# Patient Record
Sex: MALE | Race: WHITE | NOT HISPANIC OR LATINO | Employment: OTHER | URBAN - METROPOLITAN AREA
[De-identification: names, ages, dates, MRNs, and addresses within clinical notes are randomized per-mention and may not be internally consistent; named-entity substitution may affect disease eponyms.]

---

## 2018-08-12 ENCOUNTER — APPOINTMENT (OUTPATIENT)
Dept: GENERAL RADIOLOGY | Facility: CLINIC | Age: 58
End: 2018-08-12
Attending: NURSE PRACTITIONER
Payer: COMMERCIAL

## 2018-08-12 ENCOUNTER — NURSE TRIAGE (OUTPATIENT)
Dept: NURSING | Facility: CLINIC | Age: 58
End: 2018-08-12

## 2018-08-12 ENCOUNTER — HOSPITAL ENCOUNTER (OUTPATIENT)
Facility: CLINIC | Age: 58
Setting detail: OBSERVATION
Discharge: HOME | End: 2018-08-13
Attending: NURSE PRACTITIONER | Admitting: INTERNAL MEDICINE
Payer: COMMERCIAL

## 2018-08-12 DIAGNOSIS — E87.6 DIURETIC-INDUCED HYPOKALEMIA: ICD-10-CM

## 2018-08-12 DIAGNOSIS — R60.0 BILATERAL LEG EDEMA: ICD-10-CM

## 2018-08-12 DIAGNOSIS — I10 HYPERTENSION, UNSPECIFIED TYPE: ICD-10-CM

## 2018-08-12 DIAGNOSIS — E11.9 NEW ONSET TYPE 2 DIABETES MELLITUS (H): ICD-10-CM

## 2018-08-12 DIAGNOSIS — F43.22 ADJUSTMENT DISORDER WITH ANXIOUS MOOD: ICD-10-CM

## 2018-08-12 DIAGNOSIS — J90 PLEURAL EFFUSION: ICD-10-CM

## 2018-08-12 DIAGNOSIS — I50.9 PLEURAL EFFUSION DUE TO CONGESTIVE HEART FAILURE (H): ICD-10-CM

## 2018-08-12 DIAGNOSIS — I50.9 ACUTE CONGESTIVE HEART FAILURE, UNSPECIFIED CONGESTIVE HEART FAILURE TYPE: Primary | ICD-10-CM

## 2018-08-12 DIAGNOSIS — J90 EXUDATIVE PLEURISY: ICD-10-CM

## 2018-08-12 DIAGNOSIS — Z87.891 PERSONAL HISTORY OF TOBACCO USE, PRESENTING HAZARDS TO HEALTH: ICD-10-CM

## 2018-08-12 DIAGNOSIS — M79.89 SWELLING OF LIMB: ICD-10-CM

## 2018-08-12 DIAGNOSIS — E11.9 DIABETES MELLITUS WITHOUT COMPLICATION (H): ICD-10-CM

## 2018-08-12 DIAGNOSIS — T50.2X5A DIURETIC-INDUCED HYPOKALEMIA: ICD-10-CM

## 2018-08-12 PROBLEM — F10.10 ALCOHOL ABUSE: Status: ACTIVE | Noted: 2018-08-12

## 2018-08-12 PROBLEM — R17 ELEVATED BILIRUBIN: Status: ACTIVE | Noted: 2018-08-12

## 2018-08-12 PROBLEM — E87.1 HYPONATREMIA: Status: ACTIVE | Noted: 2018-08-12

## 2018-08-12 PROBLEM — R00.0 SINUS TACHYCARDIA: Status: ACTIVE | Noted: 2018-08-12

## 2018-08-12 PROBLEM — R03.0 ELEVATED BLOOD PRESSURE READING WITHOUT DIAGNOSIS OF HYPERTENSION: Status: ACTIVE | Noted: 2018-08-12

## 2018-08-12 LAB
ALBUMIN SERPL-MCNC: 3.5 G/DL (ref 3.4–5)
ALP SERPL-CCNC: 98 U/L (ref 40–150)
ALT SERPL W P-5'-P-CCNC: 55 U/L (ref 0–70)
ANION GAP SERPL CALCULATED.3IONS-SCNC: 12 MMOL/L (ref 3–14)
APTT PPP: 30 SEC (ref 22–37)
AST SERPL W P-5'-P-CCNC: 44 U/L (ref 0–45)
BASOPHILS # BLD AUTO: 0.1 10E9/L (ref 0–0.2)
BASOPHILS NFR BLD AUTO: 0.4 %
BILIRUB SERPL-MCNC: 1.6 MG/DL (ref 0.2–1.3)
BUN SERPL-MCNC: 17 MG/DL (ref 7–30)
CALCIUM SERPL-MCNC: 8.6 MG/DL (ref 8.5–10.1)
CHLORIDE SERPL-SCNC: 96 MMOL/L (ref 94–109)
CO2 SERPL-SCNC: 23 MMOL/L (ref 20–32)
CREAT SERPL-MCNC: 1.01 MG/DL (ref 0.66–1.25)
CRP SERPL-MCNC: 14.1 MG/L (ref 0–8)
DIFFERENTIAL METHOD BLD: ABNORMAL
EOSINOPHIL NFR BLD AUTO: 0.8 %
ERYTHROCYTE [DISTWIDTH] IN BLOOD BY AUTOMATED COUNT: 12.1 % (ref 10–15)
GFR SERPL CREATININE-BSD FRML MDRD: 75 ML/MIN/1.7M2
GLUCOSE BLDC GLUCOMTR-MCNC: 381 MG/DL (ref 70–99)
GLUCOSE SERPL-MCNC: 343 MG/DL (ref 70–99)
HBA1C MFR BLD: 11.6 % (ref 0–5.6)
HCT VFR BLD AUTO: 47.4 % (ref 40–53)
HGB BLD-MCNC: 16.2 G/DL (ref 13.3–17.7)
IMM GRANULOCYTES # BLD: 0.1 10E9/L (ref 0–0.4)
IMM GRANULOCYTES NFR BLD: 0.4 %
INR PPP: 1.12 (ref 0.86–1.14)
LYMPHOCYTES # BLD AUTO: 1.4 10E9/L (ref 0.8–5.3)
LYMPHOCYTES NFR BLD AUTO: 12.1 %
MCH RBC QN AUTO: 31.8 PG (ref 26.5–33)
MCHC RBC AUTO-ENTMCNC: 34.2 G/DL (ref 31.5–36.5)
MCV RBC AUTO: 93 FL (ref 78–100)
MONOCYTES # BLD AUTO: 1.4 10E9/L (ref 0–1.3)
MONOCYTES NFR BLD AUTO: 11.9 %
NEUTROPHILS # BLD AUTO: 8.8 10E9/L (ref 1.6–8.3)
NEUTROPHILS NFR BLD AUTO: 74.4 %
NRBC # BLD AUTO: 0 10*3/UL
NRBC BLD AUTO-RTO: 0 /100
NT-PROBNP SERPL-MCNC: 9087 PG/ML (ref 0–900)
PLATELET # BLD AUTO: 215 10E9/L (ref 150–450)
POTASSIUM SERPL-SCNC: 4.3 MMOL/L (ref 3.4–5.3)
PROT SERPL-MCNC: 6.6 G/DL (ref 6.8–8.8)
RBC # BLD AUTO: 5.1 10E12/L (ref 4.4–5.9)
SODIUM SERPL-SCNC: 131 MMOL/L (ref 133–144)
T4 FREE SERPL-MCNC: 1.41 NG/DL (ref 0.76–1.46)
TROPONIN I SERPL-MCNC: 0.02 UG/L (ref 0–0.04)
TROPONIN I SERPL-MCNC: 0.02 UG/L (ref 0–0.04)
TSH SERPL DL<=0.005 MIU/L-ACNC: 4.88 MU/L (ref 0.4–4)
WBC # BLD AUTO: 11.8 10E9/L (ref 4–11)

## 2018-08-12 PROCEDURE — 84443 ASSAY THYROID STIM HORMONE: CPT | Performed by: NURSE PRACTITIONER

## 2018-08-12 PROCEDURE — 00000146 ZZHCL STATISTIC GLUCOSE BY METER IP

## 2018-08-12 PROCEDURE — 96374 THER/PROPH/DIAG INJ IV PUSH: CPT | Performed by: NURSE PRACTITIONER

## 2018-08-12 PROCEDURE — G0378 HOSPITAL OBSERVATION PER HR: HCPCS

## 2018-08-12 PROCEDURE — 85610 PROTHROMBIN TIME: CPT | Performed by: NURSE PRACTITIONER

## 2018-08-12 PROCEDURE — 80053 COMPREHEN METABOLIC PANEL: CPT | Performed by: NURSE PRACTITIONER

## 2018-08-12 PROCEDURE — 99285 EMERGENCY DEPT VISIT HI MDM: CPT | Performed by: NURSE PRACTITIONER

## 2018-08-12 PROCEDURE — 83036 HEMOGLOBIN GLYCOSYLATED A1C: CPT | Performed by: NURSE PRACTITIONER

## 2018-08-12 PROCEDURE — 99219 ZZC INITIAL OBSERVATION CARE,LEVL II: CPT | Performed by: INTERNAL MEDICINE

## 2018-08-12 PROCEDURE — 96375 TX/PRO/DX INJ NEW DRUG ADDON: CPT | Performed by: NURSE PRACTITIONER

## 2018-08-12 PROCEDURE — 99285 EMERGENCY DEPT VISIT HI MDM: CPT | Mod: 25 | Performed by: NURSE PRACTITIONER

## 2018-08-12 PROCEDURE — 25000128 H RX IP 250 OP 636: Performed by: NURSE PRACTITIONER

## 2018-08-12 PROCEDURE — 84439 ASSAY OF FREE THYROXINE: CPT | Performed by: NURSE PRACTITIONER

## 2018-08-12 PROCEDURE — 93005 ELECTROCARDIOGRAM TRACING: CPT | Performed by: NURSE PRACTITIONER

## 2018-08-12 PROCEDURE — 36415 COLL VENOUS BLD VENIPUNCTURE: CPT | Performed by: NURSE PRACTITIONER

## 2018-08-12 PROCEDURE — 84484 ASSAY OF TROPONIN QUANT: CPT | Mod: 91 | Performed by: NURSE PRACTITIONER

## 2018-08-12 PROCEDURE — 71046 X-RAY EXAM CHEST 2 VIEWS: CPT | Mod: TC

## 2018-08-12 PROCEDURE — 85730 THROMBOPLASTIN TIME PARTIAL: CPT | Performed by: NURSE PRACTITIONER

## 2018-08-12 PROCEDURE — 86140 C-REACTIVE PROTEIN: CPT | Performed by: NURSE PRACTITIONER

## 2018-08-12 PROCEDURE — 83880 ASSAY OF NATRIURETIC PEPTIDE: CPT | Performed by: NURSE PRACTITIONER

## 2018-08-12 PROCEDURE — 93010 ELECTROCARDIOGRAM REPORT: CPT | Mod: Z6 | Performed by: NURSE PRACTITIONER

## 2018-08-12 PROCEDURE — 25000132 ZZH RX MED GY IP 250 OP 250 PS 637: Performed by: INTERNAL MEDICINE

## 2018-08-12 PROCEDURE — 85025 COMPLETE CBC W/AUTO DIFF WBC: CPT | Performed by: NURSE PRACTITIONER

## 2018-08-12 RX ORDER — LANOLIN ALCOHOL/MO/W.PET/CERES
100 CREAM (GRAM) TOPICAL DAILY
Status: DISCONTINUED | OUTPATIENT
Start: 2018-08-12 | End: 2018-08-13 | Stop reason: HOSPADM

## 2018-08-12 RX ORDER — ACETAMINOPHEN 650 MG/1
650 SUPPOSITORY RECTAL EVERY 4 HOURS PRN
Status: DISCONTINUED | OUTPATIENT
Start: 2018-08-12 | End: 2018-08-13 | Stop reason: HOSPADM

## 2018-08-12 RX ORDER — LISINOPRIL 2.5 MG/1
5 TABLET ORAL DAILY
Status: DISCONTINUED | OUTPATIENT
Start: 2018-08-12 | End: 2018-08-13 | Stop reason: HOSPADM

## 2018-08-12 RX ORDER — FUROSEMIDE 40 MG
40 TABLET ORAL
Status: DISCONTINUED | OUTPATIENT
Start: 2018-08-13 | End: 2018-08-13 | Stop reason: HOSPADM

## 2018-08-12 RX ORDER — METOPROLOL TARTRATE 50 MG
50 TABLET ORAL 2 TIMES DAILY
Status: DISCONTINUED | OUTPATIENT
Start: 2018-08-12 | End: 2018-08-13 | Stop reason: HOSPADM

## 2018-08-12 RX ORDER — DEXTROSE MONOHYDRATE 25 G/50ML
25-50 INJECTION, SOLUTION INTRAVENOUS
Status: DISCONTINUED | OUTPATIENT
Start: 2018-08-12 | End: 2018-08-13 | Stop reason: HOSPADM

## 2018-08-12 RX ORDER — NALOXONE HYDROCHLORIDE 0.4 MG/ML
.1-.4 INJECTION, SOLUTION INTRAMUSCULAR; INTRAVENOUS; SUBCUTANEOUS
Status: DISCONTINUED | OUTPATIENT
Start: 2018-08-12 | End: 2018-08-13 | Stop reason: HOSPADM

## 2018-08-12 RX ORDER — FOLIC ACID 1 MG/1
1 TABLET ORAL DAILY
Status: DISCONTINUED | OUTPATIENT
Start: 2018-08-12 | End: 2018-08-13 | Stop reason: HOSPADM

## 2018-08-12 RX ORDER — NICOTINE POLACRILEX 4 MG
15-30 LOZENGE BUCCAL
Status: DISCONTINUED | OUTPATIENT
Start: 2018-08-12 | End: 2018-08-13 | Stop reason: HOSPADM

## 2018-08-12 RX ORDER — ONDANSETRON 2 MG/ML
4 INJECTION INTRAMUSCULAR; INTRAVENOUS EVERY 6 HOURS PRN
Status: DISCONTINUED | OUTPATIENT
Start: 2018-08-12 | End: 2018-08-13 | Stop reason: HOSPADM

## 2018-08-12 RX ORDER — FUROSEMIDE 10 MG/ML
20 INJECTION INTRAMUSCULAR; INTRAVENOUS ONCE
Status: COMPLETED | OUTPATIENT
Start: 2018-08-12 | End: 2018-08-12

## 2018-08-12 RX ORDER — ONDANSETRON 4 MG/1
4 TABLET, ORALLY DISINTEGRATING ORAL EVERY 6 HOURS PRN
Status: DISCONTINUED | OUTPATIENT
Start: 2018-08-12 | End: 2018-08-13 | Stop reason: HOSPADM

## 2018-08-12 RX ORDER — ACETAMINOPHEN 325 MG/1
650 TABLET ORAL EVERY 4 HOURS PRN
Status: DISCONTINUED | OUTPATIENT
Start: 2018-08-12 | End: 2018-08-13 | Stop reason: HOSPADM

## 2018-08-12 RX ORDER — LORAZEPAM 2 MG/ML
1 INJECTION INTRAMUSCULAR ONCE
Status: COMPLETED | OUTPATIENT
Start: 2018-08-12 | End: 2018-08-12

## 2018-08-12 RX ADMIN — Medication 100 MG: at 23:59

## 2018-08-12 RX ADMIN — METFORMIN HYDROCHLORIDE 500 MG: 500 TABLET ORAL at 23:59

## 2018-08-12 RX ADMIN — LORAZEPAM 1 MG: 2 INJECTION INTRAMUSCULAR; INTRAVENOUS at 20:12

## 2018-08-12 RX ADMIN — FOLIC ACID 1 MG: 1 TABLET ORAL at 23:59

## 2018-08-12 RX ADMIN — FUROSEMIDE 20 MG: 10 INJECTION, SOLUTION INTRAVENOUS at 19:27

## 2018-08-12 RX ADMIN — METOPROLOL TARTRATE 50 MG: 50 TABLET, FILM COATED ORAL at 23:59

## 2018-08-12 ASSESSMENT — ENCOUNTER SYMPTOMS: SHORTNESS OF BREATH: 1

## 2018-08-12 NOTE — IP AVS SNAPSHOT
MRN:[id number]                      After Visit Summary   8/12/2018    Sushil Diana    MRN: [id number]           Thank you!     Thank you for choosing [facility] for your care. Our goal is always to provide you with excellent care. Hearing back from our patients is one way we can continue to improve our services. Please take a few minutes to complete the written survey that you may receive in the mail after you visit with us. Thank you!        Patient Information     Date Of Birth          1960        About your hospital stay     You were admitted on:  August 12, 2018 You last received care in the:  [address]    You were discharged on:  August 13, 2018        Reason for your hospital stay       Hospitalized for swelling in your legs and after testing was done, you have a new diagnosis of heart failure, high blood pressure, and diabetes.                  Who to Call     For medical emergencies, please call 911.  For non-urgent questions about your medical care, please call your primary care provider or clinic, [phone]          Attending Provider     Provider Specialty    Kendra Hill APRN CNP Nurse Practitioner - Family    Ben Ventura MD Internal Medicine       Primary Care Provider Office Phone # Fax #    [facility] [phone]       When to contact your care team       Call your primary doctor if you have any of the following: starting to shake or have tremors,  increased shortness of breath or increased swelling.  Symptoms/Problems to look for at home and call the physician about:                  After Care Instructions     Activity       Your activity upon discharge: activity as tolerated            Diet       Follow this diet upon discharge: Orders Placed This Encounter      Room Service      Combination Diet [id number] Calories: Moderate Consistent CHO (4-6 CHO units/meal); 2 gm NA Diet            Monitor and  record       blood glucose 4 times a day, before meals and at bedtime  blood pressure daily  weight every day                  Follow-up Appointments     Follow-up and recommended labs and tests        Follow up with primary care provider, [facility], within 2 weeks, for hospital follow- up and regarding new diagnosis. The following labs/tests are recommended: BMP.                  Your next 10 appointments already scheduled     Aug 22, 2018  8:20 AM CDT   [facility]    [address] [facility] [address] [phone]           Bring a current list of meds and any records pertaining to this visit. For Physicals, please bring immunization records and any forms needing to be filled out. Please arrive 10 minutes early to complete paperwork.              Additional Services     DIABETES EDUCATOR REFERRAL       DIABETES SELF MANAGEMENT TRAINING (DSMT)      Your provider has referred you to Diabetes Education: [facility]: Diabetes Education - [facility] [phone]   [email or web address]     If an urgent visit is needed or A1C is above 12, Care Team to call the Diabetes  Education Team at [phone] or send an In Basket message to the Diabetes Education Pool (P DIAB ED-PATIENT CARE).    A  will call you to make your appointment. If it has been more than 3 business days since your referral was placed, please call the above phone number to schedule.    Type of training and number of hours: New Diagnosis: Initial group DSMT - 10 hours.      Diabetes Type: Type 2 - On Oral Medication   Medicare covers: 10 hours of initial DSMT in 12 month period from the time of first visit, plus 2 hours of follow-up DSMT annually, and additional hours as requested for insulin training.         Diabetes Co-Morbidities: hypertension and mental/affective disorder                A1C Goal:  <7.0       A1C is: Lab Results       Component                Value               Date                       A1C                      11.6                08/12/2018              MEDICAL NUTRITION THERAPY (MNT) for Diabetes    Medical Nutrition Therapy with a Registered Dietitian can be provided in coordination with Diabetes Self-Management Training to assist in achieving optimal diabetes management.    MNT Type and Hours: New diagnosis: Initial MNT - 3 hours                       Medicare will cover: 3 hours initial MNT in 12 month period after first visit, plus 2 hours of follow-up MNT annually        Diabetes Education Topics: Comprehensive Knowledge Assessment and Instruction    Special Educational Needs Requiring Individual DSMT: None      Please be aware that coverage of these services is subject to the terms and limitations of your health insurance plan.  Call member services at your health plan to determine Diabetes Self-Management Training (Codes  and ) and Medical Nutrition Therapy (Codes 53021 and 21894) benefits and ask which blood glucose monitor brands are covered by your plan.  Please bring the following with you to your appointment:    (1)  List of current medications   (2)  List of Blood Glucose Monitor brands that are covered by your insurance plan  (3)  Blood Glucose Monitor and log book  (4)   Food records for the 3 days prior to your visit    The Certified Diabetes Educator may make diabetes medication adjustments per the CDE Protocol and Collaborative Practice Agreement.                  Pending Results     No orders found for last 3 day(s).            Statement of Approval     Ordered          08/13/18 1337  I have reviewed and agree with all the recommendations and orders detailed in this document.  EFFECTIVE NOW     Approved and electronically signed by:  Ben Mazariegos MD             Admission Information     Date & Time Provider Department Dept. Phone     "2018 Ben Ventura MD [address] [facility] [phone]      Your Vitals Were     Blood Pressure Pulse Temperature Respirations Height Weight    116/83 97 95.9  F (35.5  C) (Oral) 20 1.727 m (5' 8\") 87.1 kg (192 lb 0.3 oz)    Pulse Oximetry BMI (Body Mass Index)                97% 29.2 kg/m2          [facility] Information     [facility] lets you send messages to your doctor, view your test results, renew your prescriptions, schedule appointments and more. To sign up, go to [email or web address] . Click on \"Log in\" on the left side of the screen, which will take you to the Welcome page. Then click on \"Sign up Now\" on the right side of the page.     You will be asked to enter the access code listed below, as well as some personal information. Please follow the directions to create your username and password.     Your access code is: [email or web address]  Expires: 2018 11:53 AM     Your access code will  in 90 days. If you need help or a new code, please call your [facility] clinic or [phone].        Care EveryWhere ID     This is your Care EveryWhere ID. This could be used by other organizations to access your [facility] medical records  [id number]        Equal Access to Services     LALY DIAZ AH: Hadii ravinder houser hadasho Sovictor manuel, waaxda luqadaha, qaybta kaalmada eulalio, mariposa khan. So [facility] [phone].    ATENCIÓN: Si habla español, tiene a devi disposición servicios gratuitos de asistencia lingüística. aMlgorzata al [phone].    We comply with applicable federal civil rights laws and [place] laws. We do not discriminate on the basis of race, color, national origin, age, disability, sex, sexual orientation, or gender identity.               Review of your medicines      START taking        Dose / Directions    aspirin 81 MG chewable tablet        Dose:  81 mg   Take 1 tablet (81 mg) by mouth daily   Quantity:  108 tablet   Refills:  3       " atorvastatin 20 MG tablet   Commonly known as:  LIPITOR   Used for:  Diabetes mellitus without complication (H)        Dose:  20 mg   Take 1 tablet (20 mg) by mouth daily   Quantity:  30 tablet   Refills:  0       blood glucose lancets standard   Commonly known as:  no brand specified   Used for:  Diabetes mellitus without complication (H)        Use to test blood sugar 1-3 times daily or as directed.   Quantity:  100 each   Refills:  11       blood glucose monitoring meter device kit   Commonly known as:  no brand specified   Used for:  Diabetes mellitus without complication (H)        Use to test blood sugar 1-3 times daily or as directed.   Quantity:  1 kit   Refills:  0       blood glucose monitoring test strip   Commonly known as:  no brand specified   Used for:  Diabetes mellitus without complication (H)        Use to test blood sugars 1-3 times daily or as directed   Quantity:  100 strip   Refills:  0       citalopram 20 MG tablet   Commonly known as:  celeXA   Used for:  Adjustment disorder with anxious mood        Take 1/2 tablet (10 mg) for 1-2 weeks, then increase to 1 tablet orally daily   Quantity:  30 tablet   Refills:  0       furosemide 40 MG tablet   Commonly known as:  LASIX   Used for:  Swelling of limb        Dose:  40 mg   Take 1 tablet (40 mg) by mouth 2 times daily   Quantity:  60 tablet   Refills:  0       glipiZIDE 5 MG tablet   Commonly known as:  GLUCOTROL   Used for:  Diabetes mellitus without complication (H)        Dose:  5 mg   Take 1 tablet (5 mg) by mouth every morning (before breakfast)   Quantity:  30 tablet   Refills:  0       lisinopril 10 MG tablet   Commonly known as:  PRINIVIL/ZESTRIL   Used for:  Hypertension, unspecified type        Dose:  10 mg   Start taking on:  8/14/2018   Take 1 tablet (10 mg) by mouth daily   Quantity:  30 tablet   Refills:  0       metFORMIN 500 MG 24 hr tablet   Commonly known as:  GLUCOPHAGE-XR        Dose:  1000 mg   Take 2 tablets (1,000 mg) by  mouth daily (with dinner)   Quantity:  60 tablet   Refills:  0       metoprolol tartrate 50 MG tablet   Commonly known as:  LOPRESSOR   Used for:  Hypertension, unspecified type        Dose:  50 mg   Take 1 tablet (50 mg) by mouth 2 times daily   Quantity:  60 tablet   Refills:  0       potassium chloride SA 20 MEQ CR tablet   Commonly known as:  KLOR-CON   Used for:  Bilateral leg edema, Diuretic-induced hypokalemia        Dose:  20 mEq   Take 1 tablet (20 mEq) by mouth daily   Quantity:  30 tablet   Refills:  0         CONTINUE these medicines which have NOT CHANGED        Dose / Directions    omeprazole 20 MG CR capsule   Commonly known as:  priLOSEC   Used for:  Esophageal reflux        1 CAPSULE DAILY   Quantity:  30   Refills:  0         STOP taking     allopurinol 300 MG tablet   Commonly known as:  ZYLOPRIM           ibuprofen 600 MG tablet   Commonly known as:  ADVIL/MOTRIN           SILVADENE 1 % cream   Generic drug:  silver sulfADIAZINE           VICODIN 5-500 MG per tablet   Generic drug:  HYDROcodone-acetaminophen                Where to get your medicines      These medications were sent to [facility] [place] [id number] [facility]  [address] [place] [address]     Phone:  [phone]     aspirin 81 MG chewable tablet         These medications were sent to [facility] [place] [id number] [facility]  [address] [facility] [address]     Phone:  [phone]     atorvastatin 20 MG tablet    blood glucose lancets standard    blood glucose monitoring meter device kit    blood glucose monitoring test strip    citalopram 20 MG tablet    furosemide 40 MG tablet    glipiZIDE 5 MG tablet    lisinopril 10 MG tablet    metFORMIN 500 MG 24 hr tablet    metoprolol tartrate 50 MG tablet    potassium chloride SA 20 MEQ CR tablet                Protect others around you: Learn how to safely use, store and throw away your medicines at  www.disposemymeds.org.             Medication List: This is a list of all your medications and when to take them. Check marks below indicate your daily home schedule. Keep this list as a reference.      Medications           Morning Afternoon Evening Bedtime As Needed    aspirin 81 MG chewable tablet   Take 1 tablet (81 mg) by mouth daily                                   atorvastatin 20 MG tablet   Commonly known as:  LIPITOR   Take 1 tablet (20 mg) by mouth daily                                   blood glucose lancets standard   Commonly known as:  no brand specified   Use to test blood sugar 1-3 times daily or as directed.                                blood glucose monitoring meter device kit   Commonly known as:  no brand specified   Use to test blood sugar 1-3 times daily or as directed.                                blood glucose monitoring test strip   Commonly known as:  no brand specified   Use to test blood sugars 1-3 times daily or as directed                                citalopram 20 MG tablet   Commonly known as:  celeXA   Take 1/2 tablet (10 mg) for 1-2 weeks, then increase to 1 tablet orally daily                                   furosemide 40 MG tablet   Commonly known as:  LASIX   Take 1 tablet (40 mg) by mouth 2 times daily   Last time this was given:  40 mg on 8/13/2018 10:15 AM                                      glipiZIDE 5 MG tablet   Commonly known as:  GLUCOTROL   Take 1 tablet (5 mg) by mouth every morning (before breakfast)   Last time this was given:  5 mg on 8/13/2018  3:07 AM                                   lisinopril 10 MG tablet   Commonly known as:  PRINIVIL/ZESTRIL   Take 1 tablet (10 mg) by mouth daily   Start taking on:  8/14/2018   Last time this was given:  5 mg on 8/13/2018 10:14 AM                                   metFORMIN 500 MG 24 hr tablet   Commonly known as:  GLUCOPHAGE-XR   Take 2 tablets (1,000 mg) by mouth daily (with dinner)                                    metoprolol tartrate 50 MG tablet   Commonly known as:  LOPRESSOR   Take 1 tablet (50 mg) by mouth 2 times daily   Last time this was given:  50 mg on 8/13/2018 10:15 AM                                      omeprazole 20 MG CR capsule   Commonly known as:  priLOSEC   1 CAPSULE DAILY   Last time this was given:  20 mg on 8/13/2018 10:15 AM                                   potassium chloride SA 20 MEQ CR tablet   Commonly known as:  KLOR-CON   Take 1 tablet (20 mEq) by mouth daily                                             More Information        Metoprolol tablets  Brand Name: Lopressor  What is this medicine?  METOPROLOL (me TOE proe lole) is a beta-blocker. Beta-blockers reduce the workload on the heart and help it to beat more regularly. This medicine is used to treat high blood pressure and to prevent chest pain. It is also used to after a heart attack and to prevent an additional heart attack from occurring.  How should I use this medicine?  Take this medicine by mouth with a drink of water. Follow the directions on the prescription label. Take this medicine immediately after meals. Take your doses at regular intervals. Do not take more medicine than directed. Do not stop taking this medicine suddenly. This could lead to serious heart-related effects.  Talk to your pediatrician regarding the use of this medicine in children. Special care may be needed.  What side effects may I notice from receiving this medicine?  Side effects that you should report to your doctor or health care professional as soon as possible:    allergic reactions like skin rash, itching or hives    cold or numb hands or feet    depression    difficulty breathing    faint    fever with sore throat    irregular heartbeat, chest pain    rapid weight gain    swollen legs or ankles  Side effects that usually do not require medical attention (report to your doctor or health care professional if they continue or are bothersome):    anxiety or  nervousness    change in sex drive or performance    dry skin    headache    nightmares or trouble sleeping    short term memory loss    stomach upset or diarrhea    unusually tired  What may interact with this medicine?  This medicine may interact with the following medications:    certain medicines for blood pressure, heart disease, irregular heart beat    certain medicines for depression like monoamine oxidase (MAO) inhibitors, fluoxetine, or paroxetine    clonidine    dobutamine    epinephrine    isoproterenol    reserpine  What if I miss a dose?  If you miss a dose, take it as soon as you can. If it is almost time for your next dose, take only that dose. Do not take double or extra doses.  Where should I keep my medicine?  Keep out of the reach of children.  Store at room temperature between 15 and 30 degrees C (59 and 86 degrees F). Throw away any unused medicine after the expiration date.  What should I tell my health care provider before I take this medicine?  They need to know if you have any of these conditions:    diabetes    heart or vessel disease like slow heart rate, worsening heart failure, heart block, sick sinus syndrome or Raynaud's disease    kidney disease    liver disease    lung or breathing disease, like asthma or emphysema    pheochromocytoma    thyroid disease    an unusual or allergic reaction to metoprolol, other beta-blockers, medicines, foods, dyes, or preservatives    pregnant or trying to get pregnant    breast-feeding  What should I watch for while using this medicine?  Visit your doctor or health care professional for regular check ups. Contact your doctor right away if your symptoms worsen. Check your blood pressure and pulse rate regularly. Ask your health care professional what your blood pressure and pulse rate should be, and when you should contact them.  You may get drowsy or dizzy. Do not drive, use machinery, or do anything that needs mental alertness until you know how this  medicine affects you. Do not sit or stand up quickly, especially if you are an older patient. This reduces the risk of dizzy or fainting spells. Contact your doctor if these symptoms continue. Alcohol may interfere with the effect of this medicine. Avoid alcoholic drinks.  NOTE:This sheet is a summary. It may not cover all possible information. If you have questions about this medicine, talk to your doctor, pharmacist, or health care provider. Copyright  2018 [employer]                Patient Education    Furosemide Oral solution    Furosemide Oral tablet    Furosemide Solution for injection  Furosemide Oral tablet  What is this medicine?  FUROSEMIDE (fyoor [place] se mide) is a diuretic. It helps you make more urine and to lose salt and excess water from your body. This medicine is used to treat high blood pressure, and edema or swelling from heart, kidney, or liver disease.  This medicine may be used for other purposes; ask your health care provider or pharmacist if you have questions.  What should I tell my health care provider before I take this medicine?  They need to know if you have any of these conditions:    abnormal blood electrolytes    diarrhea or vomiting    gout    heart disease    kidney disease, small amounts of urine, or difficulty passing urine    liver disease    an unusual or allergic reaction to furosemide, sulfa drugs, other medicines, foods, dyes, or preservatives    pregnant or trying to get pregnant    breast-feeding  How should I use this medicine?  Take this medicine by mouth with a glass of water. Follow the directions on the prescription label. You may take this medicine with or without food. If it upsets your stomach, take it with food or milk. Do not take your medicine more often than directed. Remember that you will need to pass more urine after taking this medicine. Do not take your medicine at a time of day that will cause you problems. Do not take at bedtime.  Talk to your pediatrician  regarding the use of this medicine in children. While this drug may be prescribed for selected conditions, precautions do apply.  Overdosage: If you think you have taken too much of this medicine contact a poison control center or emergency room at once.  NOTE: This medicine is only for you. Do not share this medicine with others.  What if I miss a dose?  If you miss a dose, take it as soon as you can. If it is almost time for your next dose, take only that dose. Do not take double or extra doses.  What may interact with this medicine?    aspirin and aspirin-like medicines    certain antibiotics    chloral hydrate    cisplatin    cyclosporine    digoxin    diuretics    laxatives    lithium    medicines for blood pressure    medicines that relax muscles for surgery    methotrexate    NSAIDs, medicines for pain and inflammation like ibuprofen, naproxen, or indomethacin    phenytoin    steroid medicines like prednisone or cortisone    sucralfate  This list may not describe all possible interactions. Give your health care provider a list of all the medicines, herbs, non-prescription drugs, or dietary supplements you use. Also tell them if you smoke, drink alcohol, or use illegal drugs. Some items may interact with your medicine.  What should I watch for while using this medicine?  Visit your doctor or health care professional for regular checks on your progress. Check your blood pressure regularly. Ask your doctor or health care professional what your blood pressure should be, and when you should contact him or her. If you are a diabetic, check your blood sugar as directed.  You may need to be on a special diet while taking this medicine. Check with your doctor. Also, ask how many glasses of fluid you need to drink a day. You must not get dehydrated.  You may get drowsy or dizzy. Do not drive, use machinery, or do anything that needs mental alertness until you know how this drug affects you. Do not stand or sit up  quickly, especially if you are an older patient. This reduces the risk of dizzy or fainting spells. Alcohol can make you more drowsy and dizzy. Avoid alcoholic drinks.  This medicine can make you more sensitive to the sun. Keep out of the sun. If you cannot avoid being in the sun, wear protective clothing and use sunscreen. Do not use sun lamps or tanning beds/booths.  What side effects may I notice from receiving this medicine?  Side effects that you should report to your doctor or health care professional as soon as possible:    blood in urine or stools    dry mouth    fever or chills    hearing loss or ringing in the ears    irregular heartbeat    muscle pain or weakness, cramps    skin rash    stomach upset, pain, or nausea    tingling or numbness in the hands or feet    unusually weak or tired    vomiting or diarrhea    yellowing of the eyes or skin  Side effects that usually do not require medical attention (report to your doctor or health care professional if they continue or are bothersome):    headache    loss of appetite    unusual bleeding or bruising  This list may not describe all possible side effects. Call your doctor for medical advice about side effects. You may report side effects to FDA at 9-927-FDA-8905.  Where should I keep my medicine?  Keep out of the reach of children.  Store at room temperature between 15 and 30 degrees C (59 and 86 degrees F). Protect from light. Throw away any unused medicine after the expiration date.  NOTE:This sheet is a summary. It may not cover all possible information. If you have questions about this medicine, talk to your doctor, pharmacist, or health care provider. Copyright  2016 [employer]                Lisinopril tablets  Brand Names: Prinivil, Zestril  What is this medicine?  LISINOPRIL (lyse IN oh pril) is an ACE inhibitor. This medicine is used to treat high blood pressure and heart failure. It is also used to protect the heart immediately after a heart  attack.  How should I use this medicine?  Take this medicine by mouth with a glass of water. Follow the directions on your prescription label. You may take this medicine with or without food. If it upsets your stomach, take it with food. Take your medicine at regular intervals. Do not take it more often than directed. Do not stop taking except on your doctor's advice.  Talk to your pediatrician regarding the use of this medicine in children. Special care may be needed. While this drug may be prescribed for children as young as 6 years of age for selected conditions, precautions do apply.  What side effects may I notice from receiving this medicine?  Side effects that you should report to your doctor or health care professional as soon as possible:    allergic reactions like skin rash, itching or hives, swelling of the hands, feet, face, lips, throat, or tongue    breathing problems    signs and symptoms of kidney injury like trouble passing urine or change in the amount of urine    signs and symptoms of increased potassium like muscle weakness; chest pain; or fast, irregular heartbeat    signs and symptoms of liver injury like dark yellow or brown urine; general ill feeling or flu-like symptoms; light-colored stools; loss of appetite; nausea; right upper belly pain; unusually weak or tired; yellowing of the eyes or skin    signs and symptoms of low blood pressure like dizziness; feeling faint or lightheaded, falls; unusually weak or tired    stomach pain with or without nausea and vomiting  Side effects that usually do not require medical attention (report to your doctor or health care professional if they continue or are bothersome):    changes in taste    cough    dizziness    fever    headache    sensitivity to light  What may interact with this medicine?  Do not take this medicine with any of the following medications:    hymenoptera venom    sacubitril; valsartan  This medicines may also interact with the  following medications:    aliskiren    angiotensin receptor blockers, like losartan or valsartan    certain medicines for diabetes    diuretics    everolimus    gold compounds    lithium    NSAIDs, medicines for pain and inflammation, like ibuprofen or naproxen    potassium salts or supplements    salt substitutes    sirolimus    temsirolimus  What if I miss a dose?  If you miss a dose, take it as soon as you can. If it is almost time for your next dose, take only that dose. Do not take double or extra doses.  Where should I keep my medicine?  Keep out of the reach of children.  Store at room temperature between 15 and 30 degrees C (59 and 86 degrees F). Protect from moisture. Keep container tightly closed. Throw away any unused medicine after the expiration date.  What should I tell my health care provider before I take this medicine?  They need to know if you have any of these conditions:    diabetes    heart or blood vessel disease    kidney disease    low blood pressure    previous swelling of the tongue, face, or lips with difficulty breathing, difficulty swallowing, hoarseness, or tightening of the throat    an unusual or allergic reaction to lisinopril, other ACE inhibitors, insect venom, foods, dyes, or preservatives    pregnant or trying to get pregnant    breast-feeding  What should I watch for while using this medicine?  Visit your doctor or health care professional for regular check ups. Check your blood pressure as directed. Ask your doctor what your blood pressure should be, and when you should contact him or her.  Do not treat yourself for coughs, colds, or pain while you are using this medicine without asking your doctor or health care professional for advice. Some ingredients may increase your blood pressure.  Women should inform their doctor if they wish to become pregnant or think they might be pregnant. There is a potential for serious side effects to an unborn child. Talk to your health care  professional or pharmacist for more information.  Check with your doctor or health care professional if you get an attack of severe diarrhea, nausea and vomiting, or if you sweat a lot. The loss of too much body fluid can make it dangerous for you to take this medicine.  You may get drowsy or dizzy. Do not drive, use machinery, or do anything that needs mental alertness until you know how this drug affects you. Do not stand or sit up quickly, especially if you are an older patient. This reduces the risk of dizzy or fainting spells. Alcohol can make you more drowsy and dizzy. Avoid alcoholic drinks.  Avoid salt substitutes unless you are told otherwise by your doctor or health care professional.  NOTE:This sheet is a summary. It may not cover all possible information. If you have questions about this medicine, talk to your doctor, pharmacist, or health care provider. Copyright  2018 [employer]

## 2018-08-12 NOTE — TELEPHONE ENCOUNTER
C/o bilateral LE swelling x 2 weeks. Includes feet, ankles, calves up to knees. Legs feel tight. A tiny bit of weeping. No pain or redness.  No SOB, no chest pain. Working outside in heat, on feet a lot or sitting. Swelling improves overnight. Elevating legs in recliner, has not seemed to help. Advised see provider within 24 hrs per guideline. Discussed guideline home care advice. Pt voiced understanding and agreement.  Tatyana Lora RN/FNA      Reason for Disposition    [1] MODERATE leg swelling (e.g., swelling extends up to knees) AND [2] new onset or worsening    Additional Information    Negative: Severe difficulty breathing (e.g., struggling for each breath, speaks in single words)    Negative: Looks like a broken bone or dislocated joint (e.g., crooked or deformed)    Negative: Sounds like a life-threatening emergency to the triager    Negative: Chest pain    Negative: Followed a leg injury    Negative: [1] Small area of swelling AND [2] followed an insect bite to the area    Negative: Swelling of one ankle joint    Negative: Swelling of knee is main symptom    Negative: Pregnant    Negative: Postpartum (< 1 month since delivery)    Negative: Difficulty breathing at rest    Negative: Entire foot is cool or blue in comparison to other side    Negative: [1] Can't walk or can barely walk AND [2] new onset    Negative: [1] Difficulty breathing with exertion (e.g., walking) AND [2] new onset or worsening    Negative: [1] Red area or streak AND [2] fever    Negative: [1] Swelling is painful to touch AND [2] fever    Negative: [1] Cast on leg or ankle AND [2] now increased pain    Negative: Patient sounds very sick or weak to the triager    Negative: SEVERE leg swelling (e.g., swelling extends above knee, entire leg is swollen, weeping fluid)    Negative: [1] Red area or streak [2] large (> 2 in. or 5 cm)    Negative: [1] Thigh or calf pain AND [2] only 1 side AND [3] present > 1 hour    Negative: [1] Thigh, calf, or  ankle swelling AND [2] only 1 side    Negative: [1] Thigh, calf, or ankle swelling AND [2] bilateral AND [3] 1 side is more swollen    Protocols used: LEG SWELLING AND EDEMA-ADULT-AH

## 2018-08-12 NOTE — LETTER
Transition Communication Hand-off for Care Transitions to Next Level of Care Provider    Name: Sushil Diana  : 1960  MRN #: [id number]  Primary Care Provider: [facility]  Primary Care MD Name: Dr. Matson  Primary Clinic: [address] [facility] [address]  Primary Care Clinic Name: [facility]  Reason for Hospitalization:  Pleural effusion [J90]  Bilateral leg edema [R60.0]  New onset type 2 diabetes mellitus (H) [E11.9]  Acute congestive heart failure, unspecified congestive heart failure type (H) [I50.9]  Admit Date/Time: 2018  5:55 PM  Discharge Date: 18  Payor Source: Payor: BLUE PLUS / Plan: BLUE PLUS MA / Product Type: HMO /     Readmission Assessment Measure (BRADY) Risk Score/category: N/A - OBS         Reason for Communication Hand-off Referral: Admission diagnoses: CHF    Discharge Plan: Home     Concern for non-adherence with plan of care:   Y/N : No    Discharge Needs Assessment:  Needs       Most Recent Value    Transportation Available family or friend will provide    # of Referrals Placed by [facility] Care Coordination        Follow-up plan:  Future Appointments  Date Time Provider Department Center   2018 8:20 AM Mark Matson DO [facility] MEDIC         Key Recommendations:  Patient with new diagnosis of CHF and new diabetes.  Does not have any discharge needs but needs to follow up close in clinic.    PATRICIA Walsh  [facility] [phone]/ [facility] [phone]    AVS/Discharge Summary is the source of truth; this is a helpful guide for improved communication of patient story

## 2018-08-12 NOTE — ED TRIAGE NOTES
Pt comes in with complaints of bilateral leg swelling that has started over the last few weeks. Pt denies SOB. Pt denies any other symptoms. Pt is very anxious at triage.

## 2018-08-12 NOTE — IP AVS SNAPSHOT
[address] [facility]    [address] [place] [address]    Phone:  [phone]                                       After Visit Summary   8/12/2018    Sushil Diana    MRN: [id number]           After Visit Summary Signature Page     I have received my discharge instructions, and my questions have been answered. I have discussed any challenges I see with this plan with the nurse or doctor.    ..........................................................................................................................................  Patient/Patient Representative Signature      ..........................................................................................................................................  Patient Representative Print Name and Relationship to Patient    ..................................................               ................................................  Date                                            Time    ..........................................................................................................................................  Reviewed by Signature/Title    ...................................................              ..............................................  Date                                                            Time
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
## 2018-08-13 ENCOUNTER — APPOINTMENT (OUTPATIENT)
Dept: CARDIOLOGY | Facility: CLINIC | Age: 58
End: 2018-08-13
Attending: INTERNAL MEDICINE
Payer: COMMERCIAL

## 2018-08-13 VITALS
TEMPERATURE: 95.9 F | RESPIRATION RATE: 20 BRPM | HEIGHT: 68 IN | DIASTOLIC BLOOD PRESSURE: 83 MMHG | WEIGHT: 192.02 LBS | BODY MASS INDEX: 29.1 KG/M2 | OXYGEN SATURATION: 97 % | SYSTOLIC BLOOD PRESSURE: 116 MMHG | HEART RATE: 97 BPM

## 2018-08-13 PROBLEM — I50.43 ACUTE ON CHRONIC COMBINED SYSTOLIC AND DIASTOLIC CONGESTIVE HEART FAILURE (H): Status: ACTIVE | Noted: 2018-08-12

## 2018-08-13 LAB
ALBUMIN SERPL-MCNC: 3.1 G/DL (ref 3.4–5)
ALP SERPL-CCNC: 85 U/L (ref 40–150)
ALT SERPL W P-5'-P-CCNC: 49 U/L (ref 0–70)
ANION GAP SERPL CALCULATED.3IONS-SCNC: 7 MMOL/L (ref 3–14)
AST SERPL W P-5'-P-CCNC: 41 U/L (ref 0–45)
BILIRUB SERPL-MCNC: 1.6 MG/DL (ref 0.2–1.3)
BUN SERPL-MCNC: 19 MG/DL (ref 7–30)
CALCIUM SERPL-MCNC: 8.5 MG/DL (ref 8.5–10.1)
CHLORIDE SERPL-SCNC: 98 MMOL/L (ref 94–109)
CO2 SERPL-SCNC: 28 MMOL/L (ref 20–32)
CREAT SERPL-MCNC: 1.13 MG/DL (ref 0.66–1.25)
GFR SERPL CREATININE-BSD FRML MDRD: 66 ML/MIN/1.7M2
GLUCOSE BLDC GLUCOMTR-MCNC: 155 MG/DL (ref 70–99)
GLUCOSE BLDC GLUCOMTR-MCNC: 238 MG/DL (ref 70–99)
GLUCOSE BLDC GLUCOMTR-MCNC: 386 MG/DL (ref 70–99)
GLUCOSE SERPL-MCNC: 276 MG/DL (ref 70–99)
POTASSIUM SERPL-SCNC: 4.2 MMOL/L (ref 3.4–5.3)
PROT SERPL-MCNC: 5.9 G/DL (ref 6.8–8.8)
SODIUM SERPL-SCNC: 133 MMOL/L (ref 133–144)

## 2018-08-13 PROCEDURE — 80053 COMPREHEN METABOLIC PANEL: CPT | Performed by: INTERNAL MEDICINE

## 2018-08-13 PROCEDURE — 99217 ZZC OBSERVATION CARE DISCHARGE: CPT | Performed by: FAMILY MEDICINE

## 2018-08-13 PROCEDURE — 25000132 ZZH RX MED GY IP 250 OP 250 PS 637: Performed by: INTERNAL MEDICINE

## 2018-08-13 PROCEDURE — 93306 TTE W/DOPPLER COMPLETE: CPT

## 2018-08-13 PROCEDURE — 25500064 ZZH RX 255 OP 636: Performed by: INTERNAL MEDICINE

## 2018-08-13 PROCEDURE — 93306 TTE W/DOPPLER COMPLETE: CPT | Mod: 26 | Performed by: INTERNAL MEDICINE

## 2018-08-13 PROCEDURE — 99207 ZZC MOONLIGHTING INDICATOR: CPT | Performed by: FAMILY MEDICINE

## 2018-08-13 PROCEDURE — 36415 COLL VENOUS BLD VENIPUNCTURE: CPT | Performed by: INTERNAL MEDICINE

## 2018-08-13 PROCEDURE — 84443 ASSAY THYROID STIM HORMONE: CPT | Performed by: INTERNAL MEDICINE

## 2018-08-13 PROCEDURE — G0378 HOSPITAL OBSERVATION PER HR: HCPCS

## 2018-08-13 PROCEDURE — 00000146 ZZHCL STATISTIC GLUCOSE BY METER IP

## 2018-08-13 RX ORDER — LISINOPRIL 10 MG/1
10 TABLET ORAL DAILY
Qty: 30 TABLET | Refills: 0 | Status: SHIPPED | OUTPATIENT
Start: 2018-08-14 | End: 2018-08-13

## 2018-08-13 RX ORDER — METOPROLOL TARTRATE 50 MG
50 TABLET ORAL 2 TIMES DAILY
Qty: 60 TABLET | Refills: 0 | Status: SHIPPED | OUTPATIENT
Start: 2018-08-13 | End: 2018-08-31

## 2018-08-13 RX ORDER — ASPIRIN 81 MG/1
81 TABLET, CHEWABLE ORAL DAILY
Qty: 108 TABLET | Refills: 3 | Status: SHIPPED | OUTPATIENT
Start: 2018-08-13 | End: 2019-09-03

## 2018-08-13 RX ORDER — LISINOPRIL 5 MG/1
10 TABLET ORAL DAILY
Qty: 60 TABLET | Refills: 0 | Status: SHIPPED | OUTPATIENT
Start: 2018-08-14 | End: 2018-08-13

## 2018-08-13 RX ORDER — ATORVASTATIN CALCIUM 20 MG/1
20 TABLET, FILM COATED ORAL DAILY
Qty: 30 TABLET | Refills: 0 | Status: SHIPPED | OUTPATIENT
Start: 2018-08-13 | End: 2018-08-31

## 2018-08-13 RX ORDER — METFORMIN HCL 500 MG
1000 TABLET, EXTENDED RELEASE 24 HR ORAL
Qty: 60 TABLET | Refills: 0 | Status: SHIPPED | OUTPATIENT
Start: 2018-08-13 | End: 2018-08-13

## 2018-08-13 RX ORDER — GLIPIZIDE 5 MG/1
5 TABLET ORAL
Status: DISCONTINUED | OUTPATIENT
Start: 2018-08-13 | End: 2018-08-13 | Stop reason: HOSPADM

## 2018-08-13 RX ORDER — CITALOPRAM HYDROBROMIDE 20 MG/1
TABLET ORAL
Qty: 30 TABLET | Refills: 0 | Status: SHIPPED | OUTPATIENT
Start: 2018-08-13 | End: 2018-08-31

## 2018-08-13 RX ORDER — METFORMIN HCL 500 MG
1000 TABLET, EXTENDED RELEASE 24 HR ORAL
Qty: 60 TABLET | Refills: 0 | Status: SHIPPED | OUTPATIENT
Start: 2018-08-13 | End: 2018-08-31

## 2018-08-13 RX ORDER — CITALOPRAM HYDROBROMIDE 20 MG/1
TABLET ORAL
Qty: 30 TABLET | Refills: 0 | Status: SHIPPED | OUTPATIENT
Start: 2018-08-13 | End: 2018-08-13

## 2018-08-13 RX ORDER — ATORVASTATIN CALCIUM 20 MG/1
20 TABLET, FILM COATED ORAL DAILY
Qty: 30 TABLET | Refills: 0 | Status: SHIPPED | OUTPATIENT
Start: 2018-08-13 | End: 2018-08-13

## 2018-08-13 RX ORDER — POTASSIUM CHLORIDE 1500 MG/1
20 TABLET, EXTENDED RELEASE ORAL DAILY
Qty: 30 TABLET | Refills: 0 | Status: SHIPPED | OUTPATIENT
Start: 2018-08-13 | End: 2018-08-31

## 2018-08-13 RX ORDER — GLIPIZIDE 5 MG/1
5 TABLET ORAL
Qty: 30 TABLET | Refills: 0 | Status: SHIPPED | OUTPATIENT
Start: 2018-08-13 | End: 2018-08-13

## 2018-08-13 RX ORDER — FUROSEMIDE 40 MG
40 TABLET ORAL
Qty: 60 TABLET | Refills: 0 | Status: SHIPPED | OUTPATIENT
Start: 2018-08-13 | End: 2018-08-27

## 2018-08-13 RX ORDER — POTASSIUM CHLORIDE 1500 MG/1
20 TABLET, EXTENDED RELEASE ORAL DAILY
Qty: 30 TABLET | Refills: 0 | Status: SHIPPED | OUTPATIENT
Start: 2018-08-13 | End: 2018-08-13

## 2018-08-13 RX ORDER — ZOLPIDEM TARTRATE 5 MG/1
5 TABLET ORAL
Status: DISCONTINUED | OUTPATIENT
Start: 2018-08-13 | End: 2018-08-13 | Stop reason: HOSPADM

## 2018-08-13 RX ORDER — GLIPIZIDE 5 MG/1
5 TABLET ORAL
Qty: 30 TABLET | Refills: 0 | Status: SHIPPED | OUTPATIENT
Start: 2018-08-13 | End: 2018-08-31

## 2018-08-13 RX ORDER — METOPROLOL TARTRATE 50 MG
50 TABLET ORAL 2 TIMES DAILY
Qty: 60 TABLET | Refills: 0 | Status: SHIPPED | OUTPATIENT
Start: 2018-08-13 | End: 2018-08-13

## 2018-08-13 RX ORDER — FUROSEMIDE 40 MG
40 TABLET ORAL
Qty: 60 TABLET | Refills: 0 | Status: SHIPPED | OUTPATIENT
Start: 2018-08-13 | End: 2018-08-13

## 2018-08-13 RX ORDER — LISINOPRIL 10 MG/1
10 TABLET ORAL DAILY
Qty: 30 TABLET | Refills: 0 | Status: SHIPPED | OUTPATIENT
Start: 2018-08-14 | End: 2018-08-31

## 2018-08-13 RX ADMIN — LISINOPRIL 5 MG: 2.5 TABLET ORAL at 00:00

## 2018-08-13 RX ADMIN — Medication 1 MG: at 00:00

## 2018-08-13 RX ADMIN — Medication 100 MG: at 10:15

## 2018-08-13 RX ADMIN — FUROSEMIDE 40 MG: 40 TABLET ORAL at 10:15

## 2018-08-13 RX ADMIN — ZOLPIDEM TARTRATE 5 MG: 5 TABLET, FILM COATED ORAL at 02:16

## 2018-08-13 RX ADMIN — METOPROLOL TARTRATE 50 MG: 50 TABLET, FILM COATED ORAL at 10:15

## 2018-08-13 RX ADMIN — METFORMIN HYDROCHLORIDE 500 MG: 500 TABLET ORAL at 10:15

## 2018-08-13 RX ADMIN — FOLIC ACID 1 MG: 1 TABLET ORAL at 10:15

## 2018-08-13 RX ADMIN — OMEPRAZOLE 20 MG: 20 CAPSULE, DELAYED RELEASE ORAL at 10:15

## 2018-08-13 RX ADMIN — HUMAN ALBUMIN MICROSPHERES AND PERFLUTREN 3 ML: 10; .22 INJECTION, SOLUTION INTRAVENOUS at 09:40

## 2018-08-13 RX ADMIN — GLIPIZIDE 5 MG: 5 TABLET ORAL at 03:07

## 2018-08-13 RX ADMIN — LISINOPRIL 5 MG: 2.5 TABLET ORAL at 10:14

## 2018-08-13 NOTE — PROGRESS NOTES
S-(situation): patient education    B-(background): CHF and Diabetes    A-(assessment): patient newly diagnosed with CH and Diabetes, CHF booklet and diabetic booklet given along with new medication handouts on metformin, glipizide, metoprolol, lisinopril, and furosemide. Lungs continues to be clear but patient complained if inability to sleep and stated that when he lays down he feels increased anxiety.     R-(recommendations): patient given Ambien at 0230 am along with glipizide for elevated blood sugar. Upon recheck patient is asleep. Will continue to monitor patient and will notify MD of any changes.

## 2018-08-13 NOTE — PROGRESS NOTES
SPIRITUAL HEALTH SERVICES  SPIRITUAL ASSESSMENT Progress Note  [facility]      During Rounding,  introduced himself to Sushil Adalgisa and informed him of his availability.    Joo Al M.Div., [facility]  Staff   Office tel: [phone]

## 2018-08-13 NOTE — CONSULTS
CARE TRANSITION SOCIAL WORK INITIAL ASSESSMENT:  Reason For Consult: other (see comments) (New CHF New Diabetic)   Met with: Patient and Family.    DATA  Principal Problem:    Acute on chronic combined systolic and diastolic congestive heart failure (H)  Active Problems:    Gout    Esophageal reflux    New onset type 2 diabetes mellitus (H)    Alcohol abuse    Hyponatremia    Sinus tachycardia    Elevated bilirubin    Elevated blood pressure reading without diagnosis of hypertension       Primary Care Clinic Name: [facility]  Primary Care MD Name: Dr. Matson  Contact information and PCP information verified: Yes      ASSESSMENT  Cognitive Status: awake, alert and oriented.             Lives With: spouse        Description of Support System: Supportive, Involved   Who is your support system?: Wife       Insurance Concerns: No Insurance issues identified        This writer met with pt and spouse introduced self and role. Discussed discharge planning and medicare guidelines in regards to home care and SNF benefits.  Met with patient and his wife about  discharge plans.  Patient has two new diagnosis.  He states the staff have been teaching him well here.  Discussed the importance of making it to his follow up appointments.  Patient and wife express that are confident in discharging home and following up with PCP.  No other discharge needs at this time.    PLAN    Home    Discharge Planner   Discharge Plans in progress: Home  Barriers to discharge plan: None  Follow up plan: PCP    PATRICIA Walsh  [facility] [phone]/ [facility] [phone]         Entered by: Ashley Tran 08/13/2018 2:08 PM

## 2018-08-13 NOTE — DISCHARGE SUMMARY
[facility] Observation Discharge Summary    Sushil Diana MRN# [id number]   Age: 58 year old YOB: 1960     Date of Observation:  8/12/2018  Date of Discharge:  8/13/2018   Initial Physician:  Ben Ventura MD  Discharge Physician:  Sumi Doe CNP/ Ben Mazariegos MD     Home clinic: [facility] - Referral made for New Patient appointment  Primary care provider: [facility]          Admission Diagnoses:     New onset type 2 diabetes mellitus (H) [E11.9]  Acute congestive heart failure, unspecified congestive heart failure type (H) [I50.9]  Pleural effusion [J90]  Bilateral leg edema [R60.0]         Discharge Diagnoses:   Principal diagnosis: Acute on chronic combined systolic and diastolic congestive heart failure (H)    Secondary diagnoses:    Acute on chronic combined systolic and diastolic congestive heart failure (H)    New onset type 2 diabetes mellitus (H)    Esophageal reflux    Alcohol abuse    Hyponatremia    Sinus tachycardia    Elevated bilirubin    Elevated blood pressure reading without diagnosis of hypertension    Gout    * No resolved hospital problems. *            Procedures:   No procedures performed during this hospital stay           Discharge Medications:     Outpatient Prescriptions Marked as Taking for the 8/12/18 encounter (Hospital Encounter)   Medication Sig     aspirin 81 MG chewable tablet Take 1 tablet (81 mg) by mouth daily     atorvastatin (LIPITOR) 20 MG tablet Take 1 tablet (20 mg) by mouth daily     blood glucose (NO BRAND SPECIFIED) lancets standard Use to test blood sugar 1-3 times daily or as directed.     blood glucose monitoring (NO BRAND SPECIFIED) meter device kit Use to test blood sugar 1-3 times daily or as directed.     blood glucose monitoring (NO BRAND SPECIFIED) test strip Use to test blood sugars 1-3 times daily or as directed     citalopram (CELEXA) 20 MG tablet Take 1/2 tablet (10 mg) for 1-2 weeks,  then increase to 1 tablet orally daily     furosemide (LASIX) 40 MG tablet Take 1 tablet (40 mg) by mouth 2 times daily     glipiZIDE (GLUCOTROL) 5 MG tablet Take 1 tablet (5 mg) by mouth every morning (before breakfast)     IBUPROFEN 600 MG OR TABS 1 tab po every 6 hr. with food     [START ON 8/14/2018] lisinopril (PRINIVIL/ZESTRIL) 5 MG tablet Take 2 tablets (10 mg) by mouth daily     metFORMIN (GLUCOPHAGE-XR) 500 MG 24 hr tablet Take 2 tablets (1,000 mg) by mouth daily (with dinner)     metoprolol tartrate (LOPRESSOR) 50 MG tablet Take 1 tablet (50 mg) by mouth 2 times daily     potassium chloride SA (KLOR-CON) 20 MEQ CR tablet Take 1 tablet (20 mEq) by mouth daily       Current Discharge Medication List      START taking these medications    Details   aspirin 81 MG chewable tablet Take 1 tablet (81 mg) by mouth daily  Qty: 108 tablet, Refills: 3    Associated Diagnoses: Acute congestive heart failure, unspecified congestive heart failure type (H)      atorvastatin (LIPITOR) 20 MG tablet Take 1 tablet (20 mg) by mouth daily  Qty: 30 tablet, Refills: 0    Associated Diagnoses: Acute congestive heart failure, unspecified congestive heart failure type (H); Diabetes mellitus without complication (H)      blood glucose (NO BRAND SPECIFIED) lancets standard Use to test blood sugar 1-3 times daily or as directed.  Qty: 100 each, Refills: 11    Associated Diagnoses: New onset type 2 diabetes mellitus (H); Diabetes mellitus without complication (H)      blood glucose monitoring (NO BRAND SPECIFIED) meter device kit Use to test blood sugar 1-3 times daily or as directed.  Qty: 1 kit, Refills: 0    Associated Diagnoses: New onset type 2 diabetes mellitus (H); Diabetes mellitus without complication (H)      blood glucose monitoring (NO BRAND SPECIFIED) test strip Use to test blood sugars 1-3 times daily or as directed  Qty: 100 strip, Refills: 0    Associated Diagnoses: New onset type 2 diabetes mellitus (H); Diabetes  mellitus without complication (H)      citalopram (CELEXA) 20 MG tablet Take 1/2 tablet (10 mg) for 1-2 weeks, then increase to 1 tablet orally daily  Qty: 30 tablet, Refills: 0    Associated Diagnoses: Adjustment disorder with anxious mood      furosemide (LASIX) 40 MG tablet Take 1 tablet (40 mg) by mouth 2 times daily  Qty: 60 tablet, Refills: 0    Associated Diagnoses: Acute congestive heart failure, unspecified congestive heart failure type (H); Swelling of limb      glipiZIDE (GLUCOTROL) 5 MG tablet Take 1 tablet (5 mg) by mouth every morning (before breakfast)  Qty: 30 tablet, Refills: 0    Associated Diagnoses: Diabetes mellitus without complication (H)      lisinopril (PRINIVIL/ZESTRIL) 5 MG tablet Take 2 tablets (10 mg) by mouth daily  Qty: 60 tablet, Refills: 0    Associated Diagnoses: Acute congestive heart failure, unspecified congestive heart failure type (H); Hypertension, unspecified type      metFORMIN (GLUCOPHAGE-XR) 500 MG 24 hr tablet Take 2 tablets (1,000 mg) by mouth daily (with dinner)  Qty: 60 tablet, Refills: 0    Associated Diagnoses: New onset type 2 diabetes mellitus (H)      metoprolol tartrate (LOPRESSOR) 50 MG tablet Take 1 tablet (50 mg) by mouth 2 times daily  Qty: 60 tablet, Refills: 0    Associated Diagnoses: Acute congestive heart failure, unspecified congestive heart failure type (H); Hypertension, unspecified type      potassium chloride SA (KLOR-CON) 20 MEQ CR tablet Take 1 tablet (20 mEq) by mouth daily  Qty: 30 tablet, Refills: 0    Associated Diagnoses: Acute congestive heart failure, unspecified congestive heart failure type (H); Bilateral leg edema; Diuretic-induced hypokalemia         CONTINUE these medications which have NOT CHANGED    Details   IBUPROFEN 600 MG OR TABS 1 tab po every 6 hr. with food  Qty: 60, Refills: 1      OMEPRAZOLE 20 MG OR CPDR 1 CAPSULE DAILY  Qty: 30, Refills: 0    Associated Diagnoses: Esophageal reflux         STOP taking these medications        allopurinol (ZYLOPRIM) 300 MG tablet Comments:   Reason for Stopping:         SILVADENE 1 % EX CREA Comments:   Reason for Stopping:         VICODIN 5-500 MG OR TABS Comments:   Reason for Stopping:                     Consultations:   No consultations were requested during this hospital stay          Brief History of Illness:   58 year old male patient with HTN, GERD and daily alcohol use presented with 2 weeks history of worsening shortness of air, labored breathing, malaise and weeping lower extremity edema.  Due to concern for new onset heart failure and diabetes, hospitalization for observation was advised. See ER note and history and physical for details.          Hospital Course:   Patient was observed in the hospital.  Patient workup revealed new onset heart failure and Type 2 diabetes with noted hypertension. Patient has a noted history of gout, acid reflux, and hypertension on his previous encounters although he has not been seen by a provider since 2006.  Patient was started on Lopressor 50 mg BID, and Lisinopril 5 mg daily. Lisinopril increased to 10 mg daily on discharge. Lasix transitioned to oral 40 mg twice a day and added Potassium supplement 20 mEq daily.  Echocardiogram completed on 8/13/18 with noted EF estimated at 20%, severe global hypokinesia of the left ventricle.  Patient was given prescription for Lipitor 20 mg daily and recommend ASA 81 mg daily.     Patient started Metformin 500 mg BID and Glipizide 5 mg daily. No diarrhea although looser stools noted and changed the Metformin the long acting XR form 1000 mg once a day. Patient Hemoglobin was A1c 11.6.  Blood Sugars decreased in the hospital stay, from  [phone]. Patient received education prior to discharge and recommendations to follow up with diabetic education. Order for glucose monitor and education complete.     Hyponatremia likely due to CHF, resolved on discharge. Sinus tachycardia also likely related to his CHF, HR  down from 123 to 100.  He does have some anxiety as well that may be contributing to his tachycardia. Started Citalopram titrate 10 to 20 mg daily. In work up noted TSH 4.88, T4 1.8.  This may be elevated due to his other chronic medical problems.  Follow up recommended.  Gout noted in his history, no recent problems. He has used Allopurinol with relief. Patient states he has been taking over the counter Omeprazole for acid reflux. Plan to continue at 20 mg daily.       Patient had quit smoking 2 months ago. Encouraged continued cessation. Patient admits to 6-8 beers per night. No complications or legal concerns from alcohol use. Patient agrees to stop drinking alcohol. Discussed calling primary care or returning to the hospital if he develops tremors or shaking after he quits. Hyperbilirubinemia noted on admission labs, remainder of his LFT's look normal. Follow up with primary care, he may require further evaluation of his liver looking for developing cirrhosis          Data   Most Recent 3 CBC's:  Recent Labs   Lab Test  08/12/18   1830   WBC  11.8*   HGB  16.2   MCV  93   PLT  215      Most Recent 3 BMP's:  Recent Labs   Lab Test  08/13/18   0544  08/12/18   1830   NA  133  131*   POTASSIUM  4.2  4.3   CHLORIDE  98  96   CO2  28  23   BUN  19  17   CR  1.13  1.01   ANIONGAP  7  12   ROBERT  8.5  8.6   GLC  276*  343*     Most Recent 2 LFT's:  Recent Labs   Lab Test  08/13/18   0544  08/12/18   1830   AST  41  44   ALT  49  55   ALKPHOS  85  98   BILITOTAL  1.6*  1.6*     Most Recent INR's and Anticoagulation Dosing History:  Anticoagulation Dose History     Recent Dosing and Labs Latest Ref Rng & Units 8/12/2018    INR 0.86 - 1.14 1.12        Most Recent 3 Troponin's:  Recent Labs   Lab Test  08/12/18   2019  08/12/18   1830   TROPI  0.023  0.022     Most Recent TSH, T4 and A1c Labs:  Recent Labs   Lab Test  08/12/18   1830   TSH  4.88*   T4  1.41   A1C  11.6*     Results for orders placed or performed during the  hospital encounter of 08/12/18   XR Chest 2 Views    Narrative    CHEST TWO VIEW 8/12/2018 6:48 PM     HISTORY: Leg swelling, bibasilar crackles.      COMPARISON: None available.    FINDINGS: Spine hyperostosis versus ankylosis.      Impression    IMPRESSION:  Moderate cardiac enlargement. Pulmonary vascular  congestion. Mild-moderate right and small left pleural effusions.    PIERO MONDRAGON MD     EKG 8/12/2018    Sinus  Tachycardia   Low voltage in limb leads.    -Right sided conduction defect and right axis -possible right ventricular hypertrophy or posterior fascicular block.    -Poor R-wave progression -may be secondary to pulmonary disease   consider old anterior infarct.    -  Diffuse nonspecific T-abnormality.     ECHO COMPLETE WITH [facility]  8/13/2018    Interpretation Summary     1. The left ventricle is normal in size. The visual ejection fraction is  estimated at 20%. There is severe global hypokinesia of the left ventricle.  2. The right ventricle is normal size. Moderately decreased right ventricular  systolic function  3. There is mild to moderate (1-2+) mitral regurgitation.  4. Normal IVC size, minimal respiratory collapse.     No previous echo for comparison.          Most Recent 3 BNP's:  Recent Labs   Lab Test  08/12/18   1830   NTBNPI  9087*   Most Recent 6 glucoses:  Recent Labs   Lab Test  08/13/18   0544  08/12/18   1830   GLC  276*  343*     Most Recent ESR & CRP:  Recent Labs   Lab Test  08/12/18   1830   CRP  14.1*               Discharge Instructions and Follow-Up:   Discharge diet: Cardiac  Diabetic (1800 ADA)   Discharge activity: Activity as tolerated   Discharge follow-up: Follow up with primary care provider in 2 weeks      Pending test results:   Unresulted Labs Ordered in the Past 30 Days of this Admission     No orders found for last 61 day(s).               Discharge Disposition:   Discharged to home      Attestation:  I have reviewed today's vital signs, notes, medications, and test  results.    Sumi Doe, CNP

## 2018-08-13 NOTE — PROGRESS NOTES
S: Patient discharged to home with wife    B: Observation goals met     A: Denies pain. Lungs clear, Mild edema in lower extremities. Teaching done on CHF, teaching done on new diabetes. Patient demonstrated using his new glucometer. All questions answered.     R: Discharge instructions reviewed with patient and patient's wife. Listed belongings gathered and returned to patient.    Patient Education resolved: Yes  New medications-Pt. Has been educated about reason of use and side effects Yes  Home and hospital acquired medications returned to patient NA  Medication Bin checked and emptied on discharge Yes

## 2018-08-13 NOTE — H&P
[facility]    History and Physical  Hospitalist       Date of Admission:  8/12/2018  Date of Service (when I saw the patient): 08/12/18    Assessment & Plan       Active Problems:    CHF (congestive heart failure) (H) - suspected    Assessment: presents with findings concerning for CHF.  He has significant peripheral edema with pleural effusions, elevated BNP along with orthopnea and PND.  He does not have any known history of CHF and it is unclear if this is diastolic or systolic.  He does have cardiomegaly on CXR.  He does not have EKG changes or symptoms concerning for ischemia and his troponin is normal.  He does drink alcohol fairly heavy so this could be an alcohol induced CMP.  His BP is also elevated and this could be hypertension induced CMP.   He is tachycardic and anxious so need to check thyroid function. No known valvular disease and no murmur on exam.  He is not showing any signs of acute respiratory failure and do not think he is going to need a prolonged stay in the hospital but he will need to be monitored overnight as his new medications are started and he received education.    Plan: register to observation, monitor on tele, monitor pulse ox, start metoprolol, lasix and lisinopril, echocardiogram tomorrow, monitor I and O's and daily weights, recheck renal function and electrolytes tomorrow am      New onset type 2 diabetes mellitus (H)    Assessment: no symptoms of hyperglycemia but clearly with new onset DM    Plan: ADA diet, monitor BS, start metformin and will need some education prior to discharge and f/u with diabetic ed      Hyponatremia    Assessment: likely due to CHF    Plan: follow      Sinus tachycardia    Assessment: likely related to his CHF.  He does have some anxiety as well.     Plan: monitor on tele, treat CHF and check TSH      Elevated blood pressure reading without diagnosis of hypertension    Assessment: not previously diagnosed with  hypertension but may very well have HTN    Plan: meds as above.      Gout    Assessment: noted past history but no recent problems    Plan: continue allopurinol      Esophageal reflux    Assessment: asymptomatic currently    Plan: continue PPI      Alcohol abuse    Assessment: admits to 6 - 8 per day.  Remote history of DUI otherwise no complications from alcohol abuse    Plan: start thiamine and folic acid      Elevated bilirubin    Assessment: remainder of his LFT's look normal.  This could be Gilbert's but consider also more advanced liver disease then previously suspected    Plan: recheck tomorrow morning.  His his echocardiogram is completed and is read as normal he is then going to need further evaluation of his liver looking for developing cirrhosis      # Pain Assessment:   Sushil carrillo pain level was assessed and he currently denies pain.        DVT Prophylaxis: anticipate less than 24 hour stay  Code Status: Full Code    Disposition: Expected discharge in 1 days once no signs of acute respiratory failure have developed.    Ben Ventura MD    Primary Care Physician   [facility]    Chief Complaint   Swollen legs    History is obtained from the patient    History of Present Illness   Sushil Diana is a 58 year old male who presents with swelling of his legs.  He has had increasing swelling in his legs over the past two weeks.  He has been standing for long periods of time on a ladder during the hot weather and thought that might be causing his swelling.  They eventually got to the point of weeping so he decided to come to the ED.  He denies SOB but admits to PND and orthopnea.  He denies polyuria or weight loss.  He denies any CP or cough. He is now registered to observation with plans as outlined above.    Past Medical History    I have reviewed this patient's medical history and updated it with pertinent information if needed.   Past Medical History:   Diagnosis Date     GERD  (gastroesophageal reflux disease)      Gout        Past Surgical History   I have reviewed this patient's surgical history and updated it with pertinent information if needed.  Past Surgical History:   Procedure Laterality Date     NONE OF THE ABOVE CORE MEASURE DIAGNOSES         Prior to Admission Medications   Prior to Admission Medications   Prescriptions Last Dose Informant Patient Reported? Taking?   IBUPROFEN 600 MG OR TABS Past Week at Unknown time  No Yes   Si tab po every 6 hr. with food   OMEPRAZOLE 20 MG OR CPDR More than a month at Unknown time  No No   Si CAPSULE DAILY   SILVADENE 1 % EX CREA More than a month at Unknown time  No No   Sig: locally bid   VICODIN 5-500 MG OR TABS Unknown at Unknown time  No No   Si TABLET EVERY 4 TO 6 HOURS AS NEEDED   allopurinol (ZYLOPRIM) 300 MG tablet More than a month at Unknown time  No No   Sig: Take 1 tablet by mouth daily.      Facility-Administered Medications: None     Allergies   Allergies   Allergen Reactions     No Known Drug Allergies        Social History   I have reviewed this patient's social history and updated it with pertinent information if needed. Sushil Diana  reports that he has quit smoking. His smoking use included Cigarettes. He has a 28.00 pack-year smoking history. He has never used smokeless tobacco. He reports that he drinks alcohol. He reports that he does not use illicit drugs.    Family History   I have reviewed this patient's family history and updated it with pertinent information if needed.   Family History   Problem Relation Age of Onset     Diabetes Other        Review of Systems   The 10 point Review of Systems is negative other than noted in the HPI or here.     Physical Exam   Temp: 97.8  F (36.6  C) Temp src: Oral BP: (!) 155/110 Pulse: 119 Heart Rate: 123 Resp: 16 SpO2: 99 % O2 Device: None (Room air)    Vital Signs with Ranges  Temp:  [97.8  F (36.6  C)] 97.8  F (36.6  C)  Pulse:  [119] 119  Heart Rate:  [123]  123  Resp:  [16] 16  BP: (155-174)/(110-122) 155/110  SpO2:  [99 %] 99 %  196 lbs 3.2 oz    Constitutional:   awake, alert, cooperative, no apparent distress, and appears stated age     Eyes:   Lids and lashes normal, pupils equal, round and reactive to light, extra ocular muscles intact, sclera clear, conjunctiva normal     ENT:   Normocephalic, without obvious abnormality, atraumatic, sinuses nontender on palpation, external ears without lesions, oral pharynx with moist mucous membranes, tonsils without erythema or exudates, gums normal and good dentition.     Neck:   Supple, symmetrical, trachea midline, no adenopathy, thyroid symmetric, not enlarged and no tenderness, skin normal     Hematologic / Lymphatic:   no cervical lymphadenopathy and no supraclavicular lymphadenopathy     Back:   Symmetric, no curvature, spinous processes are non-tender on palpation, paraspinous muscles are non-tender on palpation, no costal vertebral tenderness     Lungs:   No increased work of breathing, good air exchange, clear to auscultation bilaterally, no crackles or wheezing     Cardiovascular:   Tachycardic but regular without murmur, rub or gallop     Abdomen:   normal bowel sounds, soft, non-distended, non-tender, no masses palpated, no hepatosplenomegally     Neurologic:   Awake, alert, oriented to name, place and time.  Cranial nerves II-XII are grossly intact.  Motor is 5 out of 5 bilaterally.    Sensory is intact.       Skin:   Edema from his toes to  His hips bilaterally.  He has some scratches on the back of both calves but no erythema.  He has some serous drainage from the areas he has scratched       Data   Data reviewed today:  I personally reviewed the EKG tracing showing Sinus tachycardia without acute ischemic changes.    Recent Labs  Lab 08/12/18  2019 08/12/18  1830   WBC  --  11.8*   HGB  --  16.2   MCV  --  93   PLT  --  215   INR  --  1.12   NA  --  131*   POTASSIUM  --  4.3   CHLORIDE  --  96   CO2  --  23    BUN  --  17   CR  --  1.01   ANIONGAP  --  12   ROBERT  --  8.6   GLC  --  343*   ALBUMIN  --  3.5   PROTTOTAL  --  6.6*   BILITOTAL  --  1.6*   ALKPHOS  --  98   ALT  --  55   AST  --  44   TROPI 0.023 0.022       Recent Results (from the past 24 hour(s))   XR Chest 2 Views    Narrative    CHEST TWO VIEW 8/12/2018 6:48 PM     HISTORY: Leg swelling, bibasilar crackles.      COMPARISON: None available.    FINDINGS: Spine hyperostosis versus ankylosis.      Impression    IMPRESSION:  Moderate cardiac enlargement. Pulmonary vascular  congestion. Mild-moderate right and small left pleural effusions.    PIERO MONDRAGON MD

## 2018-08-13 NOTE — PROGRESS NOTES
S-(situation): Patient registered to Observation. Patient arrived to room 270 via wheelchair from ER    B-(background): complaints of increased swelling to bilateral lower extremities    A-(assessment): patient alert and oriented but seem anxious. Lungs clear but he fells increased shortness of breath when he lays down. He also developed a cough when laying down which goes away when he is up right. Room air oxygenation is 97%. Blood pressure slightly up but started patient on metoprolol and lisinopril. Lower extremities noted to be pink and swollen,  3+ pitting edema, CMS intact and pulses palpable and strong. Sinus rhythm to sinus tachycardia noted. No ectopy    R-(recommendations): Orders and observation goals reviewed with patient and significant other    Nursing Observation criteria listed below was met:    Skin issues/needs documented:Yes  Isolation needs addressed, if appropriate: Yes  Fall Prevention: Education given and documented: Yes  Education Assessment documented:Yes  Education Documented (Pre-existing chronic infection such as, MRSA/VRE need education on admission): Yes  Medication Reconciliation Complete: Yes  New medication patient education completed and documented (Possible Side Effects of Common Medications handout): Yes  Home medications if not able to send immediately home with family stored here: NA  Reminder note placed in discharge instructions: NA  Patient has discharge needs (If yes, please explain): Yes, follow up out patient care of diabetes and heart failure.

## 2018-08-14 ENCOUNTER — PATIENT OUTREACH (OUTPATIENT)
Dept: CARE COORDINATION | Facility: CLINIC | Age: 58
End: 2018-08-14

## 2018-08-14 NOTE — PROGRESS NOTES
Clinic Care Coordination Contact  [facility]/Voicemail    Referral Source: IP Handoff  Clinical Data: Care Coordinator Outreach  Outreach attempted x 1.  Left message on voicemail with call back information and requested return call.  Plan:  Care Coordinator will try to reach patient again in 1-2 business days.      Tomasa EATON, RN, PHN  Care Coordination    [facility]  [address] [place] [address]  Office: [phone]  Fax [phone]   [facility]  [address] [place] [address]  Office: [phone] Fax [phone]  [email or web address]   [email or web address]   Connect with [facility] on social media.

## 2018-08-15 NOTE — PROGRESS NOTES
Clinic Care Coordination Contact  Care Team Conversations    Pt is scheduled to see Dr. Matson on 8/22, he has a new diagnosis of FH and diabetes.  Pt has gained 4 pounds in two days.  Currently taking 40mg lasix twice daily.  Also would he be able to work him in sooner.     Thanks    Tomasa EATON, RN, PHN  Care Coordination    [facility]  [address] [place] [address]  Office: [phone]  Fax [phone]   [facility]  [address] [place] [address]  Office: [phone] Fax [phone]  [email or web address]   [email or web address]   Connect with [facility] on social media.

## 2018-08-15 NOTE — PROGRESS NOTES
Per Dr. Matson patient can be seen on Monday. If he gets worse in the meantime he should go to the ER. Lucero CULLEN

## 2018-08-15 NOTE — PROGRESS NOTES
Clinic Care Coordination Contact  Care Team Conversations    Contacted pt, appt was moved up to Monday.  advised to continue to watch for worsening symptoms.  Keep feet elevated as much as possible and watch salt intake.  Pt states he will and will attend appt on Monday.     Plan:  Pt will weigh himself daily and will call with any questions  Pt will watch sodium intake  Pt will scheduled with diabetic education  RN CCC will outreach in 2-5 business days.     Tomasa EATON, RN, PHN  Care Coordination    [address] [place] [address]  Office: [phone]  Fax [phone]   [facility]  [address] [place] [address]  Office: [phone] Fax [phone]  [email or web address]   [email or web address]   Connect with [facility] on social media.

## 2018-08-15 NOTE — PROGRESS NOTES
"Clinic Care Coordination Contact    Clinic Care Coordination Contact  OUTREACH    Referral Information:  Referral Source: IP Handoff    Primary Diagnosis: CHF    Chief Complaint   Patient presents with     Clinic Care Coordination - Post Hospital     RN assessment      Universal Utilization:   Clinic Utilization  Difficulty keeping appointments:: No  Utilization    Last refreshed: 8/14/2018  3:16 PM:  No Show Count (past year) 0       Last refreshed: 8/14/2018  3:16 PM:  ED visits 0       Last refreshed: 8/14/2018  3:16 PM:  Hospital admissions 1          Current as of: 8/14/2018  3:16 PM           Clinical Concerns:  Current Medical Concerns:  Called and spoke with pt, introduced self and role. Pt states he feels like he is doing ok.  States he has not been to the doctor in years and now he has diabetes and heart failure. He does admit to eating the wrong things, smoking, and drinking.  He states he quite smoking about two months ago and his cough is gone.  He quit drinking about a week ago and really does not feel the need to drink again.  He states he only drank because he likes the taste of beer.  Pt states he is motivated to eat better and take better care of himself.  Pt states he has been weighing himself daily and was 185 when he got home. Today his weight is 189 and has \"about the same\" amount of swelling in his lower extremities.  Pt is taking 40mg lasix twice a day.  Pt does have a scheduled appt with Dr. Matson on 8/22.  Pt would like a sooner appt if able.  Also notified pt diabetic education was trying to get a hold of him.    Current Behavioral Concerns: no current concerns    Education Provided to patient: low sodium diet, daily weights, set up with CDE      Health Maintenance Reviewed: Not assessed  Clinical Pathway: CHF    Medication Management:  Self Management. States he has been nauseated with all the new medications she has been on.      Functional Status:  Dependent ADLs:: Independent  Bed " or wheelchair confined:: No  Mobility Status: Independent    Living Situation:  Current living arrangement:: I live in a private home with family    Diet/Exercise/Sleep:  Diet:: No added salt    Transportation:  Transportation concerns (GOAL):: No  Transportation means:: Regular car     Psychosocial:  Informal Support system:: Significant other     Financial/Insurance concerns (GOAL):: No     Resources and Interventions:  Current Resources: none    Supplies used at home:: None    Referrals Placed: None     Goals: TBD    Patient/Caregiver understanding: Pt states this is all new to him but is motivated to make changes.        Future Appointments              In 1 week Mark Matson DO [facility] MEDIC          Plan:   RN will send message to provider to review weight gain and sooner appt.     Tomasa EATON, RN, PHN  Care Coordination    [address] [address]  Office: [phone]  Fax [phone]   [facility]  [address] [place] [address]  Office: [phone] Fax [phone]  [email or web address]   [email or web address]   Connect with [facility] on social media.

## 2018-08-18 NOTE — PROGRESS NOTES
"Sushil Diana  Gender: male  : 1960  [address] [facility] [address] [phone] (home)     Medical Record: [id number]  Pharmacy:    [facility], [place] - [address] [place] [address] [facility] [place] [facility] [address] [facility]   Primary Care Provider: [facility]    Parent's names are: Data Unavailable (mother) and Data Unavailable (father).      [facility]  2018     Discharge Phone Call:  Key Words/Key Times      Introduction - AIDET (Acknowledge, Introduce, Duration, Explanation)      Empathy-   We are calling to see how you are since your recent stay in the hospital?     Call back COMMENTS:       Clinical Questions -  (f/u appts, medication side effects/purpose, ability to care for self at home) \"For your safety, it is important to us that you understand the purpose and side effects of your medications, can you tell me what your new medications are?\"     Call back COMMENTS:       Staff Recognition -  We like to recognize staff and physicians who have done an excellent job.  Do you remember any people from your care team that you would like recognize?     Call back COMMENTS:       Very Good Care -  We want to provide very good care to all patients.  How was your care?     Call back COMMENTS:       Opportunities for Improvement -  Our goal is to be the best.  Do you have any suggestions for things that we could improve upon?     Call back COMMENTS:       Thank You       For CHF Patients Only:  Teach Back Questions Answered Correctly:    1. What is the name of your  water pill ?   2. What weight gain should you report to your doctor?   3. What foods should you avoid?   4. What symptoms would you report to your doctor?   CHF COMMENTS:       Safe Accounts (purposfully retained items) Examples: MARILEE's, Hemovac, penrose,Wound packing, Ureteral stents, Alfred, PIV/Picc Line    We care about the coordination of your care  1. Do you still " have  in place?   2. If the item is in place, Can you review the plan for removal with me?     First attempt 8/18/18. No answer. No message left. Called both numbers listed.

## 2018-08-20 ENCOUNTER — OFFICE VISIT (OUTPATIENT)
Dept: FAMILY MEDICINE | Facility: OTHER | Age: 58
End: 2018-08-20
Payer: COMMERCIAL

## 2018-08-20 VITALS
OXYGEN SATURATION: 96 % | BODY MASS INDEX: 27.51 KG/M2 | HEART RATE: 80 BPM | SYSTOLIC BLOOD PRESSURE: 122 MMHG | WEIGHT: 180.9 LBS | RESPIRATION RATE: 24 BRPM | DIASTOLIC BLOOD PRESSURE: 88 MMHG | TEMPERATURE: 96 F

## 2018-08-20 DIAGNOSIS — E11.9 NEW ONSET TYPE 2 DIABETES MELLITUS (H): ICD-10-CM

## 2018-08-20 DIAGNOSIS — L02.01 CUTANEOUS ABSCESS OF FACE: ICD-10-CM

## 2018-08-20 DIAGNOSIS — Z12.11 SPECIAL SCREENING FOR MALIGNANT NEOPLASMS, COLON: Primary | ICD-10-CM

## 2018-08-20 DIAGNOSIS — I50.43 ACUTE ON CHRONIC COMBINED SYSTOLIC AND DIASTOLIC CONGESTIVE HEART FAILURE (H): ICD-10-CM

## 2018-08-20 PROCEDURE — 99214 OFFICE O/P EST MOD 30 MIN: CPT | Performed by: INTERNAL MEDICINE

## 2018-08-20 RX ORDER — CEPHALEXIN 500 MG/1
500 CAPSULE ORAL 4 TIMES DAILY
Qty: 21 CAPSULE | Refills: 0 | Status: SHIPPED | OUTPATIENT
Start: 2018-08-20 | End: 2018-08-27

## 2018-08-20 ASSESSMENT — PAIN SCALES - GENERAL: PAINLEVEL: NO PAIN (0)

## 2018-08-20 NOTE — MR AVS SNAPSHOT
After Visit Summary   8/20/2018    Sushil Diana    MRN: [id number]           Patient Information     Date Of Birth          1960        Visit Information        Provider Department      8/20/2018 10:40 AM Mark Matson DO [facility]        Today's Diagnoses     Special screening for malignant neoplasms, colon    -  1    Acute on chronic combined systolic and diastolic congestive heart failure (H)        Cutaneous abscess of face        New onset type 2 diabetes mellitus (H)           Follow-ups after your visit        Additional Services     GASTROENTEROLOGY ADULT REF PROCEDURE ONLY [facility] [phone]       Last Lab Result: Creatinine (mg/dL)       Date                     Value                 08/13/2018               1.13             ----------  Body mass index is 27.51 kg/(m^2).     Needed:  No  Language:  English    Patient will be contacted to schedule procedure.     Please be aware that coverage of these services is subject to the terms and limitations of your health insurance plan.  Call member services at your health plan with any benefit or coverage questions.  Any procedures must be performed at a [facility] facility OR coordinated by your clinic's referral office.    Please bring the following with you to your appointment:    (1) Any X-Rays, CTs or MRIs which have been performed.  Contact the facility where they were done to arrange for  prior to your scheduled appointment.    (2) List of current medications   (3) This referral request   (4) Any documents/labs given to you for this referral                  Your next 10 appointments already scheduled     Aug 23, 2018 11:00 AM CDT   Diabetes Education with [facility] DIABETIC ED RESOURCE   [facility] ([facility])    [address] [place] [id number] [phone]              Who to contact     If you have questions or need follow up  "information about today's clinic visit or your schedule please contact [facility] directly at [phone].  Normal or non-critical lab and imaging results will be communicated to you by [email or web address], letter or phone within 4 business days after the clinic has received the results. If you do not hear from us within 7 days, please contact the clinic through [email or web address] or phone. If you have a critical or abnormal lab result, we will notify you by phone as soon as possible.  Submit refill requests through [employer] or call your pharmacy and they will forward the refill request to us. Please allow 3 business days for your refill to be completed.          Additional Information About Your Visit        MyChart Information     [employer] lets you send messages to your doctor, view your test results, renew your prescriptions, schedule appointments and more. To sign up, go to [email or web address] . Click on \"Log in\" on the left side of the screen, which will take you to the Welcome page. Then click on \"Sign up Now\" on the right side of the page.     You will be asked to enter the access code listed below, as well as some personal information. Please follow the directions to create your username and password.     Your access code is: [email or web address]  Expires: 2018 11:53 AM     Your access code will  in 90 days. If you need help or a new code, please call your [place] clinic or [phone].        Care EveryWhere ID     This is your Care EveryWhere ID. This could be used by other organizations to access your [place] medical records  [id number]        Your Vitals Were     Pulse Temperature Respirations Pulse Oximetry BMI (Body Mass Index)       80 96  F (35.6  C) (Temporal) 24 96% 27.51 kg/m2        Blood Pressure from Last 3 Encounters:   18 122/88   18 116/83   06 180/100    Weight from Last 3 Encounters:   18 180 lb 14.4 oz (82.1 kg)   18 192 lb 0.3 oz (87.1 kg)   06 182 " lb (82.6 kg)              We Performed the Following     GASTROENTEROLOGY ADULT REF PROCEDURE ONLY [facility] [phone]     HEART FAILURE ACTION PLAN          Today's Medication Changes          These changes are accurate as of 8/20/18  9:57 PM.  If you have any questions, ask your nurse or doctor.               Start taking these medicines.        Dose/Directions    cephALEXin 500 MG capsule   Commonly known as:  KEFLEX   Used for:  Cutaneous abscess of face   Started by:  Mark Matson DO        Dose:  500 mg   Take 1 capsule (500 mg) by mouth 4 times daily   Quantity:  21 capsule   Refills:  0            Where to get your medicines      These medications were sent to [facility] [place], [place] - [address]   [address] [facility] [address]     Phone:  [phone]     cephALEXin 500 MG capsule                Primary Care Provider Office Phone # Fax #    Mark Matson [phone]       [address] [facility] [address]        Goals        Lifestyle    I will weigh myself daily and resport weights       Equal Access to Services     LALY DIAZ : Hadii aad ku hadasho Soomaali, waaxda luqadaha, qaybta kaalmada adeegyada, waxay idiin hayamritn honey gant . So [facility] [phone].    ATENCIÓN: Si habla español, tiene a devi disposición servicios gratuitos de asistencia lingüística. Llame al [phone].    We comply with applicable federal civil rights laws and [place] laws. We do not discriminate on the basis of race, color, national origin, age, disability, sex, sexual orientation, or gender identity.            Thank you!     Thank you for choosing [facility]  for your care. Our goal is always to provide you with excellent care. Hearing back from our patients is one way we can continue to improve our services. Please take a few minutes to complete the written survey that you may receive in the mail after your visit with us.  Thank you!             Your Updated Medication List - Protect others around you: Learn how to safely use, store and throw away your medicines at www.disposemymeds.org.          This list is accurate as of 8/20/18  9:57 PM.  Always use your most recent med list.                   Brand Name Dispense Instructions for use Diagnosis    aspirin 81 MG chewable tablet     108 tablet    Take 1 tablet (81 mg) by mouth daily    Acute congestive heart failure, unspecified congestive heart failure type (H)       atorvastatin 20 MG tablet    LIPITOR    30 tablet    Take 1 tablet (20 mg) by mouth daily    Acute congestive heart failure, unspecified congestive heart failure type (H), Diabetes mellitus without complication (H)       blood glucose lancets standard    no brand specified    100 each    Use to test blood sugar 1-3 times daily or as directed.    New onset type 2 diabetes mellitus (H), Diabetes mellitus without complication (H)       blood glucose monitoring meter device kit    no brand specified    1 kit    Use to test blood sugar 1-3 times daily or as directed.    New onset type 2 diabetes mellitus (H), Diabetes mellitus without complication (H)       blood glucose monitoring test strip    no brand specified    100 strip    Use to test blood sugars 1-3 times daily or as directed    New onset type 2 diabetes mellitus (H), Diabetes mellitus without complication (H)       cephALEXin 500 MG capsule    KEFLEX    21 capsule    Take 1 capsule (500 mg) by mouth 4 times daily    Cutaneous abscess of face       citalopram 20 MG tablet    celeXA    30 tablet    Take 1/2 tablet (10 mg) for 1-2 weeks, then increase to 1 tablet orally daily    Adjustment disorder with anxious mood       furosemide 40 MG tablet    LASIX    60 tablet    Take 1 tablet (40 mg) by mouth 2 times daily    Acute congestive heart failure, unspecified congestive heart failure type (H), Swelling of limb       glipiZIDE 5 MG tablet    GLUCOTROL    30 tablet     Take 1 tablet (5 mg) by mouth every morning (before breakfast)    Diabetes mellitus without complication (H)       lisinopril 10 MG tablet    PRINIVIL/ZESTRIL    30 tablet    Take 1 tablet (10 mg) by mouth daily    Acute congestive heart failure, unspecified congestive heart failure type (H), Hypertension, unspecified type       metFORMIN 500 MG 24 hr tablet    GLUCOPHAGE-XR    60 tablet    Take 2 tablets (1,000 mg) by mouth daily (with dinner)    New onset type 2 diabetes mellitus (H)       metoprolol tartrate 50 MG tablet    LOPRESSOR    60 tablet    Take 1 tablet (50 mg) by mouth 2 times daily    Acute congestive heart failure, unspecified congestive heart failure type (H), Hypertension, unspecified type       omeprazole 20 MG CR capsule    priLOSEC    30    1 CAPSULE DAILY    Esophageal reflux       potassium chloride SA 20 MEQ CR tablet    KLOR-CON    30 tablet    Take 1 tablet (20 mEq) by mouth daily    Acute congestive heart failure, unspecified congestive heart failure type (H), Bilateral leg edema, Diuretic-induced hypokalemia

## 2018-08-20 NOTE — LETTER
My Heart Failure Action Plan   Name: Sushil Diana    YOB: 1960   Date: 8/20/2018    My doctor: Mark Matson     [address] [address] [phone]  My Diagnosis:    My Ejection Fraction:     My Exercise Goal: 30 minutes daily  .     My Weight Goal:   Wt Readings from Last 2 Encounters:   08/20/18 180 lb 14.4 oz (82.1 kg)   08/13/18 192 lb 0.3 oz (87.1 kg)     Weigh yourself daily using the same scale. If you gain more than 2 pounds in 24 hours or 5 pounds in a week     My Diet Goal:     Emergency Room Visits:    Our goal is to improve your quality of life and help you avoid a visit to the emergency room or hospital.  If we work together, we can achieve this goal. But, if you feel you need to call 911 or go to the emergency room, please do so.  If you go to the emergency room, please bring your list of medicines and your daily weight chart with you.       GREEN ZONE     Doing well today    Weight gained is no more than 2 pounds a day or 5 pounds a week.    No swelling in feet, ankles, legs or stomach.    No more swelling than usual.    No more trouble breathing than usual.    No change in my sleep.    No other problems. Actions:    I am doing fine.  I will take my medicine, follow my diet, see my doctor, exercise, and watch for symptoms.           YELLOW ZONE         Having a bad day or flare up    Weight gain of more than 2 pounds in one day or 5 pounds in one week.    New swelling in ankle, leg, knee or thigh.    Bloating in belly, pants feel tighter.    Swelling in hands or face.    Coughing or trouble breathing while walking or talking.    Harder to breathe last night.    Have trouble sleeping, wake up short of breath.    Much more tired than usual.    Not eating.    Pain in my chest or bad leg cramps.    Feel weak or dizzy. Actions:    I need to take action and call my doctor or nurse today.                 RED ZONE         Need  medical care now    Weight gain of 5 pounds overnight.    Chest pain or pressure that does not go away.    Feel less alert.    Wheezing or have trouble breathing when at rest.    Cannot sleep lying down.    Cannot take my water pill.    Pass out or faint. Actions:    I need to call my doctor or nurse now!    Call 911 if I have chest pain or cannot breathe.

## 2018-08-20 NOTE — PROGRESS NOTES
SUBJECTIVE:   Sushil Diana is a 58 year old male who presents to clinic today for the following health issues:  Health Maintenance reviewed at today's visit patient asked to schedule/complete:   Colon Cancer:  Patient agrees to schedule      ED/UC Followup:    Facility:  [facility]  Date of visit: 8/12/18  Reason for visit: CHF  Current Status:Improved                       Chief Complaint         The patient is a pleasant 58-year-old gentleman who was recently in the hospital with shortness of breath and edema.  He was diuresed and echocardiogram was performed which demonstrated ejection fraction of about 20%.  While in the hospital, he was also diagnosed as having type 2 diabetes.  He has many questions as does his significant other.He does have a history of alcohol consumption and the possibility of alcoholic cardiomyopathy does exist.  No previous history of cardiac ischemia/myocardial infarction are noted. With respect to the diabetes, he has no previous history polyuria or polydipsia.  Has had no acute vision changes are symptoms of uncontrolled hyperglycemia.  He does have a skin lesion on his face.  It is above his left eye and measured about the size of a dime.  It is very typical superficial abscess.  No involvement of the upper eyelid is noted at this time.                       PAST, FAMILY,SOCIAL HISTORY:     Medical  History:   has a past medical history of GERD (gastroesophageal reflux disease) and Gout.     Surgical History:   has a past surgical history that includes None of the above core measure diagnoses (AMI, CHF, Stroke) apply at this time..     Social History:   reports that he has quit smoking. His smoking use included Cigarettes. He has a 28.00 pack-year smoking history. He has never used smokeless tobacco. He reports that he drinks alcohol. He reports that he does not use illicit drugs.     Family History:  family history includes Diabetes in an other family  member.            MEDICATIONS  Current Outpatient Prescriptions   Medication Sig Dispense Refill     aspirin 81 MG chewable tablet Take 1 tablet (81 mg) by mouth daily 108 tablet 3     atorvastatin (LIPITOR) 20 MG tablet Take 1 tablet (20 mg) by mouth daily 30 tablet 0     blood glucose (NO BRAND SPECIFIED) lancets standard Use to test blood sugar 1-3 times daily or as directed. 100 each 11     blood glucose monitoring (NO BRAND SPECIFIED) meter device kit Use to test blood sugar 1-3 times daily or as directed. 1 kit 0     blood glucose monitoring (NO BRAND SPECIFIED) test strip Use to test blood sugars 1-3 times daily or as directed 100 strip 0     cephALEXin (KEFLEX) 500 MG capsule Take 1 capsule (500 mg) by mouth 4 times daily 21 capsule 0     citalopram (CELEXA) 20 MG tablet Take 1/2 tablet (10 mg) for 1-2 weeks, then increase to 1 tablet orally daily 30 tablet 0     furosemide (LASIX) 40 MG tablet Take 1 tablet (40 mg) by mouth 2 times daily 60 tablet 0     glipiZIDE (GLUCOTROL) 5 MG tablet Take 1 tablet (5 mg) by mouth every morning (before breakfast) 30 tablet 0     lisinopril (PRINIVIL/ZESTRIL) 10 MG tablet Take 1 tablet (10 mg) by mouth daily 30 tablet 0     metFORMIN (GLUCOPHAGE-XR) 500 MG 24 hr tablet Take 2 tablets (1,000 mg) by mouth daily (with dinner) 60 tablet 0     metoprolol tartrate (LOPRESSOR) 50 MG tablet Take 1 tablet (50 mg) by mouth 2 times daily 60 tablet 0     OMEPRAZOLE 20 MG OR CPDR 1 CAPSULE DAILY 30 0     potassium chloride SA (KLOR-CON) 20 MEQ CR tablet Take 1 tablet (20 mEq) by mouth daily 30 tablet 0         --------------------------------------------------------------------------------------------------------------------                  Review of Systems     LUNGS: Pt denies: cough,excess sputum, hemoptysis, or shortness of breath.He has had shortness of breath in the past with orthopnea.   HEART: Pt denies: chest pain, arrythmia, syncope, tachy or bradyarrhythmia. He has had a  demon lower extremities which is markedly improved at this time.   GI: Pt denies: nausea, vomitting, diarrhea, constipation, melena, or hematochezia.   NEURO: Pt denies: seizures, strokes, diplopia, weakness, paraesthesias, or paralysis.                        Examination      /88 (BP Location: Right arm, Patient Position: Chair, Cuff Size: Adult Regular)  Pulse 80  Temp 96  F (35.6  C) (Temporal)  Resp 24  Wt 180 lb 14.4 oz (82.1 kg)  SpO2 96%  BMI 27.51 kg/m2   Constitutional: The patient appears to be in no acute distress. The patient appears to be adequately hydrated. No acute respiratory or hemodynamic distress is noted at this time.   LUNGS: clear bilaterally, airflow is brisk, no intercostal retraction or stridor is noted. No coughing is noted during visit.   HEART:  regular without rubs, clicks, gallops, or murmurs. PMI is nondisplaced. Upstrokes are brisk. S1,S2 are heard.   GI: Abdomen is soft, without rebound, guarding or tenderness. Bowel sounds are appropriate. No renal bruits are heard.    NEURO: Pt is alert and appropriate. No neurologic lateralization is noted. Cranial nerves 2-12 are intact. Peripheral sensory and motor function are grossly normal.    SKIN:  warm and dry. No erythema, or rashes are noted. No specific lesions of concern are noted.                        Decision-Making          1. Acute on chronic combined systolic and diastolic congestive heart failure (H)  Suspect alcoholic cardiomyopathy but should rule out ischemic.  Will allow patient to stabilize and then set up for exercise stress testing.      2. Cutaneous abscess of face  Start on antibiotic, recommend moist compresses  - cephALEXin (KEFLEX) 500 MG capsule; Take 1 capsule (500 mg) by mouth 4 times daily  Dispense: 21 capsule; Refill: 0    3. New onset type 2 diabetes mellitus (H)  Lengthy discussion regarding diabetic causes, management, medications etc.  Will be same dietitian later the week    4. Special  screening for malignant neoplasms, colon  Will set up colonoscopy down the road a ways.  This is not really a priority given his markedly decreased ejection fraction.  - GASTROENTEROLOGY ADULT REF PROCEDURE ONLY [facility] [phone]  - HEART FAILURE ACTION PLAN                             FOLLOW UP   I have asked the patient to make an appointment for followup with me In one week        I have carefully explained the diagnosis and treatment options with the patient. The patient has displayed an understanding of the above, and all subsequent questions were answered.               DO SEAN Rodgers    Portions of this note were produced using [employer]  Although every attempt at real-time proof reading has been made, occasional grammar/syntax errors may have been missed.

## 2018-08-22 ENCOUNTER — TELEPHONE (OUTPATIENT)
Dept: FAMILY MEDICINE | Facility: CLINIC | Age: 58
End: 2018-08-22

## 2018-08-22 DIAGNOSIS — Z12.11 SPECIAL SCREENING FOR MALIGNANT NEOPLASMS, COLON: Primary | ICD-10-CM

## 2018-08-22 NOTE — TELEPHONE ENCOUNTER
Left a message for patient to return my call to schedule a colonoscopy/endoscopy. If Brook or Kika are not available, please transfer to Same Day Surgery at [phone]

## 2018-08-23 ENCOUNTER — ALLIED HEALTH/NURSE VISIT (OUTPATIENT)
Dept: EDUCATION SERVICES | Facility: CLINIC | Age: 58
End: 2018-08-23
Payer: COMMERCIAL

## 2018-08-23 DIAGNOSIS — E11.9 NEW ONSET TYPE 2 DIABETES MELLITUS (H): Primary | ICD-10-CM

## 2018-08-23 PROCEDURE — G0108 DIAB MANAGE TRN  PER INDIV: HCPCS

## 2018-08-23 NOTE — MR AVS SNAPSHOT
"              After Visit Summary   8/23/2018    Sushil Diana    MRN: [id number]           Patient Information     Date Of Birth          1960        Visit Information        Provider Department      8/23/2018 11:00 AM [facility]        Today's Diagnoses     New onset type 2 diabetes mellitus (H)    -  1       Follow-ups after your visit        Your next 10 appointments already scheduled     Sep 26, 2018 11:00 AM CDT   Diabetes Education with [facility]   [facility] ([facility])    [address] [place] [id number] [phone]              Who to contact     If you have questions or need follow up information about today's clinic visit or your schedule please contact [facility] [place] directly at [phone].  Normal or non-critical lab and imaging results will be communicated to you by MyChart, letter or phone within 4 business days after the clinic has received the results. If you do not hear from us within 7 days, please contact the clinic through MyChart or phone. If you have a critical or abnormal lab result, we will notify you by phone as soon as possible.  Submit refill requests through [email or web address] or call your pharmacy and they will forward the refill request to us. Please allow 3 business days for your refill to be completed.          Additional Information About Your Visit        [employer] [place] Information     [email or web address] lets you send messages to your doctor, view your test results, renew your prescriptions, schedule appointments and more. To sign up, go to [email or web address] . Click on \"Log in\" on the left side of the screen, which will take you to the Welcome page. Then click on \"Sign up Now\" on the right side of the page.     You will be asked to enter the access code listed below, as well as some personal information. Please follow the directions to create your " username and password.     Your access code is: [email or web address]  Expires: 2018 11:53 AM     Your access code will  in 90 days. If you need help or a new code, please call your [facility] [place] clinic or [phone].        Care EveryWhere ID     This is your Care EveryWhere ID. This could be used by other organizations to access your [facility] medical records  [id number]         Blood Pressure from Last 3 Encounters:   18 122/88   18 116/83   06 180/100    Weight from Last 3 Encounters:   18 82.1 kg (180 lb 14.4 oz)   18 87.1 kg (192 lb 0.3 oz)   06 82.6 kg (182 lb)              Today, you had the following     No orders found for display       Primary Care Provider Office Phone # Fax #    Mark Javed Matson,  [phone]       [address] [facility] [address]        Goals        General    Monitoring (pt-stated)     Notes - Note created  2018 12:16 PM by Candelaria Hdz, JALEEL    I will check before breakfast and one about 2 hours after a meal.          Lifestyle    I will weigh myself daily and resport weights       Equal Access to Services     LALY DIAZ : Hadii ravinder houser hadasho Soomaali, waaxda luqadaha, qaybta kaalmada adeegyada, mariposa khan. So [facility] [phone].    ATENCIÓN: Si habla español, tiene a devi disposición servicios gratuitos de asistencia lingüística. Llame al [phone].    We comply with applicable federal civil rights laws and [place] laws. We do not discriminate on the basis of race, color, national origin, age, disability, sex, sexual orientation, or gender identity.            Thank you!     Thank you for choosing [facility]  for your care. Our goal is always to provide you with excellent care. Hearing back from our patients is one way we can continue to improve our services. Please take a few minutes to complete the written survey that you may receive in the mail after  your visit with us. Thank you!             Your Updated Medication List - Protect others around you: Learn how to safely use, store and throw away your medicines at www.disposemymeds.org.          This list is accurate as of 8/23/18 12:19 PM.  Always use your most recent med list.                   Brand Name Dispense Instructions for use Diagnosis    aspirin 81 MG chewable tablet     108 tablet    Take 1 tablet (81 mg) by mouth daily    Acute congestive heart failure, unspecified congestive heart failure type (H)       atorvastatin 20 MG tablet    LIPITOR    30 tablet    Take 1 tablet (20 mg) by mouth daily    Acute congestive heart failure, unspecified congestive heart failure type (H), Diabetes mellitus without complication (H)       blood glucose lancets standard    no brand specified    100 each    Use to test blood sugar 1-3 times daily or as directed.    New onset type 2 diabetes mellitus (H), Diabetes mellitus without complication (H)       blood glucose monitoring meter device kit    no brand specified    1 kit    Use to test blood sugar 1-3 times daily or as directed.    New onset type 2 diabetes mellitus (H), Diabetes mellitus without complication (H)       blood glucose monitoring test strip    no brand specified    100 strip    Use to test blood sugars 1-3 times daily or as directed    New onset type 2 diabetes mellitus (H), Diabetes mellitus without complication (H)       cephALEXin 500 MG capsule    KEFLEX    21 capsule    Take 1 capsule (500 mg) by mouth 4 times daily    Cutaneous abscess of face       citalopram 20 MG tablet    celeXA    30 tablet    Take 1/2 tablet (10 mg) for 1-2 weeks, then increase to 1 tablet orally daily    Adjustment disorder with anxious mood       furosemide 40 MG tablet    LASIX    60 tablet    Take 1 tablet (40 mg) by mouth 2 times daily    Acute congestive heart failure, unspecified congestive heart failure type (H), Swelling of limb       glipiZIDE 5 MG tablet     GLUCOTROL    30 tablet    Take 1 tablet (5 mg) by mouth every morning (before breakfast)    Diabetes mellitus without complication (H)       lisinopril 10 MG tablet    PRINIVIL/ZESTRIL    30 tablet    Take 1 tablet (10 mg) by mouth daily    Acute congestive heart failure, unspecified congestive heart failure type (H), Hypertension, unspecified type       metFORMIN 500 MG 24 hr tablet    GLUCOPHAGE-XR    60 tablet    Take 2 tablets (1,000 mg) by mouth daily (with dinner)    New onset type 2 diabetes mellitus (H)       metoprolol tartrate 50 MG tablet    LOPRESSOR    60 tablet    Take 1 tablet (50 mg) by mouth 2 times daily    Acute congestive heart failure, unspecified congestive heart failure type (H), Hypertension, unspecified type       omeprazole 20 MG CR capsule    priLOSEC    30    1 CAPSULE DAILY    Esophageal reflux       potassium chloride SA 20 MEQ CR tablet    KLOR-CON    30 tablet    Take 1 tablet (20 mEq) by mouth daily    Acute congestive heart failure, unspecified congestive heart failure type (H), Bilateral leg edema, Diuretic-induced hypokalemia

## 2018-08-23 NOTE — PROGRESS NOTES
"Diabetes Self-Management Education & Support    Diabetes Education Self Management & Training    SUBJECTIVE/OBJECTIVE:  Diabetes education in the past 24mo: (P) No  Date of diagnosis: (P) 08/12/18  Disease course: (P) Improving  Cultural Influences/Ethnic Background:  American      Diabetes Symptoms & Complications  Blurred vision: (P) No  Fatigue: (P) No  Foot paresthesias: (P) No  Foot ulcerations: (P) No  Polydipsia: (P) No  Polyphagia: (P) No  Polyuria: (P) No  Visual change: (P) No  Weakness: (P) No  Weight loss: (P) Yes 16 lbs - fluid  Slow healing wounds: (P) No  Weight trend: (P) Decreasing steadily  Autonomic neuropathy: (P) No  CVA: (P) No  Heart disease: (P) No  Nephropathy: (P) No  Peripheral neuropathy: (P) No  Peripheral Vascular Disease: (P) No  Retinopathy: (P) No  Sexual dysfunction: (P) No    Patient Problem List and Family Medical History reviewed for relevant medical history, current medical status, and diabetes risk factors.    Vitals:  There were no vitals taken for this visit.  Estimated body mass index is 27.51 kg/(m^2) as calculated from the following:    Height as of 8/12/18: 1.727 m (5' 8\").    Weight as of 8/20/18: 82.1 kg (180 lb 14.4 oz).   Last 3 BP:   BP Readings from Last 3 Encounters:   08/20/18 122/88   08/13/18 116/83   06/20/06 180/100       History   Smoking Status     Former Smoker     Packs/day: 1.00     Years: 28.00     Types: Cigarettes   Smokeless Tobacco     Never Used     Comment: quit June 2018       Labs:  Lab Results   Component Value Date    A1C 11.6 08/12/2018     Lab Results   Component Value Date     08/13/2018     Lab Results   Component Value Date    LDL (A) 05/08/2002     HDL Cholesterol   Date Value Ref Range Status   05/08/2002 39 (L) 41 - 300 mg/dL    ]  GFR Estimate   Date Value Ref Range Status   08/13/2018 66 >60 mL/min/1.7m2 Final     Comment:     Non  GFR Calc     GFR Estimate If Black   Date Value Ref Range Status   08/13/2018 80 " >60 mL/min/1.7m2 Final     Comment:      GFR Calc     Lab Results   Component Value Date    CR 1.13 2018     No results found for: MICROALBUMIN    Healthy Eating  Cultural/Evangelical diet restrictions?: No  Meals include: Breakfast, Lunch, Dinner, Snacks  Beverages: Water, Coffee, Milk  Has patient met with a dietitian in the past?: No  B: eggs, toast, hash browns, galarza usual; now 1 egg and 1 toast or oatmeal, coffee  L; when working usually and sandwich, chips, apple, pop or water; lately don't know what to eat  D: meat, sweet potato, lots of vegetables, milk 1%  Cookies or fruit. Not much sweets.   Has cut out a lot of starch foods and eating smaller portions.       Being Active  Barrier to exercise: None    Monitoring  Blood Glucose Meter: [employer]  Home Glucose (Sugar) Monitorin-2 times per day  Blood glucose trend: Decreasing steadily  Low Glucose Range (mg/dL): 110-130  High Glucose Range (mg/dL): >200  Overall Range (mg/dL): 110-130    Taking Medications  Diabetes Medication(s)     Biguanides Sig    metFORMIN (GLUCOPHAGE-XR) 500 MG 24 hr tablet Take 2 tablets (1,000 mg) by mouth daily (with dinner)    Sulfonylureas Sig    glipiZIDE (GLUCOTROL) 5 MG tablet Take 1 tablet (5 mg) by mouth every morning (before breakfast)          Current Treatments: Diet    Problem Solving  Hypoglycemia Treatment: Juice  Patient carries a carbohydrate source: No  Medical alert: No  Severe weather/disaster plan for diabetes management?: No  DKA prevention plan?: No  Sick day plan for diabetes management?: (P) No    Hypoglycemia Symptoms  Dizziness or Light-Headedness: No  Headaches: No  Hunger: No  Mood changes: No  Nervousness/Anxiety: No  Sleepiness: No  Speech difficulty: No  Sweats: No    Hypoglycemia Complications       Reducing Risks  CAD Risks: Diabetes Mellitus, Hypertension  Has dilated eye exam at least once a year?: No  Sees dentist every 6 months?: Yes  Sees podiatrist (foot doctor)?:  No    Healthy Coping  Difficulty affording diabetes management supplies?: No  Patient Activation Measure Survey Score:  No flowsheet data found.    ASSESSMENT:  Patient's blood sugars have come down significantly, close to target already. He would get some good feedback on his portions of carb by checking some 2 hours after meals.     Goals        Lifestyle    I will weigh myself daily and resport weights           Patient's most recent   Lab Results   Component Value Date    A1C 11.6 08/12/2018    is not meeting goal of <8.0    INTERVENTION:   Diabetes knowledge and skills assessment:     Patient is knowledgeable in diabetes management concepts related to: Monitoring and Taking Medication    Patient needs further education on the following diabetes management concepts: Healthy Eating, Being Active, Monitoring, Taking Medication, Problem Solving, Reducing Risks and Healthy Coping    Based on learning assessment above, most appropriate setting for further diabetes education would be: Group class or Individual setting.    Education provided today on:  AADE Self-Care Behaviors:  Healthy Eating: carbohydrate counting, heart healthy diet, portion control and label reading  Being Active: relationship to blood glucose  Monitoring: individual blood glucose targets and frequency of monitoring  Taking Medication: action of prescribed medication and side effects of prescribed medications  Problem Solving: low blood glucose - causes, signs/symptoms, treatment and prevention and carrying a carbohydrate source at all times  Reducing Risks: A1C - goals, relating to blood glucose levels, how often to check  Healthy Coping: recognize feelings about diagnosis and benefits of making appropriate lifestyle changes    Opportunities for ongoing education and support in diabetes-self management were discussed.    Pt verbalized understanding of concepts discussed and recommendations provided today.       Education Materials Provided:  BG  Log Sheet and Carbohydrate Counting    PLAN:  See Patient Instructions for co-developed, patient-stated behavior change goals.  AVS printed and provided to patient today. See Follow-Up section for recommended follow-up.    Candelaria Hdz RD  Time Spent: 60 minutes  Encounter Type: Individual    Any diabetes medication dose changes were made via the CDE Protocol and Collaborative Practice Agreement with the patient's primary care provider. A copy of this encounter was shared with the provider.

## 2018-08-23 NOTE — TELEPHONE ENCOUNTER
Left message for patient to return call to schedule colonoscopy or EGD. If Kika or Brook are unavailable, please transfer to the surgery center.

## 2018-08-27 ENCOUNTER — OFFICE VISIT (OUTPATIENT)
Dept: FAMILY MEDICINE | Facility: OTHER | Age: 58
End: 2018-08-27
Payer: COMMERCIAL

## 2018-08-27 ENCOUNTER — PATIENT OUTREACH (OUTPATIENT)
Dept: EDUCATION SERVICES | Facility: CLINIC | Age: 58
End: 2018-08-27

## 2018-08-27 VITALS
BODY MASS INDEX: 24.89 KG/M2 | WEIGHT: 163.7 LBS | DIASTOLIC BLOOD PRESSURE: 82 MMHG | HEART RATE: 88 BPM | SYSTOLIC BLOOD PRESSURE: 122 MMHG | TEMPERATURE: 96.5 F | OXYGEN SATURATION: 98 % | RESPIRATION RATE: 16 BRPM

## 2018-08-27 DIAGNOSIS — E11.9 NEW ONSET TYPE 2 DIABETES MELLITUS (H): Primary | ICD-10-CM

## 2018-08-27 DIAGNOSIS — I50.9 ACUTE CONGESTIVE HEART FAILURE, UNSPECIFIED CONGESTIVE HEART FAILURE TYPE: ICD-10-CM

## 2018-08-27 DIAGNOSIS — L02.01 CUTANEOUS ABSCESS OF FACE: ICD-10-CM

## 2018-08-27 DIAGNOSIS — M79.89 SWELLING OF LIMB: ICD-10-CM

## 2018-08-27 PROCEDURE — 99214 OFFICE O/P EST MOD 30 MIN: CPT | Performed by: INTERNAL MEDICINE

## 2018-08-27 RX ORDER — CEPHALEXIN 500 MG/1
500 CAPSULE ORAL 4 TIMES DAILY
Qty: 21 CAPSULE | Refills: 0 | Status: SHIPPED | OUTPATIENT
Start: 2018-08-27 | End: 2018-09-25

## 2018-08-27 ASSESSMENT — PAIN SCALES - GENERAL: PAINLEVEL: NO PAIN (0)

## 2018-08-27 NOTE — PROGRESS NOTES
Chief Complaint   Patient presents with     Lesion     f/u     CHIEF COMPLAINT:    The patient is a pleasant 58-year-old gentleman who presents today for follow-up of his newly diagnosed diabetes as well as his edema. He also had a small abscess above his left eyelid and was placed on Keflex, this spontaneously ruptured but there still a little bit of erythema around it. It is not nearly the size it was previously. It does not involve the eyelid or affect the vision in any way. We have discussed in detail his congestive heart failure and is markedly decreased ejection fraction. Part of his edema was also resulting hypoproteinemia associated with urinary loss and uncontrolled diabetes. Is also strong possibility that there is some alcoholic cardiomyopathy involved. His edema is gone. His weight has fallen a another 16 pounds since he was last seen. He is currently taking 40 mg of Lasix twice daily and this will be decreased.                         PAST, FAMILY,SOCIAL HISTORY:     Medical  History:   has a past medical history of GERD (gastroesophageal reflux disease) and Gout.     Surgical History:   has a past surgical history that includes None of the above core measure diagnoses (AMI, CHF, Stroke) apply at this time..     Social History:   reports that he has quit smoking. His smoking use included Cigarettes. He has a 28.00 pack-year smoking history. He has never used smokeless tobacco. He reports that he drinks alcohol. He reports that he does not use illicit drugs.     Family History:  family history includes Diabetes in an other family member.            MEDICATIONS  Current Outpatient Prescriptions   Medication Sig Dispense Refill     aspirin 81 MG chewable tablet Take 1 tablet (81 mg) by mouth daily 108 tablet 3     atorvastatin (LIPITOR) 20 MG tablet Take 1 tablet (20 mg) by mouth daily 30 tablet 0     blood glucose (NO BRAND SPECIFIED) lancets standard Use to test blood sugar 1-3 times daily or as  directed. 100 each 11     blood glucose monitoring (NO BRAND SPECIFIED) meter device kit Use to test blood sugar 1-3 times daily or as directed. 1 kit 0     blood glucose monitoring (NO BRAND SPECIFIED) test strip Use to test blood sugars 1-3 times daily or as directed 100 strip 0     cephALEXin (KEFLEX) 500 MG capsule Take 1 capsule (500 mg) by mouth 4 times daily 21 capsule 0     citalopram (CELEXA) 20 MG tablet Take 1/2 tablet (10 mg) for 1-2 weeks, then increase to 1 tablet orally daily 30 tablet 0     glipiZIDE (GLUCOTROL) 5 MG tablet Take 1 tablet (5 mg) by mouth every morning (before breakfast) 30 tablet 0     lisinopril (PRINIVIL/ZESTRIL) 10 MG tablet Take 1 tablet (10 mg) by mouth daily 30 tablet 0     metFORMIN (GLUCOPHAGE-XR) 500 MG 24 hr tablet Take 2 tablets (1,000 mg) by mouth daily (with dinner) 60 tablet 0     metoprolol tartrate (LOPRESSOR) 50 MG tablet Take 1 tablet (50 mg) by mouth 2 times daily 60 tablet 0     OMEPRAZOLE 20 MG OR CPDR 1 CAPSULE DAILY 30 0     potassium chloride SA (KLOR-CON) 20 MEQ CR tablet Take 1 tablet (20 mEq) by mouth daily 30 tablet 0         --------------------------------------------------------------------------------------------------------------------                          REVIEW OF SYSTEMS:         LUNGS: Pt denies: cough,excess sputum, hemoptysis, or shortness of breath.   HEART: Pt denies: chest pain, arrythmia, syncope, tachy or bradyarrhythmia or excess edema.   GI: Pt denies: nausea, vomitting, diarrhea, constipation, melena, or hematochezia.   NEURO: Pt denies: seizures, strokes, diplopia, weakness, paraesthesias, or paralysis.   SKIN: Pt denies: itching, rashes, discoloration, or additional lesions of concern. Denies recent hair loss. The small skin abscess above his eye has improved significantly                          EXAMINATION:       /82 (BP Location: Left arm, Patient Position: Chair, Cuff Size: Adult Regular)  Pulse 88  Temp 96.5  F (35.8   C) (Temporal)  Resp 16  Wt 163 lb 11.2 oz (74.3 kg)  SpO2 98%  BMI 24.89 kg/m2   Constitutional: The patient appears to be in no acute distress. The patient appears to be adequately hydrated. No acute respiratory or hemodynamic distress is noted at this time.   LUNGS: clear bilaterally, airflow is brisk, no intercostal retraction or stridor is noted. No coughing is noted during visit.   HEART:  regular without rubs, clicks, gallops, or murmurs. PMI is nondisplaced. Upstrokes are brisk. S1,S2 are heard.   GI: Abdomen is soft, without rebound, guarding or tenderness. Bowel sounds are appropriate. No renal bruits are heard.    NEURO: Pt is alert and appropriate. No neurologic lateralization is noted. Cranial nerves 2-12 are intact. Peripheral sensory and motor function are grossly normal                        DECISION MAKIN. Acute congestive heart failure, unspecified congestive heart failure type (H)  Markedly improved. Decreased Lasix to 40 mg daily only if needed     2. Swelling of limb  As above. Markedly improved    3. New onset type 2 diabetes mellitus (H)  Discussed diabetic education  Medications  Diet  Limitations to intake    4. Cutaneous abscess of face  Markedly improved but not gone. Continue Keflex one additional week  - cephALEXin (KEFLEX) 500 MG capsule; Take 1 capsule (500 mg) by mouth 4 times daily  Dispense: 21 capsule; Refill: 0                             FOLLOW UP    I have asked the patient to make an appointment for follow up with me in one month        I have carefully explained the diagnosis and treatment options with the patient. The patient has displayed an understanding of the above, and all subsequent questions were answered.         DO SEAN Rodgers    Portions of this note were produced using ARPU  Although every attempt at real-time proof reading has been made, occasional grammar/syntax errors may have been missed.

## 2018-08-27 NOTE — MR AVS SNAPSHOT
After Visit Summary   8/27/2018    Sushil Diana    MRN: [id number]           Patient Information     Date Of Birth          1960        Visit Information        Provider Department      8/27/2018 2:20 PM Mark Matson DO [facility]        Today's Diagnoses     New onset type 2 diabetes mellitus (H)    -  1    Acute congestive heart failure, unspecified congestive heart failure type (H)        Swelling of limb        Cutaneous abscess of face           Follow-ups after your visit        Your next 10 appointments already scheduled     Sep 25, 2018 10:00 AM CDT   [facility]   [facility]    [address] [facility] [id number] [phone]           Bring a current list of meds and any records pertaining to this visit. For Physicals, please bring immunization records and any forms needing to be filled out. Please arrive 10 minutes early to complete paperwork.            Sep 26, 2018 11:00 AM CDT   Diabetes Education with [facility]   [facility] ([facility])    [address] [place] [address] [address] [place] [id number] [phone]              Who to contact     If you have questions or need follow up information about today's clinic visit or your schedule please contact [facility] directly at [phone].  Normal or non-critical lab and imaging results will be communicated to you by MyChart, letter or phone within 4 business days after the clinic has received the results. If you do not hear from us within 7 days, please contact the clinic through MyChart or phone. If you have a critical or abnormal lab result, we will notify you by phone as soon as possible.  Submit refill requests through [employer] or call your pharmacy and they will forward the refill request to us. Please allow 3 business days for your refill to be completed.           "Additional Information About Your Visit        MyChart Information     [employer] lets you send messages to your doctor, view your test results, renew your prescriptions, schedule appointments and more. To sign up, go to [email or web address] . Click on \"Log in\" on the left side of the screen, which will take you to the Welcome page. Then click on \"Sign up Now\" on the right side of the page.     You will be asked to enter the access code listed below, as well as some personal information. Please follow the directions to create your username and password.     Your access code is: [email or web address]  Expires: 2018 11:53 AM     Your access code will  in 90 days. If you need help or a new code, please call your [facility] clinic or [phone].        Care EveryWhere ID     This is your Care EveryWhere ID. This could be used by other organizations to access your [facility] medical records  [id number]        Your Vitals Were     Pulse Temperature Respirations Pulse Oximetry BMI (Body Mass Index)       88 96.5  F (35.8  C) (Temporal) 16 98% 24.89 kg/m2        Blood Pressure from Last 3 Encounters:   18 122/82   18 122/88   18 116/83    Weight from Last 3 Encounters:   18 163 lb 11.2 oz (74.3 kg)   18 180 lb 14.4 oz (82.1 kg)   18 192 lb 0.3 oz (87.1 kg)              Today, you had the following     No orders found for display         Today's Medication Changes          These changes are accurate as of 18  2:49 PM.  If you have any questions, ask your nurse or doctor.               Stop taking these medicines if you haven't already. Please contact your care team if you have questions.     furosemide 40 MG tablet   Commonly known as:  LASIX   Stopped by:  Mark Matson, DO                Where to get your medicines      These medications were sent to [facility] [place], [place] - [address]   [address] [facility] [address]     Phone:  [phone]     " cephALEXin 500 MG capsule                Primary Care Provider Office Phone # Fax #    Markjono Matson [phone]       [address] [facility] [address]        Goals        General    Monitoring (pt-stated)     Notes - Note created  8/23/2018 12:16 PM by Candelaria Hdz RD    I will check before breakfast and one about 2 hours after a meal.          Lifestyle    I will weigh myself daily and resport weights       Equal Access to Services     LALY DIAZ : Hadii aad ku hadasho Soomaali, waaxda luqadaha, qaybta kaalmada adeegyada, waxay idiin hayaan adeeg kharash la'aan . So [facility] [phone].    ATENCIÓN: Si habla español, tiene a devi disposición servicios gratuitos de asistencia lingüística. [facility] [phone].    We comply with applicable federal civil rights laws and [place] laws. We do not discriminate on the basis of race, color, national origin, age, disability, sex, sexual orientation, or gender identity.            Thank you!     Thank you for choosing [facility]  for your care. Our goal is always to provide you with excellent care. Hearing back from our patients is one way we can continue to improve our services. Please take a few minutes to complete the written survey that you may receive in the mail after your visit with us. Thank you!             Your Updated Medication List - Protect others around you: Learn how to safely use, store and throw away your medicines at www.disposemymeds.org.          This list is accurate as of 8/27/18  2:49 PM.  Always use your most recent med list.                   Brand Name Dispense Instructions for use Diagnosis    aspirin 81 MG chewable tablet     108 tablet    Take 1 tablet (81 mg) by mouth daily    Acute congestive heart failure, unspecified congestive heart failure type (H)       atorvastatin 20 MG tablet    LIPITOR    30 tablet    Take 1 tablet (20 mg) by mouth daily    Acute congestive heart failure, unspecified  congestive heart failure type (H), Diabetes mellitus without complication (H)       blood glucose lancets standard    no brand specified    100 each    Use to test blood sugar 1-3 times daily or as directed.    New onset type 2 diabetes mellitus (H), Diabetes mellitus without complication (H)       blood glucose monitoring meter device kit    no brand specified    1 kit    Use to test blood sugar 1-3 times daily or as directed.    New onset type 2 diabetes mellitus (H), Diabetes mellitus without complication (H)       blood glucose monitoring test strip    no brand specified    100 strip    Use to test blood sugars 1-3 times daily or as directed    New onset type 2 diabetes mellitus (H), Diabetes mellitus without complication (H)       cephALEXin 500 MG capsule    KEFLEX    21 capsule    Take 1 capsule (500 mg) by mouth 4 times daily    Cutaneous abscess of face       citalopram 20 MG tablet    celeXA    30 tablet    Take 1/2 tablet (10 mg) for 1-2 weeks, then increase to 1 tablet orally daily    Adjustment disorder with anxious mood       glipiZIDE 5 MG tablet    GLUCOTROL    30 tablet    Take 1 tablet (5 mg) by mouth every morning (before breakfast)    Diabetes mellitus without complication (H)       lisinopril 10 MG tablet    PRINIVIL/ZESTRIL    30 tablet    Take 1 tablet (10 mg) by mouth daily    Acute congestive heart failure, unspecified congestive heart failure type (H), Hypertension, unspecified type       metFORMIN 500 MG 24 hr tablet    GLUCOPHAGE-XR    60 tablet    Take 2 tablets (1,000 mg) by mouth daily (with dinner)    New onset type 2 diabetes mellitus (H)       metoprolol tartrate 50 MG tablet    LOPRESSOR    60 tablet    Take 1 tablet (50 mg) by mouth 2 times daily    Acute congestive heart failure, unspecified congestive heart failure type (H), Hypertension, unspecified type       omeprazole 20 MG CR capsule    priLOSEC    30    1 CAPSULE DAILY    Esophageal reflux       potassium chloride SA 20  MEQ CR tablet    KLOR-CON    30 tablet    Take 1 tablet (20 mEq) by mouth daily    Acute congestive heart failure, unspecified congestive heart failure type (H), Bilateral leg edema, Diuretic-induced hypokalemia

## 2018-08-27 NOTE — PROGRESS NOTES
Patient had left voicemail and I returned his call. He is wondering why he continues to lose weight and wonders if it is his water pill. He is able to eat fine and blood sugars are running around 140s so it is not likely to be related to either of those. He will be seeing his doctor today to discuss this question.   No changes recommended in diabetes food plan at this time.     Candelaria Hdz RDN, LD, CDE

## 2018-08-28 NOTE — TELEPHONE ENCOUNTER
Left message for patient to return call to schedule colonoscopy or EGD. If [facility] or [facility] are unavailable, please transfer to the surgery center.       Letter sent

## 2018-08-29 NOTE — TELEPHONE ENCOUNTER
"Please send the patient a \"fit\" test.  He is 58 years of age and has not had a previous colonoscopy.  However, he would prefer not to.  I disagree with this decision but respect his autonomy to make it.  Dano    "

## 2018-08-29 NOTE — TELEPHONE ENCOUNTER
Patient called back and does not want this.  He is going to talk with Dr Matson about ordering a home test

## 2018-08-31 DIAGNOSIS — R60.0 BILATERAL LEG EDEMA: ICD-10-CM

## 2018-08-31 DIAGNOSIS — T50.2X5A DIURETIC-INDUCED HYPOKALEMIA: ICD-10-CM

## 2018-08-31 DIAGNOSIS — I10 HYPERTENSION, UNSPECIFIED TYPE: ICD-10-CM

## 2018-08-31 DIAGNOSIS — I50.9 ACUTE CONGESTIVE HEART FAILURE, UNSPECIFIED CONGESTIVE HEART FAILURE TYPE: ICD-10-CM

## 2018-08-31 DIAGNOSIS — E11.9 NEW ONSET TYPE 2 DIABETES MELLITUS (H): ICD-10-CM

## 2018-08-31 DIAGNOSIS — E87.6 DIURETIC-INDUCED HYPOKALEMIA: ICD-10-CM

## 2018-08-31 DIAGNOSIS — E11.9 DIABETES MELLITUS WITHOUT COMPLICATION (H): ICD-10-CM

## 2018-08-31 DIAGNOSIS — F43.22 ADJUSTMENT DISORDER WITH ANXIOUS MOOD: ICD-10-CM

## 2018-08-31 NOTE — TELEPHONE ENCOUNTER
"Requested Prescriptions   Pending Prescriptions Disp Refills     potassium chloride SA (KLOR-CON) 20 MEQ CR tablet 30 tablet 0    Last Written Prescription Date:  8-13-18  Last Fill Quantity: 30,  # refills: 0   Last office visit: No previous visit found with prescribing provider:  8-27-18   Future Office Visit:   Next 5 appointments (look out 90 days)     Sep 25, 2018 10:00 AM CDT   Office Visit with Mark Matson DO   [facility]    [address] [facility] [id number] [phone]                  Sig: Take 1 tablet (20 mEq) by mouth daily    Potassium Supplements Protocol Passed    8/31/2018 12:45 PM       Passed - Recent (12 mo) or future (30 days) visit within the authorizing provider's specialty    Patient had office visit in the last 12 months or has a visit in the next 30 days with authorizing provider or within the authorizing provider's specialty.  See \"Patient Info\" tab in inbasket, or \"Choose Columns\" in Meds & Orders section of the refill encounter.           Passed - Patient is age 18 or older       Passed - Normal serum potassium in past 12 months    Recent Labs   Lab Test  08/13/18   0544   POTASSIUM  4.2                    atorvastatin (LIPITOR) 20 MG tablet 30 tablet 0    Last Written Prescription Date:  8-13-18  Last Fill Quantity: 30,  # refills: 0   Last office visit: No previous visit found with prescribing provider:  8-27-18   Future Office Visit:   Next 5 appointments (look out 90 days)     Sep 25, 2018 10:00 AM CDT   [facility]    [address] [facility] [address] [phone]                  Sig: Take 1 tablet (20 mg) by mouth daily    Statins Protocol Failed    8/31/2018 12:45 PM       Failed - LDL on file in past 12 months    No lab results found.         Passed - No abnormal creatine kinase in past 12 months    No lab results found. " "           Passed - Recent (12 mo) or future (30 days) visit within the authorizing provider's specialty    Patient had office visit in the last 12 months or has a visit in the next 30 days with authorizing provider or within the authorizing provider's specialty.  See \"Patient Info\" tab in inbasket, or \"Choose Columns\" in Meds & Orders section of the refill encounter.           Passed - Patient is age 18 or older        citalopram (CELEXA) 20 MG tablet 30 tablet 0    Last Written Prescription Date:  8-13-18  Last Fill Quantity: 30,  # refills: 0   Last office visit: No previous visit found with prescribing provider:  8-27-18   Future Office Visit:   Next 5 appointments (look out 90 days)     Sep 25, 2018 10:00 AM CDT   Office Visit with Mark Matson DO   [facility]    [address] [facility] [id number] [phone]                  Sig: Take 1/2 tablet (10 mg) for 1-2 weeks, then increase to 1 tablet orally daily    SSRIs Protocol Passed    8/31/2018 12:45 PM       Passed - Recent (12 mo) or future (30 days) visit within the authorizing provider's specialty    Patient had office visit in the last 12 months or has a visit in the next 30 days with authorizing provider or within the authorizing provider's specialty.  See \"Patient Info\" tab in inbasket, or \"Choose Columns\" in Meds & Orders section of the refill encounter.           Passed - Patient is age 18 or older        glipiZIDE (GLUCOTROL) 5 MG tablet 30 tablet 0    Last Written Prescription Date:  8-13-18  Last Fill Quantity: 30,  # refills: 0   Last office visit: No previous visit found with prescribing provider:  8-27-18   Future Office Visit:   Next 5 appointments (look out 90 days)     Sep 25, 2018 10:00 AM CDT   [facility]   [facility]    [address] [facility] [id number] [phone]                  Sig: Take 1 " "tablet (5 mg) by mouth every morning (before breakfast)    Sulfonylurea Agents Failed    8/31/2018 12:45 PM       Failed - Patient has documented LDL within the past 12 mos.    No lab results found.         Failed - Patient has had a Microalbumin in the past 12 mos.    No lab results found.         Passed - Blood pressure less than 140/90 in past 6 months    BP Readings from Last 3 Encounters:   08/27/18 122/82   08/20/18 122/88   08/13/18 116/83                Passed - Patient has documented A1c within the specified period of time.    If HgbA1C is 8 or greater, it needs to be on file within the past 3 months.  If less than 8, must be on file within the past 6 months.     Recent Labs   Lab Test  08/12/18   1830   A1C  11.6*            Passed - Patient is age 18 or older       Passed - Patient has a recent creatinine (normal) within the past 12 mos.    Recent Labs   Lab Test  08/13/18   0544   CR  1.13            Passed - Recent (6 mo) or future (30 days) visit within the authorizing provider's specialty    Patient had office visit in the last 6 months or has a visit in the next 30 days with authorizing provider or within the authorizing provider's specialty.  See \"Patient Info\" tab in inbasket, or \"Choose Columns\" in Meds & Orders section of the refill encounter.            lisinopril (PRINIVIL/ZESTRIL) 10 MG tablet 30 tablet 0    Last Written Prescription Date:  8-14-18  Last Fill Quantity: 30,  # refills: 0   Last office visit: No previous visit found with prescribing provider:  8-   Future Office Visit:   Next 5 appointments (look out 90 days)     Sep 25, 2018 10:00 AM CDT   Office Visit with Mark Matson,    [facility]    [address] [facility] [address] [phone]                  Sig: Take 1 tablet (10 mg) by mouth daily    ACE Inhibitors (Including Combos) Protocol Passed    8/31/2018 12:45 PM       Passed - Blood pressure under 140/90 " "in past 12 months    BP Readings from Last 3 Encounters:   08/27/18 122/82   08/20/18 122/88   08/13/18 116/83                Passed - Recent (12 mo) or future (30 days) visit within the authorizing provider's specialty    Patient had office visit in the last 12 months or has a visit in the next 30 days with authorizing provider or within the authorizing provider's specialty.  See \"Patient Info\" tab in inbasket, or \"Choose Columns\" in Meds & Orders section of the refill encounter.           Passed - Patient is age 18 or older       Passed - Normal serum creatinine on file in past 12 months    Recent Labs   Lab Test  08/13/18   0544   CR  1.13            Passed - Normal serum potassium on file in past 12 months    Recent Labs   Lab Test  08/13/18   0544   POTASSIUM  4.2             metoprolol tartrate (LOPRESSOR) 50 MG tablet 60 tablet 0    Last Written Prescription Date:  8-13-18  Last Fill Quantity: 60,  # refills: 0   Last office visit: No previous visit found with prescribing provider:  8-27-18   Future Office Visit:   Next 5 appointments (look out 90 days)     Sep 25, 2018 10:00 AM CDT   Office Visit with Mark Matson DO   [facility]    [address] [facility] [address] [phone]                  Sig: Take 1 tablet (50 mg) by mouth 2 times daily    Beta-Blockers Protocol Passed    8/31/2018 12:45 PM       Passed - Blood pressure under 140/90 in past 12 months    BP Readings from Last 3 Encounters:   08/27/18 122/82   08/20/18 122/88 08/13/18 116/83                Passed - Patient is age 6 or older       Passed - Recent (12 mo) or future (30 days) visit within the authorizing provider's specialty    Patient had office visit in the last 12 months or has a visit in the next 30 days with authorizing provider or within the authorizing provider's specialty.  See \"Patient Info\" tab in inbasket, or \"Choose Columns\" in Meds & Orders section of the refill " "encounter.            metFORMIN (GLUCOPHAGE-XR) 500 MG 24 hr tablet 60 tablet 0    Last Written Prescription Date:  8-  Last Fill Quantity: 60,  # refills: 0   Last office visit: No previous visit found with prescribing provider:  8-   Future Office Visit:   Next 5 appointments (look out 90 days)     Sep 25, 2018 10:00 AM CDT   [facility]   [facility]    [address] [facility] [address] [phone]                  Sig: Take 2 tablets (1,000 mg) by mouth daily (with dinner)    Biguanide Agents Failed    8/31/2018 12:45 PM       Failed - Patient has documented LDL within the past 12 mos.    No lab results found.         Failed - Patient has had a Microalbumin in the past 15 mos.    No lab results found.         Passed - Blood pressure less than 140/90 in past 6 months    BP Readings from Last 3 Encounters:   08/27/18 122/82   08/20/18 122/88   08/13/18 116/83                Passed - Patient is age 10 or older       Passed - Patient has documented A1c within the specified period of time.    If HgbA1C is 8 or greater, it needs to be on file within the past 3 months.  If less than 8, must be on file within the past 6 months.     Recent Labs   Lab Test  08/12/18   1830   A1C  11.6*            Passed - Patient's CR is NOT>1.4 OR Patient's EGFR is NOT<45 within past 12 mos.    Recent Labs   Lab Test  08/13/18   0544   GFRESTIMATED  66   GFRESTBLACK  80       Recent Labs   Lab Test  08/13/18   0544   CR  1.13            Passed - Patient does NOT have a diagnosis of CHF.       Passed - Recent (6 mo) or future (30 days) visit within the authorizing provider's specialty    Patient had office visit in the last 6 months or has a visit in the next 30 days with authorizing provider or within the authorizing provider's specialty.  See \"Patient Info\" tab in inbasket, or \"Choose Columns\" in Meds & Orders section of the refill " encounter.

## 2018-09-04 RX ORDER — POTASSIUM CHLORIDE 1500 MG/1
20 TABLET, EXTENDED RELEASE ORAL DAILY
Qty: 30 TABLET | Refills: 0 | Status: SHIPPED | OUTPATIENT
Start: 2018-09-04 | End: 2019-09-27

## 2018-09-04 RX ORDER — ATORVASTATIN CALCIUM 20 MG/1
20 TABLET, FILM COATED ORAL DAILY
Qty: 30 TABLET | Refills: 0 | Status: SHIPPED | OUTPATIENT
Start: 2018-09-04 | End: 2018-10-04

## 2018-09-04 RX ORDER — LISINOPRIL 10 MG/1
10 TABLET ORAL DAILY
Qty: 30 TABLET | Refills: 0 | Status: SHIPPED | OUTPATIENT
Start: 2018-09-04 | End: 2018-10-04

## 2018-09-04 RX ORDER — CITALOPRAM HYDROBROMIDE 20 MG/1
TABLET ORAL
Qty: 30 TABLET | Refills: 0 | Status: SHIPPED | OUTPATIENT
Start: 2018-09-04 | End: 2018-10-04

## 2018-09-04 RX ORDER — GLIPIZIDE 5 MG/1
5 TABLET ORAL
Qty: 30 TABLET | Refills: 0 | Status: SHIPPED | OUTPATIENT
Start: 2018-09-04 | End: 2018-10-04

## 2018-09-04 RX ORDER — METOPROLOL TARTRATE 50 MG
50 TABLET ORAL 2 TIMES DAILY
Qty: 60 TABLET | Refills: 0 | Status: SHIPPED | OUTPATIENT
Start: 2018-09-04 | End: 2018-10-04

## 2018-09-04 RX ORDER — METFORMIN HCL 500 MG
1000 TABLET, EXTENDED RELEASE 24 HR ORAL
Qty: 60 TABLET | Refills: 0 | Status: SHIPPED | OUTPATIENT
Start: 2018-09-04 | End: 2018-10-04

## 2018-09-04 NOTE — TELEPHONE ENCOUNTER
Routing refill request to provider for review/approval because:  Labs not current:  LDL, Microalbumin    ANGELITO Bear, RN  [facility]

## 2018-09-08 ENCOUNTER — TELEPHONE (OUTPATIENT)
Dept: FAMILY MEDICINE | Facility: OTHER | Age: 58
End: 2018-09-08

## 2018-09-08 NOTE — TELEPHONE ENCOUNTER
Dr Matson told him to stop Lasix, should he also discontinue potassium?    [place] [facility]  [phone]

## 2018-09-13 ENCOUNTER — PATIENT OUTREACH (OUTPATIENT)
Dept: CARE COORDINATION | Facility: CLINIC | Age: 58
End: 2018-09-13

## 2018-09-13 DIAGNOSIS — E11.9 DIABETES MELLITUS, TYPE 2 (H): Primary | ICD-10-CM

## 2018-09-13 DIAGNOSIS — I50.9 CHF (CONGESTIVE HEART FAILURE) (H): ICD-10-CM

## 2018-09-13 NOTE — PROGRESS NOTES
"Clinic Care Coordination Contact    Clinic Care Coordination Contact  OUTREACH    Referral Information:       Primary Diagnosis: CHF, DM type 2         [place] Utilization:   Clinic Utilization  Difficulty keeping appointments:: No  Compliance Concerns: No  No-Show Concerns: No  No PCP office visit in Past Year: No  Utilization    Last refreshed: 2018  4:31 PM:  No Show Count (past year) 0       Last refreshed: 2018  4:31 PM:  ED visits 0       Last refreshed: 2018  4:31 PM:  Hospital admissions 1          Current as of: 2018  4:31 PM             Clinical Concerns:  Current Medical Concerns:  Clinic Care Coordinator RN spoke with patient. Patient reports he is feeling \"so much better\"   Patient is weighting himself daily.   Clinic Care Coordinator RN educated patient to call clinic if wt increases 2 pounds in one day or 5 pounds in a week. Patient verbalized understanding.   Patient denies edema.   Clinic Care Coordinator RN will ask PCP when/if patient should follow up with cardiology.   Clinic Care Coordinator RN will mail CHF booklet to patient.     Patient reports he quit smoking.   Patient reports he \"occasionally has a beer or 2\"   Denies need for ETOH treatment. Patient denies withdrawal sx.     Patient is checking blood glucose reading several times a day.  Averaging 100-130s.   Patient has follow up appointment with PCP and DM educator at the end of September.            Health Maintenance Reviewed: Not assessed  Clinical Pathway: Clinic Care Coordination CHF Assessment      CHF:    Home scale available:  Yes  Home scale weight this mornin    Symptom Review:   Heart Failure Symptoms  Shortness of breath:: No  Wheezing or noisy breathing?: No  Cough: No  Increased sputum: No  Fever: No  Chest pain: : No  Dizzy or Lightheaded: No  Checking weight daily? : Yes  Weight?: Loss  Today's Weight?: 129 lb (58.5 kg)  Weight increase more than 2 lbs in 24 hours?: No  Weight Increase more " "than 5 lbs in 1 week? : No  Does the patient have understanding of Diuretic self-management?: Yes  Diet:: No added salt  Appetite:: Normal  Swelling: :  (denies)  Bloating:: None  Urination:: Normal  Fatigue: No  Weakness (Heaviness in limbs):: No  Cognitive::  (denies)  Emotional::  (denies)  What Heart Failure zone are you currently in?:  (will mail CHF booklet to pt )  Overall your CHF symptoms are (GOAL):: Improving  How confident are you with the plan we have identified?: 9    Medications:  \"Do you have questions about your medications?\"  No  For patients on insulin: \"Did you start on insulin in the hospital or did you have your insulin dose changed?\"  Yes -- Diabetes education referral needed (unless MTM referral already ordered)  Is patient on Warfarin?  No  Is Ejection Fraction <40%: Yes:                  Diet/Exercise/Sleep:  Diet:: No added salt       Resources and Interventions:  Current Resources:           Advance Care Plan/Directive  Advanced Care Plans/Directives on file:: No          Goals:   Goals        General    Monitoring (pt-stated)     Notes - Note created  8/23/2018 12:16 PM by Candelaria Hdz RD    I will check before breakfast and one about 2 hours after a meal.          Lifestyle    I will weigh myself daily and resport weights             Patient/Caregiver understanding: yes    Outreach Frequency: weekly  Future Appointments              In 1 week Mark Matson DO [facility]    In 1 week [facility] [place] [facility]          Plan: Clinic Care Coordinator RN will mail CHF booklet to patient.   Will ask PCP when/if patient should follow up with cardiology.   Patient has appointment with PCP and DM educator at the end of September.   Patient will continue to monitor blood glucose reading.   Patient will continue to weight self daily. Patient verbalized understanding to call clinic if wt increases 2 " pounds in one day or 5 pounds in a week.

## 2018-09-13 NOTE — TELEPHONE ENCOUNTER
I approve of requested home care orders.  Cardiology follow-up seems like a good idea.  Mark Matson

## 2018-09-17 ENCOUNTER — PATIENT OUTREACH (OUTPATIENT)
Dept: CARE COORDINATION | Facility: CLINIC | Age: 58
End: 2018-09-17
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
## 2018-09-17 NOTE — LETTER
[facility] CARE COORDINATION  [address] [facility] [address]    September 17, 2018    Sushil Diana  [address] [facility] [address]      Dear Sushil,    I wanted to thank you for spending the time to talk with me.  Here is the book about Heart Failure that we discussed last week. .  I will pipo you later this week to review information.   Please feel free to contact me at [phone], with any questions or concerns. We at [facility] are focused on providing you with the highest-quality healthcare experience possible and that all starts with you.     Sincerely,       Michelle Chavez, RN  Clinic Care Coordinator     Phone: [phone]

## 2018-09-17 NOTE — PROGRESS NOTES
Clinic Care Coordination Contact  Care Team Conversations    Heart failure book, care pan and intro letter mailed to patient.   Clinic Care Coordinator RN will call patient to follow up and review book.

## 2018-09-25 ENCOUNTER — OFFICE VISIT (OUTPATIENT)
Dept: FAMILY MEDICINE | Facility: OTHER | Age: 58
End: 2018-09-25
Payer: COMMERCIAL

## 2018-09-25 VITALS
OXYGEN SATURATION: 98 % | SYSTOLIC BLOOD PRESSURE: 138 MMHG | WEIGHT: 169.4 LBS | HEART RATE: 102 BPM | DIASTOLIC BLOOD PRESSURE: 88 MMHG | TEMPERATURE: 98.7 F | RESPIRATION RATE: 16 BRPM | BODY MASS INDEX: 25.76 KG/M2

## 2018-09-25 DIAGNOSIS — Z11.4 ENCOUNTER FOR SCREENING FOR HIV: ICD-10-CM

## 2018-09-25 DIAGNOSIS — R17 ELEVATED BILIRUBIN: ICD-10-CM

## 2018-09-25 DIAGNOSIS — Z12.11 SPECIAL SCREENING FOR MALIGNANT NEOPLASMS, COLON: ICD-10-CM

## 2018-09-25 DIAGNOSIS — E11.9 NEW ONSET TYPE 2 DIABETES MELLITUS (H): Primary | ICD-10-CM

## 2018-09-25 DIAGNOSIS — Z11.59 NEED FOR HEPATITIS C SCREENING TEST: ICD-10-CM

## 2018-09-25 DIAGNOSIS — I10 ESSENTIAL HYPERTENSION, BENIGN: ICD-10-CM

## 2018-09-25 DIAGNOSIS — Z23 NEED FOR PROPHYLACTIC VACCINATION AND INOCULATION AGAINST INFLUENZA: ICD-10-CM

## 2018-09-25 DIAGNOSIS — Z23 NEED FOR PROPHYLACTIC VACCINATION WITH COMBINED VACCINE: ICD-10-CM

## 2018-09-25 LAB
ALBUMIN SERPL-MCNC: 3.8 G/DL (ref 3.4–5)
ALP SERPL-CCNC: 76 U/L (ref 40–150)
ALT SERPL W P-5'-P-CCNC: 30 U/L (ref 0–70)
AST SERPL W P-5'-P-CCNC: 28 U/L (ref 0–45)
BILIRUB DIRECT SERPL-MCNC: 0.4 MG/DL (ref 0–0.2)
BILIRUB SERPL-MCNC: 1.5 MG/DL (ref 0.2–1.3)
CHOLEST SERPL-MCNC: 160 MG/DL
CREAT UR-MCNC: 123 MG/DL
HDLC SERPL-MCNC: 48 MG/DL
LDLC SERPL CALC-MCNC: 75 MG/DL
MICROALBUMIN UR-MCNC: 716 MG/L
MICROALBUMIN/CREAT UR: 582.11 MG/G CR (ref 0–17)
NONHDLC SERPL-MCNC: 112 MG/DL
PROT SERPL-MCNC: 7.4 G/DL (ref 6.8–8.8)
TRIGL SERPL-MCNC: 187 MG/DL

## 2018-09-25 PROCEDURE — 99214 OFFICE O/P EST MOD 30 MIN: CPT | Mod: 25 | Performed by: INTERNAL MEDICINE

## 2018-09-25 PROCEDURE — 36415 COLL VENOUS BLD VENIPUNCTURE: CPT | Performed by: INTERNAL MEDICINE

## 2018-09-25 PROCEDURE — 80061 LIPID PANEL: CPT | Performed by: INTERNAL MEDICINE

## 2018-09-25 PROCEDURE — 80076 HEPATIC FUNCTION PANEL: CPT | Performed by: INTERNAL MEDICINE

## 2018-09-25 PROCEDURE — 90715 TDAP VACCINE 7 YRS/> IM: CPT | Performed by: INTERNAL MEDICINE

## 2018-09-25 PROCEDURE — 82043 UR ALBUMIN QUANTITATIVE: CPT | Performed by: INTERNAL MEDICINE

## 2018-09-25 PROCEDURE — 90682 RIV4 VACC RECOMBINANT DNA IM: CPT | Performed by: INTERNAL MEDICINE

## 2018-09-25 PROCEDURE — 90472 IMMUNIZATION ADMIN EACH ADD: CPT | Performed by: INTERNAL MEDICINE

## 2018-09-25 PROCEDURE — G0472 HEP C SCREEN HIGH RISK/OTHER: HCPCS | Performed by: INTERNAL MEDICINE

## 2018-09-25 PROCEDURE — 90471 IMMUNIZATION ADMIN: CPT | Performed by: INTERNAL MEDICINE

## 2018-09-25 PROCEDURE — 90732 PPSV23 VACC 2 YRS+ SUBQ/IM: CPT | Performed by: INTERNAL MEDICINE

## 2018-09-25 PROCEDURE — 87389 HIV-1 AG W/HIV-1&-2 AB AG IA: CPT | Performed by: INTERNAL MEDICINE

## 2018-09-25 PROCEDURE — 99207 C FOOT EXAM  NO CHARGE: CPT | Performed by: INTERNAL MEDICINE

## 2018-09-25 ASSESSMENT — PAIN SCALES - GENERAL: PAINLEVEL: NO PAIN (0)

## 2018-09-25 NOTE — PROGRESS NOTES
SUBJECTIVE:   Sushil Diana is a 58 year old male who presents to clinic today for the following health issues:    Diabetes Follow-up    Patient is checking blood sugars: twice daily.    Blood sugar testing frequency justification: Adjustment of medication(s)  Results are as follows:         am - 120s-130s         bedtime - 100-140s    Diabetic concerns: None     Symptoms of hypoglycemia (low blood sugar): none     Paresthesias (numbness or burning in feet) or sores: No     Date of last diabetic eye exam: due    BP Readings from Last 2 Encounters:   08/27/18 122/82   08/20/18 122/88     Hemoglobin A1C (%)   Date Value   08/12/2018 11.6 (H)     LDL Cholesterol Calculated (mg/dL)   Date Value   05/08/2002 (A)       Diabetes Management Resources    Amount of exercise or physical activity: None    Problems taking medications regularly: No    Medication side effects: none    Diet: regular (no restrictions)                          Chief Complaint         The patient is a pleasant 58-year-old gentleman who was recently diagnosed as having type 2 diabetes.  His blood sugars have been exemplary with most values under 140.  He is watching his diet and increasing his exercise modestly.  He denies any hyper or hypoglycemic episodes.  His initial A1c was 11.6 in August, I anticipate when this is repeated, will be markedly better.  Right now, he is on glipizide and metformin without any side effects.  He is appropriately on a statin as well as aspirin and an ACE inhibitor.  We discussed the need for screening colonoscopy, he would like to do a fit test instead.  We will update his vaccinations.  Additionally, the recommended once in a lifetime screening for HIV and hepatitis C is also discussed and recommended.                       PAST, FAMILY,SOCIAL HISTORY:     Medical  History:   has a past medical history of GERD (gastroesophageal reflux disease) and Gout.     Surgical History:   has a past surgical history that  includes None of the above core measure diagnoses (AMI, CHF, Stroke) apply at this time..     Social History:   reports that he has quit smoking. His smoking use included Cigarettes. He has a 28.00 pack-year smoking history. He has never used smokeless tobacco. He reports that he drinks alcohol. He reports that he does not use illicit drugs.     Family History:  family history includes Diabetes in an other family member.            MEDICATIONS  Current Outpatient Prescriptions   Medication Sig Dispense Refill     aspirin 81 MG chewable tablet Take 1 tablet (81 mg) by mouth daily 108 tablet 3     atorvastatin (LIPITOR) 20 MG tablet Take 1 tablet (20 mg) by mouth daily 30 tablet 0     citalopram (CELEXA) 20 MG tablet 1 tablet orally daily 30 tablet 0     glipiZIDE (GLUCOTROL) 5 MG tablet Take 1 tablet (5 mg) by mouth every morning (before breakfast) 30 tablet 0     lisinopril (PRINIVIL/ZESTRIL) 10 MG tablet Take 1 tablet (10 mg) by mouth daily 30 tablet 0     metFORMIN (GLUCOPHAGE-XR) 500 MG 24 hr tablet Take 2 tablets (1,000 mg) by mouth daily (with dinner) 60 tablet 0     metoprolol tartrate (LOPRESSOR) 50 MG tablet Take 1 tablet (50 mg) by mouth 2 times daily 60 tablet 0     OMEPRAZOLE 20 MG OR CPDR 1 CAPSULE DAILY 30 0     potassium chloride SA (KLOR-CON) 20 MEQ CR tablet Take 1 tablet (20 mEq) by mouth daily 30 tablet 0     blood glucose (NO BRAND SPECIFIED) lancets standard Use to test blood sugar 1-3 times daily or as directed. 100 each 11     blood glucose monitoring (NO BRAND SPECIFIED) meter device kit Use to test blood sugar 1-3 times daily or as directed. 1 kit 0     blood glucose monitoring (NO BRAND SPECIFIED) test strip Use to test blood sugars 1-3 times daily or as directed 100 strip 0         --------------------------------------------------------------------------------------------------------------------                              Review of Systems     LUNGS: Pt denies: cough,excess sputum,  hemoptysis, or shortness of breath.   HEART: Pt denies: chest pain, arrythmia, syncope, tachy or bradyarrhythmia.   GI: Pt denies: nausea, vomitting, diarrhea, constipation, melena, or hematochezia.   NEURO: Pt denies: seizures, strokes, diplopia, weakness, paraesthesias, or paralysis.   SKIN: Pt denies: itching, rashes, discoloration, or specific lesions of concern. Denies recent hair loss.   PSYCH: The patient denies significant depression, anxiety, mood imbalance. Specifically denies any suicidal ideation.                                     Examination      /88  Pulse 102  Temp 98.7  F (37.1  C) (Temporal)  Resp 16  Wt 169 lb 6.4 oz (76.8 kg)  SpO2 98%  BMI 25.76 kg/m2   Constitutional: The patient appears to be in no acute distress. The patient appears to be adequately hydrated. No acute respiratory or hemodynamic distress is noted at this time.   LUNGS: clear bilaterally, airflow is brisk, no intercostal retraction or stridor is noted. No coughing is noted during visit.   HEART:  regular without rubs, clicks, gallops, or murmurs. PMI is nondisplaced. Upstrokes are brisk. S1,S2 are heard.   GI: Abdomen is soft, without rebound, guarding or tenderness. Bowel sounds are appropriate. No renal bruits are heard.   NEURO: Pt is alert and appropriate. No neurologic lateralization is noted. Cranial nerves 2-12 are intact. Peripheral sensory and motor function are grossly normal.    SKIN:  warm and dry. No erythema, or rashes are noted. No specific lesions of concern are noted.    Feet: no evidence of skin breakdown or ulceration is noted.  Sensation is intact to monofilament and vibration.  Pulses are strong, capillary refill is brisk.                                               Decision Making    1. New onset type 2 diabetes mellitus (H)  Continue current medication  - Lipid Profile  - FOOT EXAM  - Albumin Random Urine Quantitative with Creat Ratio    2. Essential hypertension, benign  Continue current  medication and follow    3. Elevated bilirubin  Recheck liver function studies and bilirubin    4. Special screening for malignant neoplasms, colon  Discussed colonoscopy, patient refusing  - Fecal colorectal cancer screen (FIT); Future    5. Need for hepatitis C screening test  Ordered  - Hepatitis C antibody    6. Encounter for screening for HIV  Ordered  - HIV Antigen Antibody Combo                          FOLLOW UP   I have asked the patient to make an appointment for followup with me in 4 months or as predicated by his results        I have carefully explained the diagnosis and treatment options to the patient.  The patient has displayed an understanding of the above, and all subsequent questions were answered.      DO SEAN Rodgers    Portions of this note were produced using [employer]  Although every attempt at real-time proof reading has been made, occasional grammar/syntax errors may have been missed.

## 2018-09-25 NOTE — NURSING NOTE
Prior to injection verified patient identity using patient's name and date of birth.  Due to injection administration, patient instructed to remain in clinic for 15 minutes  afterwards, and to report any adverse reaction to me immediately.    Screening Questionnaire for Adult Immunization    Are you sick today?   No   Do you have allergies to medications, food, a vaccine component or latex?   No   Have you ever had a serious reaction after receiving a vaccination?   No   Do you have a long-term health problem with heart disease, lung disease, asthma, kidney disease, metabolic disease (e.g. diabetes), anemia, or other blood disorder?   No   Do you have cancer, leukemia, HIV/AIDS, or any other immune system problem?   No   In the past 3 months, have you taken medications that affect  your immune system, such as prednisone, other steroids, or anticancer drugs; drugs for the treatment of rheumatoid arthritis, Crohn s disease, or psoriasis; or have you had radiation treatments?   No   Have you had a seizure, or a brain or other nervous system problem?   No   During the past year, have you received a transfusion of blood or blood     products, or been given immune (gamma) globulin or antiviral drug?   No   For women: Are you pregnant or is there a chance you could become        pregnant during the next month?   No   Have you received any vaccinations in the past 4 weeks?   No     Immunization questionnaire answers were all negative.        Per orders of Dr. Matson, injection of Pneumovax 23, Tdap, and Influenza given by Tara Gilbert. Patient instructed to remain in clinic for 15 minutes afterwards, and to report any adverse reaction to me immediately.       Screening performed by Tara Gilbert on 9/25/2018 at 11:05 AM.

## 2018-09-25 NOTE — LETTER
September 27, 2018      Sushil Diana  [address] [address] [address]        Dear ,    We are writing to inform you of your test results.    The microalbumin is somewhat elevated.   The HIV and hepatitis C screens are negative.   The cholesterol is well controlled with an LDL of 75.   Liver tests are essentially normal.     Resulted Orders   Lipid Profile   Result Value Ref Range    Cholesterol 160 <200 mg/dL    Triglycerides 187 (H) <150 mg/dL      Comment:      Borderline high:  150-199 mg/dl  High:             200-499 mg/dl  Very high:       >499 mg/dl  Non Fasting      HDL Cholesterol 48 >39 mg/dL    LDL Cholesterol Calculated 75 <100 mg/dL      Comment:      Desirable:       <100 mg/dl    Non HDL Cholesterol 112 <130 mg/dL   Albumin Random Urine Quantitative with Creat Ratio   Result Value Ref Range    Creatinine Urine 123 mg/dL    Albumin Urine mg/L 716 mg/L    Albumin Urine mg/g Cr 582.11 (H) 0 - 17 mg/g Cr   HIV Antigen Antibody Combo   Result Value Ref Range    HIV Antigen Antibody Combo Nonreactive NR^Nonreactive          Comment:      HIV-1 p24 Ag & HIV-1/HIV-2 Ab Not Detected   Hepatitis C antibody   Result Value Ref Range    Hepatitis C Antibody Nonreactive NR^Nonreactive      Comment:      Assay performance characteristics have not been established for newborns,   infants, and children     Hepatic panel   Result Value Ref Range    Bilirubin Direct 0.4 (H) 0.0 - 0.2 mg/dL    Bilirubin Total 1.5 (H) 0.2 - 1.3 mg/dL    Albumin 3.8 3.4 - 5.0 g/dL    Protein Total 7.4 6.8 - 8.8 g/dL    Alkaline Phosphatase 76 40 - 150 U/L    ALT 30 0 - 70 U/L    AST 28 0 - 45 U/L       If you have any questions or concerns, please call the clinic at the number listed above.       Sincerely,        Mark Matson, DO

## 2018-09-25 NOTE — MR AVS SNAPSHOT
After Visit Summary   9/25/2018    Sushil Diana    MRN: [id number]           Patient Information     Date Of Birth          1960        Visit Information        Provider Department      9/25/2018 10:00 AM Mark Matson DO [facility]        Today's Diagnoses     New onset type 2 diabetes mellitus (H)    -  1    Essential hypertension, benign        Elevated bilirubin        Special screening for malignant neoplasms, colon        Need for hepatitis C screening test        Encounter for screening for HIV        Need for prophylactic vaccination with combined vaccine        Need for prophylactic vaccination and inoculation against influenza           Follow-ups after your visit        Follow-up notes from your care team     Return in about 4 months (around 1/25/2019) for Follow up.      Your next 10 appointments already scheduled     Sep 26, 2018 11:00 AM CDT   Diabetes Education with [facility]   [facility] ([facility])    [address] [place] [address]   [phone]              Future tests that were ordered for you today     Open Future Orders        Priority Expected Expires Ordered    Fecal colorectal cancer screen (FIT) Routine 10/16/2018 12/18/2018 9/25/2018            Who to contact     If you have questions or need follow up information about today's clinic visit or your schedule please contact [facility] directly at [phone].  Normal or non-critical lab and imaging results will be communicated to you by MyChart, letter or phone within 4 business days after the clinic has received the results. If you do not hear from us within 7 days, please contact the clinic through MyChart or phone. If you have a critical or abnormal lab result, we will notify you by phone as soon as possible.  Submit refill requests through [employer] or call your pharmacy and they will forward the refill  request to us. Please allow 3 business days for your refill to be completed.          Additional Information About Your Visit        Care EveryWhere ID     This is your Care EveryWhere ID. This could be used by other organizations to access your [place] medical records  [id number]        Your Vitals Were     Pulse Temperature Respirations Pulse Oximetry BMI (Body Mass Index)       102 98.7  F (37.1  C) (Temporal) 16 98% 25.76 kg/m2        Blood Pressure from Last 3 Encounters:   09/25/18 138/88   08/27/18 122/82   08/20/18 122/88    Weight from Last 3 Encounters:   09/25/18 169 lb 6.4 oz (76.8 kg)   08/27/18 163 lb 11.2 oz (74.3 kg)   08/20/18 180 lb 14.4 oz (82.1 kg)              We Performed the Following     Albumin Random Urine Quantitative with Creat Ratio     EA ADD'L VACCINE     FLU VACCINE, (RIV4) RECOMBINANT HA  , IM (FluBlok, egg free) [01031]- >18 YRS ([facility] recommended  50-64 YRS)     FOOT EXAM     Hepatic panel     Hepatitis C antibody     HIV Antigen Antibody Combo     Lipid Profile     Pneumococcal vaccine 23 valent PPSV23  (Pneumovax) [65886]     TDAP VACCINE (ADACEL)     Vaccine Administration, Initial [67591]        Primary Care Provider Office Phone # Fax #    Mark Burt DO [place] [phone]       [address] [facility] [address]        Goals        General    Monitoring (pt-stated)     Notes - Note created  8/23/2018 12:16 PM by Candelaria Hdz, JALEEL    I will check before breakfast and one about 2 hours after a meal.          Lifestyle    I will weigh myself daily and resport weights       Equal Access to Services     VIRGILIO DIAZ AH: Hadii ravinder Orta, wachandrakant dewey, qaybmariposa whelan. So [facility] [phone].    ATENCIÓN: Si habla español, tiene a devi disposición servicios gratuitos de asistencia lingüística. Malgorzata al [phone].    We comply with applicable federal civil rights laws and [place] laws. We do  not discriminate on the basis of race, color, national origin, age, disability, sex, sexual orientation, or gender identity.            Thank you!     Thank you for choosing [facility]  for your care. Our goal is always to provide you with excellent care. Hearing back from our patients is one way we can continue to improve our services. Please take a few minutes to complete the written survey that you may receive in the mail after your visit with us. Thank you!             Your Updated Medication List - Protect others around you: Learn how to safely use, store and throw away your medicines at www.disposemymeds.org.          This list is accurate as of 9/25/18 11:59 PM.  Always use your most recent med list.                   Brand Name Dispense Instructions for use Diagnosis    aspirin 81 MG chewable tablet     108 tablet    Take 1 tablet (81 mg) by mouth daily    Acute congestive heart failure, unspecified congestive heart failure type (H)       atorvastatin 20 MG tablet    LIPITOR    30 tablet    Take 1 tablet (20 mg) by mouth daily    Acute congestive heart failure, unspecified congestive heart failure type (H), Diabetes mellitus without complication (H)       blood glucose lancets standard    no brand specified    100 each    Use to test blood sugar 1-3 times daily or as directed.    New onset type 2 diabetes mellitus (H), Diabetes mellitus without complication (H)       blood glucose monitoring meter device kit    no brand specified    1 kit    Use to test blood sugar 1-3 times daily or as directed.    New onset type 2 diabetes mellitus (H), Diabetes mellitus without complication (H)       blood glucose monitoring test strip    no brand specified    100 strip    Use to test blood sugars 1-3 times daily or as directed    New onset type 2 diabetes mellitus (H), Diabetes mellitus without complication (H)       citalopram 20 MG tablet    celeXA    30 tablet    1 tablet orally daily    Adjustment  disorder with anxious mood       glipiZIDE 5 MG tablet    GLUCOTROL    30 tablet    Take 1 tablet (5 mg) by mouth every morning (before breakfast)    Diabetes mellitus without complication (H)       lisinopril 10 MG tablet    PRINIVIL/ZESTRIL    30 tablet    Take 1 tablet (10 mg) by mouth daily    Acute congestive heart failure, unspecified congestive heart failure type (H), Hypertension, unspecified type       metFORMIN 500 MG 24 hr tablet    GLUCOPHAGE-XR    60 tablet    Take 2 tablets (1,000 mg) by mouth daily (with dinner)    New onset type 2 diabetes mellitus (H)       metoprolol tartrate 50 MG tablet    LOPRESSOR    60 tablet    Take 1 tablet (50 mg) by mouth 2 times daily    Acute congestive heart failure, unspecified congestive heart failure type (H), Hypertension, unspecified type       omeprazole 20 MG CR capsule    priLOSEC    30    1 CAPSULE DAILY    Esophageal reflux       potassium chloride SA 20 MEQ CR tablet    KLOR-CON    30 tablet    Take 1 tablet (20 mEq) by mouth daily    Acute congestive heart failure, unspecified congestive heart failure type (H), Bilateral leg edema, Diuretic-induced hypokalemia

## 2018-09-25 NOTE — PROGRESS NOTES
Injectable Influenza Immunization Documentation    1.  Is the person to be vaccinated sick today?   No    2. Does the person to be vaccinated have an allergy to a component   of the vaccine?   No  Egg Allergy Algorithm Link    3. Has the person to be vaccinated ever had a serious reaction   to influenza vaccine in the past?   No    4. Has the person to be vaccinated ever had Guillain-Barré syndrome?   No    Form completed by Tara Gilbert CMA ([facility])

## 2018-09-26 DIAGNOSIS — E11.9 NEW ONSET TYPE 2 DIABETES MELLITUS (H): ICD-10-CM

## 2018-09-26 DIAGNOSIS — E11.9 DIABETES MELLITUS WITHOUT COMPLICATION (H): ICD-10-CM

## 2018-09-26 LAB
HCV AB SERPL QL IA: NONREACTIVE
HIV 1+2 AB+HIV1 P24 AG SERPL QL IA: NONREACTIVE

## 2018-09-26 NOTE — TELEPHONE ENCOUNTER
"Requested Prescriptions   Pending Prescriptions Disp Refills     CONTOUR NEXT TEST test strip [Pharmacy Med Name: CONTOUR NEXT TEST  STRP] 100 each 0    Last Written Prescription Date:  8/13/2018  Last Fill Quantity: 100,  # refills: 0   Last office visit: 9/25/2018 with prescribing provider:  Dr. Matson   Future Office Visit:     Sig: USE TO TEST BLOOD SUGARS 1-3 TIMES DAILY OR AS DIRECTED    Diabetic Supplies Protocol Passed    9/26/2018  9:04 AM       Passed - Patient is 18 years of age or older       Passed - Recent (6 mo) or future (30 days) visit within the authorizing provider's specialty    Patient had office visit in the last 6 months or has a visit in the next 30 days with authorizing provider.  See \"Patient Info\" tab in inbasket, or \"Choose Columns\" in Meds & Orders section of the refill encounter.              "

## 2018-09-26 NOTE — PROGRESS NOTES
Please contact the patient and notify him of the following:  The microalbumin is somewhat elevated.  The HIV and hepatitis C screens are negative.  The cholesterol is well controlled with an LDL of 75.  Liver tests are essentially normal.    Thank you.   DO SEAN Rodgers

## 2018-09-27 NOTE — TELEPHONE ENCOUNTER
Prescription approved per [facility] Refill Protocol.    BRYANNA BearN, RN  [facility]

## 2018-10-04 DIAGNOSIS — E11.9 DIABETES MELLITUS WITHOUT COMPLICATION (H): ICD-10-CM

## 2018-10-04 DIAGNOSIS — E11.9 NEW ONSET TYPE 2 DIABETES MELLITUS (H): ICD-10-CM

## 2018-10-04 DIAGNOSIS — F43.22 ADJUSTMENT DISORDER WITH ANXIOUS MOOD: ICD-10-CM

## 2018-10-04 DIAGNOSIS — I10 HYPERTENSION, UNSPECIFIED TYPE: ICD-10-CM

## 2018-10-04 RX ORDER — CITALOPRAM HYDROBROMIDE 20 MG/1
TABLET ORAL
Qty: 30 TABLET | Refills: 3 | Status: SHIPPED | OUTPATIENT
Start: 2018-10-04 | End: 2019-02-04

## 2018-10-04 RX ORDER — ATORVASTATIN CALCIUM 20 MG/1
TABLET, FILM COATED ORAL
Qty: 30 TABLET | Refills: 3 | Status: SHIPPED | OUTPATIENT
Start: 2018-10-04 | End: 2019-02-04

## 2018-10-04 RX ORDER — METFORMIN HCL 500 MG
TABLET, EXTENDED RELEASE 24 HR ORAL
Qty: 60 TABLET | Refills: 3 | Status: SHIPPED | OUTPATIENT
Start: 2018-10-04 | End: 2019-02-04

## 2018-10-04 RX ORDER — LISINOPRIL 10 MG/1
TABLET ORAL
Qty: 30 TABLET | Refills: 3 | Status: SHIPPED | OUTPATIENT
Start: 2018-10-04 | End: 2019-02-22

## 2018-10-04 RX ORDER — GLIPIZIDE 5 MG/1
TABLET ORAL
Qty: 30 TABLET | Refills: 3 | Status: SHIPPED | OUTPATIENT
Start: 2018-10-04 | End: 2019-02-04

## 2018-10-04 RX ORDER — METOPROLOL TARTRATE 50 MG
TABLET ORAL
Qty: 60 TABLET | Refills: 3 | Status: SHIPPED | OUTPATIENT
Start: 2018-10-04 | End: 2019-02-04

## 2018-10-04 NOTE — TELEPHONE ENCOUNTER
Routing refill request to provider for review/approval because:  Labs out of range:  Microalbumin, A1C    ANGELITO Bear, RN  [facility]

## 2018-10-04 NOTE — TELEPHONE ENCOUNTER
"Requested Prescriptions   Pending Prescriptions Disp Refills     lisinopril (PRINIVIL/ZESTRIL) 10 MG tablet [Pharmacy Med Name: LISINOPRIL 10MG TABS] 30 tablet 0    Last Written Prescription Date:  9/4/18  Last Fill Quantity: 30,  # refills: 0   Last office visit: No previous visit found with prescribing provider:  9/25/18   Future Office Visit:     Sig: TAKE ONE TABLET BY MOUTH EVERY DAY    ACE Inhibitors (Including Combos) Protocol Passed    10/4/2018  9:29 AM       Passed - Blood pressure under 140/90 in past 12 months    BP Readings from Last 3 Encounters:   09/25/18 138/88   08/27/18 122/82   08/20/18 122/88                Passed - Recent (12 mo) or future (30 days) visit within the authorizing provider's specialty    Patient had office visit in the last 12 months or has a visit in the next 30 days with authorizing provider or within the authorizing provider's specialty.  See \"Patient Info\" tab in inbasket, or \"Choose Columns\" in Meds & Orders section of the refill encounter.           Passed - Patient is age 18 or older       Passed - Normal serum creatinine on file in past 12 months    Recent Labs   Lab Test  08/13/18   0544   CR  1.13            Passed - Normal serum potassium on file in past 12 months    Recent Labs   Lab Test  08/13/18   0544   POTASSIUM  4.2             atorvastatin (LIPITOR) 20 MG tablet [Pharmacy Med Name: ATORVASTATIN CALCIUM 20MG TABS] 30 tablet 0    Last Written Prescription Date:  9/4/18  Last Fill Quantity: 30,  # refills: 0   Last office visit: No previous visit found with prescribing provider:  9/25/18    Future Office Visit:   Sig: TAKE ONE TABLET BY MOUTH EVERY DAY    Statins Protocol Passed    10/4/2018  9:29 AM       Passed - LDL on file in past 12 months    Recent Labs   Lab Test  09/25/18   1035   LDL  75            Passed - No abnormal creatine kinase in past 12 months    No lab results found.            Passed - Recent (12 mo) or future (30 days) visit within the " "authorizing provider's specialty    Patient had office visit in the last 12 months or has a visit in the next 30 days with authorizing provider or within the authorizing provider's specialty.  See \"Patient Info\" tab in inbasket, or \"Choose Columns\" in Meds & Orders section of the refill encounter.           Passed - Patient is age 18 or older        metFORMIN (GLUCOPHAGE-XR) 500 MG 24 hr tablet [Pharmacy Med Name: METFORMIN HCL ER 500MG TB24] 60 tablet 0    Last Written Prescription Date:  9/4/18  Last Fill Quantity: 60,  # refills: 0   Last office visit: No previous visit found with prescribing provider:  9/25/18    Future Office Visit:   Sig: TAKE TWO TABLETS BY MOUTH EVERY DAY WITH DINNER    Biguanide Agents Passed    10/4/2018  9:29 AM       Passed - Blood pressure less than 140/90 in past 6 months    BP Readings from Last 3 Encounters:   09/25/18 138/88   08/27/18 122/82   08/20/18 122/88                Passed - Patient has documented LDL within the past 12 mos.    Recent Labs   Lab Test  09/25/18   1035   LDL  75            Passed - Patient has had a Microalbumin in the past 15 mos.    Recent Labs   Lab Test  09/25/18   1241   MICROL  716   UMALCR  582.11*            Passed - Patient is age 10 or older       Passed - Patient has documented A1c within the specified period of time.    If HgbA1C is 8 or greater, it needs to be on file within the past 3 months.  If less than 8, must be on file within the past 6 months.     Recent Labs   Lab Test  08/12/18   1830   A1C  11.6*            Passed - Patient's CR is NOT>1.4 OR Patient's EGFR is NOT<45 within past 12 mos.    Recent Labs   Lab Test  08/13/18   0544   GFRESTIMATED  66   GFRESTBLACK  80       Recent Labs   Lab Test  08/13/18   0544   CR  1.13            Passed - Patient does NOT have a diagnosis of CHF.       Passed - Recent (6 mo) or future (30 days) visit within the authorizing provider's specialty    Patient had office visit in the last 6 months or has a " "visit in the next 30 days with authorizing provider or within the authorizing provider's specialty.  See \"Patient Info\" tab in inbasket, or \"Choose Columns\" in Meds & Orders section of the refill encounter.            glipiZIDE (GLUCOTROL) 5 MG tablet [Pharmacy Med Name: GLIPIZIDE 5MG TABS] 30 tablet 0    Last Written Prescription Date:  9/4/18  Last Fill Quantity: 30,  # refills: 0   Last office visit: No previous visit found with prescribing provider:  9/25/18    Future Office Visit:   Sig: TAKE ONE TABLET BY MOUTH EVERY MORNING BEFORE BRREAKFAST    Sulfonylurea Agents Passed    10/4/2018  9:29 AM       Passed - Blood pressure less than 140/90 in past 6 months    BP Readings from Last 3 Encounters:   09/25/18 138/88   08/27/18 122/82   08/20/18 122/88                Passed - Patient has documented LDL within the past 12 mos.    Recent Labs   Lab Test  09/25/18   1035   LDL  75            Passed - Patient has had a Microalbumin in the past 12 mos.    Recent Labs   Lab Test  09/25/18   1241   MICROL  716   UMALCR  582.11*            Passed - Patient has documented A1c within the specified period of time.    If HgbA1C is 8 or greater, it needs to be on file within the past 3 months.  If less than 8, must be on file within the past 6 months.     Recent Labs   Lab Test  08/12/18   1830   A1C  11.6*            Passed - Patient is age 18 or older       Passed - Patient has a recent creatinine (normal) within the past 12 mos.    Recent Labs   Lab Test  08/13/18   0544   CR  1.13            Passed - Recent (6 mo) or future (30 days) visit within the authorizing provider's specialty    Patient had office visit in the last 6 months or has a visit in the next 30 days with authorizing provider or within the authorizing provider's specialty.  See \"Patient Info\" tab in inbasket, or \"Choose Columns\" in Meds & Orders section of the refill encounter.            metoprolol tartrate (LOPRESSOR) 50 MG tablet [Pharmacy Med Name: " "METOPROLOL TARTRATE 50MG TABS] 60 tablet 0    Last Written Prescription Date:  9/4/18  Last Fill Quantity: 60,  # refills: 0   Last office visit: No previous visit found with prescribing provider:  9/25/18    Future Office Visit:   Sig: TAKE ONE TABLET BY MOUTH TWICE A DAY    Beta-Blockers Protocol Passed    10/4/2018  9:29 AM       Passed - Blood pressure under 140/90 in past 12 months    BP Readings from Last 3 Encounters:   09/25/18 138/88   08/27/18 122/82   08/20/18 122/88                Passed - Patient is age 6 or older       Passed - Recent (12 mo) or future (30 days) visit within the authorizing provider's specialty    Patient had office visit in the last 12 months or has a visit in the next 30 days with authorizing provider or within the authorizing provider's specialty.  See \"Patient Info\" tab in inbasket, or \"Choose Columns\" in Meds & Orders section of the refill encounter.            citalopram (CELEXA) 20 MG tablet [Pharmacy Med Name: CITALOPRAM HYDROBROMIDE 20MG TABS] 30 tablet 0    Last Written Prescription Date:  9/4/18  Last Fill Quantity: 30,  # refills: 0   Last office visit: No previous visit found with prescribing provider:  9/25/18    Future Office Visit:   Sig: TAKE ONE TABLET BY MOUTH EVERY DAY    SSRIs Protocol Passed    10/4/2018  9:29 AM       Passed - Recent (12 mo) or future (30 days) visit within the authorizing provider's specialty    Patient had office visit in the last 12 months or has a visit in the next 30 days with authorizing provider or within the authorizing provider's specialty.  See \"Patient Info\" tab in inbasket, or \"Choose Columns\" in Meds & Orders section of the refill encounter.           Passed - Patient is age 18 or older        "

## 2018-10-10 ENCOUNTER — HOSPITAL ENCOUNTER (OUTPATIENT)
Dept: LAB | Facility: CLINIC | Age: 58
Discharge: HOME | End: 2018-10-10
Attending: INTERNAL MEDICINE | Admitting: INTERNAL MEDICINE
Payer: COMMERCIAL

## 2018-10-10 DIAGNOSIS — Z12.11 SPECIAL SCREENING FOR MALIGNANT NEOPLASMS, COLON: ICD-10-CM

## 2018-10-10 LAB — HEMOCCULT STL QL IA: NEGATIVE

## 2018-10-10 PROCEDURE — 82274 ASSAY TEST FOR BLOOD FECAL: CPT | Performed by: INTERNAL MEDICINE

## 2018-10-10 NOTE — LETTER
October 15, 2018      Sushil Diana  [address] [address] [place] [id number]        Dear ,    We are writing to inform you of your test results.    Your test results fall within the expected range(s) or remain unchanged from previous results.  Please continue with current treatment plan.    Resulted Orders   Fecal colorectal cancer screen (FIT)   Result Value Ref Range    Occult Blood Scn FIT Negative NEG^Negative       If you have any questions or concerns, please call the clinic at the number listed above.       Sincerely,        Mark Matson DO

## 2018-10-11 NOTE — PROGRESS NOTES
Please contact the patient and notify him of the following:  There appears to be no blood in the tested stool sample.    Thank you.   DO SEAN Rodgers

## 2018-10-12 ENCOUNTER — TRANSFERRED RECORDS (OUTPATIENT)
Dept: HEALTH INFORMATION MANAGEMENT | Facility: CLINIC | Age: 58
End: 2018-10-12

## 2018-10-23 ENCOUNTER — PATIENT OUTREACH (OUTPATIENT)
Dept: CARE COORDINATION | Facility: CLINIC | Age: 58
End: 2018-10-23

## 2018-10-23 NOTE — PROGRESS NOTES
Clinic Care Coordination Contact  [facility]/Voicemail       Clinical Data: Care Coordinator Outreach  Patient saw PCP 9/25/18 for DM follow up.   Clinic Care Coordinator RN mailed CHF booklet to patient.   Outreach attempted .  Left message on voicemail with call back information and requested return call.  Plan:  Care Coordinator will try to reach patient again in 3-5 business days.

## 2018-11-09 ENCOUNTER — PATIENT OUTREACH (OUTPATIENT)
Dept: CARE COORDINATION | Facility: CLINIC | Age: 58
End: 2018-11-09

## 2018-11-19 NOTE — PROGRESS NOTES
Clinic Care Coordination Contact  [facility]/Voicemail       Clinical Data: Care Coordinator Outreach  Outreach attempted  Left message on voicemail with call back information and requested return call.  Plan:  Care Coordinator will try to reach patient again in 3-5 business days.

## 2018-12-13 ENCOUNTER — PATIENT OUTREACH (OUTPATIENT)
Dept: CARE COORDINATION | Facility: CLINIC | Age: 58
End: 2018-12-13

## 2018-12-13 NOTE — PROGRESS NOTES
Clinic Care Coordination Contact  [facility]/Voicemail       Clinical Data: Care Coordinator Outreach  Outreach attempted.  Left message on voicemail with call back information and requested return call.  Plan:  Care Coordinator will try to reach patient again in 3-5 business days.

## 2018-12-14 DIAGNOSIS — E11.9 NEW ONSET TYPE 2 DIABETES MELLITUS (H): ICD-10-CM

## 2018-12-14 DIAGNOSIS — E11.9 DIABETES MELLITUS WITHOUT COMPLICATION (H): ICD-10-CM

## 2018-12-17 NOTE — TELEPHONE ENCOUNTER
"Last Written Prescription Date:  9/27/18  Last Fill Quantity: 100,  # refills: 1   Last office visit: 9/25/2018 with prescribing provider:  Mark Matson   Future Office Visit:      Requested Prescriptions   Pending Prescriptions Disp Refills     CONTOUR NEXT TEST test strip [Pharmacy Med Name: CONTOUR NEXT TEST  STRP] 100 each 1     Sig: USE TO TEST BLOOD SUGARS ONE TO THREE TIMES DAILY  OR AS DIRECTED    Diabetic Supplies Protocol Passed - 12/14/2018  9:53 AM       Passed - Patient is 18 years of age or older       Passed - Recent (6 mo) or future (30 days) visit within the authorizing provider's specialty    Patient had office visit in the last 6 months or has a visit in the next 30 days with authorizing provider.  See \"Patient Info\" tab in inbasket, or \"Choose Columns\" in Meds & Orders section of the refill encounter.            Prescription approved per [facility] [place] Refill Protocol.    Stefania Doe RN     "

## 2019-01-03 DIAGNOSIS — I10 HYPERTENSION, UNSPECIFIED TYPE: ICD-10-CM

## 2019-01-03 DIAGNOSIS — I50.9 ACUTE CONGESTIVE HEART FAILURE (H): ICD-10-CM

## 2019-01-03 RX ORDER — LISINOPRIL 10 MG/1
TABLET ORAL
Qty: 30 TABLET | Refills: 9 | Status: SHIPPED | OUTPATIENT
Start: 2019-01-03 | End: 2019-02-22

## 2019-01-03 NOTE — TELEPHONE ENCOUNTER
"Lisinopril  Last Written Prescription Date:  10/04/2018  Last Fill Quantity: 30,  # refills: 3   Last office visit: 09/25/2018 with prescribing provider:  cora   Future Office Visit:  None  Prescription approved per [facility] Refill Protocol.    Requested Prescriptions   Pending Prescriptions Disp Refills     lisinopril (PRINIVIL/ZESTRIL) 10 MG tablet [Pharmacy Med Name: LISINOPRIL 10MG TABS] 30 tablet 0     Sig: TAKE ONE TABLET BY MOUTH EVERY DAY    ACE Inhibitors (Including Combos) Protocol Passed - 1/3/2019  9:28 AM       Passed - Blood pressure under 140/90 in past 12 months    BP Readings from Last 3 Encounters:   09/25/18 138/88   08/27/18 122/82   08/20/18 122/88          Passed - Recent (12 mo) or future (30 days) visit within the authorizing provider's specialty    Patient had office visit in the last 12 months or has a visit in the next 30 days with authorizing provider or within the authorizing provider's specialty.  See \"Patient Info\" tab in inbasket, or \"Choose Columns\" in Meds & Orders section of the refill encounter.         Passed - Patient is age 18 or older       Passed - Normal serum creatinine on file in past 12 months    Recent Labs   Lab Test 08/13/18  0544   CR 1.13          Passed - Normal serum potassium on file in past 12 months    Recent Labs   Lab Test 08/13/18  0544   POTASSIUM 4.2         Cassandra Cohen RN   "

## 2019-01-10 ENCOUNTER — PATIENT OUTREACH (OUTPATIENT)
Dept: CARE COORDINATION | Facility: CLINIC | Age: 59
End: 2019-01-10

## 2019-01-10 NOTE — LETTER
[place] CARE COORDINATION  [address] [facility] [address]    January 15, 2019    Sushil Diana  [address] [facility] [phone]      Dear Sushil,    I am a clinic care coordinator who works with Mark Matson DO.  I recently tried to call and was unable to reach you. I wanted to introduce myself and provide you with my contact information so that you can call me with questions or concerns about your health care. Below is a description of clinic care coordination and how I can further assist you.     The clinic care coordinator is a registered nurse and/or  who understand the health care system. The goal of clinic care coordination is to help you manage your health and improve access to the [facility] system in the most efficient manner. The registered nurse can assist you in meeting your health care goals by providing education, coordinating services, and strengthening the communication among your providers. The  can assist you with financial, behavioral, psychosocial, chemical dependency, counseling, and/or psychiatric resources.    Please feel free to contact me at [phone], with any questions or concerns. We at [facility] are focused on providing you with the highest-quality healthcare experience possible and that all starts with you.     Sincerely,       Michelle Chavez, RN  Clinic Care Coordinator   [facility] [place] [facility]   Phone: [phone]

## 2019-01-15 NOTE — PROGRESS NOTES
Clinic Care Coordination Contact  [facility]/Voicemail       Clinical Data: Care Coordinator Outreach  Outreach attempted.  Left message on voicemail with call back information and requested return call.  Plan: Care Coordinator will mail out care coordination letter with care coordinator contact information and explanation of care coordination services. Care Coordinator will try to reach patient again in 7-10 business days.

## 2019-02-04 DIAGNOSIS — E11.9 NEW ONSET TYPE 2 DIABETES MELLITUS (H): ICD-10-CM

## 2019-02-04 DIAGNOSIS — F43.22 ADJUSTMENT DISORDER WITH ANXIOUS MOOD: ICD-10-CM

## 2019-02-04 DIAGNOSIS — I10 HYPERTENSION, UNSPECIFIED TYPE: ICD-10-CM

## 2019-02-04 DIAGNOSIS — E11.9 DIABETES MELLITUS WITHOUT COMPLICATION (H): ICD-10-CM

## 2019-02-06 RX ORDER — ATORVASTATIN CALCIUM 20 MG/1
TABLET, FILM COATED ORAL
Qty: 30 TABLET | Refills: 3 | Status: SHIPPED | OUTPATIENT
Start: 2019-02-06 | End: 2019-06-03

## 2019-02-06 RX ORDER — METFORMIN HCL 500 MG
TABLET, EXTENDED RELEASE 24 HR ORAL
Qty: 60 TABLET | Refills: 0 | Status: SHIPPED | OUTPATIENT
Start: 2019-02-06 | End: 2019-03-06

## 2019-02-06 RX ORDER — CITALOPRAM HYDROBROMIDE 20 MG/1
TABLET ORAL
Qty: 30 TABLET | Refills: 3 | Status: SHIPPED | OUTPATIENT
Start: 2019-02-06 | End: 2019-06-03

## 2019-02-06 RX ORDER — METOPROLOL TARTRATE 50 MG
TABLET ORAL
Qty: 60 TABLET | Refills: 3 | Status: SHIPPED | OUTPATIENT
Start: 2019-02-06 | End: 2019-06-03

## 2019-02-06 RX ORDER — GLIPIZIDE 5 MG/1
TABLET ORAL
Qty: 30 TABLET | Refills: 0 | Status: SHIPPED | OUTPATIENT
Start: 2019-02-06 | End: 2019-03-06

## 2019-02-06 NOTE — TELEPHONE ENCOUNTER
"Routing to scheduling to contact patient for lab appointment: A1C (Every 3 months when over 8)    Glipizide Last Written Prescription Date:  10/4/18  Last Fill Quantity: 30,  # refills: 3   Last office visit: 9/25/18 with prescribing provider  Mark Fischer Office Visit:      Citalopram Last Written Prescription Date:  10/4/18  Last Fill Quantity: 30,  # refills: 3   Last office visit: 9/25/18 with prescribing provider:  Mark Fischer Office Visit:      Metoprolol Last Written Prescription Date:  10/4/18  Last Fill Quantity: 60,  # refills: 3   Last office visit: 9/25/18 with prescribing provider:  Mark Fischer Office Visit:      Metformin Last Written Prescription Date:  10/4/18  Last Fill Quantity: 60,  # refills: 3   Last office visit: No previous visit found with prescribing provider:  Mark Fischer Office Visit:      Atorvastatin Last Written Prescription Date:  10/4/18  Last Fill Quantity: 30,  # refills: 3   Last office visit: No previous visit found with prescribing provider:  Mark Fischer Office Visit:      Requested Prescriptions   Pending Prescriptions Disp Refills     atorvastatin (LIPITOR) 20 MG tablet [Pharmacy Med Name: ATORVASTATIN CALCIUM 20MG TABS] 30 tablet 3     Sig: TAKE ONE TABLET BY MOUTH EVERY DAY    Statins Protocol Passed - 2/4/2019  9:40 AM       Passed - LDL on file in past 12 months    Recent Labs   Lab Test 09/25/18  1035   LDL 75            Passed - No abnormal creatine kinase in past 12 months    No lab results found.            Passed - Recent (12 mo) or future (30 days) visit within the authorizing provider's specialty    Patient had office visit in the last 12 months or has a visit in the next 30 days with authorizing provider or within the authorizing provider's specialty.  See \"Patient Info\" tab in inbasket, or \"Choose Columns\" in Meds & Orders section of the refill encounter.             Passed - Medication is " "active on med list       Passed - Patient is age 18 or older        metFORMIN (GLUCOPHAGE-XR) 500 MG 24 hr tablet [Pharmacy Med Name: METFORMIN 500MG ER TABLET] 60 tablet 3     Sig: TAKE TWO TABLETS BY MOUTH EVERY DAY WITH DINNER    Biguanide Agents Failed - 2/4/2019  9:40 AM       Failed - Patient has documented A1c within the specified period of time.    If HgbA1C is 8 or greater, it needs to be on file within the past 3 months.  If less than 8, must be on file within the past 6 months.     Recent Labs   Lab Test 08/12/18  1830   A1C 11.6*            Passed - Blood pressure less than 140/90 in past 6 months    BP Readings from Last 3 Encounters:   09/25/18 138/88   08/27/18 122/82   08/20/18 122/88                Passed - Patient has documented LDL within the past 12 mos.    Recent Labs   Lab Test 09/25/18  1035   LDL 75            Passed - Patient has had a Microalbumin in the past 15 mos.    Recent Labs   Lab Test 09/25/18  1241   MICROL 716   UMALCR 582.11*            Passed - Patient is age 10 or older       Passed - Patient's CR is NOT>1.4 OR Patient's EGFR is NOT<45 within past 12 mos.    Recent Labs   Lab Test 08/13/18  0544   GFRESTIMATED 66   GFRESTBLACK 80       Recent Labs   Lab Test 08/13/18  0544   CR 1.13            Passed - Patient does NOT have a diagnosis of CHF.       Passed - Medication is active on med list       Passed - Recent (6 mo) or future (30 days) visit within the authorizing provider's specialty    Patient had office visit in the last 6 months or has a visit in the next 30 days with authorizing provider or within the authorizing provider's specialty.  See \"Patient Info\" tab in inbasket, or \"Choose Columns\" in Meds & Orders section of the refill encounter.            metoprolol tartrate (LOPRESSOR) 50 MG tablet [Pharmacy Med Name: METOPROLOL TARTRATE 50MG TABS] 60 tablet 3     Sig: TAKE ONE TABLET BY MOUTH TWICE A DAY    Beta-Blockers Protocol Passed - 2/4/2019  9:40 AM       Passed - " "Blood pressure under 140/90 in past 12 months    BP Readings from Last 3 Encounters:   09/25/18 138/88   08/27/18 122/82   08/20/18 122/88                Passed - Patient is age 6 or older       Passed - Recent (12 mo) or future (30 days) visit within the authorizing provider's specialty    Patient had office visit in the last 12 months or has a visit in the next 30 days with authorizing provider or within the authorizing provider's specialty.  See \"Patient Info\" tab in inbasket, or \"Choose Columns\" in Meds & Orders section of the refill encounter.             Passed - Medication is active on med list        citalopram (CELEXA) 20 MG tablet [Pharmacy Med Name: CITALOPRAM HYDROBROMIDE 20MG TABS] 30 tablet 3     Sig: TAKE ONE TABLET BY MOUTH EVERY DAY    SSRIs Protocol Passed - 2/4/2019  9:40 AM       Passed - Recent (12 mo) or future (30 days) visit within the authorizing provider's specialty    Patient had office visit in the last 12 months or has a visit in the next 30 days with authorizing provider or within the authorizing provider's specialty.  See \"Patient Info\" tab in inbasket, or \"Choose Columns\" in Meds & Orders section of the refill encounter.             Passed - Medication is active on med list       Passed - Patient is age 18 or older        glipiZIDE (GLUCOTROL) 5 MG tablet [Pharmacy Med Name: GLIPIZIDE 5MG TABS] 30 tablet 3     Sig: TAKE ONE TABLET BY MOUTH EVERY MORNING BEFORE BRREAKFAST    Sulfonylurea Agents Failed - 2/4/2019  9:40 AM       Failed - Patient has documented A1c within the specified period of time.    If HgbA1C is 8 or greater, it needs to be on file within the past 3 months.  If less than 8, must be on file within the past 6 months.     Recent Labs   Lab Test 08/12/18  1830   A1C 11.6*            Passed - Blood pressure less than 140/90 in past 6 months    BP Readings from Last 3 Encounters:   09/25/18 138/88   08/27/18 122/82   08/20/18 122/88                Passed - Patient has " "documented LDL within the past 12 mos.    Recent Labs   Lab Test 09/25/18  1035   LDL 75            Passed - Patient has had a Microalbumin in the past 12 mos.    Recent Labs   Lab Test 09/25/18  1241   MICROL 716   UMALCR 582.11*            Passed - Medication is active on med list       Passed - Patient is age 18 or older       Passed - Patient has a recent creatinine (normal) within the past 12 mos.    Recent Labs   Lab Test 08/13/18  0544   CR 1.13            Passed - Recent (6 mo) or future (30 days) visit within the authorizing provider's specialty    Patient had office visit in the last 6 months or has a visit in the next 30 days with authorizing provider or within the authorizing provider's specialty.  See \"Patient Info\" tab in inbasket, or \"Choose Columns\" in Meds & Orders section of the refill encounter.            Citalopram Prescription approved per [facility] Refill Protocol.  Metoprolol Prescription approved per [facility] Refill Protocol.  Atorvastatin Prescription approved per [facility] Refill Protocol.  Glipizide Routing refill request to provider for review/approval because:  Labs out of range:  A1C  Labs not current:  A1C    Metformin Routing refill request to provider for review/approval because:  Labs out of range:  A1C  Labs not current:  A1C  Routing to scheduling to contact patient for lab appointment: A1C (Every 3 months when over 8)    Stefania Doe RN on 2/6/2019 at 8:09 AM              "

## 2019-02-22 ENCOUNTER — OFFICE VISIT (OUTPATIENT)
Dept: FAMILY MEDICINE | Facility: OTHER | Age: 59
End: 2019-02-22
Payer: COMMERCIAL

## 2019-02-22 ENCOUNTER — TELEPHONE (OUTPATIENT)
Dept: INTERNAL MEDICINE | Facility: CLINIC | Age: 59
End: 2019-02-22

## 2019-02-22 VITALS
TEMPERATURE: 98.2 F | RESPIRATION RATE: 16 BRPM | DIASTOLIC BLOOD PRESSURE: 102 MMHG | HEART RATE: 86 BPM | OXYGEN SATURATION: 99 % | BODY MASS INDEX: 25.61 KG/M2 | SYSTOLIC BLOOD PRESSURE: 164 MMHG | HEIGHT: 68 IN | WEIGHT: 169 LBS

## 2019-02-22 DIAGNOSIS — I10 HYPERTENSION, UNSPECIFIED TYPE: ICD-10-CM

## 2019-02-22 DIAGNOSIS — N52.8 OTHER MALE ERECTILE DYSFUNCTION: ICD-10-CM

## 2019-02-22 DIAGNOSIS — I50.41 ACUTE COMBINED SYSTOLIC AND DIASTOLIC CONGESTIVE HEART FAILURE (H): ICD-10-CM

## 2019-02-22 DIAGNOSIS — E11.9 NEW ONSET TYPE 2 DIABETES MELLITUS (H): Primary | ICD-10-CM

## 2019-02-22 LAB
ANION GAP SERPL CALCULATED.3IONS-SCNC: 6 MMOL/L (ref 3–14)
BUN SERPL-MCNC: 13 MG/DL (ref 7–30)
CALCIUM SERPL-MCNC: 9.5 MG/DL (ref 8.5–10.1)
CHLORIDE SERPL-SCNC: 105 MMOL/L (ref 94–109)
CO2 SERPL-SCNC: 28 MMOL/L (ref 20–32)
CREAT SERPL-MCNC: 0.82 MG/DL (ref 0.66–1.25)
GFR SERPL CREATININE-BSD FRML MDRD: >90 ML/MIN/{1.73_M2}
GLUCOSE SERPL-MCNC: 139 MG/DL (ref 70–99)
HBA1C MFR BLD: 5.8 % (ref 0–5.6)
POTASSIUM SERPL-SCNC: 4.5 MMOL/L (ref 3.4–5.3)
SODIUM SERPL-SCNC: 139 MMOL/L (ref 133–144)

## 2019-02-22 PROCEDURE — 80048 BASIC METABOLIC PNL TOTAL CA: CPT | Performed by: INTERNAL MEDICINE

## 2019-02-22 PROCEDURE — 36415 COLL VENOUS BLD VENIPUNCTURE: CPT | Performed by: INTERNAL MEDICINE

## 2019-02-22 PROCEDURE — 99214 OFFICE O/P EST MOD 30 MIN: CPT | Performed by: INTERNAL MEDICINE

## 2019-02-22 PROCEDURE — 83036 HEMOGLOBIN GLYCOSYLATED A1C: CPT | Performed by: INTERNAL MEDICINE

## 2019-02-22 RX ORDER — SILDENAFIL CITRATE 20 MG/1
TABLET ORAL
Qty: 30 TABLET | Refills: 1 | Status: SHIPPED | OUTPATIENT
Start: 2019-02-22 | End: 2020-12-15

## 2019-02-22 RX ORDER — LISINOPRIL 20 MG/1
20 TABLET ORAL DAILY
Qty: 90 TABLET | Refills: 0 | Status: SHIPPED | OUTPATIENT
Start: 2019-02-22 | End: 2019-05-23

## 2019-02-22 ASSESSMENT — MIFFLIN-ST. JEOR: SCORE: 1551.08

## 2019-02-22 ASSESSMENT — PAIN SCALES - GENERAL: PAINLEVEL: NO PAIN (0)

## 2019-02-22 NOTE — LETTER
March 6, 2019      Sushil Diana  [address] [address] [place] [phone]        Dear ,    We are writing to inform you of your test results.    Blood sugar slightly elevated 139.     Resulted Orders   BASIC METABOLIC PANEL   Result Value Ref Range    Sodium 139 133 - 144 mmol/L    Potassium 4.5 3.4 - 5.3 mmol/L    Chloride 105 94 - 109 mmol/L    Carbon Dioxide 28 20 - 32 mmol/L    Anion Gap 6 3 - 14 mmol/L    Glucose 139 (H) 70 - 99 mg/dL    Urea Nitrogen 13 7 - 30 mg/dL    Creatinine 0.82 0.66 - 1.25 mg/dL    GFR Estimate >90 >60 mL/min/[1.73_m2]      Comment:      Non  GFR Calc  Starting 12/18/2018, serum creatinine based estimated GFR (eGFR) will be   calculated using the Chronic Kidney Disease Epidemiology Collaboration   (CKD-EPI) equation.      GFR Estimate If Black >90 >60 mL/min/[1.73_m2]      Comment:       GFR Calc  Starting 12/18/2018, serum creatinine based estimated GFR (eGFR) will be   calculated using the Chronic Kidney Disease Epidemiology Collaboration   (CKD-EPI) equation.      Calcium 9.5 8.5 - 10.1 mg/dL   HEMOGLOBIN A1C   Result Value Ref Range    Hemoglobin A1C 5.8 (H) 0 - 5.6 %      Comment:      Normal <5.7% Prediabetes 5.7-6.4%  Diabetes 6.5% or higher - adopted from ADA   consensus guidelines.         If you have any questions or concerns, please call the clinic at the number listed above.       Sincerely,        Mark Matson DO

## 2019-02-22 NOTE — PROGRESS NOTES
SUBJECTIVE:   Sushil Diana is a 58 year old male who presents to clinic today for the following health issues:      Diabetes Follow-up    Patient is checking blood sugars: once daily.  Results are as follows:         good    Diabetic concerns: None     Symptoms of hypoglycemia (low blood sugar): none     Paresthesias (numbness or burning in feet) or sores: No     Date of last diabetic eye exam: within 6 months    BP Readings from Last 2 Encounters:   02/22/19 (!) 164/102   09/25/18 138/88     Hemoglobin A1C (%)   Date Value   08/12/2018 11.6 (H)     LDL Cholesterol Calculated (mg/dL)   Date Value   09/25/2018 75   05/08/2002 (A)       Diabetes Management Resources                      Chief Complaint             The patient is a pleasant 58-year-old gentleman who presents today for follow-up of his diabetes.  He notes that he has been doing quite well.  His blood sugar log shows most values under 150.  He has had no hyper or hypoglycemic episodes.  His previous A1c was markedly elevated greater than 11 and he notes that he has been doing a much better job maintaining his blood sugar.  He is watching his carbohydrate intake.  He does have a concurrent history of combined congestive heart failure which is currently stable.  He does continue his blood pressure management as well as the aspirin and beta-blocker.  Blood pressure today is somewhat elevated at 164/102.  Adjustments will be made to his medication.                       PAST, FAMILY,SOCIAL HISTORY:     Medical  History:   has a past medical history of GERD (gastroesophageal reflux disease) and Gout.     Surgical History:   has a past surgical history that includes None of the above core measure diagnoses (AMI, CHF, Stroke) apply at this time..     Social History:   reports that he has quit smoking. His smoking use included cigarettes. He has a 28.00 pack-year smoking history. he has never used smokeless tobacco. He reports that he drinks alcohol. He  "reports that he does not use drugs.     Family History:  family history includes Diabetes in an other family member.            MEDICATIONS  Current Outpatient Medications   Medication Sig Dispense Refill     aspirin 81 MG chewable tablet Take 1 tablet (81 mg) by mouth daily 108 tablet 3     atorvastatin (LIPITOR) 20 MG tablet TAKE ONE TABLET BY MOUTH EVERY DAY 30 tablet 3     blood glucose (NO BRAND SPECIFIED) lancets standard Use to test blood sugar 1-3 times daily or as directed. 100 each 11     blood glucose monitoring (NO BRAND SPECIFIED) meter device kit Use to test blood sugar 1-3 times daily or as directed. 1 kit 0     citalopram (CELEXA) 20 MG tablet TAKE ONE TABLET BY MOUTH EVERY DAY 30 tablet 3     CONTOUR NEXT TEST test strip USE TO TEST BLOOD SUGARS ONE TO THREE TIMES DAILY  OR AS DIRECTED 100 each 1     glipiZIDE (GLUCOTROL) 5 MG tablet TAKE ONE TABLET BY MOUTH EVERY MORNING BEFORE BRREAKFAST 30 tablet 0     lisinopril (PRINIVIL/ZESTRIL) 20 MG tablet Take 1 tablet (20 mg) by mouth daily 90 tablet 0     metFORMIN (GLUCOPHAGE-XR) 500 MG 24 hr tablet TAKE TWO TABLETS BY MOUTH EVERY DAY WITH DINNER 60 tablet 0     metoprolol tartrate (LOPRESSOR) 50 MG tablet TAKE ONE TABLET BY MOUTH TWICE A DAY 60 tablet 3     OMEPRAZOLE 20 MG OR CPDR 1 CAPSULE DAILY 30 0     potassium chloride SA (KLOR-CON) 20 MEQ CR tablet Take 1 tablet (20 mEq) by mouth daily 30 tablet 0     sildenafil (REVATIO) 20 MG tablet Take 1-3 pills 30 minutes before \"that magic moment\" 30 tablet 1         --------------------------------------------------------------------------------------------------------------------                              Review of Systems       LUNGS: Pt denies: cough, excess sputum, hemoptysis, or shortness of breath.   HEART: Pt denies: chest pain, arrhythmia, syncope, tachy or bradyarrhythmia.   GI: Pt denies: nausea, vomiting, diarrhea, constipation, melena, or hematochezia.   NEURO: Pt denies: seizures, strokes, " "diplopia, weakness, paraesthesias, or paralysis.   SKIN: Pt denies: itching, rashes, discoloration, or specific lesions of concern. Denies recent hair loss.   PSYCH: The patient denies significant depression, anxiety, mood imbalance. Specifically denies any suicidal ideation.                                     Examination      BP (!) 164/102 (BP Location: Left arm, Patient Position: Sitting, Cuff Size: Adult Regular)   Pulse 86   Temp 98.2  F (36.8  C) (Temporal)   Resp 16   Ht 1.727 m (5' 8\")   Wt 76.7 kg (169 lb)   SpO2 99%   BMI 25.70 kg/m        LUNGS: clear bilaterally, airflow is brisk, no intercostal retraction or stridor is noted. No coughing is noted during visit.   HEART:  regular without rubs, clicks, gallops, or murmurs. PMI is nondisplaced. Upstrokes are brisk. S1,S2 are heard.   GI: Abdomen is soft, without rebound, guarding or tenderness. Bowel sounds are appropriate. No renal bruits are heard.   NEURO: Pt is alert and appropriate. No neurologic lateralization is noted. Cranial nerves 2-12 are intact. Peripheral sensory and motor function are grossly normal.    SKIN:  warm and dry. No erythema, or rashes are noted. No specific lesions of concern are noted.    PSYCH: The patient appears grossly appropriate. Maintains good eye contact, does not have any jittery or atypical motion. Displays appropriate affect.                                           Decision Making    1. New onset type 2 diabetes mellitus (H)  Check A1c and renal function  - BASIC METABOLIC PANEL  - HEMOGLOBIN A1C    2. Acute combined systolic and diastolic congestive heart failure (H)  Continue current medications  - lisinopril (PRINIVIL/ZESTRIL) 20 MG tablet; Take 1 tablet (20 mg) by mouth daily  Dispense: 90 tablet; Refill: 0    3. Hypertension, unspecified type  Increase lisinopril to 20 mg  - lisinopril (PRINIVIL/ZESTRIL) 20 MG tablet; Take 1 tablet (20 mg) by mouth daily  Dispense: 90 tablet; Refill: 0    4. Other male " "erectile dysfunction  Trial of Viagra-kirsten stuff  - sildenafil (REVATIO) 20 MG tablet; Take 1-3 pills 30 minutes before \"that magic moment\"  Dispense: 30 tablet; Refill: 1                               FOLLOW UP   I have asked the patient to make an appointment for followup with me in 1 month        I have carefully explained the diagnosis and treatment options to the patient.  The patient has displayed an understanding of the above, and all subsequent questions were answered.      DO SEAN Rodgers    Portions of this note were produced using [employer]  Although every attempt at real-time proof reading has been made, occasional grammar/syntax errors may have been missed.           "

## 2019-02-22 NOTE — TELEPHONE ENCOUNTER
[facility] Prior Authorization Team Request    Medication: Sildenafil 20mg  Dosing:   NDC (required for Medicaid members):[address]-96     Insurance   BIN: [id number]  PCN: [id number]  Grp: [id number]  ID: [id number]    CoverMyMeds Key (if applicable):     Additional documentation:     Filling Pharmacy: [facility]  Phone Number: [phone]  Contact:  PHARM JOSE PA (P [id number]) please send all responses to this pool.  Pharmacy NPI (required for Medicaid members)[id number]:

## 2019-02-22 NOTE — RESULT ENCOUNTER NOTE
Dear Sushil, your recent test results are attached.  The hemoglobin A1c is 5.8.  This is a remarkable improvement over the previous value of 11.6.  Your blood sugar average has improved significantly.  Nice job!      Feel free to contact me via the office or My Chart if you have any questions regarding the above.

## 2019-02-22 NOTE — LETTER
[facility]  [address] [facility] [address] [phone]        February 22, 2019    Sushil Diana  [address] [facility] [address]            Dear Sushil,     your recent test results are attached.   The hemoglobin A1c is 5.8.  This is a remarkable improvement over the previous value of 11.6.   Your blood sugar average has improved significantly.   Nice job!       Feel free to contact me via the office or My Chart if you have any questions regarding the above.     Sincerely,        Mark Matson, DO

## 2019-02-25 NOTE — TELEPHONE ENCOUNTER
Prior Authorization Retail Medication Request    Medication/Dose: sildenafil (REVATIO) 20 MG tablet  ICD code (if different than what is on RX):  Other male erectile dysfunction [N52.8]   Previously Tried and Failed:  na  Rationale:  na    Insurance Name:  See note from pharmacy   Insurance ID:          Pharmacy Information (if different than what is on RX)  Name:  [facility]  Phone:

## 2019-03-02 NOTE — TELEPHONE ENCOUNTER
PA Initiation    Medication: sildenafil (REVATIO) 20 MG tablet  Insurance Company: [employer] [place] - Phone [phone] Fax [phone]  Pharmacy Filling the Rx: [facility] [place], [place] - [address]   Filling Pharmacy Phone: [phone]  Filling Pharmacy Fax:    Start Date: 3/2/2019    Central Prior Authorization Team   Phone: [phone]

## 2019-03-04 NOTE — TELEPHONE ENCOUNTER
Yes, the medication was denied.  It was never intended to be covered.  It was intended to be cheaper than the cash price for brand name Viagra.  Pretty much, none of the insurance companies cover medications for erectile dysfunction.    Dano

## 2019-03-04 NOTE — TELEPHONE ENCOUNTER
PRIOR AUTHORIZATION DENIED    Medication: sildenafil (REVATIO) 20 MG tablet    Denial Date: 3/2/2019    Denial Rational: Denied due to diagnosis, when used for ED this medication is not covered:          Appeal Information:

## 2019-03-05 ENCOUNTER — PATIENT OUTREACH (OUTPATIENT)
Dept: CARE COORDINATION | Facility: CLINIC | Age: 59
End: 2019-03-05

## 2019-03-05 NOTE — PROGRESS NOTES
Clinic Care Coordination Contact  [facility]/Voicemail       Clinical Data: Care Coordinator Outreach  Follow up Diabetes   Outreach attempted x 1.  Left message on voicemail with call back information and requested return call.  Plan:  Care Coordinator will try to reach patient again in the next month.

## 2019-03-06 DIAGNOSIS — E11.9 NEW ONSET TYPE 2 DIABETES MELLITUS (H): ICD-10-CM

## 2019-03-06 DIAGNOSIS — E11.9 DIABETES MELLITUS WITHOUT COMPLICATION (H): ICD-10-CM

## 2019-03-06 NOTE — RESULT ENCOUNTER NOTE
Dear Sushil, your recent test results are attached.  Blood sugar slightly elevated 139.      Feel free to contact me via the office or My Chart if you have any questions regarding the above.

## 2019-03-07 RX ORDER — METFORMIN HCL 500 MG
TABLET, EXTENDED RELEASE 24 HR ORAL
Qty: 180 TABLET | Refills: 1 | Status: SHIPPED | OUTPATIENT
Start: 2019-03-07 | End: 2019-08-30

## 2019-03-07 RX ORDER — GLIPIZIDE 5 MG/1
TABLET ORAL
Qty: 90 TABLET | Refills: 1 | Status: SHIPPED | OUTPATIENT
Start: 2019-03-07 | End: 2019-08-30

## 2019-03-07 NOTE — TELEPHONE ENCOUNTER
Routing refill request to provider for review/approval because:  Labs out of range:  BP and Microalbumin    ANGELITO Bear, RN  [facility]

## 2019-03-07 NOTE — TELEPHONE ENCOUNTER
"Requested Prescriptions   Pending Prescriptions Disp Refills     metFORMIN (GLUCOPHAGE-XR) 500 MG 24 hr tablet [Pharmacy Med Name: METFORMIN HCL ER 500MG TB24] 60 tablet 0    Last Written Prescription Date:  2/6/18  Last Fill Quantity: 60,  # refills: 0   Last office visit: No previous visit found with prescribing provider:  2/22/19   Future Office Visit:     Sig: TAKE TWO TABLETS BY MOUTH ONCE DAILY WITH DINNER    Biguanide Agents Failed - 3/6/2019  8:19 AM       Failed - Blood pressure less than 140/90 in past 6 months    BP Readings from Last 3 Encounters:   02/22/19 (!) 164/102   09/25/18 138/88   08/27/18 122/82                Passed - Patient has documented LDL within the past 12 mos.    Recent Labs   Lab Test 09/25/18  1035   LDL 75            Passed - Patient has had a Microalbumin in the past 15 mos.    Recent Labs   Lab Test 09/25/18  1241   MICROL 716   UMALCR 582.11*            Passed - Patient is age 10 or older       Passed - Patient has documented A1c within the specified period of time.    If HgbA1C is 8 or greater, it needs to be on file within the past 3 months.  If less than 8, must be on file within the past 6 months.     Recent Labs   Lab Test 02/22/19  0922   A1C 5.8*            Passed - Patient's CR is NOT>1.4 OR Patient's EGFR is NOT<45 within past 12 mos.    Recent Labs   Lab Test 02/22/19  0922   GFRESTIMATED >90   GFRESTBLACK >90       Recent Labs   Lab Test 02/22/19  0922   CR 0.82            Passed - Patient does NOT have a diagnosis of CHF.       Passed - Medication is active on med list       Passed - Recent (6 mo) or future (30 days) visit within the authorizing provider's specialty    Patient had office visit in the last 6 months or has a visit in the next 30 days with authorizing provider or within the authorizing provider's specialty.  See \"Patient Info\" tab in inbasket, or \"Choose Columns\" in Meds & Orders section of the refill encounter.            glipiZIDE (GLUCOTROL) 5 MG " "tablet [Pharmacy Med Name: GLIPIZIDE 5MG TABS] 30 tablet 0    Last Written Prescription Date:  2/6/19  Last Fill Quantity: 30,  # refills: 0   Last office visit: No previous visit found with prescribing provider:  2/22/19   Future Office Visit:     Sig: TAKE ONE TABLET BY MOUTH EVERY MORNING BEFORE BRREAKFAST    Sulfonylurea Agents Failed - 3/6/2019  8:19 AM       Failed - Blood pressure less than 140/90 in past 6 months    BP Readings from Last 3 Encounters:   02/22/19 (!) 164/102   09/25/18 138/88   08/27/18 122/82                Passed - Patient has documented LDL within the past 12 mos.    Recent Labs   Lab Test 09/25/18  1035   LDL 75            Passed - Patient has had a Microalbumin in the past 12 mos.    Recent Labs   Lab Test 09/25/18  1241   MICROL 716   UMALCR 582.11*            Passed - Patient has documented A1c within the specified period of time.    If HgbA1C is 8 or greater, it needs to be on file within the past 3 months.  If less than 8, must be on file within the past 6 months.     Recent Labs   Lab Test 02/22/19  0922   A1C 5.8*            Passed - Medication is active on med list       Passed - Patient is age 18 or older       Passed - Patient has a recent creatinine (normal) within the past 12 mos.    Recent Labs   Lab Test 02/22/19  0922   CR 0.82            Passed - Recent (6 mo) or future (30 days) visit within the authorizing provider's specialty    Patient had office visit in the last 6 months or has a visit in the next 30 days with authorizing provider or within the authorizing provider's specialty.  See \"Patient Info\" tab in inbasket, or \"Choose Columns\" in Meds & Orders section of the refill encounter.            "

## 2019-04-11 ENCOUNTER — PATIENT OUTREACH (OUTPATIENT)
Dept: CARE COORDINATION | Facility: CLINIC | Age: 59
End: 2019-04-11
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
## 2019-05-03 DIAGNOSIS — I50.43 ACUTE ON CHRONIC COMBINED SYSTOLIC AND DIASTOLIC CONGESTIVE HEART FAILURE (H): Primary | ICD-10-CM

## 2019-05-06 ENCOUNTER — PATIENT OUTREACH (OUTPATIENT)
Dept: CARE COORDINATION | Facility: CLINIC | Age: 59
End: 2019-05-06

## 2019-05-06 ASSESSMENT — ACTIVITIES OF DAILY LIVING (ADL): DEPENDENT_IADLS:: INDEPENDENT

## 2019-05-06 NOTE — PROGRESS NOTES
"Clinic Care Coordination Contact    Clinic Care Coordination Contact  OUTREACH    Referral Information:       Primary Diagnosis: Diabetes       [place] Utilization:   Clinic Utilization  Difficulty keeping appointments:: No  Compliance Concerns: No  No-Show Concerns: No  No PCP office visit in Past Year: No  Utilization    Last refreshed: 5/4/2019  7:16 AM:  Hospital Admissions 1           Last refreshed: 5/4/2019  7:16 AM:  ED Visits 0           Last refreshed: 5/4/2019  7:16 AM:  No Show Count (past year) 0              Current as of: 5/4/2019  7:16 AM              Clinical Concerns:  Current Medical Concerns:  Clinic Care Coordinator RN routed previous encounter to PCP inquiring if patient should have an echo and or follow up with cardiology.   PCP responded:   Mark Matson, DO  You 3 days ago         The patient is a little late on his follow-up.  I have placed an order for an echocardiogram as this is a good idea.  I would like to see him after the echo and then we can determine whether or not cardiology consultation is necessary.     Thank you for helping in his care.     Mark Matson             Clinic Care Coordinator RN notified patient of echo order.   Clinic Care Coordinator RN clarified with clinic, patient should call [facility]): [phone] to set up echo. Patient agreed to call for appointment.   Patient also instructed to make a follow up appointment with PCP after echo results.   During previous conversation Clinic Care Coordinator RN advised patient to check his blood pressure at a pharmacy.  Patient has not done this yet but agreed to check it this year.  Patient's blood pressure medication was increased at last PCP visit.  Patient denies sob or edema.  Patient reports he was raking the yard yesterday without sob.     Clinic Care Coordinator RN informed patient he is due for a hgbA1C iin May.  Patient reports his blood glucose readings are \"normal\"  " "Unless he eats candy.  Clinic Care Coordinator RN reviewed eating more complex carbs.  Patient has met with [facility]  In the past.  Patient does not think he needs to met with them again.     Clinic Care Coordinator RN and patient also discussed his lack of employment.  Patient states he has \"barely worked the last few years\"  He states \"Its hard for a man over 60 to find a job\"  Patient has been self employed handman- mostly painting.  He does not quilify for unemployment.  Clinic Care Coordinator RN suggested contacting or going to unemployment office for resources. Patient reports his partner pays \"all the bills\"  Clinic Care Coordinator RN will ask Clinic Care Coordinator  to follow up.         Current Behavioral Concerns: n/a    Education Provided to patient: see above.    Pain  Pain (GOAL):: No  Health Maintenance Reviewed:    Clinical Pathway: Clinic Care Coordination - Diabetes Pathway    Patient's diabetes management related comments/concerns: Assistance with making lifestyle changes, Healthy Eating and Being Active.    Patient's emotional response to diabetes: expresses readiness to learn    ASSESSMENT:    Symptoms:  Do you have:  Symptoms  Blurred vision: (P) No  Fatigue: (P) No  Neuropathy: (P) No  Foot ulcerations: (P) No  Polydipsia: (P) No  Polyphagia: (P) No  Polyuria: (P) No  Visual change: (P) No  Weakness: (P) No  Weight loss: (P) No  Slow healing wounds: (P) No  Recent Infection(s): (P) No  Other: (P) No  Symptom course: (P) Stable  Weight trend: (P) Stable    Hypoglycemia Symptoms:  Do you have:  Hypoglycemia symptoms  Confusion: (P) No  Dizziness or Light-Headedness: (P) No  Headaches: (P) No  Hunger: (P) No  Mood changes: (P) No  Nervousness/Anxiety: (P) No  Sleepiness: (P) No  Speech difficulty: (P) No  Sweats: (P) No  Tremors: (P) No    Patient Problem List and Family Medical History reviewed for relevant medical history, current medical status, and diabetes risk " factors.    Current Diabetes Management per Patient:  Taking diabetes medications? Oral Medications:        BG values are: In goal  Patient's most recent   Lab Results   Component Value Date    A1C 5.8 02/22/2019    is meeting goal of <7.0    Nutrition:    How is your weight trend? (P) Stable  How often do you meal plan? (P) Avoiding sweets  Do you visit a dietician? (P) Yes  Patient on a regular basis: (P) Eats 3 meals a day            Diabetes Complications:  Do you experience:  Hypoglycemia Complications  Blackouts: (P) No  Hospitalization: (P) No  Nocturnal hypoglycemia: (P) No  Required assistance: (P) No  Required glucagon injection: (P) No  Seizures: (P) No    (P) Type 2  What is the duration of your diabetes?    What is the state of your diabetes? (P) Improving  Have you received Diabetes education in the past 24 months? (P) Yes    How often do you experience Hypoglycemia?  never      How often do you do home blood tests? (P) 1-2 times per day    Medication Management:  See above     Functional Status:  Dependent ADLs:: Independent  Dependent IADLs:: Independent  Bed or wheelchair confined:: No  Mobility Status: Independent  Fallen 2 or more times in the past year?: No  Any fall with injury in the past year?: No    Living Situation:  Current living arrangement:: I live in a private home with family    Diet/Exercise/Sleep:  Diet:: Diabetic diet  Inadequate nutrition (GOAL):: No  Food Insecurity: No  Tube Feeding: No  Inadequate activity/exercise (GOAL):: No  Significant changes in sleep pattern (GOAL): No    Transportation:  Transportation concerns (GOAL):: No  Transportation means:: Regular car     Psychosocial:  Sikhism or spiritual beliefs that impact treatment:: No  Mental health DX:: No  Mental health management concern (GOAL):: No  Informal Support system:: Significant other     Financial/Insurance:   Financial/Insurance concerns (GOAL):: Yes  See above      Resources and Interventions:  Current  Resources:    ;   Community Resources: None  Supplies used at home:: Diabetic Supplies  Equipment Currently Used at Home: none    Advance Care Plan/Directive  Advanced Care Plans/Directives on file:: No    Referrals Placed: Community Resources     Goals:   Goals        General    Financial Wellbeing (pt-stated)     Notes - Note created  5/6/2019  1:36 PM by Felisa Chavez RN    Goal Statement:  I will talk with  regarding my unemployment   Measure of Success: completed  Supportive Steps to Achieve: Clinic Care Coordinator RN routed to Clinic Care Coordinator    Barriers: na  Strengths: desire for resources   Date to Achieve By: 1/1/20  Patient expressed understanding of goal: yes        Medical (pt-stated)     Notes - Note created  5/6/2019  1:18 PM by Felisa Chavez RN    Goal Statement: I will have echo then follow up with my PCP  Measure of Success: completed  Supportive Steps to Achieve: order placed   Barriers: na  Strengths: desire for well being   Date to Achieve By: 7/1/ 9  Patient expressed understanding of goal: yes           Monitoring (pt-stated)     Notes - Note created  8/23/2018 12:16 PM by Candelaria Hdz RD    I will check before breakfast and one about 2 hours after a meal.       Monitoring (pt-stated)     Notes - Note created  5/3/2019  2:06 PM by Felisa Chavez RN    Goal Statement: I will occasionally check my blood pressure at a pharmacy. I will record reading and bring to my next clinic appointment.   Measure of Success: completed  Supportive Steps to Achieve: verbalizes understanding  Barriers: na  Strengths: desire for improved health  Date to Achieve By: on going   Patient expressed understanding of goal: yes                Patient/Caregiver understanding: yes     Outreach Frequency: monthly      Plan: Patient will make appointment for echo and follow up with PCP.   Patient will have his blood pressure checked this week.   Patient will continue to take  medications as directed.   Patient will continue to monitor blood glucose reading as directed.   Clinic Care Coordinator RN will ask Clinic Care Coordinator  to follow up with patient regarding unemployment.

## 2019-05-09 ENCOUNTER — PATIENT OUTREACH (OUTPATIENT)
Dept: CARE COORDINATION | Facility: CLINIC | Age: 59
End: 2019-05-09

## 2019-05-09 ASSESSMENT — ACTIVITIES OF DAILY LIVING (ADL): DEPENDENT_IADLS:: INDEPENDENT
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
## 2019-05-15 ENCOUNTER — HOSPITAL ENCOUNTER (OUTPATIENT)
Dept: CARDIOLOGY | Facility: CLINIC | Age: 59
Discharge: HOME | End: 2019-05-15
Attending: INTERNAL MEDICINE | Admitting: INTERNAL MEDICINE
Payer: COMMERCIAL

## 2019-05-15 DIAGNOSIS — I50.43 ACUTE ON CHRONIC COMBINED SYSTOLIC AND DIASTOLIC CONGESTIVE HEART FAILURE (H): ICD-10-CM

## 2019-05-15 PROCEDURE — 93306 TTE W/DOPPLER COMPLETE: CPT

## 2019-05-15 PROCEDURE — 93306 TTE W/DOPPLER COMPLETE: CPT | Mod: 26 | Performed by: INTERNAL MEDICINE

## 2019-05-15 NOTE — LETTER
May 21, 2019      Sushil Diana  [address] [address] [place] [id number]        Dear ,    We are writing to inform you of your test results.    The visual ejection fraction is 35%.  This is about half of what it should be.   Valvular function is unremarkable.     Resulted Orders   Echocardiogram Complete    Narrative    [id number]  [id number]  [id number]  [id number]^HALEIGH^KIRA^RIYA           [facility]  Echocardiography Laboratory  [address] [address]        Name: SUSHIL DIANA  MRN: [id number]  : 1960  Study Date: 05/15/2019 10:02 AM  Age: 59 yrs  Gender: Male  Patient Location: [facility]  Reason For Study: Acute on chronic combined systolic and diastolic congestive  hear  History: CHF, HTN, Tachy  Ordering Physician: KIRA RICARDO  Referring Physician: KIRA RICARDO  Performed By: Laura Mike     BSA: 1.9 m2  Height: 68 in  Weight: 169 lb  HR: 78  BP: 180/88 mmHg  _____________________________________________________________________________  __        Procedure  Complete Echo Adult.  _____________________________________________________________________________  __        Interpretation Summary     Left ventricular systolic function is moderately reduced.  The visual ejection fraction is estimated at 30-35%.  EF imporved to 30-35% compared to 20% on prior study from . The study was  technically difficult.  _____________________________________________________________________________  __        Left Ventricle  The left ventricle is normal in size. There is mild to moderate concentric  left ventricular hypertrophy. Grade I or early diastolic dysfunction.  Diastolic Doppler findings (E/E' ratio and/or other parameters) suggest left  ventricular filling pressures are indeterminate. Left ventricular systolic  function is moderately reduced. The visual ejection fraction is estimated at  30-35%. There is moderate global hypokinesia of  the left ventricle.     Right Ventricle  The right ventricle is normal size. The right ventricular systolic function is  borderline reduced.     Atria  The left atrium is moderately dilated. Right atrial size is normal.     Mitral Valve  There is mild mitral annular calcification. The mitral valve leaflets are  mildly thickened. There is mild (1+) mitral regurgitation.        Tricuspid Valve  No tricuspid regurgitation. Right ventricular systolic pressure could not be  approximated due to inadequate tricuspid regurgitation. IVC diameter <2.1 cm  collapsing >50% with sniff suggests a normal RA pressure of 3 mmHg.     Aortic Valve  There is mild trileaflet aortic sclerosis. No aortic stenosis is present.     Pulmonic Valve  The pulmonic valve is not well seen, but is grossly normal.     Vessels  The aortic root is normal size.     Pericardium  There is no pericardial effusion.        Rhythm  Sinus rhythm was noted.  _____________________________________________________________________________  __  MMode/2D Measurements & Calculations  IVSd: 1.5 cm     LVIDd: 4.7 cm  LVIDs: 4.1 cm  LVPWd: 1.3 cm  FS: 12.2 %  LV mass(C)d: 266.8 grams  LV mass(C)dI: 140.2 grams/m2  Ao root diam: 3.0 cm  LA dimension: 4.2 cm  asc Aorta Diam: 3.3 cm  LA/Ao: 1.4  LA Volume (BP): 82.6 ml  LA Volume Index (BP): 43.5 ml/m2  RWT: 0.57           Doppler Measurements & Calculations  MV E max wu: 55.3 cm/sec  MV A max wu: 93.0 cm/sec  MV E/A: 0.59  MV dec time: 0.36 sec  Ao V2 max: 97.7 cm/sec  Ao max P.0 mmHg  LV V1 max PG: 3.0 mmHg  LV V1 max: 86.1 cm/sec  PA acc time: 0.14 sec  AV Wu Ratio (DI): 0.88  E/E' avg: 15.0  Lateral E/e': 13.0  Medial E/e': 16.9              _____________________________________________________________________________  __        Report approved by: Teresa Gil 05/15/2019 11:27 AM          If you have any questions or concerns, please call the clinic at the number listed above.        Sincerely,        [facility] ECHO ROOM 1

## 2019-05-21 NOTE — RESULT ENCOUNTER NOTE
Dear Sushil, your recent test results are attached.  The visual ejection fraction is 35%.  This is about half of what it should be.  Valvular function is unremarkable.    Feel free to contact me via the office or My Chart if you have any questions regarding the above.

## 2019-05-23 ENCOUNTER — PATIENT OUTREACH (OUTPATIENT)
Dept: CARE COORDINATION | Facility: CLINIC | Age: 59
End: 2019-05-23

## 2019-05-23 DIAGNOSIS — E11.9 NEW ONSET TYPE 2 DIABETES MELLITUS (H): ICD-10-CM

## 2019-05-23 DIAGNOSIS — I50.41 ACUTE COMBINED SYSTOLIC AND DIASTOLIC CONGESTIVE HEART FAILURE (H): ICD-10-CM

## 2019-05-23 DIAGNOSIS — I10 HYPERTENSION, UNSPECIFIED TYPE: ICD-10-CM

## 2019-05-23 DIAGNOSIS — E11.9 DIABETES MELLITUS WITHOUT COMPLICATION (H): ICD-10-CM

## 2019-05-23 RX ORDER — LISINOPRIL 20 MG/1
TABLET ORAL
Qty: 90 TABLET | Refills: 0 | Status: SHIPPED | OUTPATIENT
Start: 2019-05-23 | End: 2019-08-19

## 2019-05-23 NOTE — TELEPHONE ENCOUNTER
Routing refill request to provider for review/approval because:  Labs out of range:  BP    BRYANNA BearN, RN  [facility]

## 2019-05-23 NOTE — PROGRESS NOTES
Clinic Care Coordination Contact    Clinic Care Coordination Contact  OUTREACH    Referral Information:  Referral Source: IP Handoff    Primary Diagnosis: Diabetes    Chief Complaint   Patient presents with     Clinic Care Coordination - Follow-up        [place] Utilization:   Clinic Utilization  Difficulty keeping appointments:: No  Compliance Concerns: No  No-Show Concerns: No  No PCP office visit in Past Year: No  Utilization    Last refreshed: 2019  7:54 PM:  Hospital Admissions 1           Last refreshed: 2019  7:54 PM:  ED Visits 0           Last refreshed: 2019  7:54 PM:  No Show Count (past year) 0              Current as of: 2019  7:54 PM              Clinical Concerns:  Current Medical Concerns:  Patient had Echo 5/15/19.   Ef 35.  Clinic Care Coordinator RN spoke with patient today.  Clinic Care Coordinator RN explained EF to patient. Encouraged patient to follow up with Cardiology.   Patient was given phone number to specialty clinic    [phone].   CHF education done with patient. Clinic Care Coordinator RN send CHF booklet to patient.   Patient's current wt is 168 pounds.      Pain  Pain (GOAL):: No  Health Maintenance Reviewed:    Clinical Pathway: Clinic Care Coordination CHF Assessment    Discharge:        CHF:    Home scale available:  Yes  Home scale weight this mornin    Symptom Review:            Medication Management:      Functional Status:  Dependent ADLs:: Independent  Dependent IADLs:: Independent  Bed or wheelchair confined:: No  Mobility Status: Independent    Living Situation:  Current living arrangement:: I live in a private home with family    Diet/Exercise/Sleep:  Diet:: Diabetic diet  Inadequate nutrition (GOAL):: No  Food Insecurity: No  Tube Feeding: No  Inadequate activity/exercise (GOAL):: No  Significant changes in sleep pattern (GOAL): No    Transportation:  Transportation concerns (GOAL):: No  Transportation means:: Regular car      Psychosocial:  Sikhism or spiritual beliefs that impact treatment:: No  Mental health DX:: No  Mental health management concern (GOAL):: No  Informal Support system:: Significant other     Financial/Insurance:   Financial/Insurance concerns (GOAL):: Yes       Resources and Interventions:  Current Resources:    ;   Community Resources: None  Supplies used at home:: Diabetic Supplies  Equipment Currently Used at Home: none    Advance Care Plan/Directive  Advanced Care Plans/Directives on file:: No    Referrals Placed: Community Resources     Goals:   Goals        General    Financial Wellbeing (pt-stated)     Notes - Note created  5/6/2019  1:36 PM by Felisa Chavez RN    Goal Statement:  I will talk with  regarding my unemployment   Measure of Success: completed  Supportive Steps to Achieve: Clinic Care Coordinator RN routed to Clinic Care Coordinator    Barriers: na  Strengths: desire for resources   Date to Achieve By: 1/1/20  Patient expressed understanding of goal: yes        Medical (pt-stated)     Notes - Note created  5/6/2019  1:18 PM by Felisa Chavez RN    Goal Statement: I will have echo then follow up with my PCP  Measure of Success: completed  Supportive Steps to Achieve: order placed   Barriers: na  Strengths: desire for well being   Date to Achieve By: 7/1/ 9  Patient expressed understanding of goal: yes           Monitoring (pt-stated)     Notes - Note created  8/23/2018 12:16 PM by Candelaria Hdz RD    I will check before breakfast and one about 2 hours after a meal.       Monitoring (pt-stated)     Notes - Note created  5/3/2019  2:06 PM by Felisa Chavez RN    Goal Statement: I will occasionally check my blood pressure at a pharmacy. I will record reading and bring to my next clinic appointment.   Measure of Success: completed  Supportive Steps to Achieve: verbalizes understanding  Barriers: na  Strengths: desire for improved health  Date to Achieve By: on  going   Patient expressed understanding of goal: yes                Patient/Caregiver understanding: yes    Outreach Frequency: monthly      Plan: Patient will make an appointment with Cardiology.   Clinic Care Coordinator RN will follow up next month with patient.

## 2019-05-23 NOTE — TELEPHONE ENCOUNTER
"Requested Prescriptions   Pending Prescriptions Disp Refills     lisinopril (PRINIVIL/ZESTRIL) 20 MG tablet [Pharmacy Med Name: LISINOPRIL 20MG TABS] 90 tablet 0     Sig: TAKE ONE TABLET BY MOUTH ONCE DAILY   Last Written Prescription Date:  2/22/19  Last Fill Quantity: 90,  # refills: 0   Last office visit: 2/22/2019 with prescribing provider:  2/22/19   Future Office Visit:        ACE Inhibitors (Including Combos) Protocol Failed - 5/23/2019  9:16 AM        Failed - Blood pressure under 140/90 in past 12 months     BP Readings from Last 3 Encounters:   02/22/19 (!) 164/102   09/25/18 138/88   08/27/18 122/82                 Passed - Recent (12 mo) or future (30 days) visit within the authorizing provider's specialty     Patient had office visit in the last 12 months or has a visit in the next 30 days with authorizing provider or within the authorizing provider's specialty.  See \"Patient Info\" tab in inbasket, or \"Choose Columns\" in Meds & Orders section of the refill encounter.              Passed - Medication is active on med list        Passed - Patient is age 18 or older        Passed - Normal serum creatinine on file in past 12 months     Recent Labs   Lab Test 02/22/19  0922   CR 0.82             Passed - Normal serum potassium on file in past 12 months     Recent Labs   Lab Test 02/22/19  0922   POTASSIUM 4.5             CONTOUR NEXT TEST test strip [Pharmacy Med Name: CONTOUR NEXT TEST  STRP] 100 each 1     Sig: USE TO TEST BLOOD SUGARS ONE TO THREE TIMES DAILY  OR AS DIRECTED   Last Written Prescription Date:  12/17/18  Last Fill Quantity: 100,  # refills: 1   Last office visit: 2/22/2019 with prescribing provider:  2/22/19   Future Office Visit:        Diabetic Supplies Protocol Passed - 5/23/2019  9:16 AM        Passed - Medication is active on med list        Passed - Patient is 18 years of age or older        Passed - Recent (6 mo) or future (30 days) visit within the authorizing provider's specialty " "    Patient had office visit in the last 6 months or has a visit in the next 30 days with authorizing provider.  See \"Patient Info\" tab in inbasket, or \"Choose Columns\" in Meds & Orders section of the refill encounter.            "

## 2019-05-31 ASSESSMENT — ACTIVITIES OF DAILY LIVING (ADL): DEPENDENT_IADLS:: INDEPENDENT

## 2019-06-03 DIAGNOSIS — F43.22 ADJUSTMENT DISORDER WITH ANXIOUS MOOD: ICD-10-CM

## 2019-06-03 DIAGNOSIS — E11.9 DIABETES MELLITUS WITHOUT COMPLICATION (H): ICD-10-CM

## 2019-06-03 DIAGNOSIS — I10 HYPERTENSION, UNSPECIFIED TYPE: ICD-10-CM

## 2019-06-03 RX ORDER — CITALOPRAM HYDROBROMIDE 20 MG/1
TABLET ORAL
Qty: 30 TABLET | Refills: 3 | Status: SHIPPED | OUTPATIENT
Start: 2019-06-03 | End: 2019-10-07

## 2019-06-03 RX ORDER — ATORVASTATIN CALCIUM 20 MG/1
TABLET, FILM COATED ORAL
Qty: 30 TABLET | Refills: 3 | Status: SHIPPED | OUTPATIENT
Start: 2019-06-03 | End: 2019-10-07

## 2019-06-03 RX ORDER — METOPROLOL TARTRATE 50 MG
TABLET ORAL
Qty: 60 TABLET | Refills: 3 | Status: SHIPPED | OUTPATIENT
Start: 2019-06-03 | End: 2019-09-27

## 2019-06-03 NOTE — TELEPHONE ENCOUNTER
"Requested Prescriptions   Pending Prescriptions Disp Refills     metoprolol tartrate (LOPRESSOR) 50 MG tablet [Pharmacy Med Name: METOPROLOL TARTRATE 50MG TABS] 60 tablet 3     Sig: TAKE ONE TABLET BY MOUTH TWICE A DAY   Last Written Prescription Date:  2/6/19  Last Fill Quantity: 60,  # refills: 3   Last office visit: No previous visit found with prescribing provider:  2/22/19   Future Office Visit:        Beta-Blockers Protocol Failed - 6/3/2019  9:33 AM        Failed - Blood pressure under 140/90 in past 12 months     BP Readings from Last 3 Encounters:   02/22/19 (!) 164/102   09/25/18 138/88   08/27/18 122/82                 Passed - Patient is age 6 or older        Passed - Recent (12 mo) or future (30 days) visit within the authorizing provider's specialty     Patient had office visit in the last 12 months or has a visit in the next 30 days with authorizing provider or within the authorizing provider's specialty.  See \"Patient Info\" tab in inbasket, or \"Choose Columns\" in Meds & Orders section of the refill encounter.              Passed - Medication is active on med list        citalopram (CELEXA) 20 MG tablet [Pharmacy Med Name: CITALOPRAM HYDROBROMIDE 20MG TABS] 30 tablet 3     Sig: TAKE ONE TABLET BY MOUTH EVERY DAY   Last Written Prescription Date:  2/6/19  Last Fill Quantity: 30,  # refills: 3   Last office visit: No previous visit found with prescribing provider:  2/22/19   Future Office Visit:        SSRIs Protocol Passed - 6/3/2019  9:33 AM        Passed - Recent (12 mo) or future (30 days) visit within the authorizing provider's specialty     Patient had office visit in the last 12 months or has a visit in the next 30 days with authorizing provider or within the authorizing provider's specialty.  See \"Patient Info\" tab in inbasket, or \"Choose Columns\" in Meds & Orders section of the refill encounter.              Passed - Medication is active on med list        Passed - Patient is age 18 or older    " "    atorvastatin (LIPITOR) 20 MG tablet [Pharmacy Med Name: ATORVASTATIN CALCIUM 20MG TABS] 30 tablet 3     Sig: TAKE ONE TABLET BY MOUTH EVERY DAY   Last Written Prescription Date:  2/6/19  Last Fill Quantity: 30,  # refills: 3   Last office visit: No previous visit found with prescribing provider:  2/22/19   Future Office Visit:        Statins Protocol Passed - 6/3/2019  9:33 AM        Passed - LDL on file in past 12 months     Recent Labs   Lab Test 09/25/18  1035   LDL 75             Passed - No abnormal creatine kinase in past 12 months     No lab results found.             Passed - Recent (12 mo) or future (30 days) visit within the authorizing provider's specialty     Patient had office visit in the last 12 months or has a visit in the next 30 days with authorizing provider or within the authorizing provider's specialty.  See \"Patient Info\" tab in inbasket, or \"Choose Columns\" in Meds & Orders section of the refill encounter.              Passed - Medication is active on med list        Passed - Patient is age 18 or older        "

## 2019-06-03 NOTE — TELEPHONE ENCOUNTER
Routing refill request to provider for review/approval because:  Labs out of range:  BP    BRYANNA BearN, RN  [facility]

## 2019-06-27 ENCOUNTER — PATIENT OUTREACH (OUTPATIENT)
Dept: CARE COORDINATION | Facility: CLINIC | Age: 59
End: 2019-06-27

## 2019-06-27 NOTE — PROGRESS NOTES
Clinic Care Coordination Contact  [facility]/Voicemail       Clinical Data: Care Coordinator Outreach  Outreach attempted .  Left message on voicemail with call back information and requested return call.  Plan: Care Coordinator will try to reach patient again in July 2019 if no return call from patient.

## 2019-07-23 ENCOUNTER — PATIENT OUTREACH (OUTPATIENT)
Dept: CARE COORDINATION | Facility: CLINIC | Age: 59
End: 2019-07-23
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
## 2019-07-23 NOTE — PROGRESS NOTES
Clinic Care Coordination Contact  [facility]/Voicemail       Clinical Data: Care Coordinator Outreach.   Patient needs echo appointment   Outreach attempted x 1.  Left message on voicemail with call back information and requested return call.  Plan:  Care Coordinator will try to reach patient again in 7-10 business days.

## 2019-08-19 ENCOUNTER — PATIENT OUTREACH (OUTPATIENT)
Dept: CARE COORDINATION | Facility: CLINIC | Age: 59
End: 2019-08-19

## 2019-08-19 DIAGNOSIS — I10 HYPERTENSION, UNSPECIFIED TYPE: ICD-10-CM

## 2019-08-19 DIAGNOSIS — I50.41 ACUTE COMBINED SYSTOLIC AND DIASTOLIC CONGESTIVE HEART FAILURE (H): ICD-10-CM

## 2019-08-19 RX ORDER — LISINOPRIL 20 MG/1
TABLET ORAL
Qty: 90 TABLET | Refills: 0 | Status: SHIPPED | OUTPATIENT
Start: 2019-08-19 | End: 2019-09-27

## 2019-08-19 NOTE — TELEPHONE ENCOUNTER
"Requested Prescriptions   Pending Prescriptions Disp Refills     lisinopril (PRINIVIL/ZESTRIL) 20 MG tablet [Pharmacy Med Name: LISINOPRIL 20MG TABS]  Last Written Prescription Date:  5/23/19  Last Fill Quantity: 90,  # refills: 0   Last office visit: 2/22/2019 with prescribing provider:  Dano   Future Office Visit:     90 tablet 0     Sig: TAKE ONE TABLET BY MOUTH ONCE DAILY       ACE Inhibitors (Including Combos) Protocol Failed - 8/19/2019  8:43 AM        Failed - Blood pressure under 140/90 in past 12 months     BP Readings from Last 3 Encounters:   02/22/19 (!) 164/102   09/25/18 138/88   08/27/18 122/82                 Passed - Recent (12 mo) or future (30 days) visit within the authorizing provider's specialty     Patient had office visit in the last 12 months or has a visit in the next 30 days with authorizing provider or within the authorizing provider's specialty.  See \"Patient Info\" tab in inbasket, or \"Choose Columns\" in Meds & Orders section of the refill encounter.              Passed - Medication is active on med list        Passed - Patient is age 18 or older        Passed - Normal serum creatinine on file in past 12 months     Recent Labs   Lab Test 02/22/19  0922   CR 0.82             Passed - Normal serum potassium on file in past 12 months     Recent Labs   Lab Test 02/22/19  0922   POTASSIUM 4.5               "

## 2019-08-19 NOTE — PROGRESS NOTES
Clinic Care Coordination Contact  [facility]/Voicemail       Clinical Data: Clinic Care Coordinator RN follow up attempt   Patient needs to make a follow up appointment with cardiology for CHF.  Patient is past due for a hgbA1C. Follow up HTN.        Care Coordinator Outreach  Outreach attempted x .  Left message on voicemail with call back information and requested return call.  Plan:  Care Coordinator will try to reach patient again in 10-14 business days.

## 2019-08-19 NOTE — TELEPHONE ENCOUNTER
Routing refill request to provider for review/approval because:  Labs out of range:  BP    BRYANNA BearN, RN  [facility]

## 2019-08-26 ENCOUNTER — PATIENT OUTREACH (OUTPATIENT)
Dept: CARE COORDINATION | Facility: CLINIC | Age: 59
End: 2019-08-26

## 2019-08-26 NOTE — PROGRESS NOTES
Clinic Care Coordination Contact  [facility]/Voicemail       Clinical Data: Care Coordinator Outreach  Patient returned Clinic Care Coordinator RN call.   Clinic Care Coordinator RN left message on voicemail with call back information and requested return call.  Plan: Care Coordinator will try to reach patient again in 7-10 business days.

## 2019-08-30 DIAGNOSIS — I50.9 ACUTE CONGESTIVE HEART FAILURE (H): ICD-10-CM

## 2019-08-30 DIAGNOSIS — E11.9 DIABETES MELLITUS WITHOUT COMPLICATION (H): ICD-10-CM

## 2019-08-30 DIAGNOSIS — E11.9 NEW ONSET TYPE 2 DIABETES MELLITUS (H): ICD-10-CM

## 2019-08-30 RX ORDER — METFORMIN HCL 500 MG
TABLET, EXTENDED RELEASE 24 HR ORAL
Qty: 60 TABLET | Refills: 0 | Status: SHIPPED | OUTPATIENT
Start: 2019-08-30 | End: 2019-10-07

## 2019-08-30 RX ORDER — GLIPIZIDE 5 MG/1
TABLET ORAL
Qty: 30 TABLET | Refills: 0 | Status: SHIPPED | OUTPATIENT
Start: 2019-08-30 | End: 2019-10-07

## 2019-08-30 NOTE — TELEPHONE ENCOUNTER
Medication is being filled for 1 time refill only due to:  Patient needs to be seen because overdue for 6 month diabetic check.     Will route to scheduling to call and schedule.     BRYANNA BearN, RN  [facility]

## 2019-08-30 NOTE — TELEPHONE ENCOUNTER
Patient called back, relayed message above. Will call back to schedule       Reina Michaels ~ Patient Representative  [address] [address]  [email or web address]  [email or web address]  Office:  [phone]  Fax:  [phone]

## 2019-08-30 NOTE — TELEPHONE ENCOUNTER
"Requested Prescriptions   Pending Prescriptions Disp Refills     metFORMIN (GLUCOPHAGE-XR) 500 MG 24 hr tablet [Pharmacy Med Name: METFORMIN HCL ER 500MG TB24] 180 tablet 1     Sig: TAKE TWO TABLETS BY MOUTH ONCE DAILY WITH DINNER   Last Written Prescription Date:  3/7/19  Last Fill Quantity: 180,  # refills: 1   Last office visit: No previous visit found with prescribing provider:  2/22/19   Future Office Visit:        Biguanide Agents Failed - 8/30/2019 12:22 PM        Failed - Blood pressure less than 140/90 in past 6 months     BP Readings from Last 3 Encounters:   02/22/19 (!) 164/102   09/25/18 138/88   08/27/18 122/82                 Failed - Patient has documented A1c within the specified period of time.     If HgbA1C is 8 or greater, it needs to be on file within the past 3 months.  If less than 8, must be on file within the past 6 months.     Recent Labs   Lab Test 02/22/19 0922   A1C 5.8*             Failed - Recent (6 mo) or future (30 days) visit within the authorizing provider's specialty     Patient had office visit in the last 6 months or has a visit in the next 30 days with authorizing provider or within the authorizing provider's specialty.  See \"Patient Info\" tab in inbasket, or \"Choose Columns\" in Meds & Orders section of the refill encounter.            Passed - Patient has documented LDL within the past 12 mos.     Recent Labs   Lab Test 09/25/18  1035   LDL 75             Passed - Patient has had a Microalbumin in the past 15 mos.     Recent Labs   Lab Test 09/25/18  1241   MICROL 716   UMALCR 582.11*             Passed - Patient is age 10 or older        Passed - Patient's CR is NOT>1.4 OR Patient's EGFR is NOT<45 within past 12 mos.     Recent Labs   Lab Test 02/22/19 0922   GFRESTIMATED >90   GFRESTBLACK >90       Recent Labs   Lab Test 02/22/19 0922   CR 0.82             Passed - Patient does NOT have a diagnosis of CHF.        Passed - Medication is active on med list        glipiZIDE " "(GLUCOTROL) 5 MG tablet [Pharmacy Med Name: GLIPIZIDE 5MG TABS] 90 tablet 1     Sig: TAKE ONE TABLET BY MOUTH EVERY MORNING BEFORE BREAKFAST   Last Written Prescription Date:  3/7/19  Last Fill Quantity: 90,  # refills: 1   Last office visit: No previous visit found with prescribing provider:  2/22/19   Future Office Visit:        Sulfonylurea Agents Failed - 8/30/2019 12:22 PM        Failed - Blood pressure less than 140/90 in past 6 months     BP Readings from Last 3 Encounters:   02/22/19 (!) 164/102   09/25/18 138/88   08/27/18 122/82                 Failed - Patient has documented A1c within the specified period of time.     If HgbA1C is 8 or greater, it needs to be on file within the past 3 months.  If less than 8, must be on file within the past 6 months.     Recent Labs   Lab Test 02/22/19  0922   A1C 5.8*             Failed - Recent (6 mo) or future (30 days) visit within the authorizing provider's specialty     Patient had office visit in the last 6 months or has a visit in the next 30 days with authorizing provider or within the authorizing provider's specialty.  See \"Patient Info\" tab in inbasket, or \"Choose Columns\" in Meds & Orders section of the refill encounter.            Passed - Patient has documented LDL within the past 12 mos.     Recent Labs   Lab Test 09/25/18  1035   LDL 75             Passed - Patient has had a Microalbumin in the past 15 mos.     Recent Labs   Lab Test 09/25/18  1241   MICROL 716   UMALCR 582.11*             Passed - Medication is active on med list        Passed - Patient is age 18 or older        Passed - Patient has a recent creatinine (normal) within the past 12 mos.     Recent Labs   Lab Test 02/22/19  0922   CR 0.82             "

## 2019-09-03 RX ORDER — ASPIRIN 81 MG/1
81 TABLET, CHEWABLE ORAL DAILY
Qty: 108 TABLET | Refills: 1 | Status: SHIPPED | OUTPATIENT
Start: 2019-09-03 | End: 2020-05-27

## 2019-09-03 NOTE — TELEPHONE ENCOUNTER
"  Requested Prescriptions   Pending Prescriptions Disp Refills     aspirin (ASA) 81 MG chewable tablet 108 tablet 3     Sig: Take 1 tablet (81 mg) by mouth daily   Last Written Prescription Date:  8/13/2018  Last Fill Quantity: 108,  # refills: 3   Last office visit: 2/22/2019 with prescribing provider:     Future Office Visit:        Analgesics (Non-Narcotic Tylenol and ASA Only) Passed - 8/30/2019  3:50 PM        Passed - Recent (12 mo) or future (30 days) visit within the authorizing provider's specialty     Patient had office visit in the last 12 months or has a visit in the next 30 days with authorizing provider or within the authorizing provider's specialty.  See \"Patient Info\" tab in inbasket, or \"Choose Columns\" in Meds & Orders section of the refill encounter.              Passed - Patient is age 20 years or older     If ASA is flagged for ages under 20 years old. Forward to provider for confirmation Ryes Syndrome is not a concern.              Passed - Medication is active on med list      Prescription approved per [facility] Refill Protocol.  Arleth Hidalgo RN      "

## 2019-09-24 ENCOUNTER — PATIENT OUTREACH (OUTPATIENT)
Dept: CARE COORDINATION | Facility: CLINIC | Age: 59
End: 2019-09-24

## 2019-09-24 NOTE — PROGRESS NOTES
Clinic Care Coordination Contact  Care Team Conversations    Patient has an appointment with PCP 9/27/19.   Clinic Care Coordinator RN routed encounter to PCP.   Will ask that PCP address heart failure.   Patient had an echo on May 15, 2019. EF was 35%.   Clinic Care Coordinator RN has attempted to educate patient on CHF.  Patient needs further teaching.  Clinic Care Coordinator RN instructed patient to see cardiology.  Patient has not made appointment.

## 2019-09-27 ENCOUNTER — OFFICE VISIT (OUTPATIENT)
Dept: FAMILY MEDICINE | Facility: OTHER | Age: 59
End: 2019-09-27
Payer: COMMERCIAL

## 2019-09-27 DIAGNOSIS — I50.41 ACUTE COMBINED SYSTOLIC AND DIASTOLIC CONGESTIVE HEART FAILURE (H): ICD-10-CM

## 2019-09-27 DIAGNOSIS — E11.9 NEW ONSET TYPE 2 DIABETES MELLITUS (H): ICD-10-CM

## 2019-09-27 DIAGNOSIS — E78.5 HYPERLIPIDEMIA LDL GOAL <100: ICD-10-CM

## 2019-09-27 DIAGNOSIS — I10 HYPERTENSION, UNSPECIFIED TYPE: ICD-10-CM

## 2019-09-27 DIAGNOSIS — Z23 NEED FOR PROPHYLACTIC VACCINATION AND INOCULATION AGAINST INFLUENZA: Primary | ICD-10-CM

## 2019-09-27 LAB
ALT SERPL W P-5'-P-CCNC: 26 U/L (ref 0–70)
ANION GAP SERPL CALCULATED.3IONS-SCNC: 7 MMOL/L (ref 3–14)
BUN SERPL-MCNC: 20 MG/DL (ref 7–30)
CALCIUM SERPL-MCNC: 9 MG/DL (ref 8.5–10.1)
CHLORIDE SERPL-SCNC: 103 MMOL/L (ref 94–109)
CHOLEST SERPL-MCNC: 155 MG/DL
CO2 SERPL-SCNC: 29 MMOL/L (ref 20–32)
CREAT SERPL-MCNC: 0.92 MG/DL (ref 0.66–1.25)
CREAT UR-MCNC: 101 MG/DL
ERYTHROCYTE [DISTWIDTH] IN BLOOD BY AUTOMATED COUNT: 12.6 % (ref 10–15)
GFR SERPL CREATININE-BSD FRML MDRD: 89 ML/MIN/{1.73_M2}
GLUCOSE SERPL-MCNC: 156 MG/DL (ref 70–99)
HBA1C MFR BLD: 5.2 % (ref 0–5.6)
HCT VFR BLD AUTO: 41.7 % (ref 40–53)
HDLC SERPL-MCNC: 65 MG/DL
HGB BLD-MCNC: 14.1 G/DL (ref 13.3–17.7)
LDLC SERPL CALC-MCNC: 73 MG/DL
MCH RBC QN AUTO: 31.8 PG (ref 26.5–33)
MCHC RBC AUTO-ENTMCNC: 33.8 G/DL (ref 31.5–36.5)
MCV RBC AUTO: 94 FL (ref 78–100)
MICROALBUMIN UR-MCNC: 217 MG/L
MICROALBUMIN/CREAT UR: 214.85 MG/G CR (ref 0–17)
NONHDLC SERPL-MCNC: 90 MG/DL
PLATELET # BLD AUTO: 190 10E9/L (ref 150–450)
POTASSIUM SERPL-SCNC: 4.3 MMOL/L (ref 3.4–5.3)
RBC # BLD AUTO: 4.44 10E12/L (ref 4.4–5.9)
SODIUM SERPL-SCNC: 139 MMOL/L (ref 133–144)
TRIGL SERPL-MCNC: 86 MG/DL
WBC # BLD AUTO: 8.5 10E9/L (ref 4–11)

## 2019-09-27 PROCEDURE — 80048 BASIC METABOLIC PNL TOTAL CA: CPT | Performed by: INTERNAL MEDICINE

## 2019-09-27 PROCEDURE — 85027 COMPLETE CBC AUTOMATED: CPT | Performed by: INTERNAL MEDICINE

## 2019-09-27 PROCEDURE — 83036 HEMOGLOBIN GLYCOSYLATED A1C: CPT | Performed by: INTERNAL MEDICINE

## 2019-09-27 PROCEDURE — 82043 UR ALBUMIN QUANTITATIVE: CPT | Performed by: INTERNAL MEDICINE

## 2019-09-27 PROCEDURE — 36415 COLL VENOUS BLD VENIPUNCTURE: CPT | Performed by: INTERNAL MEDICINE

## 2019-09-27 PROCEDURE — 90471 IMMUNIZATION ADMIN: CPT | Performed by: INTERNAL MEDICINE

## 2019-09-27 PROCEDURE — 99214 OFFICE O/P EST MOD 30 MIN: CPT | Mod: 25 | Performed by: INTERNAL MEDICINE

## 2019-09-27 PROCEDURE — 90682 RIV4 VACC RECOMBINANT DNA IM: CPT | Performed by: INTERNAL MEDICINE

## 2019-09-27 PROCEDURE — 80061 LIPID PANEL: CPT | Performed by: INTERNAL MEDICINE

## 2019-09-27 PROCEDURE — 84460 ALANINE AMINO (ALT) (SGPT): CPT | Performed by: INTERNAL MEDICINE

## 2019-09-27 RX ORDER — LISINOPRIL 40 MG/1
40 TABLET ORAL DAILY
Qty: 30 TABLET | Refills: 0 | Status: SHIPPED | OUTPATIENT
Start: 2019-09-27 | End: 2019-11-06

## 2019-09-27 RX ORDER — METOPROLOL TARTRATE 100 MG
50 TABLET ORAL 2 TIMES DAILY
Qty: 60 TABLET | Refills: 0 | Status: SHIPPED | OUTPATIENT
Start: 2019-09-27 | End: 2019-11-06

## 2019-09-27 ASSESSMENT — PAIN SCALES - GENERAL: PAINLEVEL: NO PAIN (0)

## 2019-09-27 NOTE — LETTER
October 3, 2019      Sushil Diana  [address] [address] [place] [id number]        Dear ,    We are writing to inform you of your test results.    The microalbumin is somewhat elevated but improved over previous.   The cholesterol is well controlled with an LDL of 73.   Blood sugar is slightly elevated at 156 but the kidney function is normal.   Liver test is normal.   The hemoglobin A1c has improved from 5.8 down to 5.2 suggesting excellent blood sugar control.   The blood cell count is normal without evidence of anemia or leukemia.     Feel free to contact me via the office or My Chart if you have any questions regarding the above.     Resulted Orders   CBC with Platelets     Result Value Ref Range    WBC 8.5 4.0 - 11.0 10e9/L    RBC Count 4.44 4.4 - 5.9 10e12/L    Hemoglobin 14.1 13.3 - 17.7 g/dL    Hematocrit 41.7 40.0 - 53.0 %    MCV 94 78 - 100 fl    MCH 31.8 26.5 - 33.0 pg    MCHC 33.8 31.5 - 36.5 g/dL    RDW 12.6 10.0 - 15.0 %    Platelet Count 190 150 - 450 10e9/L   HEMOGLOBIN A1C   Result Value Ref Range    Hemoglobin A1C 5.2 0 - 5.6 %      Comment:      Normal <5.7% Prediabetes 5.7-6.4%  Diabetes 6.5% or higher - adopted from ADA   consensus guidelines.     BASIC METABOLIC PANEL   Result Value Ref Range    Sodium 139 133 - 144 mmol/L    Potassium 4.3 3.4 - 5.3 mmol/L    Chloride 103 94 - 109 mmol/L    Carbon Dioxide 29 20 - 32 mmol/L    Anion Gap 7 3 - 14 mmol/L    Glucose 156 (H) 70 - 99 mg/dL    Urea Nitrogen 20 7 - 30 mg/dL    Creatinine 0.92 0.66 - 1.25 mg/dL    GFR Estimate 89 >60 mL/min/[1.73_m2]      Comment:      Non  GFR Calc  Starting 12/18/2018, serum creatinine based estimated GFR (eGFR) will be   calculated using the Chronic Kidney Disease Epidemiology Collaboration   (CKD-EPI) equation.      GFR Estimate If Black >90 >60 mL/min/[1.73_m2]      Comment:       GFR Calc  Starting 12/18/2018, serum creatinine based estimated GFR (eGFR) will be    calculated using the Chronic Kidney Disease Epidemiology Collaboration   (CKD-EPI) equation.      Calcium 9.0 8.5 - 10.1 mg/dL   ALT   Result Value Ref Range    ALT 26 0 - 70 U/L   Lipid panel reflex to direct LDL Non-fasting   Result Value Ref Range    Cholesterol 155 <200 mg/dL    Triglycerides 86 <150 mg/dL    HDL Cholesterol 65 >39 mg/dL    LDL Cholesterol Calculated 73 <100 mg/dL      Comment:      Desirable:       <100 mg/dl    Non HDL Cholesterol 90 <130 mg/dL   Albumin Random Urine Quantitative with Creat Ratio   Result Value Ref Range    Creatinine Urine 101 mg/dL    Albumin Urine mg/L 217 mg/L    Albumin Urine mg/g Cr 214.85 (H) 0 - 17 mg/g Cr       If you have any questions or concerns, please call the clinic at the number listed above.       Sincerely,        Mark Matson, DO

## 2019-09-27 NOTE — PROGRESS NOTES
Subjective     Sushil Diana is a 59 year old male who presents to clinic today for the following health issues:    HPI   Diabetes Follow-up      How often are you checking your blood sugar? A few times a week    What time of day are you checking your blood sugars (select all that apply)?  Before and after meals    Have you had any blood sugars above 200?  No    Have you had any blood sugars below 70?  No    What symptoms do you notice when your blood sugar is low?  None    What concerns do you have today about your diabetes? None     Do you have any of these symptoms? (Select all that apply)  No numbness or tingling in feet.  No redness, sores or blisters on feet.  No complaints of excessive thirst.  No reports of blurry vision.  No significant changes to weight.     Have you had a diabetic eye exam in the last 12 months? Yes- Date of last eye exam: 10/12/18    BP Readings from Last 2 Encounters:   09/27/19 (!) 210/104   02/22/19 (!) 164/102     Hemoglobin A1C (%)   Date Value   02/22/2019 5.8 (H)   08/12/2018 11.6 (H)     LDL Cholesterol Calculated (mg/dL)   Date Value   09/25/2018 75   05/08/2002 (A)       Diabetes Management Resources                    Chief Complaint         The patient is a pleasant 59-year-old gentleman who presents today for follow-up of his diabetes and hypertension.  Notably, blood pressure today is markedly elevated at over 200 systolic.  Recheck did show some improvement but it is still elevated.  He continues to be on 20 mg lisinopril daily and 50 mg of Lopressor twice daily.  He notes he has had no side effects or complications from the medication.  Pulse rate is 82 at this time.  With respect to his sugar, most recent glycosylated hemoglobin was 5.8 down from the previous 11.6 suggesting significant improvement.  He notes no polyuria or polydipsia.  He is watching his diet closely and continues to take the 5 mg of glipizide daily in combination with the metformin 1000 mg extended  "release daily.  He has had no nausea or signs of hypoglycemia associated with this.                       PAST, FAMILY,SOCIAL HISTORY:     Medical  History:   has a past medical history of GERD (gastroesophageal reflux disease) and Gout.     Surgical History:   has a past surgical history that includes None of the above core measure diagnoses (AMI, CHF, Stroke) apply at this time..     Social History:   reports that he has quit smoking. His smoking use included cigarettes. He has a 28.00 pack-year smoking history. He has never used smokeless tobacco. He reports current alcohol use. He reports that he does not use drugs.     Family History:  family history includes Diabetes in an other family member.            MEDICATIONS  Current Outpatient Medications   Medication Sig Dispense Refill     aspirin (ASA) 81 MG chewable tablet Take 1 tablet (81 mg) by mouth daily 108 tablet 1     blood glucose (NO BRAND SPECIFIED) lancets standard Use to test blood sugar 1-3 times daily or as directed. 100 each 11     blood glucose monitoring (NO BRAND SPECIFIED) meter device kit Use to test blood sugar 1-3 times daily or as directed. 1 kit 0     CONTOUR NEXT TEST test strip USE TO TEST BLOOD SUGARS ONE TO THREE TIMES DAILY  OR AS DIRECTED 100 each 1     lisinopril (PRINIVIL/ZESTRIL) 40 MG tablet Take 1 tablet (40 mg) by mouth daily 30 tablet 0     metoprolol tartrate (LOPRESSOR) 100 MG tablet Take 0.5 tablets (50 mg) by mouth 2 times daily 60 tablet 0     sildenafil (REVATIO) 20 MG tablet Take 1-3 pills 30 minutes before \"that magic moment\" 30 tablet 1     atorvastatin (LIPITOR) 20 MG tablet TAKE ONE TABLET BY MOUTH ONCE DAILY 90 tablet 1     citalopram (CELEXA) 20 MG tablet TAKE ONE TABLET BY MOUTH EVERY DAY 90 tablet 1     glipiZIDE (GLUCOTROL) 5 MG tablet Take 1 tablet (5 mg) by mouth every morning 90 tablet 1     metFORMIN (GLUCOPHAGE-XR) 500 MG 24 hr tablet TAKE TWO TABLETS BY MOUTH ONCE DAILY WITH WITH DINNER 180 tablet 1 "         --------------------------------------------------------------------------------------------------------------------                              Review of Systems       LUNGS: Pt denies: cough, excess sputum, hemoptysis, or shortness of breath.   HEART: Pt denies: chest pain, arrhythmia, syncope, tachy or bradyarrhythmia.   GI: Pt denies: nausea, vomiting, diarrhea, constipation, melena, or hematochezia.   NEURO: Pt denies: seizures, strokes, diplopia, weakness, paraesthesias, or paralysis.   SKIN: Pt denies: itching, rashes, discoloration, or specific lesions of concern. Denies recent hair loss.   PSYCH: The patient denies significant depression, anxiety, mood imbalance. Specifically denies any suicidal ideation.                                     Examination    BP (!) 170/95   Pulse 82   Temp 99  F (37.2  C) (Temporal)   Resp 18   Wt 80.3 kg (177 lb)   SpO2 100%   BMI 26.91 kg/m       Constitutional: The patient appears to be in no acute distress. The patient appears to be adequately hydrated. No acute respiratory or hemodynamic distress is noted at this time.   LUNGS: clear bilaterally, airflow is brisk, no intercostal retraction or stridor is noted. No coughing is noted during visit.   HEART:  regular without rubs, clicks, gallops, or murmurs. PMI is nondisplaced. Upstrokes are brisk. S1,S2 are heard.   GI: Abdomen is soft, without rebound, guarding or tenderness. Bowel sounds are appropriate. No renal bruits are heard.   NEURO: Pt is alert and appropriate. No neurologic lateralization is noted. Cranial nerves 2-12 are intact. Peripheral sensory and motor function are grossly normal.    SKIN:  warm and dry. No erythema, or rashes are noted. No specific lesions of concern are noted.    PSYCH: The patient appears grossly appropriate. Maintains good eye contact, does not have any jittery or atypical motion. Displays appropriate affect.                                           Decision Making    1.  New onset type 2 diabetes mellitus (H)  Check hemoglobin and renal function  - HEMOGLOBIN A1C  - BASIC METABOLIC PANEL  - Albumin Random Urine Quantitative with Creat Ratio    2. 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  Increase lisinopril for uncontrolled hypertension  - lisinopril (PRINIVIL/ZESTRIL) 40 MG tablet; Take 1 tablet (40 mg) by mouth daily  Dispense: 30 tablet; Refill: 0    3. Hypertension, unspecified type  Increase ACE inhibitor with intentions of increasing beta-blocker if blood pressure does not respond properly  - CBC with Platelets    - lisinopril (PRINIVIL/ZESTRIL) 40 MG tablet; Take 1 tablet (40 mg) by mouth daily  Dispense: 30 tablet; Refill: 0  - metoprolol tartrate (LOPRESSOR) 100 MG tablet; Take 0.5 tablets (50 mg) by mouth 2 times daily  Dispense: 60 tablet; Refill: 0    4. Hyperlipidemia LDL goal <100  Check lipid and liver  - ALT  - Lipid panel reflex to direct LDL Non-fasting    5. Need for prophylactic vaccination and inoculation against influenza  Given  - INFLUENZA QUAD, RECOMBINANT, P-FREE (RIV4) (FLUBLOCK) [03737]  - Vaccine Administration, Initial [90069]                           FOLLOW UP   I have asked the patient to make an appointment for followup with me in 1 week for blood pressure check        I have carefully explained the diagnosis and treatment options to the patient.  The patient has displayed an understanding of the above, and all subsequent questions were answered.      DO SEAN Rodgers    Portions of this note were produced using [employer]  Although every attempt at real-time proof reading has been made, occasional grammar/syntax errors may have been missed.

## 2019-09-27 NOTE — Clinical Note
This gentleman was to make an appointment for a follow-up.  Could you please see if he has and if not, assist him in setting one up.?

## 2019-10-03 NOTE — RESULT ENCOUNTER NOTE
Dear Sushil, your recent test results are attached.  The microalbumin is somewhat elevated but improved over previous.  The cholesterol is well controlled with an LDL of 73.  Blood sugar is slightly elevated at 156 but the kidney function is normal.  Liver test is normal.  The hemoglobin A1c has improved from 5.8 down to 5.2 suggesting excellent blood sugar control.  The blood cell count is normal without evidence of anemia or leukemia.    Feel free to contact me via the office or My Chart if you have any questions regarding the above.

## 2019-10-07 DIAGNOSIS — E11.9 DIABETES MELLITUS WITHOUT COMPLICATION (H): ICD-10-CM

## 2019-10-07 DIAGNOSIS — E11.9 NEW ONSET TYPE 2 DIABETES MELLITUS (H): ICD-10-CM

## 2019-10-07 DIAGNOSIS — F43.22 ADJUSTMENT DISORDER WITH ANXIOUS MOOD: ICD-10-CM

## 2019-10-08 RX ORDER — METFORMIN HCL 500 MG
TABLET, EXTENDED RELEASE 24 HR ORAL
Qty: 180 TABLET | Refills: 1 | Status: SHIPPED | OUTPATIENT
Start: 2019-10-08 | End: 2020-03-25

## 2019-10-08 RX ORDER — CITALOPRAM HYDROBROMIDE 20 MG/1
TABLET ORAL
Qty: 90 TABLET | Refills: 1 | Status: SHIPPED | OUTPATIENT
Start: 2019-10-08 | End: 2020-03-25

## 2019-10-08 RX ORDER — ATORVASTATIN CALCIUM 20 MG/1
TABLET, FILM COATED ORAL
Qty: 90 TABLET | Refills: 1 | Status: SHIPPED | OUTPATIENT
Start: 2019-10-08 | End: 2020-03-25

## 2019-10-08 RX ORDER — GLIPIZIDE 5 MG/1
5 TABLET ORAL EVERY MORNING
Qty: 90 TABLET | Refills: 1 | Status: SHIPPED | OUTPATIENT
Start: 2019-10-08 | End: 2020-03-25

## 2019-10-08 NOTE — TELEPHONE ENCOUNTER
Routing refill request to provider for review/approval because:  Labs out of range:  BP, Microalbumin  CHF diagnosis    BRYANNA BearN, RN  [facility]

## 2019-10-08 NOTE — TELEPHONE ENCOUNTER
"Requested Prescriptions   Pending Prescriptions Disp Refills     metFORMIN (GLUCOPHAGE-XR) 500 MG 24 hr tablet [Pharmacy Med Name: METFORMIN HCL ER 500MG TB24] 60 tablet 0     Sig: TAKE TWO TABLETS BY MOUTH ONCE DAILY WITH WITH DINNER   Last Written Prescription Date:  8/30/19  Last Fill Quantity: 30,  # refills: 0   Last office visit: No previous visit found with prescribing provider:  9/27/19   Future Office Visit:        Biguanide Agents Failed - 10/7/2019  4:27 PM        Failed - Blood pressure less than 140/90 in past 6 months     BP Readings from Last 3 Encounters:   09/27/19 (!) 110/102   02/22/19 (!) 164/102   09/25/18 138/88                 Failed - Patient does NOT have a diagnosis of CHF.        Passed - Patient has documented LDL within the past 12 mos.     Recent Labs   Lab Test 09/27/19  1033   LDL 73             Passed - Patient has had a Microalbumin in the past 15 mos.     Recent Labs   Lab Test 09/27/19  1038   MICROL 217   UMALCR 214.85*             Passed - Patient is age 10 or older        Passed - Patient has documented A1c within the specified period of time.     If HgbA1C is 8 or greater, it needs to be on file within the past 3 months.  If less than 8, must be on file within the past 6 months.     Recent Labs   Lab Test 09/27/19  1033   A1C 5.2             Passed - Patient's CR is NOT>1.4 OR Patient's EGFR is NOT<45 within past 12 mos.     Recent Labs   Lab Test 09/27/19  1033   GFRESTIMATED 89   GFRESTBLACK >90       Recent Labs   Lab Test 09/27/19  1033   CR 0.92             Passed - Medication is active on med list        Passed - Recent (6 mo) or future (30 days) visit within the authorizing provider's specialty     Patient had office visit in the last 6 months or has a visit in the next 30 days with authorizing provider or within the authorizing provider's specialty.  See \"Patient Info\" tab in inbasket, or \"Choose Columns\" in Meds & Orders section of the refill encounter.            " "citalopram (CELEXA) 20 MG tablet [Pharmacy Med Name: CITALOPRAM HYDROBROMIDE 20MG TABS] 30 tablet 3     Sig: TAKE ONE TABLET BY MOUTH EVERY DAY   Last Written Prescription Date:  6/3/19  Last Fill Quantity: 30,  # refills: 3   Last office visit: No previous visit found with prescribing provider:  9/27/19   Future Office Visit:        SSRIs Protocol Passed - 10/7/2019  4:27 PM        Passed - Recent (12 mo) or future (30 days) visit within the authorizing provider's specialty     Patient has had an office visit with the authorizing provider or a provider within the authorizing providers department within the previous 12 mos or has a future within next 30 days. See \"Patient Info\" tab in inbasket, or \"Choose Columns\" in Meds & Orders section of the refill encounter.              Passed - Medication is active on med list        Passed - Patient is age 18 or older        glipiZIDE (GLUCOTROL) 5 MG tablet [Pharmacy Med Name: GLIPIZIDE 5MG TABS] 30 tablet 0     Sig: TAKE ONE TABLET BY MOUTH EVERY MORNING BEFORE BREAKFAST (NEED TO BE SEEN IN CLINIC FOR FURTHER REFILLS)   Last Written Prescription Date:  8/30/19  Last Fill Quantity: 30,  # refills: 0   Last office visit: No previous visit found with prescribing provider:  9/27/19   Future Office Visit:        Sulfonylurea Agents Failed - 10/7/2019  4:27 PM        Failed - Blood pressure less than 140/90 in past 6 months     BP Readings from Last 3 Encounters:   09/27/19 (!) 110/102   02/22/19 (!) 164/102   09/25/18 138/88                 Passed - Patient has documented LDL within the past 12 mos.     Recent Labs   Lab Test 09/27/19  1033   LDL 73             Passed - Patient has had a Microalbumin in the past 15 mos.     Recent Labs   Lab Test 09/27/19  1038   MICROL 217   UMALCR 214.85*             Passed - Patient has documented A1c within the specified period of time.     If HgbA1C is 8 or greater, it needs to be on file within the past 3 months.  If less than 8, must be " "on file within the past 6 months.     Recent Labs   Lab Test 09/27/19  1033   A1C 5.2             Passed - Medication is active on med list        Passed - Patient is age 18 or older        Passed - Patient has a recent creatinine (normal) within the past 12 mos.     Recent Labs   Lab Test 09/27/19  1033   CR 0.92             Passed - Recent (6 mo) or future (30 days) visit within the authorizing provider's specialty     Patient had office visit in the last 6 months or has a visit in the next 30 days with authorizing provider or within the authorizing provider's specialty.  See \"Patient Info\" tab in inbasket, or \"Choose Columns\" in Meds & Orders section of the refill encounter.            atorvastatin (LIPITOR) 20 MG tablet [Pharmacy Med Name: ATORVASTATIN CALCIUM 20MG TABS] 30 tablet 3     Sig: TAKE ONE TABLET BY MOUTH ONCE DAILY   Last Written Prescription Date:  6/3/19  Last Fill Quantity: 30,  # refills: 3   Last office visit: No previous visit found with prescribing provider:  9/27/19   Future Office Visit:        Statins Protocol Passed - 10/7/2019  4:27 PM        Passed - LDL on file in past 12 months     Recent Labs   Lab Test 09/27/19  1033   LDL 73             Passed - No abnormal creatine kinase in past 12 months     No lab results found.             Passed - Recent (12 mo) or future (30 days) visit within the authorizing provider's specialty     Patient has had an office visit with the authorizing provider or a provider within the authorizing providers department within the previous 12 mos or has a future within next 30 days. See \"Patient Info\" tab in inbasket, or \"Choose Columns\" in Meds & Orders section of the refill encounter.              Passed - Medication is active on med list        Passed - Patient is age 18 or older        "

## 2019-10-10 ENCOUNTER — PATIENT OUTREACH (OUTPATIENT)
Dept: CARE COORDINATION | Facility: CLINIC | Age: 59
End: 2019-10-10

## 2019-10-23 ENCOUNTER — TELEPHONE (OUTPATIENT)
Dept: FAMILY MEDICINE | Facility: OTHER | Age: 59
End: 2019-10-23

## 2019-10-23 VITALS
DIASTOLIC BLOOD PRESSURE: 95 MMHG | RESPIRATION RATE: 18 BRPM | BODY MASS INDEX: 26.91 KG/M2 | WEIGHT: 177 LBS | TEMPERATURE: 99 F | SYSTOLIC BLOOD PRESSURE: 170 MMHG | OXYGEN SATURATION: 100 % | HEART RATE: 82 BPM

## 2019-10-23 NOTE — TELEPHONE ENCOUNTER
Mark Matson DO [facility] [place]             This gentleman was to make an appointment for a follow-up.  Could you please see if he has and if not, assist him in setting one up.?

## 2019-10-23 NOTE — TELEPHONE ENCOUNTER
Left message for patient to call and speak to any , patient needs to schedule an appointment for follow up blood pressure check with Dr Matson, please assist patient in scheduling an appointment.

## 2019-11-06 ENCOUNTER — OFFICE VISIT (OUTPATIENT)
Dept: FAMILY MEDICINE | Facility: OTHER | Age: 59
End: 2019-11-06
Payer: COMMERCIAL

## 2019-11-06 ENCOUNTER — TELEPHONE (OUTPATIENT)
Dept: FAMILY MEDICINE | Facility: OTHER | Age: 59
End: 2019-11-06

## 2019-11-06 VITALS
SYSTOLIC BLOOD PRESSURE: 188 MMHG | BODY MASS INDEX: 26.91 KG/M2 | WEIGHT: 177 LBS | HEART RATE: 88 BPM | RESPIRATION RATE: 18 BRPM | TEMPERATURE: 98.2 F | OXYGEN SATURATION: 99 % | DIASTOLIC BLOOD PRESSURE: 90 MMHG

## 2019-11-06 DIAGNOSIS — E11.9 NEW ONSET TYPE 2 DIABETES MELLITUS (H): Primary | ICD-10-CM

## 2019-11-06 DIAGNOSIS — I50.41 ACUTE COMBINED SYSTOLIC AND DIASTOLIC CONGESTIVE HEART FAILURE (H): ICD-10-CM

## 2019-11-06 DIAGNOSIS — I10 HYPERTENSION, UNSPECIFIED TYPE: ICD-10-CM

## 2019-11-06 PROCEDURE — 99213 OFFICE O/P EST LOW 20 MIN: CPT | Performed by: INTERNAL MEDICINE

## 2019-11-06 RX ORDER — HYDRALAZINE HYDROCHLORIDE 50 MG/1
50 TABLET, FILM COATED ORAL 3 TIMES DAILY
Qty: 60 TABLET | Refills: 0 | Status: SHIPPED | OUTPATIENT
Start: 2019-11-06 | End: 2019-11-13

## 2019-11-06 RX ORDER — LISINOPRIL 40 MG/1
40 TABLET ORAL DAILY
Qty: 30 TABLET | Refills: 0 | Status: SHIPPED | OUTPATIENT
Start: 2019-11-06 | End: 2019-12-02

## 2019-11-06 RX ORDER — METOPROLOL TARTRATE 50 MG
50 TABLET ORAL 2 TIMES DAILY
Qty: 180 TABLET | Refills: 0 | Status: SHIPPED | OUTPATIENT
Start: 2019-11-06 | End: 2020-01-15

## 2019-11-06 ASSESSMENT — PAIN SCALES - GENERAL: PAINLEVEL: NO PAIN (0)

## 2019-11-06 NOTE — TELEPHONE ENCOUNTER
Initially, we had some difficulty communicating regarding his medications names and dosages as he was unfamiliar with both of the above.  I would like to have him follow the instructions as noted and explained on the AVS.    Dano

## 2019-11-06 NOTE — TELEPHONE ENCOUNTER
Reason for Call:  Other call back    Detailed comments: Angelina states PCP said 1 medication had changed but Sushil is already taking it twice daily.... metoprolol tartrate (LOPRESSOR) 50 MG tablet    Phone Number Patient can be reached at: Home number on file [phone] (home)    Best Time: any     Can we leave a detailed message on this number? YES    Call taken on 11/6/2019 at 9:13 AM by Cece Medina

## 2019-11-06 NOTE — PROGRESS NOTES
Subjective     Sushil Diana is a 59 year old male who presents to clinic today for the following health issues:      Hypertension Follow-up      Do you check your blood pressure regularly outside of the clinic? No     Are you following a low salt diet? Yes    Are your blood pressures ever more than 140 on the top number (systolic) OR more   than 90 on the bottom number (diastolic), for example 140/90? No                  Chief Complaint         The patient is a pleasant 59-year-old gentleman who presents today for follow-up of his hypertension.  There has been some questionable confusion about how he is supposed to take his medication.  We have discussed this in detail and I believe he now has a better understanding.  Unfortunately, his blood pressure is initially 192 systolic and recheck confirms 188.  He is not having any chest pain or shortness of breath.  He has no signs of congestive heart failure or edema.  He does have a history of type 2 diabetes and has been taking the Glucotrol and metformin without difficulty.  He denies any polyuria or polydipsia.  Recent hemoglobin A1c was well-controlled at 5.2.                         PAST, FAMILY,SOCIAL HISTORY:     Medical  History:   has a past medical history of GERD (gastroesophageal reflux disease) and Gout.     Surgical History:   has a past surgical history that includes None of the above core measure diagnoses (AMI, CHF, Stroke) apply at this time..     Social History:   reports that he has quit smoking. His smoking use included cigarettes. He has a 28.00 pack-year smoking history. He has never used smokeless tobacco. He reports current alcohol use. He reports that he does not use drugs.     Family History:  family history includes Diabetes in an other family member.            MEDICATIONS  Current Outpatient Medications   Medication Sig Dispense Refill     aspirin (ASA) 81 MG chewable tablet Take 1 tablet (81 mg) by mouth daily 108 tablet 1      "atorvastatin (LIPITOR) 20 MG tablet TAKE ONE TABLET BY MOUTH ONCE DAILY 90 tablet 1     blood glucose (NO BRAND SPECIFIED) lancets standard Use to test blood sugar 1-3 times daily or as directed. 100 each 11     blood glucose monitoring (NO BRAND SPECIFIED) meter device kit Use to test blood sugar 1-3 times daily or as directed. 1 kit 0     citalopram (CELEXA) 20 MG tablet TAKE ONE TABLET BY MOUTH EVERY DAY 90 tablet 1     CONTOUR NEXT TEST test strip USE TO TEST BLOOD SUGARS ONE TO THREE TIMES DAILY  OR AS DIRECTED 100 each 1     glipiZIDE (GLUCOTROL) 5 MG tablet Take 1 tablet (5 mg) by mouth every morning 90 tablet 1     hydrALAZINE (APRESOLINE) 50 MG tablet Take 1 tablet (50 mg) by mouth 3 times daily 60 tablet 0     lisinopril (PRINIVIL/ZESTRIL) 40 MG tablet Take 1 tablet (40 mg) by mouth daily 30 tablet 0     metFORMIN (GLUCOPHAGE-XR) 500 MG 24 hr tablet TAKE TWO TABLETS BY MOUTH ONCE DAILY WITH WITH DINNER 180 tablet 1     metoprolol tartrate (LOPRESSOR) 50 MG tablet Take 1 tablet (50 mg) by mouth 2 times daily 180 tablet 0     sildenafil (REVATIO) 20 MG tablet Take 1-3 pills 30 minutes before \"that magic moment\" 30 tablet 1         --------------------------------------------------------------------------------------------------------------------                              Review of Systems       LUNGS: Pt denies: cough, excess sputum, hemoptysis, or shortness of breath.   HEART: Pt denies: chest pain, arrhythmia, syncope, tachy or bradyarrhythmia.   GI: Pt denies: nausea, vomiting, diarrhea, constipation, melena, or hematochezia.   NEURO: Pt denies: seizures, strokes, diplopia, weakness, paraesthesias, or paralysis.   SKIN: Pt denies: itching, rashes, discoloration, or specific lesions of concern. Denies recent hair loss.   PSYCH: The patient denies significant depression, anxiety, mood imbalance. Specifically denies any suicidal ideation.                                     Examination    BP (!) 188/90   " Pulse 88   Temp 98.2  F (36.8  C) (Temporal)   Resp 18   Wt 80.3 kg (177 lb)   SpO2 99%   BMI 26.91 kg/m       Constitutional: The patient appears to be in no acute distress. The patient appears to be adequately hydrated. No acute respiratory or hemodynamic distress is noted at this time.   LUNGS: clear bilaterally, airflow is brisk, no intercostal retraction or stridor is noted. No coughing is noted during visit.   HEART:  regular without rubs, clicks, gallops, or murmurs. PMI is nondisplaced. Upstrokes are brisk. S1,S2 are heard.   GI: Abdomen is soft, without rebound, guarding or tenderness. Bowel sounds are appropriate. No renal bruits are heard.   NEURO: Pt is alert and appropriate. No neurologic lateralization is noted. Cranial nerves 2-12 are intact. Peripheral sensory and motor function are grossly normal.    SKIN:  warm and dry. No erythema, or rashes are noted. No specific lesions of concern are noted.    PSYCH: The patient appears grossly appropriate. Maintains good eye contact, does not have any jittery or atypical motion. Displays appropriate affect.                                           Decision Making    1. Acute combined systolic and diastolic congestive heart failure (H)  Currently controlled.  Continue ACE inhibitor and beta-blocker.  - lisinopril (PRINIVIL/ZESTRIL) 40 MG tablet; Take 1 tablet (40 mg) by mouth daily  Dispense: 30 tablet; Refill: 0    2. Hypertension, unspecified type  We will start hydralazine temporarily until blood pressure more controlled.  Then will try to find a medication that can be taken daily to increase compliance.  Patient will continue lisinopril metoprolol throughout this time.  Do not wish to increase the dose of metoprolol due to potential bradycardia  - hydrALAZINE (APRESOLINE) 50 MG tablet; Take 1 tablet (50 mg) by mouth 3 times daily  Dispense: 60 tablet; Refill: 0  - lisinopril (PRINIVIL/ZESTRIL) 40 MG tablet; Take 1 tablet (40 mg) by mouth daily   Dispense: 30 tablet; Refill: 0  - metoprolol tartrate (LOPRESSOR) 50 MG tablet; Take 1 tablet (50 mg) by mouth 2 times daily  Dispense: 180 tablet; Refill: 0    3. New onset type 2 diabetes mellitus (H)  Currently stable.  Continue medications                             FOLLOW UP   I have asked the patient to make an appointment for followup with me in 1 week        I have carefully explained the diagnosis and treatment options to the patient.  The patient has displayed an understanding of the above, and all subsequent questions were answered.      DO SEAN Rodgers    Portions of this note were produced using [employer]  Although every attempt at real-time proof reading has been made, occasional grammar/syntax errors may have been missed.

## 2019-11-12 ENCOUNTER — PATIENT OUTREACH (OUTPATIENT)
Dept: CARE COORDINATION | Facility: CLINIC | Age: 59
End: 2019-11-12

## 2019-11-12 NOTE — PROGRESS NOTES
Clinic Care Coordination Contact  Care Team Conversations    This Clinic Care Coordinator RN transferred patient to Brielle Alford RN Clinic Care Coordinator

## 2019-11-13 ENCOUNTER — OFFICE VISIT (OUTPATIENT)
Dept: FAMILY MEDICINE | Facility: OTHER | Age: 59
End: 2019-11-13
Payer: COMMERCIAL

## 2019-11-13 VITALS
HEIGHT: 68 IN | BODY MASS INDEX: 27.04 KG/M2 | OXYGEN SATURATION: 100 % | SYSTOLIC BLOOD PRESSURE: 138 MMHG | TEMPERATURE: 98.5 F | DIASTOLIC BLOOD PRESSURE: 88 MMHG | HEART RATE: 80 BPM | RESPIRATION RATE: 18 BRPM | WEIGHT: 178.4 LBS

## 2019-11-13 DIAGNOSIS — E11.65 TYPE 2 DIABETES MELLITUS WITH HYPERGLYCEMIA, WITHOUT LONG-TERM CURRENT USE OF INSULIN (H): ICD-10-CM

## 2019-11-13 DIAGNOSIS — I10 HYPERTENSION, UNSPECIFIED TYPE: Primary | ICD-10-CM

## 2019-11-13 PROCEDURE — 99213 OFFICE O/P EST LOW 20 MIN: CPT | Performed by: INTERNAL MEDICINE

## 2019-11-13 RX ORDER — HYDRALAZINE HYDROCHLORIDE 50 MG/1
50 TABLET, FILM COATED ORAL 3 TIMES DAILY
Qty: 90 TABLET | Refills: 0 | Status: SHIPPED | OUTPATIENT
Start: 2019-11-13 | End: 2019-12-18

## 2019-11-13 ASSESSMENT — PAIN SCALES - GENERAL: PAINLEVEL: NO PAIN (0)

## 2019-11-13 ASSESSMENT — MIFFLIN-ST. JEOR: SCORE: 1588.72

## 2019-11-13 NOTE — PROGRESS NOTES
Subjective     Sushil Diana is a 59 year old male who presents to clinic today for the following health issues:    HPI   Hypertension Follow-up      Do you check your blood pressure regularly outside of the clinic? No     Are you following a low salt diet? Yes    Are your blood pressures ever more than 140 on the top number (systolic) OR more   than 90 on the bottom number (diastolic), for example 140/90? No                  Chief Complaint         The patient is a pleasant 59-year-old gentleman who was recently some markedly elevated blood pressure.  He is on lisinopril and metoprolol and was recently started on hydralazine as his blood pressures are not responding.  Today's blood pressure is 138/88.  He is tolerating the 3 times daily hydralazine and the compliance issues without difficulty.  Does have a concurrent history of type 2 diabetes and notes his blood sugars have been fairly well controlled.  Recent A1c was 5.2.                       PAST, FAMILY,SOCIAL HISTORY:     Medical  History:   has a past medical history of GERD (gastroesophageal reflux disease) and Gout.     Surgical History:   has a past surgical history that includes None of the above core measure diagnoses (AMI, CHF, Stroke) apply at this time..     Social History:   reports that he has quit smoking. His smoking use included cigarettes. He has a 28.00 pack-year smoking history. He has never used smokeless tobacco. He reports current alcohol use. He reports that he does not use drugs.     Family History:  family history includes Diabetes in an other family member.            MEDICATIONS  Current Outpatient Medications   Medication Sig Dispense Refill     aspirin (ASA) 81 MG chewable tablet Take 1 tablet (81 mg) by mouth daily 108 tablet 1     atorvastatin (LIPITOR) 20 MG tablet TAKE ONE TABLET BY MOUTH ONCE DAILY 90 tablet 1     blood glucose (NO BRAND SPECIFIED) lancets standard Use to test blood sugar 1-3 times daily or as directed. 100  "each 11     blood glucose monitoring (NO BRAND SPECIFIED) meter device kit Use to test blood sugar 1-3 times daily or as directed. 1 kit 0     citalopram (CELEXA) 20 MG tablet TAKE ONE TABLET BY MOUTH EVERY DAY 90 tablet 1     CONTOUR NEXT TEST test strip USE TO TEST BLOOD SUGARS ONE TO THREE TIMES DAILY  OR AS DIRECTED 100 each 1     glipiZIDE (GLUCOTROL) 5 MG tablet Take 1 tablet (5 mg) by mouth every morning 90 tablet 1     hydrALAZINE (APRESOLINE) 50 MG tablet Take 1 tablet (50 mg) by mouth 3 times daily 90 tablet 0     lisinopril (PRINIVIL/ZESTRIL) 40 MG tablet Take 1 tablet (40 mg) by mouth daily 30 tablet 0     metFORMIN (GLUCOPHAGE-XR) 500 MG 24 hr tablet TAKE TWO TABLETS BY MOUTH ONCE DAILY WITH WITH DINNER 180 tablet 1     metoprolol tartrate (LOPRESSOR) 50 MG tablet Take 1 tablet (50 mg) by mouth 2 times daily 180 tablet 0     sildenafil (REVATIO) 20 MG tablet Take 1-3 pills 30 minutes before \"that magic moment\" 30 tablet 1         --------------------------------------------------------------------------------------------------------------------                              Review of Systems       LUNGS: Pt denies: cough, excess sputum, hemoptysis, or shortness of breath.   HEART: Pt denies: chest pain, arrhythmia, syncope, tachy or bradyarrhythmia.   GI: Pt denies: nausea, vomiting, diarrhea, constipation, melena, or hematochezia.   NEURO: Pt denies: seizures, strokes, diplopia, weakness, paraesthesias, or paralysis.   SKIN: Pt denies: itching, rashes, discoloration, or specific lesions of concern. Denies recent hair loss.   PSYCH: The patient denies significant depression, anxiety, mood imbalance. Specifically denies any suicidal ideation.                                     Examination    /88 (BP Location: Right arm, Patient Position: Sitting, Cuff Size: Adult Regular)   Pulse 80   Temp 98.5  F (36.9  C) (Temporal)   Resp 18   Ht 1.727 m (5' 8\")   Wt 80.9 kg (178 lb 6.4 oz)   SpO2 100%   " BMI 27.13 kg/m       Constitutional: The patient appears to be in no acute distress. The patient appears to be adequately hydrated. No acute respiratory or hemodynamic distress is noted at this time.   LUNGS: clear bilaterally, airflow is brisk, no intercostal retraction or stridor is noted. No coughing is noted during visit.   HEART:  regular without rubs, clicks, gallops, or murmurs. PMI is nondisplaced. Upstrokes are brisk. S1,S2 are heard.   GI: Abdomen is soft, without rebound, guarding or tenderness. Bowel sounds are appropriate. No renal bruits are heard.   NEURO: Pt is alert and appropriate. No neurologic lateralization is noted. Cranial nerves 2-12 are intact. Peripheral sensory and motor function are grossly normal.    SKIN:  warm and dry. No erythema, or rashes are noted. No specific lesions of concern are noted.    PSYCH: The patient appears grossly appropriate. Maintains good eye contact, does not have any jittery or atypical motion. Displays appropriate affect.                                           Decision Making    1. Hypertension, unspecified type  Continue current therapy.  Recommend salt minimization, exercise etc.    - hydrALAZINE (APRESOLINE) 50 MG tablet; Take 1 tablet (50 mg) by mouth 3 times daily  Dispense: 90 tablet; Refill: 0    2. Type 2 diabetes mellitus with hyperglycemia, without long-term current use of insulin (H)  Continue current metformin/Glucotrol combination along with ACE inhibitor, statin, aspirin.                         FOLLOW UP   I have asked the patient to make an appointment for followup with me in 3 months.  At that time, if his blood pressure is maintained stable, I would like to try to swap out the hydralazine for something else if tolerated.  In the meantime he will continue the beta-blocker and ACE inhibitor his current.            I have carefully explained the diagnosis and treatment options to the patient.  The patient has displayed an understanding of the  above, and all subsequent questions were answered.      DO SEAN Rodgers    Portions of this note were produced using Headplay  Although every attempt at real-time proof reading has been made, occasional grammar/syntax errors may have been missed.

## 2019-11-15 ENCOUNTER — TRANSFERRED RECORDS (OUTPATIENT)
Dept: HEALTH INFORMATION MANAGEMENT | Facility: CLINIC | Age: 59
End: 2019-11-15

## 2019-11-17 PROBLEM — E11.65 TYPE 2 DIABETES MELLITUS WITH HYPERGLYCEMIA, WITHOUT LONG-TERM CURRENT USE OF INSULIN (H): Status: ACTIVE | Noted: 2019-11-17

## 2019-11-17 PROBLEM — E11.9 NEW ONSET TYPE 2 DIABETES MELLITUS (H): Status: RESOLVED | Noted: 2018-08-12 | Resolved: 2019-11-17

## 2019-11-20 ENCOUNTER — PATIENT OUTREACH (OUTPATIENT)
Dept: FAMILY MEDICINE | Facility: CLINIC | Age: 59
End: 2019-11-20

## 2019-11-20 NOTE — PROGRESS NOTES
Clinic Care Coordination Contact  [facility]/Voicemail       Clinical Data: Care Coordinator Outreach  Outreach attempted x 1.  Left message on patient's voicemail with call back information and requested return call.  Plan: Care Coordinator sent care coordination introduction letter on 1/15/19 via mail. Care Coordinator will try to reach patient again in 3-5 business days.    ANGELITO Stafford RN Clinic Care Coordinator   [place], [place], [place]  Phone: [phone]

## 2019-11-22 ASSESSMENT — ACTIVITIES OF DAILY LIVING (ADL): DEPENDENT_IADLS:: INDEPENDENT

## 2019-11-22 NOTE — PROGRESS NOTES
"Clinic Care Coordination Contact    Follow Up Progress Note      Assessment: introduced self and role. Patient agreeable to care coordination. Patient reporting checking his blood sugars 3x per week. Patient stated pcp advised he does not  Need to check TID. His most recent fasting morning reading was 119. Overall, patient is \"Feeling really good.\" Patient is still unemployed, planning to collect Social security next spring. Living with Lolis his significant other. Patient has medical assistance and SNAP for food. He has no other needs at this time. Reports good medication adherence. Discussed his last OV notes. He is planning to go back in 1.5 months. Patient try's to limit salt. Patient doesn't eat at restaurants.   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 Patient does not wish to make an exercise specific goal. Patient no longer smokes, quit over a year ago. Discussed his 2 previous echo's. Patient satisfied that his ejection fraction improved, he was seeing a cardiologist, and at this point, he does not wish to see cardiology again unless PCP advises.     Goals addressed this encounter:   Goals Addressed                 This Visit's Progress       General      COMPLETED: Financial Wellbeing (pt-stated)   100%     Goal Statement:  I will talk with  regarding my unemployment   Measure of Success: completed  Supportive Steps to Achieve: Clinic Care Coordinator RN routed to Clinic Care Coordinator    Barriers: na  Strengths: desire for resources   Date to Achieve By: 1/1/20  Patient expressed understanding of goal: yes          COMPLETED: Medical (pt-stated)        Goal Statement: I will have echo then follow up with my PCP  Measure of Success: completed  Supportive Steps to Achieve: order placed   Barriers: na  Strengths: desire for well being   Date to Achieve By: 7/1/ 9  Patient expressed understanding of goal: yes             Monitoring " (pt-stated)   50%     I will check before breakfast and one about 2 hours after a meal.         Monitoring (pt-stated)   On track     Goal Statement: I will occasionally check my blood pressure at a pharmacy. I will record reading and bring to my next clinic appointment.   Measure of Success: completed  Supportive Steps to Achieve: verbalizes understanding  Barriers: na  Strengths: desire for improved health  Date to Achieve By: on going   Patient expressed understanding of goal: yes          Intervention/Education provided during outreach: encouraged patient to reach out to the clinic or CC RN should a question or concern arise before next scheduled out reach  Outreach Frequency: monthly  Plan:   Patient will continue to work on goals, and follow PCP advice.  Care Coordinator will follow up in 1 month.  ANGELITO Stafford RN Clinic Care Coordinator   [place], [place], [place]  Phone: [phone]

## 2019-12-02 DIAGNOSIS — I10 HYPERTENSION, UNSPECIFIED TYPE: ICD-10-CM

## 2019-12-02 DIAGNOSIS — I50.41 ACUTE COMBINED SYSTOLIC AND DIASTOLIC CONGESTIVE HEART FAILURE (H): ICD-10-CM

## 2019-12-03 RX ORDER — LISINOPRIL 40 MG/1
TABLET ORAL
Qty: 30 TABLET | Refills: 5 | Status: SHIPPED | OUTPATIENT
Start: 2019-12-03 | End: 2020-01-15

## 2019-12-03 NOTE — TELEPHONE ENCOUNTER
"Lisinopril  Last Written Prescription Date:  11/06/2019  Last Fill Quantity: 30,  # refills: 0   Last office visit: 11/13/2019 with prescribing provider:  cora   Future Office Visit:   Next 5 appointments (look out 90 days)    Dec 20, 2019 10:00 AM CST  [facility] [address] [facility] [address] [phone]      Prescription approved per [facility] Refill Protocol.    Requested Prescriptions   Pending Prescriptions Disp Refills     lisinopril (PRINIVIL/ZESTRIL) 40 MG tablet [Pharmacy Med Name: LISINOPRIL 40MG TABS] 30 tablet 0     Sig: TAKE ONE TABLET BY MOUTH ONCE DAILY       ACE Inhibitors (Including Combos) Protocol Passed - 12/2/2019  9:00 AM        Passed - Blood pressure under 140/90 in past 12 months     BP Readings from Last 3 Encounters:   11/13/19 138/88   11/06/19 (!) 188/90   09/27/19 (!) 170/95           Passed - Recent (12 mo) or future (30 days) visit within the authorizing provider's specialty     Patient has had an office visit with the authorizing provider or a provider within the authorizing providers department within the previous 12 mos or has a future within next 30 days. See \"Patient Info\" tab in inbasket, or \"Choose Columns\" in Meds & Orders section of the refill encounter.          Passed - Medication is active on med list        Passed - Patient is age 18 or older        Passed - Normal serum creatinine on file in past 12 months     Recent Labs   Lab Test 09/27/19  1033   CR 0.92           Passed - Normal serum potassium on file in past 12 months     Recent Labs   Lab Test 09/27/19  1033   POTASSIUM 4.3         Cassandra Cohen RN   "

## 2019-12-18 ENCOUNTER — OFFICE VISIT (OUTPATIENT)
Dept: FAMILY MEDICINE | Facility: OTHER | Age: 59
End: 2019-12-18
Payer: COMMERCIAL

## 2019-12-18 VITALS
SYSTOLIC BLOOD PRESSURE: 218 MMHG | OXYGEN SATURATION: 100 % | BODY MASS INDEX: 27.67 KG/M2 | TEMPERATURE: 98.5 F | WEIGHT: 182 LBS | HEART RATE: 76 BPM | DIASTOLIC BLOOD PRESSURE: 100 MMHG

## 2019-12-18 DIAGNOSIS — I10 HYPERTENSION, UNSPECIFIED TYPE: ICD-10-CM

## 2019-12-18 DIAGNOSIS — E11.65 TYPE 2 DIABETES MELLITUS WITH HYPERGLYCEMIA, WITHOUT LONG-TERM CURRENT USE OF INSULIN (H): Primary | ICD-10-CM

## 2019-12-18 DIAGNOSIS — I50.43 ACUTE ON CHRONIC COMBINED SYSTOLIC AND DIASTOLIC CONGESTIVE HEART FAILURE (H): ICD-10-CM

## 2019-12-18 PROCEDURE — 99214 OFFICE O/P EST MOD 30 MIN: CPT | Performed by: INTERNAL MEDICINE

## 2019-12-18 PROCEDURE — 99207 C FOOT EXAM  NO CHARGE: CPT | Performed by: INTERNAL MEDICINE

## 2019-12-18 RX ORDER — HYDRALAZINE HYDROCHLORIDE 100 MG/1
100 TABLET, FILM COATED ORAL 3 TIMES DAILY
Qty: 90 TABLET | Refills: 0 | Status: SHIPPED | OUTPATIENT
Start: 2019-12-18 | End: 2020-01-17

## 2019-12-18 ASSESSMENT — PAIN SCALES - GENERAL: PAINLEVEL: NO PAIN (0)

## 2019-12-18 NOTE — PROGRESS NOTES
Chief Complaint   Patient presents with     Medication Follow-up     Hypertension     recheck                      Chief Complaint         The patient is a pleasant 59-year-old gentleman whose had some labile blood pressures as of late.  He is currently on 40 mg of lisinopril, 100 mg of metoprolol daily, and has recent been placed on 50 mg of hydralazine 3 times daily in order to get his blood pressure down acutely.  Blood pressure today is 218/100.  He denies any chest pain, he has had no sweating, he denies lightheadedness or headache.  He notes that his blood pressures at home are substantially better but he does not like being at the doctor's office.  We have discussed the concept of resting blood pressure versus his current blood pressure.  He has a concurrent history of type 2 diabetes which is well controlled.  His most recent glycosylated hemoglobin was 5.2 in September.  He denies any polyuria or polydipsia.                           PAST, FAMILY,SOCIAL HISTORY:     Medical  History:   has a past medical history of GERD (gastroesophageal reflux disease) and Gout.     Surgical History:   has a past surgical history that includes None of the above core measure diagnoses (AMI, CHF, Stroke) apply at this time..     Social History:   reports that he has quit smoking. His smoking use included cigarettes. He has a 28.00 pack-year smoking history. He has never used smokeless tobacco. He reports current alcohol use. He reports that he does not use drugs.     Family History:  family history includes Diabetes in an other family member.            MEDICATIONS  Current Outpatient Medications   Medication Sig Dispense Refill     aspirin (ASA) 81 MG chewable tablet Take 1 tablet (81 mg) by mouth daily 108 tablet 1     atorvastatin (LIPITOR) 20 MG tablet TAKE ONE TABLET BY MOUTH ONCE DAILY 90 tablet 1     blood glucose (NO BRAND SPECIFIED) lancets standard Use to test blood sugar 1-3 times daily or as directed. 100 each 11  "    blood glucose monitoring (NO BRAND SPECIFIED) meter device kit Use to test blood sugar 1-3 times daily or as directed. 1 kit 0     citalopram (CELEXA) 20 MG tablet TAKE ONE TABLET BY MOUTH EVERY DAY 90 tablet 1     CONTOUR NEXT TEST test strip USE TO TEST BLOOD SUGARS ONE TO THREE TIMES DAILY  OR AS DIRECTED 100 each 1     glipiZIDE (GLUCOTROL) 5 MG tablet Take 1 tablet (5 mg) by mouth every morning 90 tablet 1     hydrALAZINE (APRESOLINE) 100 MG tablet Take 1 tablet (100 mg) by mouth 3 times daily 90 tablet 0     lisinopril (PRINIVIL/ZESTRIL) 40 MG tablet TAKE ONE TABLET BY MOUTH ONCE DAILY 30 tablet 5     metFORMIN (GLUCOPHAGE-XR) 500 MG 24 hr tablet TAKE TWO TABLETS BY MOUTH ONCE DAILY WITH WITH DINNER 180 tablet 1     metoprolol tartrate (LOPRESSOR) 50 MG tablet Take 1 tablet (50 mg) by mouth 2 times daily 180 tablet 0     sildenafil (REVATIO) 20 MG tablet Take 1-3 pills 30 minutes before \"that magic moment\" 30 tablet 1         --------------------------------------------------------------------------------------------------------------------                              Review of Systems       LUNGS: Pt denies: cough, excess sputum, hemoptysis, or shortness of breath.   HEART: Pt denies: chest pain, arrhythmia, syncope, tachy or bradyarrhythmia.   GI: Pt denies: nausea, vomiting, diarrhea, constipation, melena, or hematochezia.   NEURO: Pt denies: seizures, strokes, diplopia, weakness, paraesthesias, or paralysis.   SKIN: Pt denies: itching, rashes, discoloration, or specific lesions of concern. Denies recent hair loss.   PSYCH: The patient denies significant depression, anxiety, mood imbalance. Specifically denies any suicidal ideation.                                     Examination    BP (!) 218/100   Pulse 76   Temp 98.5  F (36.9  C) (Temporal)   Wt 82.6 kg (182 lb)   SpO2 100%   BMI 27.67 kg/m       Constitutional: The patient appears to be in no acute distress. The patient appears to be " adequately hydrated. No acute respiratory or hemodynamic distress is noted at this time.   LUNGS: clear bilaterally, airflow is brisk, no intercostal retraction or stridor is noted. No coughing is noted during visit.   HEART:  regular without rubs, clicks, gallops, or murmurs. PMI is nondisplaced. Upstrokes are brisk. S1,S2 are heard.   GI: Abdomen is soft, without rebound, guarding or tenderness. Bowel sounds are appropriate. No renal bruits are heard.   NEURO: Pt is alert and appropriate. No neurologic lateralization is noted. Cranial nerves 2-12 are intact. Peripheral sensory and motor function are grossly normal.    SKIN:  warm and dry. No erythema, or rashes are noted. No specific lesions of concern are noted.    PSYCH: The patient appears grossly appropriate. Maintains good eye contact, does not have any jittery or atypical motion. Displays anxious affect.   Feet: no evidence of skin breakdown or ulceration is noted.  Sensation is slightly decreased to monofilament and vibration.  Pulses are strong, capillary refill is brisk.                                        Decision Making    1. Hypertension, unspecified type  We will increase hydralazine 200 mg 3 times daily and continue current medication until blood pressure safely under control  - hydrALAZINE (APRESOLINE) 100 MG tablet; Take 1 tablet (100 mg) by mouth 3 times daily  Dispense: 90 tablet; Refill: 0    2. Type 2 diabetes mellitus with hyperglycemia, without long-term current use of insulin (H)  Continue current medication including ACE inhibitor, statin, aspirin    3. Acute on chronic combined systolic and diastolic congestive heart failure (H)  Currently controlled, no signs of edema present.  Continue beta-blocker and ACE inhibitor.  May benefit from spironolactone                             FOLLOW UP   I have asked the patient to make an appointment for followup with me in 2 weeks        I have carefully explained the diagnosis and treatment  options to the patient.  The patient has displayed an understanding of the above, and all subsequent questions were answered.      DO SEAN Rodgers    Portions of this note were produced using Rezzie  Although every attempt at real-time proof reading has been made, occasional grammar/syntax errors may have been missed.

## 2019-12-20 ENCOUNTER — PATIENT OUTREACH (OUTPATIENT)
Dept: CARE COORDINATION | Facility: CLINIC | Age: 59
End: 2019-12-20

## 2019-12-20 NOTE — PROGRESS NOTES
Clinic Care Coordination Contact  [facility]/Voicemail       Clinical Data: Care Coordinator Outreach  Outreach attempted x 1.  Left message on patient's voicemail with call back information and requested return call.  Plan: Care Coordinator sent care coordination introduction letter on 12/20/19 via mail. Care Coordinator will try to reach patient again in 1-2 business days.    ANGELITO Stafford RN Clinic Care Coordinator   [place], [place], [place]  Phone: [phone]
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
## 2019-12-20 NOTE — LETTER
[place] CARE COORDINATION  [address] [facility] [address]    December 26, 2019    Sushil Diana  [address] [facility] [address]      Dear Sushil,    I have been attempting to reach you since our last contact. I would like to continue to work with you and provide any additional support you may need on achieving your health care related goals. I would appreciate if you would give me a call at [phone] to let me know if you would like to continue working together. I know that there are many things that can affect our ability to communicate and I hope we can continue to work together.    All of us at the [facility] are invested in your health and are here to assist you in meeting your goals.     Sincerely,    ANGELITO Hannah RN Clinic Care Coordinator   [place], [place], [place]  Phone: [phone]

## 2019-12-26 NOTE — PROGRESS NOTES
Clinic Care Coordination Contact  [facility]/Voicemail       Clinical Data: Care Coordinator Outreach  Outreach attempted x 2.  Left message on patient's voicemail with call back information and requested return call.  Plan: Care Coordinator will send unable to contact letter with care coordinator contact information via mail. Care Coordinator will try to reach patient again in 1 month.    ANGELITO Stafford RN Clinic Care Coordinator   [place], [place], [place]  Phone: [phone]

## 2020-01-15 ENCOUNTER — OFFICE VISIT (OUTPATIENT)
Dept: FAMILY MEDICINE | Facility: OTHER | Age: 60
End: 2020-01-15
Payer: COMMERCIAL

## 2020-01-15 VITALS
TEMPERATURE: 98.2 F | HEART RATE: 76 BPM | DIASTOLIC BLOOD PRESSURE: 84 MMHG | RESPIRATION RATE: 20 BRPM | BODY MASS INDEX: 26.08 KG/M2 | SYSTOLIC BLOOD PRESSURE: 180 MMHG | OXYGEN SATURATION: 99 % | HEIGHT: 69 IN | WEIGHT: 176.1 LBS

## 2020-01-15 DIAGNOSIS — I10 HYPERTENSION, UNSPECIFIED TYPE: ICD-10-CM

## 2020-01-15 DIAGNOSIS — I50.41 ACUTE COMBINED SYSTOLIC AND DIASTOLIC CONGESTIVE HEART FAILURE (H): ICD-10-CM

## 2020-01-15 DIAGNOSIS — E11.65 TYPE 2 DIABETES MELLITUS WITH HYPERGLYCEMIA, WITHOUT LONG-TERM CURRENT USE OF INSULIN (H): Primary | ICD-10-CM

## 2020-01-15 LAB
ANION GAP SERPL CALCULATED.3IONS-SCNC: 6 MMOL/L (ref 3–14)
BUN SERPL-MCNC: 18 MG/DL (ref 7–30)
CALCIUM SERPL-MCNC: 9.6 MG/DL (ref 8.5–10.1)
CHLORIDE SERPL-SCNC: 101 MMOL/L (ref 94–109)
CO2 SERPL-SCNC: 28 MMOL/L (ref 20–32)
CREAT SERPL-MCNC: 1.1 MG/DL (ref 0.66–1.25)
GFR SERPL CREATININE-BSD FRML MDRD: 72 ML/MIN/{1.73_M2}
GLUCOSE SERPL-MCNC: 140 MG/DL (ref 70–99)
POTASSIUM SERPL-SCNC: 5 MMOL/L (ref 3.4–5.3)
SODIUM SERPL-SCNC: 135 MMOL/L (ref 133–144)

## 2020-01-15 PROCEDURE — 80048 BASIC METABOLIC PNL TOTAL CA: CPT | Performed by: INTERNAL MEDICINE

## 2020-01-15 PROCEDURE — 99213 OFFICE O/P EST LOW 20 MIN: CPT | Performed by: INTERNAL MEDICINE

## 2020-01-15 PROCEDURE — 36415 COLL VENOUS BLD VENIPUNCTURE: CPT | Performed by: INTERNAL MEDICINE

## 2020-01-15 RX ORDER — AMLODIPINE BESYLATE 10 MG/1
10 TABLET ORAL DAILY
Qty: 30 TABLET | Refills: 0 | Status: SHIPPED | OUTPATIENT
Start: 2020-01-15 | End: 2020-02-06

## 2020-01-15 RX ORDER — METOPROLOL TARTRATE 50 MG
50 TABLET ORAL 2 TIMES DAILY
Qty: 180 TABLET | Refills: 0 | Status: SHIPPED | OUTPATIENT
Start: 2020-01-15 | End: 2020-04-24

## 2020-01-15 RX ORDER — HYDROCHLOROTHIAZIDE 12.5 MG/1
25 TABLET ORAL DAILY
Qty: 15 TABLET | Refills: 0 | Status: SHIPPED | OUTPATIENT
Start: 2020-01-15 | End: 2020-01-22

## 2020-01-15 RX ORDER — LISINOPRIL 40 MG/1
40 TABLET ORAL DAILY
Qty: 30 TABLET | Refills: 5 | Status: SHIPPED | OUTPATIENT
Start: 2020-01-15 | End: 2020-07-27

## 2020-01-15 ASSESSMENT — MIFFLIN-ST. JEOR: SCORE: 1586.22

## 2020-01-15 ASSESSMENT — PAIN SCALES - GENERAL: PAINLEVEL: NO PAIN (0)

## 2020-01-15 NOTE — LETTER
January 16, 2020      Sushil Diana  [address] [address] [address]        Dear ,    We are writing to inform you of your test results.    The chemistry panel demonstrates a slightly elevated blood sugar of 140 and normal kidney function.     Feel free to contact me via the office or My Chart if you have any questions regarding the above    Resulted Orders   Basic metabolic panel   Result Value Ref Range    Sodium 135 133 - 144 mmol/L    Potassium 5.0 3.4 - 5.3 mmol/L    Chloride 101 94 - 109 mmol/L    Carbon Dioxide 28 20 - 32 mmol/L    Anion Gap 6 3 - 14 mmol/L    Glucose 140 (H) 70 - 99 mg/dL    Urea Nitrogen 18 7 - 30 mg/dL    Creatinine 1.10 0.66 - 1.25 mg/dL    GFR Estimate 72 >60 mL/min/[1.73_m2]      Comment:      Non  GFR Calc  Starting 12/18/2018, serum creatinine based estimated GFR (eGFR) will be   calculated using the Chronic Kidney Disease Epidemiology Collaboration   (CKD-EPI) equation.      GFR Estimate If Black 83 >60 mL/min/[1.73_m2]      Comment:       GFR Calc  Starting 12/18/2018, serum creatinine based estimated GFR (eGFR) will be   calculated using the Chronic Kidney Disease Epidemiology Collaboration   (CKD-EPI) equation.      Calcium 9.6 8.5 - 10.1 mg/dL       If you have any questions or concerns, please call the clinic at the number listed above.       Sincerely,        Mark Matson DO

## 2020-01-15 NOTE — PROGRESS NOTES
Subjective     Sushil Diana is a 59 year old male who presents to clinic today for the following health issues:    HPI   Hypertension Follow-up      Do you check your blood pressure regularly outside of the clinic? Yes     Are you following a low salt diet? Yes    Are your blood pressures ever more than 140 on the top number (systolic) OR more   than 90 on the bottom number (diastolic), for example 140/90? Yes  Average is 180/80                      Chief Complaint         The patient is a pleasant 59-year-old gentleman who has hypertension.  He has concurrent type 2 diabetes which has been well controlled.  Recently, his blood pressure has been escalating.  He is currently on 40 mg of lisinopril and, 50 mg of metoprolol twice daily, and hydroxyzine 100 mg 3 times daily.  His resting blood pressures are still persistently systolically 180.  He is having no chest pain and states that he does feel better than when he was systolically over 200.  He denies any complaints at this time.  We have had a lengthy discussion today regarding primary/essential hypertension versus secondary hypertension.  We also discussed the potential for vascular disease including that of renal artery stenosis.                         PAST, FAMILY,SOCIAL HISTORY:     Medical  History:   has a past medical history of GERD (gastroesophageal reflux disease) and Gout.     Surgical History:   has a past surgical history that includes None of the above core measure diagnoses (AMI, CHF, Stroke) apply at this time..     Social History:   reports that he has quit smoking. His smoking use included cigarettes. He has a 28.00 pack-year smoking history. He has never used smokeless tobacco. He reports current alcohol use. He reports that he does not use drugs.     Family History:  family history includes Diabetes in an other family member.            MEDICATIONS  Current Outpatient Medications   Medication Sig Dispense Refill     amLODIPine (NORVASC) 10  "MG tablet Take 1 tablet (10 mg) by mouth daily 30 tablet 0     aspirin (ASA) 81 MG chewable tablet Take 1 tablet (81 mg) by mouth daily 108 tablet 1     atorvastatin (LIPITOR) 20 MG tablet TAKE ONE TABLET BY MOUTH ONCE DAILY 90 tablet 1     blood glucose (NO BRAND SPECIFIED) lancets standard Use to test blood sugar 1-3 times daily or as directed. 100 each 11     blood glucose monitoring (NO BRAND SPECIFIED) meter device kit Use to test blood sugar 1-3 times daily or as directed. 1 kit 0     citalopram (CELEXA) 20 MG tablet TAKE ONE TABLET BY MOUTH EVERY DAY 90 tablet 1     CONTOUR NEXT TEST test strip USE TO TEST BLOOD SUGARS ONE TO THREE TIMES DAILY  OR AS DIRECTED 100 each 1     glipiZIDE (GLUCOTROL) 5 MG tablet Take 1 tablet (5 mg) by mouth every morning 90 tablet 1     hydrALAZINE (APRESOLINE) 100 MG tablet Take 1 tablet (100 mg) by mouth 3 times daily 90 tablet 0     hydrochlorothiazide (HYDRODIURIL) 12.5 MG tablet Take 2 tablets (25 mg) by mouth daily 15 tablet 0     lisinopril (PRINIVIL/ZESTRIL) 40 MG tablet Take 1 tablet (40 mg) by mouth daily 30 tablet 5     metFORMIN (GLUCOPHAGE-XR) 500 MG 24 hr tablet TAKE TWO TABLETS BY MOUTH ONCE DAILY WITH WITH DINNER 180 tablet 1     metoprolol tartrate (LOPRESSOR) 50 MG tablet Take 1 tablet (50 mg) by mouth 2 times daily 180 tablet 0     sildenafil (REVATIO) 20 MG tablet Take 1-3 pills 30 minutes before \"that magic moment\" 30 tablet 1         --------------------------------------------------------------------------------------------------------------------                              Review of Systems       LUNGS: Pt denies: cough, excess sputum, hemoptysis, or shortness of breath.   HEART: Pt denies: chest pain, arrhythmia, syncope, tachy or bradyarrhythmia.   GI: Pt denies: nausea, vomiting, diarrhea, constipation, melena, or hematochezia.   NEURO: Pt denies: seizures, strokes, diplopia, weakness, paraesthesias, or paralysis.   SKIN: Pt denies: itching, rashes, " "discoloration, or specific lesions of concern. Denies recent hair loss.   PSYCH: The patient denies significant depression, anxiety, mood imbalance. Specifically denies any suicidal ideation.                                     Examination    BP (!) 182/84 (BP Location: Right arm, Patient Position: Sitting, Cuff Size: Adult Large)   Pulse 76   Temp 98.2  F (36.8  C) (Temporal)   Resp 20   Ht 1.74 m (5' 8.5\")   Wt 79.9 kg (176 lb 1.6 oz)   SpO2 99%   BMI 26.39 kg/m       Constitutional: The patient appears to be in no acute distress. The patient appears to be adequately hydrated. No acute respiratory or hemodynamic distress is noted at this time.   LUNGS: clear bilaterally, airflow is brisk, no intercostal retraction or stridor is noted. No coughing is noted during visit.   HEART:  regular without rubs, clicks, gallops, or murmurs. PMI is nondisplaced. Upstrokes are brisk. S1,S2 are heard.   GI: Abdomen is soft, without rebound, guarding or tenderness. Bowel sounds are appropriate. No renal bruits are heard.   NEURO: Pt is alert and appropriate. No neurologic lateralization is noted. Cranial nerves 2-12 are intact. Peripheral sensory and motor function are grossly normal.    SKIN:  warm and dry. No erythema, or rashes are noted. No specific lesions of concern are noted.    PSYCH: The patient appears grossly appropriate. Maintains good eye contact, does not have any jittery or atypical motion. Displays appropriate affect.                                           Decision Making    1. Hypertension, unspecified type  Add hydrochlorothiazide and Norvasc for blood pressure control, continue current meds  Check for renal artery stenosis    - metoprolol tartrate (LOPRESSOR) 50 MG tablet; Take 1 tablet (50 mg) by mouth 2 times daily  Dispense: 180 tablet; Refill: 0  - lisinopril (PRINIVIL/ZESTRIL) 40 MG tablet; Take 1 tablet (40 mg) by mouth daily  Dispense: 30 tablet; Refill: 5  - CTA Renal w Contrast; Future  - " Basic metabolic panel  - amLODIPine (NORVASC) 10 MG tablet; Take 1 tablet (10 mg) by mouth daily  Dispense: 30 tablet; Refill: 0  - hydrochlorothiazide (HYDRODIURIL) 12.5 MG tablet; Take 2 tablets (25 mg) by mouth daily  Dispense: 15 tablet; Refill: 0    2. Acute combined systolic and diastolic congestive heart failure (H)  As above  - lisinopril (PRINIVIL/ZESTRIL) 40 MG tablet; Take 1 tablet (40 mg) by mouth daily  Dispense: 30 tablet; Refill: 5    3. Type 2 diabetes mellitus with hyperglycemia, without long-term current use of insulin (H)  Continue current medications and diet                             FOLLOW UP   I have asked the patient to make an appointment for followup with me after the CT        I have carefully explained the diagnosis and treatment options to the patient.  The patient has displayed an understanding of the above, and all subsequent questions were answered.      DO SEAN Rodgers    Portions of this note were produced using [employer]  Although every attempt at real-time proof reading has been made, occasional grammar/syntax errors may have been missed.

## 2020-01-16 DIAGNOSIS — I10 HYPERTENSION, UNSPECIFIED TYPE: ICD-10-CM

## 2020-01-16 NOTE — RESULT ENCOUNTER NOTE
Dear Sushil, your recent test results are attached.  The chemistry panel demonstrates a slightly elevated blood sugar of 140 and normal kidney function.    Feel free to contact me via the office or My Chart if you have any questions regarding the above.

## 2020-01-17 RX ORDER — HYDRALAZINE HYDROCHLORIDE 100 MG/1
TABLET, FILM COATED ORAL
Qty: 90 TABLET | Refills: 0 | Status: SHIPPED | OUTPATIENT
Start: 2020-01-17 | End: 2020-02-17

## 2020-01-17 NOTE — TELEPHONE ENCOUNTER
"Requested Prescriptions   Pending Prescriptions Disp Refills     hydrALAZINE (APRESOLINE) 100 MG tablet [Pharmacy Med Name: hydrALAZINE * 100MG * TAB]  Last Written Prescription Date:  12/18/19  Last Fill Quantity: 90,  # refills: 0   Last office visit: 1/15/2020 with prescribing provider:  Dano   Future Office Visit:   Next 5 appointments (look out 90 days)    Jan 27, 2020  1:00 PM CST  [facility]son,   [facility] [address] [facility] [id number] [phone]          90 tablet 0     Sig: TAKE ONE TABLET BY MOUTH THREE TIMES A DAY       Vasodilators Failed - 1/16/2020 12:30 PM        Failed - Most recent BP less than 140/90 on record     BP Readings from Last 3 Encounters:   01/15/20 (!) 180/84   12/18/19 (!) 218/100   11/13/19 138/88                 Passed - Most recent encounter is not a hospital encounter. Patient has recent (12 mos) or future (1 mos) visit with authorizing provider's specialty     Patient's most recent encounter is NOT a hospital encounter and has had an office visit in the last 12 months or has a visit in the next 30 days with authorizing provider or within the authorizing provider's specialty.      See \"Patient Info\" tab in inbasket, or \"Choose Columns\" in Meds & Orders section of the refill encounter.      If most recent encounter is a hospital encounter AND the patient does NOT have an appointment scheduled with the authorizing provider or authorizing provider's specialty within the next 30 days, forward refill to authorizing provider for medication review.          Passed - Medication is active on med list        Passed - Patient is of age 18 years or older          "

## 2020-01-17 NOTE — TELEPHONE ENCOUNTER
Routing refill request to provider for review/approval because:  Labs out of range:  BP    BRYANNA BearN, RN  [facility]

## 2020-01-22 ENCOUNTER — TELEPHONE (OUTPATIENT)
Dept: FAMILY MEDICINE | Facility: OTHER | Age: 60
End: 2020-01-22

## 2020-01-22 ENCOUNTER — HOSPITAL ENCOUNTER (OUTPATIENT)
Dept: CT IMAGING | Facility: CLINIC | Age: 60
Discharge: HOME | End: 2020-01-22
Attending: INTERNAL MEDICINE | Admitting: INTERNAL MEDICINE
Payer: COMMERCIAL

## 2020-01-22 DIAGNOSIS — I10 HYPERTENSION, UNSPECIFIED TYPE: ICD-10-CM

## 2020-01-22 PROCEDURE — 25000125 ZZHC RX 250: Performed by: INTERNAL MEDICINE

## 2020-01-22 PROCEDURE — 25000128 H RX IP 250 OP 636: Performed by: INTERNAL MEDICINE

## 2020-01-22 PROCEDURE — 74175 CTA ABDOMEN W/CONTRAST: CPT

## 2020-01-22 RX ORDER — HYDROCHLOROTHIAZIDE 25 MG/1
25 TABLET ORAL DAILY
Qty: 30 TABLET | Refills: 0 | Status: SHIPPED | OUTPATIENT
Start: 2020-01-22 | End: 2020-02-10

## 2020-01-22 RX ORDER — IOPAMIDOL 755 MG/ML
500 INJECTION, SOLUTION INTRAVASCULAR ONCE
Status: COMPLETED | OUTPATIENT
Start: 2020-01-22 | End: 2020-01-22

## 2020-01-22 RX ADMIN — IOPAMIDOL 80 ML: 755 INJECTION, SOLUTION INTRAVENOUS at 11:16

## 2020-01-22 RX ADMIN — SODIUM CHLORIDE 80 ML: 9 INJECTION, SOLUTION INTRAVENOUS at 11:17

## 2020-01-22 NOTE — RESULT ENCOUNTER NOTE
Dear Susihl, your recent test results are attached.  Although there is noted to be some hardening of the arteries, the arteries to the kidneys appear to be wide open.  Please follow-up for your blood pressure that we discussed in the upcoming couple weeks.    Feel free to contact me via the office or My Chart if you have any questions regarding the above.

## 2020-01-22 NOTE — TELEPHONE ENCOUNTER
Reason for Call:  Other prescription    Detailed comments: hydrochlorothiazide (HYDRODIURIL)   Significant other is calling (C2C on file) and is wondering why the script was an odd number. Was told to take 2 tabs a day and received 15 tabs. Wondering if he was just supposed to take the medication until he had the CT scan today, or is he supposed to continue taking the medication? Would like a call back to discuss. Patient is aware that PCP is not in the office and is ok with waiting for their return before hearing anything back.     Phone Number Patient can be reached at: Other phone number: [phone] Angelina     Best Time: any    Can we leave a detailed message on this number? YES    Call taken on 1/22/2020 at 1:46 PM by Cece Zuniga CNA

## 2020-01-23 ENCOUNTER — TELEPHONE (OUTPATIENT)
Dept: FAMILY MEDICINE | Facility: OTHER | Age: 60
End: 2020-01-23

## 2020-01-23 ENCOUNTER — PATIENT OUTREACH (OUTPATIENT)
Dept: CARE COORDINATION | Facility: CLINIC | Age: 60
End: 2020-01-23

## 2020-01-23 NOTE — TELEPHONE ENCOUNTER
----- Message from Mark Matson DO sent at 1/22/2020  4:59 PM CST -----  Dear Sushil, your recent test results are attached.  Although there is noted to be some hardening of the arteries, the arteries to the kidneys appear to be wide open.  Please follow-up for your blood pressure that we discussed in the upcoming couple weeks.    Feel free to contact me via the office or My Chart if you have any questions regarding the above.

## 2020-01-23 NOTE — PROGRESS NOTES
Clinic Care Coordination Contact  [facility]/Voicemail     Clinical Data: Care Coordinator Outreach  Outreach attempted x 3 (see 12/20/19 patient out reach encounter).  Left message on patient's voicemail with call back information and requested return call.    Plan:   Care Coordinator will send disenrollment letter with care coordinator contact information via mail.   Care Coordinator will do no further outreaches at this time.    ANGELITO Stafford RN Clinic Care Coordinator   [place], [place], [place]  Phone: [phone]

## 2020-01-23 NOTE — LETTER
[facility]ARE [facility]ORDIN[facility]  [address] [facility] [address]    January 23, 2020    Sushil Diana  [address] [facility] [address]      Dear Sushil,    I have been unsuccessful in reaching you since our last contact. At this time I will make no further attempts to reach you, however this does not change your ability to continue receiving care from your providers at [facility]. If you are needing additional support from a care coordinator in the future please contact me at [phone].    All of us at [facility] are invested in your health and are here to assist you in meeting your goals.    Sincerely,  ANGELITO Stafford RN Clinic Care Coordinator   [place], [place], [place]  Phone: [phone]

## 2020-01-23 NOTE — TELEPHONE ENCOUNTER
Spoke with patient and informed of results below. Patient understood. He has an appointment on 1/27/2020 for follow up  On blood pressure.     Linda Rosas MA

## 2020-01-27 ENCOUNTER — OFFICE VISIT (OUTPATIENT)
Dept: FAMILY MEDICINE | Facility: OTHER | Age: 60
End: 2020-01-27
Payer: COMMERCIAL

## 2020-01-27 VITALS
WEIGHT: 178 LBS | BODY MASS INDEX: 26.67 KG/M2 | OXYGEN SATURATION: 100 % | HEART RATE: 80 BPM | RESPIRATION RATE: 18 BRPM | SYSTOLIC BLOOD PRESSURE: 138 MMHG | TEMPERATURE: 98.3 F | DIASTOLIC BLOOD PRESSURE: 88 MMHG

## 2020-01-27 DIAGNOSIS — I10 ESSENTIAL HYPERTENSION, BENIGN: ICD-10-CM

## 2020-01-27 DIAGNOSIS — Z12.11 SPECIAL SCREENING FOR MALIGNANT NEOPLASMS, COLON: Primary | ICD-10-CM

## 2020-01-27 DIAGNOSIS — E11.65 TYPE 2 DIABETES MELLITUS WITH HYPERGLYCEMIA, WITHOUT LONG-TERM CURRENT USE OF INSULIN (H): ICD-10-CM

## 2020-01-27 DIAGNOSIS — K21.9 GASTROESOPHAGEAL REFLUX DISEASE WITHOUT ESOPHAGITIS: ICD-10-CM

## 2020-01-27 PROCEDURE — 99213 OFFICE O/P EST LOW 20 MIN: CPT | Performed by: INTERNAL MEDICINE

## 2020-01-27 RX ORDER — NIZATIDINE 150 MG/1
150 CAPSULE ORAL DAILY PRN
Qty: 30 CAPSULE | Refills: 0 | Status: SHIPPED | OUTPATIENT
Start: 2020-01-27 | End: 2020-02-25

## 2020-01-27 ASSESSMENT — PAIN SCALES - GENERAL: PAINLEVEL: NO PAIN (0)

## 2020-01-27 NOTE — PROGRESS NOTES
Subjective     Sushil Diana is a 59 year old male who presents to clinic today for the following health issues:    HPI   Chief Complaint   Patient presents with     Follow Up     blood pressure and imaging                      Chief Complaint         The patient is a pleasant 59-year-old gentleman who presents today for follow-up of his hypertension today.  Today's blood pressure was initially 150/68 and recheck confirmed 138/88.  He is tolerating the medication well.  He has not missed any pills.  Recent CAT scan of the kidneys confirmed no renal artery obstruction.  He is on multiple medications including hydralazine tolerating it well.  Eventually, I like to get him off the hydralazine if at all possible.  He is not having any headaches, lightheadedness or other symptoms.                         PAST, FAMILY,SOCIAL HISTORY:     Medical  History:   has a past medical history of GERD (gastroesophageal reflux disease) and Gout.     Surgical History:   has a past surgical history that includes None of the above core measure diagnoses (AMI, CHF, Stroke) apply at this time..     Social History:   reports that he has quit smoking. His smoking use included cigarettes. He has a 28.00 pack-year smoking history. He has never used smokeless tobacco. He reports current alcohol use. He reports that he does not use drugs.     Family History:  family history includes Diabetes in an other family member.            MEDICATIONS  Current Outpatient Medications   Medication Sig Dispense Refill     amLODIPine (NORVASC) 10 MG tablet Take 1 tablet (10 mg) by mouth daily 30 tablet 0     aspirin (ASA) 81 MG chewable tablet Take 1 tablet (81 mg) by mouth daily 108 tablet 1     atorvastatin (LIPITOR) 20 MG tablet TAKE ONE TABLET BY MOUTH ONCE DAILY 90 tablet 1     blood glucose (NO BRAND SPECIFIED) lancets standard Use to test blood sugar 1-3 times daily or as directed. 100 each 11     blood glucose monitoring (NO BRAND SPECIFIED) meter  "device kit Use to test blood sugar 1-3 times daily or as directed. 1 kit 0     citalopram (CELEXA) 20 MG tablet TAKE ONE TABLET BY MOUTH EVERY DAY 90 tablet 1     CONTOUR NEXT TEST test strip USE TO TEST BLOOD SUGARS ONE TO THREE TIMES DAILY  OR AS DIRECTED 100 each 1     glipiZIDE (GLUCOTROL) 5 MG tablet Take 1 tablet (5 mg) by mouth every morning 90 tablet 1     hydrALAZINE (APRESOLINE) 100 MG tablet TAKE ONE TABLET BY MOUTH THREE TIMES A DAY 90 tablet 0     lisinopril (PRINIVIL/ZESTRIL) 40 MG tablet Take 1 tablet (40 mg) by mouth daily 30 tablet 5     metFORMIN (GLUCOPHAGE-XR) 500 MG 24 hr tablet TAKE TWO TABLETS BY MOUTH ONCE DAILY WITH WITH DINNER 180 tablet 1     metoprolol tartrate (LOPRESSOR) 50 MG tablet Take 1 tablet (50 mg) by mouth 2 times daily 180 tablet 0     nizatidine (AXID) 150 MG capsule Take 1 capsule (150 mg) by mouth daily as needed for heartburn 30 capsule 0     sildenafil (REVATIO) 20 MG tablet Take 1-3 pills 30 minutes before \"that magic moment\" 30 tablet 1     hydrochlorothiazide (HYDRODIURIL) 25 MG tablet Take 1 tablet (25 mg) by mouth daily 30 tablet 0         --------------------------------------------------------------------------------------------------------------------                              Review of Systems       LUNGS: Pt denies: cough, excess sputum, hemoptysis, or shortness of breath.   HEART: Pt denies: chest pain, arrhythmia, syncope, tachy or bradyarrhythmia.   GI: Pt denies: nausea, vomiting, diarrhea, constipation, melena, or hematochezia.  He has had some recent reflux symptoms.  He has been using Tums for this with minimal improvement.   NEURO: Pt denies: seizures, strokes, diplopia, weakness, paraesthesias, or paralysis.   SKIN: Pt denies: itching, rashes, discoloration, or specific lesions of concern. Denies recent hair loss.   PSYCH: The patient denies significant depression, anxiety, mood imbalance. Specifically denies any suicidal ideation.                     "                 Examination    /88   Pulse 80   Temp 98.3  F (36.8  C) (Temporal)   Resp 18   Wt 80.7 kg (178 lb)   SpO2 100%   BMI 26.67 kg/m       Constitutional: The patient appears to be in no acute distress. The patient appears to be adequately hydrated. No acute respiratory or hemodynamic distress is noted at this time.   LUNGS: clear bilaterally, airflow is brisk, no intercostal retraction or stridor is noted. No coughing is noted during visit.   HEART:  regular without rubs, clicks, gallops, or murmurs. PMI is nondisplaced. Upstrokes are brisk. S1,S2 are heard.   GI: Abdomen is soft, without rebound, guarding or tenderness. Bowel sounds are appropriate. No renal bruits are heard.   NEURO: Pt is alert and appropriate. No neurologic lateralization is noted. Cranial nerves 2-12 are intact. Peripheral sensory and motor function are grossly normal.    SKIN:  warm and dry. No erythema, or rashes are noted. No specific lesions of concern are noted.    PSYCH: The patient appears grossly appropriate. Maintains good eye contact, does not have any jittery or atypical motion. Displays appropriate affect.                                             Decision Making    1. Essential hypertension, benign  Continue current medication and recheck in 3 to 4 weeks  If blood pressures continue to be elevated, will recommend 24-hour urine for catecholamines, metanephrines, renin levels etc.    2. Type 2 diabetes mellitus with hyperglycemia, without long-term current use of insulin (H)  Continue current medication, blood sugars well controlled    3. Gastroesophageal reflux disease without esophagitis  Start Axid on an as-needed basis  - nizatidine (AXID) 150 MG capsule; Take 1 capsule (150 mg) by mouth daily as needed for heartburn  Dispense: 30 capsule; Refill: 0    4. Special screening for malignant neoplasms, colon  Given- Fecal colorectal cancer screen (FIT); Future                             FOLLOW UP   I have  asked the patient to make an appointment for followup with me in 3 weeks        I have carefully explained the diagnosis and treatment options to the patient.  The patient has displayed an understanding of the above, and all subsequent questions were answered.      DO SEAN Rodgers    Portions of this note were produced using [employer]  Although every attempt at real-time proof reading has been made, occasional grammar/syntax errors may have been missed.

## 2020-02-06 DIAGNOSIS — I10 HYPERTENSION, UNSPECIFIED TYPE: ICD-10-CM

## 2020-02-06 RX ORDER — AMLODIPINE BESYLATE 10 MG/1
TABLET ORAL
Qty: 30 TABLET | Refills: 3 | Status: SHIPPED | OUTPATIENT
Start: 2020-02-06 | End: 2020-06-04

## 2020-02-06 NOTE — TELEPHONE ENCOUNTER
"Requested Prescriptions   Pending Prescriptions Disp Refills     amLODIPine (NORVASC) 10 MG tablet [Pharmacy Med Name: AMLODIPINE BESYLATE 10MG TABS] 30 tablet 0     Sig: TAKE ONE TABLET BY MOUTH ONCE DAILY   Last Written Prescription Date:  1/15/2020  Last Fill Quantity: 30,  # refills: 0   Last office visit: 1/27/2020 with prescribing provider:  1/27/2020   Future Office Visit:        Calcium Channel Blockers Protocol  Passed - 2/6/2020 10:47 AM        Passed - Blood pressure under 140/90 in past 12 months     BP Readings from Last 3 Encounters:   01/27/20 138/88   01/15/20 (!) 180/84   12/18/19 (!) 218/100                 Passed - Recent (12 mo) or future (30 days) visit within the authorizing provider's specialty     Patient has had an office visit with the authorizing provider or a provider within the authorizing providers department within the previous 12 mos or has a future within next 30 days. See \"Patient Info\" tab in inbasket, or \"Choose Columns\" in Meds & Orders section of the refill encounter.              Passed - Medication is active on med list        Passed - Patient is age 18 or older        Passed - Normal serum creatinine on file in past 12 months     Recent Labs   Lab Test 01/15/20  1047   CR 1.10             "

## 2020-02-06 NOTE — TELEPHONE ENCOUNTER
Prescription approved per [facility] [place].    BRYANNA BearN, RN  [facility]

## 2020-02-08 DIAGNOSIS — I10 HYPERTENSION, UNSPECIFIED TYPE: ICD-10-CM

## 2020-02-10 RX ORDER — HYDROCHLOROTHIAZIDE 25 MG/1
TABLET ORAL
Qty: 30 TABLET | Refills: 5 | Status: SHIPPED | OUTPATIENT
Start: 2020-02-10 | End: 2020-07-30

## 2020-02-10 NOTE — TELEPHONE ENCOUNTER
"Hydrochlorothiazide  Last Written Prescription Date:  01/22/2020  Last Fill Quantity: 30,  # refills: 0   Last office visit: 1/27/2020 with prescribing provider:  Dano   Future Office Visit:  None  Prescription approved per [facility] Refill Protocol.    Requested Prescriptions   Pending Prescriptions Disp Refills     hydrochlorothiazide (HYDRODIURIL) 25 MG tablet [Pharmacy Med Name: HYDROCHLOROTHIAZIDE 25MG TABS] 30 tablet 0     Sig: TAKE ONE TABLET BY MOUTH ONCE DAILY       Diuretics (Including Combos) Protocol Passed - 2/8/2020  9:22 AM        Passed - Blood pressure under 140/90 in past 12 months     BP Readings from Last 3 Encounters:   01/27/20 138/88   01/15/20 (!) 180/84   12/18/19 (!) 218/100           Passed - Recent (12 mo) or future (30 days) visit within the authorizing provider's specialty     Patient has had an office visit with the authorizing provider or a provider within the authorizing providers department within the previous 12 mos or has a future within next 30 days. See \"Patient Info\" tab in inbasket, or \"Choose Columns\" in Meds & Orders section of the refill encounter.          Passed - Medication is active on med list        Passed - Patient is age 18 or older        Passed - Normal serum creatinine on file in past 12 months     Recent Labs   Lab Test 01/15/20  1047   CR 1.10           Passed - Normal serum potassium on file in past 12 months     Recent Labs   Lab Test 01/15/20  1047   POTASSIUM 5.0           Passed - Normal serum sodium on file in past 12 months     Recent Labs   Lab Test 01/15/20  1047            Cassandra Cohen RN   "

## 2020-02-11 DIAGNOSIS — I10 HYPERTENSION, UNSPECIFIED TYPE: ICD-10-CM

## 2020-02-11 RX ORDER — HYDROCHLOROTHIAZIDE 25 MG/1
TABLET ORAL
Qty: 30 TABLET | Refills: 0 | OUTPATIENT
Start: 2020-02-11

## 2020-02-11 NOTE — TELEPHONE ENCOUNTER
Prescription was sent 2/10/2020 for #30 with 5 refills.  Pharmacy notified via E-Prescribe refusal.     BRYANNA BearN, RN  [facility]

## 2020-02-11 NOTE — TELEPHONE ENCOUNTER
"Requested Prescriptions   Pending Prescriptions Disp Refills     hydrochlorothiazide (HYDRODIURIL) 25 MG tablet [Pharmacy Med Name: HYDROCHLOROTHIAZIDE 25MG TABS] 30 tablet 0     Sig: TAKE ONE TABLET BY MOUTH ONCE DAILY   Last Written Prescription Date:  2/10/2020  Last Fill Quantity: 30,  # refills: 5   Last office visit: 1/27/2020 with prescribing provider:  1/27/2020   Future Office Visit:        Diuretics (Including Combos) Protocol Passed - 2/11/2020  9:08 AM        Passed - Blood pressure under 140/90 in past 12 months     BP Readings from Last 3 Encounters:   01/27/20 138/88   01/15/20 (!) 180/84   12/18/19 (!) 218/100                 Passed - Recent (12 mo) or future (30 days) visit within the authorizing provider's specialty     Patient has had an office visit with the authorizing provider or a provider within the authorizing providers department within the previous 12 mos or has a future within next 30 days. See \"Patient Info\" tab in inbasket, or \"Choose Columns\" in Meds & Orders section of the refill encounter.              Passed - Medication is active on med list        Passed - Patient is age 18 or older        Passed - Normal serum creatinine on file in past 12 months     Recent Labs   Lab Test 01/15/20  1047   CR 1.10              Passed - Normal serum potassium on file in past 12 months     Recent Labs   Lab Test 01/15/20  1047   POTASSIUM 5.0                    Passed - Normal serum sodium on file in past 12 months     Recent Labs   Lab Test 01/15/20  1047                 "

## 2020-02-17 DIAGNOSIS — I10 HYPERTENSION, UNSPECIFIED TYPE: ICD-10-CM

## 2020-02-17 RX ORDER — HYDRALAZINE HYDROCHLORIDE 100 MG/1
TABLET, FILM COATED ORAL
Qty: 90 TABLET | Refills: 0 | Status: SHIPPED | OUTPATIENT
Start: 2020-02-17 | End: 2020-03-18

## 2020-02-17 NOTE — TELEPHONE ENCOUNTER
Prescription approved per [facility] Refill Protocol.    BRYANNA BearN, RN  [facility]

## 2020-02-17 NOTE — TELEPHONE ENCOUNTER
"Requested Prescriptions   Pending Prescriptions Disp Refills     hydrALAZINE (APRESOLINE) 100 MG tablet [Pharmacy Med Name: hydrALAZINE * 100MG * TAB] 90 tablet 0     Sig: TAKE ONE TABLET BY MOUTH THREE TIMES A DAY   Last Written Prescription Date:  1/17/2020  Last Fill Quantity: 90,  # refills: 0   Last office visit: 1/27/2020 with prescribing provider:  1/27/2020   Future Office Visit:        Vasodilators Passed - 2/17/2020  8:43 AM        Passed - Most recent BP less than 140/90 on record     BP Readings from Last 3 Encounters:   01/27/20 138/88   01/15/20 (!) 180/84   12/18/19 (!) 218/100                 Passed - Most recent encounter is not a hospital encounter. Patient has recent (12 mos) or future (1 mos) visit with authorizing provider's specialty     Patient's most recent encounter is NOT a hospital encounter and has had an office visit in the last 12 months or has a visit in the next 30 days with authorizing provider or within the authorizing provider's specialty.      See \"Patient Info\" tab in inbasket, or \"Choose Columns\" in Meds & Orders section of the refill encounter.      If most recent encounter is a hospital encounter AND the patient does NOT have an appointment scheduled with the authorizing provider or authorizing provider's specialty within the next 30 days, forward refill to authorizing provider for medication review.          Passed - Medication is active on med list        Passed - Patient is of age 18 years or older        "

## 2020-02-24 DIAGNOSIS — K21.9 GASTROESOPHAGEAL REFLUX DISEASE WITHOUT ESOPHAGITIS: ICD-10-CM

## 2020-02-25 RX ORDER — NIZATIDINE 150 MG/1
CAPSULE ORAL
Qty: 30 CAPSULE | Refills: 1 | Status: SHIPPED | OUTPATIENT
Start: 2020-02-25 | End: 2020-04-24

## 2020-02-25 NOTE — TELEPHONE ENCOUNTER
"Requested Prescriptions   Pending Prescriptions Disp Refills     nizatidine (AXID) 150 MG capsule [Pharmacy Med Name: NIZATIDINE 150MG CAPS] 30 capsule 0     Sig: TAKE ONE CAPSULE BY MOUTH ONCE DAILY AS NEEDED FOR HEARTBURN   Last Written Prescription Date:  1/27/2020  Last Fill Quantity: 30,  # refills: 0   Last office visit: 1/27/2020 with prescribing provider:  1/27/2020   Future Office Visit:        H2 Blockers Protocol Passed - 2/24/2020  8:46 AM        Passed - Patient is age 12 or older        Passed - Recent (12 mo) or future (30 days) visit within the authorizing provider's specialty     Patient has had an office visit with the authorizing provider or a provider within the authorizing providers department within the previous 12 mos or has a future within next 30 days. See \"Patient Info\" tab in inbasket, or \"Choose Columns\" in Meds & Orders section of the refill encounter.              Passed - Medication is active on med list        "

## 2020-02-25 NOTE — TELEPHONE ENCOUNTER
Prescription approved per [facility] Refill Protocol.    BRYANNA BearN, RN  [facility]

## 2020-03-18 DIAGNOSIS — I10 HYPERTENSION, UNSPECIFIED TYPE: ICD-10-CM

## 2020-03-18 RX ORDER — HYDRALAZINE HYDROCHLORIDE 100 MG/1
TABLET, FILM COATED ORAL
Qty: 90 TABLET | Refills: 0 | Status: SHIPPED | OUTPATIENT
Start: 2020-03-18 | End: 2020-05-05

## 2020-03-25 DIAGNOSIS — E11.9 DIABETES MELLITUS WITHOUT COMPLICATION (H): ICD-10-CM

## 2020-03-25 DIAGNOSIS — F43.22 ADJUSTMENT DISORDER WITH ANXIOUS MOOD: ICD-10-CM

## 2020-03-25 DIAGNOSIS — E11.9 NEW ONSET TYPE 2 DIABETES MELLITUS (H): ICD-10-CM

## 2020-03-25 RX ORDER — METFORMIN HCL 500 MG
TABLET, EXTENDED RELEASE 24 HR ORAL
Qty: 180 TABLET | Refills: 1 | Status: SHIPPED | OUTPATIENT
Start: 2020-03-25 | End: 2020-09-22

## 2020-03-25 RX ORDER — ATORVASTATIN CALCIUM 20 MG/1
TABLET, FILM COATED ORAL
Qty: 90 TABLET | Refills: 1 | Status: SHIPPED | OUTPATIENT
Start: 2020-03-25 | End: 2020-09-22

## 2020-03-25 RX ORDER — CITALOPRAM HYDROBROMIDE 20 MG/1
TABLET ORAL
Qty: 90 TABLET | Refills: 1 | Status: SHIPPED | OUTPATIENT
Start: 2020-03-25 | End: 2020-09-22

## 2020-03-25 RX ORDER — GLIPIZIDE 5 MG/1
TABLET ORAL
Qty: 90 TABLET | Refills: 1 | Status: SHIPPED | OUTPATIENT
Start: 2020-03-25 | End: 2020-09-24

## 2020-04-24 DIAGNOSIS — I10 HYPERTENSION, UNSPECIFIED TYPE: ICD-10-CM

## 2020-04-24 DIAGNOSIS — K21.9 GASTROESOPHAGEAL REFLUX DISEASE WITHOUT ESOPHAGITIS: ICD-10-CM

## 2020-04-24 RX ORDER — METOPROLOL TARTRATE 50 MG
TABLET ORAL
Qty: 180 TABLET | Refills: 0 | Status: SHIPPED | OUTPATIENT
Start: 2020-04-24 | End: 2020-07-27

## 2020-04-24 RX ORDER — NIZATIDINE 150 MG/1
CAPSULE ORAL
Qty: 90 CAPSULE | Refills: 0 | Status: SHIPPED | OUTPATIENT
Start: 2020-04-24 | End: 2020-07-27

## 2020-04-24 NOTE — TELEPHONE ENCOUNTER
Prescription approved per [facility] Refill Protocol.    BRYANNA BearN, RN  [facility]

## 2020-05-02 DIAGNOSIS — I10 HYPERTENSION, UNSPECIFIED TYPE: ICD-10-CM

## 2020-05-05 RX ORDER — HYDRALAZINE HYDROCHLORIDE 100 MG/1
TABLET, FILM COATED ORAL
Qty: 90 TABLET | Refills: 0 | Status: SHIPPED | OUTPATIENT
Start: 2020-05-05 | End: 2020-06-04

## 2020-05-05 NOTE — TELEPHONE ENCOUNTER
Prescription approved per [facility] Refill Protocol.    BRYANNA BearN, RN  [facility]

## 2020-05-26 DIAGNOSIS — I50.9 ACUTE CONGESTIVE HEART FAILURE (H): ICD-10-CM

## 2020-05-27 RX ORDER — ASPIRIN 81 MG/1
TABLET, CHEWABLE ORAL
Qty: 108 TABLET | Refills: 1 | Status: SHIPPED | OUTPATIENT
Start: 2020-05-27 | End: 2020-12-15

## 2020-05-27 NOTE — TELEPHONE ENCOUNTER
Prescription approved per [facility] Refill Protocol.    BRYANNA BearN, RN  [facility]

## 2020-06-03 DIAGNOSIS — I10 HYPERTENSION, UNSPECIFIED TYPE: ICD-10-CM

## 2020-06-04 RX ORDER — HYDRALAZINE HYDROCHLORIDE 100 MG/1
TABLET, FILM COATED ORAL
Qty: 90 TABLET | Refills: 2 | Status: SHIPPED | OUTPATIENT
Start: 2020-06-04 | End: 2020-09-24

## 2020-06-04 RX ORDER — AMLODIPINE BESYLATE 10 MG/1
TABLET ORAL
Qty: 30 TABLET | Refills: 3 | Status: SHIPPED | OUTPATIENT
Start: 2020-06-04 | End: 2020-09-24

## 2020-06-04 NOTE — TELEPHONE ENCOUNTER
"Hydralazine  Last Written Prescription Date:  05/05/2020  Last Fill Quantity: 90,  # refills: 0   Last office visit: 1/27/2020 with prescribing provider:  Travisin   Prescription approved per [facility] Refill Protocol.    Norvasc  Last Written Prescription Date:  02/06/2020  Last Fill Quantity: 30,  # refills: 3   Last office visit: 1/27/2020 with prescribing provider:  cora   Prescription approved per [facility] Refill Protocol.    Future Office Visit:  None    Requested Prescriptions   Pending Prescriptions Disp Refills     amLODIPine (NORVASC) 10 MG tablet [Pharmacy Med Name: AMLODIPINE BESYLATE 10MG TABS] 30 tablet 3     Sig: TAKE ONE TABLET BY MOUTH ONCE DAILY       Calcium Channel Blockers Protocol  Passed - 6/3/2020  7:49 AM        Passed - Blood pressure under 140/90 in past 12 months     BP Readings from Last 3 Encounters:   01/27/20 138/88   01/15/20 (!) 180/84   12/18/19 (!) 218/100           Passed - Recent (12 mo) or future (30 days) visit within the authorizing provider's specialty     Patient has had an office visit with the authorizing provider or a provider within the authorizing providers department within the previous 12 mos or has a future within next 30 days. See \"Patient Info\" tab in inbasket, or \"Choose Columns\" in Meds & Orders section of the refill encounter.          Passed - Medication is active on med list        Passed - Patient is age 18 or older        Passed - Normal serum creatinine on file in past 12 months     Recent Labs   Lab Test 01/15/20  1047   CR 1.10     Ok to refill medication if creatinine is low           hydrALAZINE (APRESOLINE) 100 MG tablet [Pharmacy Med Name: hydrALAZINE * 100MG * TAB] 90 tablet 0     Sig: TAKE ONE TABLET BY MOUTH THREE TIMES A DAY       Vasodilators Passed - 6/3/2020  7:49 AM        Passed - Most recent BP less than 140/90 on record     BP Readings from Last 3 Encounters:   01/27/20 138/88   01/15/20 (!) 180/84   12/18/19 (!) 218/100           Passed - Most " "recent encounter is not a hospital encounter. Patient has recent (12 mos) or future (1 mos) visit with authorizing provider's specialty     Patient's most recent encounter is NOT a hospital encounter and has had an office visit in the last 12 months or has a visit in the next 30 days with authorizing provider or within the authorizing provider's specialty.    See \"Patient Info\" tab in inbasket, or \"Choose Columns\" in Meds & Orders section of the refill encounter.    If most recent encounter is a hospital encounter AND the patient does NOT have an appointment scheduled with the authorizing provider or authorizing provider's specialty within the next 30 days, forward refill to authorizing provider for medication review.        Passed - Medication is active on med list        Passed - Patient is of age 18 years or older         Cassandra Cohen RN   "

## 2020-07-26 DIAGNOSIS — I50.41 ACUTE COMBINED SYSTOLIC AND DIASTOLIC CONGESTIVE HEART FAILURE (H): ICD-10-CM

## 2020-07-26 DIAGNOSIS — K21.9 GASTROESOPHAGEAL REFLUX DISEASE WITHOUT ESOPHAGITIS: ICD-10-CM

## 2020-07-26 DIAGNOSIS — I10 HYPERTENSION, UNSPECIFIED TYPE: ICD-10-CM

## 2020-07-27 RX ORDER — LISINOPRIL 40 MG/1
TABLET ORAL
Qty: 90 TABLET | Refills: 0 | Status: SHIPPED | OUTPATIENT
Start: 2020-07-27 | End: 2020-10-13

## 2020-07-27 RX ORDER — METOPROLOL TARTRATE 50 MG
TABLET ORAL
Qty: 180 TABLET | Refills: 0 | Status: SHIPPED | OUTPATIENT
Start: 2020-07-27 | End: 2020-10-14

## 2020-07-27 RX ORDER — NIZATIDINE 150 MG/1
CAPSULE ORAL
Qty: 90 CAPSULE | Refills: 0 | Status: SHIPPED | OUTPATIENT
Start: 2020-07-27 | End: 2020-09-17

## 2020-07-27 NOTE — TELEPHONE ENCOUNTER
Prescription approved per [facility] Refill Protocol.    BRYANNA BearN, RN  [facility]

## 2020-07-30 DIAGNOSIS — I10 HYPERTENSION, UNSPECIFIED TYPE: ICD-10-CM

## 2020-07-30 RX ORDER — HYDROCHLOROTHIAZIDE 25 MG/1
TABLET ORAL
Qty: 30 TABLET | Refills: 5 | Status: SHIPPED | OUTPATIENT
Start: 2020-07-30 | End: 2020-12-15

## 2020-07-30 NOTE — TELEPHONE ENCOUNTER
Prescription approved per [facility] Refill Protocol.    Cassandra Cohen RN

## 2020-07-31 DIAGNOSIS — E11.9 NEW ONSET TYPE 2 DIABETES MELLITUS (H): ICD-10-CM

## 2020-07-31 DIAGNOSIS — E11.9 DIABETES MELLITUS WITHOUT COMPLICATION (H): ICD-10-CM

## 2020-07-31 NOTE — TELEPHONE ENCOUNTER
Routing refill request to provider for review/approval because:  A break in medication, last prescribed in 2018    BRYANNA BearN, RN  [facility]

## 2020-09-17 ENCOUNTER — TELEPHONE (OUTPATIENT)
Dept: FAMILY MEDICINE | Facility: OTHER | Age: 60
End: 2020-09-17

## 2020-09-17 DIAGNOSIS — K21.9 GASTROESOPHAGEAL REFLUX DISEASE WITHOUT ESOPHAGITIS: Primary | ICD-10-CM

## 2020-09-17 RX ORDER — FAMOTIDINE 40 MG/1
40 TABLET, FILM COATED ORAL 2 TIMES DAILY
Qty: 180 TABLET | Refills: 0 | Status: SHIPPED | OUTPATIENT
Start: 2020-09-17 | End: 2020-12-15

## 2020-09-17 NOTE — TELEPHONE ENCOUNTER
Nizatidine is not available to order in right now, wondering if we could switch to Famotidine 40mg?  If you are ok with that, please send new RX to [facility], thanks.    Andra Hall-Technician  [facility]  [phone]

## 2020-09-19 DIAGNOSIS — E11.9 NEW ONSET TYPE 2 DIABETES MELLITUS (H): ICD-10-CM

## 2020-09-19 DIAGNOSIS — F43.22 ADJUSTMENT DISORDER WITH ANXIOUS MOOD: ICD-10-CM

## 2020-09-19 DIAGNOSIS — E11.9 DIABETES MELLITUS WITHOUT COMPLICATION (H): ICD-10-CM

## 2020-09-22 RX ORDER — CITALOPRAM HYDROBROMIDE 20 MG/1
TABLET ORAL
Qty: 90 TABLET | Refills: 0 | Status: SHIPPED | OUTPATIENT
Start: 2020-09-22 | End: 2020-12-15

## 2020-09-22 RX ORDER — ATORVASTATIN CALCIUM 20 MG/1
TABLET, FILM COATED ORAL
Qty: 90 TABLET | Refills: 0 | Status: SHIPPED | OUTPATIENT
Start: 2020-09-22 | End: 2020-12-15

## 2020-09-22 RX ORDER — METFORMIN HCL 500 MG
TABLET, EXTENDED RELEASE 24 HR ORAL
Qty: 180 TABLET | Refills: 0 | Status: SHIPPED | OUTPATIENT
Start: 2020-09-22 | End: 2020-12-15

## 2020-09-22 NOTE — TELEPHONE ENCOUNTER
Medication is being filled for 1 time refill only due to:  Patient needs to be seen because overdue for diabetic check.     Routing to schedulers to set up virtual appointment for patient for diabetic follow up.  Patient has been given #90 to get to appointment per triage protocol.    Luma Samuel, BRYANNAN, RN  [facility]

## 2020-09-23 DIAGNOSIS — I10 HYPERTENSION, UNSPECIFIED TYPE: ICD-10-CM

## 2020-09-23 DIAGNOSIS — E11.9 DIABETES MELLITUS WITHOUT COMPLICATION (H): ICD-10-CM

## 2020-09-24 RX ORDER — AMLODIPINE BESYLATE 10 MG/1
TABLET ORAL
Qty: 90 TABLET | Refills: 0 | Status: SHIPPED | OUTPATIENT
Start: 2020-09-24 | End: 2020-12-15

## 2020-09-24 RX ORDER — HYDRALAZINE HYDROCHLORIDE 100 MG/1
TABLET, FILM COATED ORAL
Qty: 90 TABLET | Refills: 0 | Status: SHIPPED | OUTPATIENT
Start: 2020-09-24 | End: 2020-10-13

## 2020-09-24 RX ORDER — GLIPIZIDE 5 MG/1
TABLET ORAL
Qty: 90 TABLET | Refills: 0 | Status: SHIPPED | OUTPATIENT
Start: 2020-09-24 | End: 2020-12-15

## 2020-09-24 NOTE — TELEPHONE ENCOUNTER
Medication is being filled for 1 time refill only due to:  Patient needs to be seen because overdue for diabetic follow up.     Routing to schedulers to set up appointment for patient.  Patient has been given #90 to get to appointment per triage protocol.    BRYANNA BearN, RN  [facility]

## 2020-10-03 DIAGNOSIS — E11.9 NEW ONSET TYPE 2 DIABETES MELLITUS (H): ICD-10-CM

## 2020-10-03 DIAGNOSIS — E11.9 DIABETES MELLITUS WITHOUT COMPLICATION (H): ICD-10-CM

## 2020-10-06 NOTE — TELEPHONE ENCOUNTER
Routing refill request to provider for review/approval because:  Patient needs to be seen because:  Due for diabetic follow up    BRYANNA BearN, RN  [facility]

## 2020-10-12 DIAGNOSIS — I10 HYPERTENSION, UNSPECIFIED TYPE: ICD-10-CM

## 2020-10-12 DIAGNOSIS — E11.9 NEW ONSET TYPE 2 DIABETES MELLITUS (H): ICD-10-CM

## 2020-10-12 DIAGNOSIS — I50.41 ACUTE COMBINED SYSTOLIC AND DIASTOLIC CONGESTIVE HEART FAILURE (H): ICD-10-CM

## 2020-10-13 DIAGNOSIS — I10 HYPERTENSION, UNSPECIFIED TYPE: ICD-10-CM

## 2020-10-13 RX ORDER — LISINOPRIL 40 MG/1
TABLET ORAL
Qty: 90 TABLET | Refills: 0 | Status: SHIPPED | OUTPATIENT
Start: 2020-10-13 | End: 2020-12-15

## 2020-10-13 RX ORDER — METFORMIN HCL 500 MG
TABLET, EXTENDED RELEASE 24 HR ORAL
Qty: 180 TABLET | Refills: 0 | OUTPATIENT
Start: 2020-10-13

## 2020-10-13 RX ORDER — HYDRALAZINE HYDROCHLORIDE 100 MG/1
TABLET, FILM COATED ORAL
Qty: 90 TABLET | Refills: 0 | Status: SHIPPED | OUTPATIENT
Start: 2020-10-13 | End: 2020-12-15

## 2020-10-13 NOTE — TELEPHONE ENCOUNTER
Prescription (Metformin) was sent 9/22/220 for #180 with 0 refills.  Pharmacy notified via E-Prescribe refusal.     BRYANNA BearN, RN  [facility]

## 2020-10-13 NOTE — TELEPHONE ENCOUNTER
Prescription approved per [facility] Refill Protocol (Hydralyzine, Lisinopril).    BRYANNA BearN, RN  [facility]

## 2020-10-14 RX ORDER — METOPROLOL TARTRATE 50 MG
TABLET ORAL
Qty: 180 TABLET | Refills: 0 | Status: SHIPPED | OUTPATIENT
Start: 2020-10-14 | End: 2020-12-15

## 2020-10-14 NOTE — TELEPHONE ENCOUNTER
Prescription approved per [facility] Refill Protocol.    BRYANNA BearN, RN  [facility]

## 2020-11-09 NOTE — PROVIDER NOTIFICATION
Notified MD at 0230 AM regarding lab results.      Spoke with: Dr Ventura    Orders were obtained.    Comments: blood sugar check at 0230 up to 386 post metformin at 2300 from previous value prior to metformin of 381. Will give glipizide 5 mg now.       
The patient is a 66y Male complaining of urinary retention.

## 2020-12-15 ENCOUNTER — OFFICE VISIT (OUTPATIENT)
Dept: INTERNAL MEDICINE | Facility: CLINIC | Age: 60
End: 2020-12-15
Payer: COMMERCIAL

## 2020-12-15 VITALS
WEIGHT: 176.31 LBS | HEART RATE: 83 BPM | DIASTOLIC BLOOD PRESSURE: 90 MMHG | OXYGEN SATURATION: 97 % | TEMPERATURE: 98.1 F | HEIGHT: 70 IN | SYSTOLIC BLOOD PRESSURE: 156 MMHG | BODY MASS INDEX: 25.24 KG/M2 | RESPIRATION RATE: 20 BRPM

## 2020-12-15 DIAGNOSIS — I10 HYPERTENSION, UNSPECIFIED TYPE: ICD-10-CM

## 2020-12-15 DIAGNOSIS — E11.9 NEW ONSET TYPE 2 DIABETES MELLITUS (H): ICD-10-CM

## 2020-12-15 DIAGNOSIS — E78.5 HYPERLIPIDEMIA LDL GOAL <100: ICD-10-CM

## 2020-12-15 DIAGNOSIS — N52.8 OTHER MALE ERECTILE DYSFUNCTION: ICD-10-CM

## 2020-12-15 DIAGNOSIS — E11.9 DIABETES MELLITUS WITHOUT COMPLICATION (H): ICD-10-CM

## 2020-12-15 DIAGNOSIS — K21.9 GASTROESOPHAGEAL REFLUX DISEASE WITHOUT ESOPHAGITIS: ICD-10-CM

## 2020-12-15 DIAGNOSIS — Z00.00 ROUTINE GENERAL MEDICAL EXAMINATION AT A HEALTH CARE FACILITY: Primary | ICD-10-CM

## 2020-12-15 DIAGNOSIS — F43.22 ADJUSTMENT DISORDER WITH ANXIOUS MOOD: ICD-10-CM

## 2020-12-15 DIAGNOSIS — Z12.11 SCREEN FOR COLON CANCER: ICD-10-CM

## 2020-12-15 DIAGNOSIS — I50.41 ACUTE COMBINED SYSTOLIC AND DIASTOLIC CONGESTIVE HEART FAILURE (H): ICD-10-CM

## 2020-12-15 LAB
ALBUMIN SERPL-MCNC: 4.3 G/DL (ref 3.4–5)
ALP SERPL-CCNC: 64 U/L (ref 40–150)
ALT SERPL W P-5'-P-CCNC: 21 U/L (ref 0–70)
ANION GAP SERPL CALCULATED.3IONS-SCNC: 5 MMOL/L (ref 3–14)
AST SERPL W P-5'-P-CCNC: 18 U/L (ref 0–45)
BASOPHILS # BLD AUTO: 0.1 10E9/L (ref 0–0.2)
BASOPHILS NFR BLD AUTO: 0.6 %
BILIRUB DIRECT SERPL-MCNC: 0.3 MG/DL (ref 0–0.2)
BILIRUB SERPL-MCNC: 1.2 MG/DL (ref 0.2–1.3)
BUN SERPL-MCNC: 19 MG/DL (ref 7–30)
CALCIUM SERPL-MCNC: 9.2 MG/DL (ref 8.5–10.1)
CHLORIDE SERPL-SCNC: 97 MMOL/L (ref 94–109)
CHOLEST SERPL-MCNC: 122 MG/DL
CO2 SERPL-SCNC: 28 MMOL/L (ref 20–32)
CREAT SERPL-MCNC: 0.9 MG/DL (ref 0.66–1.25)
DIFFERENTIAL METHOD BLD: ABNORMAL
EOSINOPHIL NFR BLD AUTO: 2.2 %
ERYTHROCYTE [DISTWIDTH] IN BLOOD BY AUTOMATED COUNT: 12.3 % (ref 10–15)
GFR SERPL CREATININE-BSD FRML MDRD: >90 ML/MIN/{1.73_M2}
GLUCOSE SERPL-MCNC: 152 MG/DL (ref 70–99)
HBA1C MFR BLD: 5.3 % (ref 0–5.6)
HCT VFR BLD AUTO: 34.3 % (ref 40–53)
HDLC SERPL-MCNC: 61 MG/DL
HGB BLD-MCNC: 11.8 G/DL (ref 13.3–17.7)
IMM GRANULOCYTES # BLD: 0 10E9/L (ref 0–0.4)
IMM GRANULOCYTES NFR BLD: 0.4 %
LDLC SERPL CALC-MCNC: 49 MG/DL
LYMPHOCYTES # BLD AUTO: 0.8 10E9/L (ref 0.8–5.3)
LYMPHOCYTES NFR BLD AUTO: 9.3 %
MCH RBC QN AUTO: 30.9 PG (ref 26.5–33)
MCHC RBC AUTO-ENTMCNC: 34.4 G/DL (ref 31.5–36.5)
MCV RBC AUTO: 90 FL (ref 78–100)
MONOCYTES # BLD AUTO: 0.9 10E9/L (ref 0–1.3)
MONOCYTES NFR BLD AUTO: 10.7 %
NEUTROPHILS # BLD AUTO: 6.3 10E9/L (ref 1.6–8.3)
NEUTROPHILS NFR BLD AUTO: 76.8 %
NONHDLC SERPL-MCNC: 61 MG/DL
NRBC # BLD AUTO: 0 10*3/UL
NRBC BLD AUTO-RTO: 0 /100
PLATELET # BLD AUTO: 189 10E9/L (ref 150–450)
POTASSIUM SERPL-SCNC: 4.5 MMOL/L (ref 3.4–5.3)
PROT SERPL-MCNC: 7.5 G/DL (ref 6.8–8.8)
RBC # BLD AUTO: 3.82 10E12/L (ref 4.4–5.9)
SODIUM SERPL-SCNC: 130 MMOL/L (ref 133–144)
TRIGL SERPL-MCNC: 61 MG/DL
WBC # BLD AUTO: 8.1 10E9/L (ref 4–11)

## 2020-12-15 PROCEDURE — 80048 BASIC METABOLIC PNL TOTAL CA: CPT | Performed by: INTERNAL MEDICINE

## 2020-12-15 PROCEDURE — 85025 COMPLETE CBC W/AUTO DIFF WBC: CPT | Performed by: INTERNAL MEDICINE

## 2020-12-15 PROCEDURE — 83036 HEMOGLOBIN GLYCOSYLATED A1C: CPT | Performed by: INTERNAL MEDICINE

## 2020-12-15 PROCEDURE — 80061 LIPID PANEL: CPT | Performed by: INTERNAL MEDICINE

## 2020-12-15 PROCEDURE — 80076 HEPATIC FUNCTION PANEL: CPT | Performed by: INTERNAL MEDICINE

## 2020-12-15 PROCEDURE — 99396 PREV VISIT EST AGE 40-64: CPT | Performed by: INTERNAL MEDICINE

## 2020-12-15 PROCEDURE — 36415 COLL VENOUS BLD VENIPUNCTURE: CPT | Performed by: INTERNAL MEDICINE

## 2020-12-15 RX ORDER — ATORVASTATIN CALCIUM 20 MG/1
20 TABLET, FILM COATED ORAL DAILY
Qty: 90 TABLET | Refills: 3 | Status: SHIPPED | OUTPATIENT
Start: 2020-12-15 | End: 2021-12-08

## 2020-12-15 RX ORDER — INFLUENZA A VIRUS A/HAWAII/70/2019 (H1N1) RECOMBINANT HEMAGGLUTININ ANTIGEN, INFLUENZA A VIRUS A/MINNESOTA/41/2019 (H3N2) RECOMBINANT HEMAGGLUTININ ANTIGEN, INFLUENZA B VIRUS B/WASHINGTON/02/2019 RECOMBINANT HEMAGGLUTININ ANTIGEN, AND INFLUENZA B VIRUS B/PHUKET/3073/2013 RECOMBINANT HEMAGGLUTININ ANTIGEN 45; 45; 45; 45 UG/.5ML; UG/.5ML; UG/.5ML; UG/.5ML
INJECTION INTRAMUSCULAR
COMMUNITY
Start: 2020-09-05 | End: 2023-10-05

## 2020-12-15 RX ORDER — METFORMIN HCL 500 MG
TABLET, EXTENDED RELEASE 24 HR ORAL
Qty: 180 TABLET | Refills: 3 | Status: SHIPPED | OUTPATIENT
Start: 2020-12-15 | End: 2021-12-10

## 2020-12-15 RX ORDER — LISINOPRIL 40 MG/1
40 TABLET ORAL DAILY
Qty: 90 TABLET | Refills: 3 | Status: SHIPPED | OUTPATIENT
Start: 2020-12-15 | End: 2022-01-12

## 2020-12-15 RX ORDER — GLIPIZIDE 5 MG/1
5 TABLET ORAL EVERY MORNING
Qty: 90 TABLET | Refills: 3 | Status: SHIPPED | OUTPATIENT
Start: 2020-12-15 | End: 2021-12-10

## 2020-12-15 RX ORDER — FAMOTIDINE 40 MG/1
40 TABLET, FILM COATED ORAL 2 TIMES DAILY
Qty: 180 TABLET | Refills: 0 | Status: SHIPPED | OUTPATIENT
Start: 2020-12-15 | End: 2021-04-19

## 2020-12-15 RX ORDER — AMLODIPINE BESYLATE 10 MG/1
10 TABLET ORAL DAILY
Qty: 90 TABLET | Refills: 3 | Status: SHIPPED | OUTPATIENT
Start: 2020-12-15 | End: 2021-12-10

## 2020-12-15 RX ORDER — ZOSTER VACCINE RECOMBINANT, ADJUVANTED 50 MCG/0.5
KIT INTRAMUSCULAR
COMMUNITY
Start: 2020-11-12 | End: 2023-10-05

## 2020-12-15 RX ORDER — HYDROCHLOROTHIAZIDE 25 MG/1
25 TABLET ORAL DAILY
Qty: 90 TABLET | Refills: 3 | Status: SHIPPED | OUTPATIENT
Start: 2020-12-15 | End: 2022-02-01

## 2020-12-15 RX ORDER — CITALOPRAM HYDROBROMIDE 20 MG/1
20 TABLET ORAL DAILY
Qty: 90 TABLET | Refills: 3 | Status: SHIPPED | OUTPATIENT
Start: 2020-12-15 | End: 2021-12-08

## 2020-12-15 RX ORDER — METOPROLOL TARTRATE 50 MG
50 TABLET ORAL 2 TIMES DAILY
Qty: 180 TABLET | Refills: 3 | Status: SHIPPED | OUTPATIENT
Start: 2020-12-15 | End: 2022-01-12

## 2020-12-15 RX ORDER — SILDENAFIL CITRATE 20 MG/1
TABLET ORAL
Qty: 30 TABLET | Refills: 1 | Status: SHIPPED | OUTPATIENT
Start: 2020-12-15 | End: 2022-02-16

## 2020-12-15 RX ORDER — NIZATIDINE 150 MG/1
CAPSULE ORAL
COMMUNITY
Start: 2020-07-27 | End: 2023-08-09 | Stop reason: ALTCHOICE

## 2020-12-15 RX ORDER — ASPIRIN 81 MG/1
TABLET, CHEWABLE ORAL
Qty: 108 TABLET | Refills: 3 | Status: SHIPPED | OUTPATIENT
Start: 2020-12-15 | End: 2024-04-25

## 2020-12-15 RX ORDER — HYDRALAZINE HYDROCHLORIDE 100 MG/1
TABLET, FILM COATED ORAL
Qty: 90 TABLET | Refills: 3 | Status: SHIPPED | OUTPATIENT
Start: 2020-12-15 | End: 2021-06-18

## 2020-12-15 ASSESSMENT — MIFFLIN-ST. JEOR: SCORE: 1606

## 2020-12-15 ASSESSMENT — PAIN SCALES - GENERAL: PAINLEVEL: NO PAIN (0)
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
## 2020-12-16 RX ORDER — GLIPIZIDE 5 MG/1
TABLET ORAL
Qty: 90 TABLET | Refills: 1 | OUTPATIENT
Start: 2020-12-16

## 2020-12-16 RX ORDER — HYDRALAZINE HYDROCHLORIDE 100 MG/1
TABLET, FILM COATED ORAL
Qty: 90 TABLET | Refills: 0 | OUTPATIENT
Start: 2020-12-16

## 2020-12-16 RX ORDER — ATORVASTATIN CALCIUM 20 MG/1
TABLET, FILM COATED ORAL
Qty: 90 TABLET | Refills: 0 | OUTPATIENT
Start: 2020-12-16

## 2020-12-16 RX ORDER — CITALOPRAM HYDROBROMIDE 20 MG/1
TABLET ORAL
Qty: 90 TABLET | Refills: 0 | OUTPATIENT
Start: 2020-12-16

## 2020-12-16 RX ORDER — AMLODIPINE BESYLATE 10 MG/1
TABLET ORAL
Qty: 90 TABLET | Refills: 0 | OUTPATIENT
Start: 2020-12-16

## 2020-12-16 RX ORDER — METFORMIN HCL 500 MG
TABLET, EXTENDED RELEASE 24 HR ORAL
Qty: 180 TABLET | Refills: 0 | OUTPATIENT
Start: 2020-12-16

## 2020-12-16 RX ORDER — LANCETS
EACH MISCELLANEOUS
Qty: 100 EACH | Refills: 0 | OUTPATIENT
Start: 2020-12-16

## 2020-12-16 NOTE — TELEPHONE ENCOUNTER
All were refilled on 12/15/2020.  Pharmacy notified via E-Prescribe refusal.     BRYANNA BearN, RN  [facility]

## 2020-12-17 ENCOUNTER — TELEPHONE (OUTPATIENT)
Dept: INTERNAL MEDICINE | Facility: CLINIC | Age: 60
End: 2020-12-17

## 2020-12-17 NOTE — TELEPHONE ENCOUNTER
----- Message from Mark Matson DO sent at 12/17/2020  7:52 AM CST -----  Dear Sushil, your recent test results are attached.    The hemoglobin A1c is well controlled at 5.3 suggesting excellent blood sugar control.  The cholesterol is well controlled with an LDL of 49.  Liver tests are all essentially within normal limits.  The chemistry panel shows a slightly elevated blood sugar of 152.  The sodium is minimally decreased to 130 and should be rechecked in a month.  Blood cell count shows a slight anemia with a hemoglobin of 11.8.  This too should be checked in a month including Hemoccult testing and iron studies.    You will be contacted with any outstanding results as they are available.  Feel free to contact me via the office or My Chart if you have any questions regarding the above.

## 2020-12-17 NOTE — RESULT ENCOUNTER NOTE
Dear Sushil, your recent test results are attached.    The hemoglobin A1c is well controlled at 5.3 suggesting excellent blood sugar control.  The cholesterol is well controlled with an LDL of 49.  Liver tests are all essentially within normal limits.  The chemistry panel shows a slightly elevated blood sugar of 152.  The sodium is minimally decreased to 130 and should be rechecked in a month.  Blood cell count shows a slight anemia with a hemoglobin of 11.8.  This too should be checked in a month including Hemoccult testing and iron studies.    You will be contacted with any outstanding results as they are available.  Feel free to contact me via the office or My Chart if you have any questions regarding the above.

## 2020-12-17 NOTE — TELEPHONE ENCOUNTER
Patient was informed that his hemoglobin A1c is well controlled at 5.3 suggesting excellent blood sugar control.  The cholesterol is well controlled with an LDL of 49.  Liver tests are all essentially within normal limits.  The chemistry panel shows a slightly elevated blood sugar of 152.  The sodium is minimally decreased to 130 and should be rechecked in a month. His blood cell count shows a slight anemia with a hemoglobin of 11.8.  This too should be checked in a month including Hemoccult testing and iron studies.      You will be contacted with any outstanding results as they are available.  Feel free to contact Dr. Matson via the office or My Chart if you have any questions regarding the above.    Priti Ruffin, CMA

## 2021-03-09 DIAGNOSIS — E11.9 DIABETES MELLITUS WITHOUT COMPLICATION (H): ICD-10-CM

## 2021-03-09 DIAGNOSIS — I10 HYPERTENSION, UNSPECIFIED TYPE: ICD-10-CM

## 2021-03-12 ENCOUNTER — TELEPHONE (OUTPATIENT)
Dept: INTERNAL MEDICINE | Facility: CLINIC | Age: 61
End: 2021-03-12

## 2021-03-17 NOTE — TELEPHONE ENCOUNTER
Form is signed by provider -patient has not signed. Left message to return call to clinic- does he want to come in and sign or have us mail back to him?? Form in providers bin.   Josie Alvarez MA on 3/17/2021 at 2:52 PM

## 2021-03-31 ENCOUNTER — TRANSFERRED RECORDS (OUTPATIENT)
Dept: HEALTH INFORMATION MANAGEMENT | Facility: CLINIC | Age: 61
End: 2021-03-31

## 2021-03-31 ENCOUNTER — IMMUNIZATION (OUTPATIENT)
Dept: FAMILY MEDICINE | Facility: CLINIC | Age: 61
End: 2021-03-31
Payer: COMMERCIAL

## 2021-03-31 LAB — RETINOPATHY: POSITIVE

## 2021-03-31 PROCEDURE — 0011A PR COVID VAC MODERNA 100 MCG/0.5 ML IM: CPT

## 2021-03-31 PROCEDURE — 91301 PR COVID VAC MODERNA 100 MCG/0.5 ML IM: CPT

## 2021-04-16 DIAGNOSIS — K21.9 GASTROESOPHAGEAL REFLUX DISEASE WITHOUT ESOPHAGITIS: ICD-10-CM

## 2021-04-19 RX ORDER — FAMOTIDINE 40 MG/1
40 TABLET, FILM COATED ORAL 2 TIMES DAILY
Qty: 180 TABLET | Refills: 0 | Status: SHIPPED | OUTPATIENT
Start: 2021-04-19 | End: 2021-07-16

## 2021-04-28 ENCOUNTER — IMMUNIZATION (OUTPATIENT)
Dept: FAMILY MEDICINE | Facility: CLINIC | Age: 61
End: 2021-04-28
Attending: FAMILY MEDICINE
Payer: COMMERCIAL

## 2021-04-28 PROCEDURE — 0012A PR COVID VAC MODERNA 100 MCG/0.5 ML IM: CPT

## 2021-04-28 PROCEDURE — 91301 PR COVID VAC MODERNA 100 MCG/0.5 ML IM: CPT

## 2021-06-17 DIAGNOSIS — I10 HYPERTENSION, UNSPECIFIED TYPE: ICD-10-CM

## 2021-06-18 RX ORDER — HYDRALAZINE HYDROCHLORIDE 100 MG/1
TABLET, FILM COATED ORAL
Qty: 90 TABLET | Refills: 3 | Status: SHIPPED | OUTPATIENT
Start: 2021-06-18 | End: 2021-12-10

## 2021-06-18 NOTE — TELEPHONE ENCOUNTER
Routing refill request to provider for review/approval because:  Elevated BP on file.     Cassandra Cohen RN

## 2021-06-20 DIAGNOSIS — E11.9 DIABETES MELLITUS WITHOUT COMPLICATION (H): ICD-10-CM

## 2021-06-22 RX ORDER — LANCETS
EACH MISCELLANEOUS
Qty: 100 EACH | Refills: 0 | Status: SHIPPED | OUTPATIENT
Start: 2021-06-22 | End: 2023-02-03

## 2021-06-22 NOTE — TELEPHONE ENCOUNTER
Prescription approved per [facility] Refill Protocol.  Carolyn Michaels RN

## 2021-07-16 DIAGNOSIS — K21.9 GASTROESOPHAGEAL REFLUX DISEASE WITHOUT ESOPHAGITIS: ICD-10-CM

## 2021-07-16 RX ORDER — FAMOTIDINE 40 MG/1
40 TABLET, FILM COATED ORAL 2 TIMES DAILY
Qty: 180 TABLET | Refills: 1 | Status: SHIPPED | OUTPATIENT
Start: 2021-07-16 | End: 2022-02-16

## 2021-10-16 DIAGNOSIS — E11.9 DIABETES MELLITUS WITHOUT COMPLICATION (H): ICD-10-CM

## 2021-10-20 RX ORDER — ATORVASTATIN CALCIUM 20 MG/1
TABLET, FILM COATED ORAL
Qty: 90 TABLET | Refills: 3 | OUTPATIENT
Start: 2021-10-20

## 2021-10-20 NOTE — TELEPHONE ENCOUNTER
Prescription was sent 12/15/2020 for #90 with 3 refills.  Pharmacy notified via E-Prescribe refusal.     BRYANNA BearN, RN  [facility]

## 2021-12-06 DIAGNOSIS — E11.9 DIABETES MELLITUS WITHOUT COMPLICATION (H): ICD-10-CM

## 2021-12-06 DIAGNOSIS — F43.22 ADJUSTMENT DISORDER WITH ANXIOUS MOOD: ICD-10-CM

## 2021-12-08 DIAGNOSIS — I10 HYPERTENSION, UNSPECIFIED TYPE: ICD-10-CM

## 2021-12-08 DIAGNOSIS — E11.9 DIABETES MELLITUS WITHOUT COMPLICATION (H): ICD-10-CM

## 2021-12-08 RX ORDER — CITALOPRAM HYDROBROMIDE 20 MG/1
TABLET ORAL
Qty: 30 TABLET | Refills: 0 | Status: SHIPPED | OUTPATIENT
Start: 2021-12-08 | End: 2021-12-14

## 2021-12-08 RX ORDER — ATORVASTATIN CALCIUM 20 MG/1
TABLET, FILM COATED ORAL
Qty: 30 TABLET | Refills: 0 | Status: SHIPPED | OUTPATIENT
Start: 2021-12-08 | End: 2021-12-14

## 2021-12-10 RX ORDER — GLIPIZIDE 5 MG/1
TABLET ORAL
Qty: 90 TABLET | Refills: 3 | Status: SHIPPED | OUTPATIENT
Start: 2021-12-10 | End: 2022-12-06

## 2021-12-10 RX ORDER — METFORMIN HCL 500 MG
TABLET, EXTENDED RELEASE 24 HR ORAL
Qty: 180 TABLET | Refills: 3 | Status: SHIPPED | OUTPATIENT
Start: 2021-12-10 | End: 2022-12-06

## 2021-12-10 RX ORDER — AMLODIPINE BESYLATE 10 MG/1
TABLET ORAL
Qty: 90 TABLET | Refills: 3 | Status: SHIPPED | OUTPATIENT
Start: 2021-12-10 | End: 2022-12-06

## 2021-12-10 RX ORDER — HYDRALAZINE HYDROCHLORIDE 100 MG/1
TABLET, FILM COATED ORAL
Qty: 90 TABLET | Refills: 3 | Status: SHIPPED | OUTPATIENT
Start: 2021-12-10 | End: 2022-06-07

## 2021-12-10 NOTE — TELEPHONE ENCOUNTER
Metformin  Routing refill request to provider for review/approval because:  Labs not current:  A1C  Patient needs to be seen because:  6 month diabetic visit    Norvasc  Routing refill request to provider for review/approval because:  BP out of range    Glipizide  Routing refill request to provider for review/approval because:  Patient needs to be seen because:  6 month diabetic visit    Hydralazine  Routing refill request to provider for review/approval because:  BP out of range      Layla Escalona RN

## 2021-12-14 NOTE — TELEPHONE ENCOUNTER
Wife is calling to report she needs a 90 day refill for these medications as it will take more than 1 month to get in for an appointment for patient's yearly exam.    She states the rest of his refills were filled for 1 year.  She will schedule a yearly exam.

## 2021-12-15 RX ORDER — CITALOPRAM HYDROBROMIDE 20 MG/1
20 TABLET ORAL DAILY
Qty: 90 TABLET | Refills: 0 | Status: SHIPPED | OUTPATIENT
Start: 2021-12-15 | End: 2022-03-30

## 2021-12-15 RX ORDER — ATORVASTATIN CALCIUM 20 MG/1
20 TABLET, FILM COATED ORAL DAILY
Qty: 90 TABLET | Refills: 0 | Status: SHIPPED | OUTPATIENT
Start: 2021-12-15 | End: 2022-03-30

## 2022-01-10 DIAGNOSIS — I50.41 ACUTE COMBINED SYSTOLIC AND DIASTOLIC CONGESTIVE HEART FAILURE (H): ICD-10-CM

## 2022-01-10 DIAGNOSIS — I10 HYPERTENSION, UNSPECIFIED TYPE: ICD-10-CM

## 2022-01-10 NOTE — LETTER
[address] [address]  Phone: [phone]        January 14, 2022      Sushil Diana                                                                                                                                [address] [address] [place] [id number]            Dear Mr. Diana,    We are concerned about your health care.  We recently provided you with a medication refill.  Many medications require routine follow-up with your Doctor.      At this time we ask that: You schedule an appointment for your annual physical. Please call the clinic at [phone] option 1 to schedule.     Your prescription:( Lisinopril/ Lopressor)  Has been refilled for 1 time so you may have time for the above noted follow-up.      Thank you,      Mark Matson DO   Care Team

## 2022-01-12 RX ORDER — LISINOPRIL 40 MG/1
40 TABLET ORAL DAILY
Qty: 30 TABLET | Refills: 0 | Status: SHIPPED | OUTPATIENT
Start: 2022-01-12 | End: 2022-02-17

## 2022-01-12 RX ORDER — METOPROLOL TARTRATE 50 MG
50 TABLET ORAL 2 TIMES DAILY
Qty: 30 TABLET | Refills: 0 | Status: SHIPPED | OUTPATIENT
Start: 2022-01-12 | End: 2022-02-01

## 2022-01-12 NOTE — TELEPHONE ENCOUNTER
Lisinopril  Routing refill request to provider for review/approval because:  A break in medication - 1 year  Labs not current: CR, potassium  Patient needs to be seen because it has been more than 1 year since last office visit.  BP failed RN refill protocol    Lopressor  Routing refill request to provider for review/approval because:  A break in medication - 1 year  Patient needs to be seen because it has been more than 1 year since last office visit.  BP failed RN refill protocol    Sending to scheduling for yearly office visit due    Arleth Hidalgo RN

## 2022-01-13 NOTE — TELEPHONE ENCOUNTER
Attempted to reach patient, unable to leave message. Please help schedule yearly appointment.   Linda Rosas MA

## 2022-01-28 DIAGNOSIS — I10 HYPERTENSION, UNSPECIFIED TYPE: ICD-10-CM

## 2022-01-31 NOTE — TELEPHONE ENCOUNTER
HCTZ  Routing refill request to provider for review/approval because:  Labs not current:  CRE, K+, Na+  BP elevated    Metoprolol  Routing refill request to provider for review/approval because:  BP elevated      Layla EscalonaRN

## 2022-02-01 RX ORDER — METOPROLOL TARTRATE 50 MG
50 TABLET ORAL 2 TIMES DAILY
Qty: 30 TABLET | Refills: 0 | Status: SHIPPED | OUTPATIENT
Start: 2022-02-01 | End: 2022-02-16

## 2022-02-01 RX ORDER — HYDROCHLOROTHIAZIDE 25 MG/1
25 TABLET ORAL DAILY
Qty: 90 TABLET | Refills: 3 | Status: SHIPPED | OUTPATIENT
Start: 2022-02-01 | End: 2023-02-03

## 2022-02-16 ENCOUNTER — OFFICE VISIT (OUTPATIENT)
Dept: INTERNAL MEDICINE | Facility: CLINIC | Age: 62
End: 2022-02-16
Payer: COMMERCIAL

## 2022-02-16 VITALS
RESPIRATION RATE: 10 BRPM | OXYGEN SATURATION: 98 % | TEMPERATURE: 98.7 F | BODY MASS INDEX: 25.77 KG/M2 | HEART RATE: 78 BPM | WEIGHT: 174 LBS | HEIGHT: 69 IN | SYSTOLIC BLOOD PRESSURE: 140 MMHG | DIASTOLIC BLOOD PRESSURE: 70 MMHG

## 2022-02-16 DIAGNOSIS — I50.41 ACUTE COMBINED SYSTOLIC AND DIASTOLIC CONGESTIVE HEART FAILURE (H): ICD-10-CM

## 2022-02-16 DIAGNOSIS — Z12.11 SCREEN FOR COLON CANCER: ICD-10-CM

## 2022-02-16 DIAGNOSIS — K21.9 GASTROESOPHAGEAL REFLUX DISEASE WITHOUT ESOPHAGITIS: ICD-10-CM

## 2022-02-16 DIAGNOSIS — Z13.220 SCREENING FOR HYPERLIPIDEMIA: ICD-10-CM

## 2022-02-16 DIAGNOSIS — Z00.00 ROUTINE GENERAL MEDICAL EXAMINATION AT A HEALTH CARE FACILITY: ICD-10-CM

## 2022-02-16 DIAGNOSIS — I10 HYPERTENSION, UNSPECIFIED TYPE: ICD-10-CM

## 2022-02-16 DIAGNOSIS — E11.65 TYPE 2 DIABETES MELLITUS WITH HYPERGLYCEMIA, WITHOUT LONG-TERM CURRENT USE OF INSULIN (H): ICD-10-CM

## 2022-02-16 DIAGNOSIS — Z12.5 SCREENING FOR PROSTATE CANCER: ICD-10-CM

## 2022-02-16 DIAGNOSIS — N18.31 CHRONIC KIDNEY DISEASE, STAGE 3A (H): Primary | ICD-10-CM

## 2022-02-16 LAB
ALT SERPL W P-5'-P-CCNC: 25 U/L (ref 0–70)
ANION GAP SERPL CALCULATED.3IONS-SCNC: 5 MMOL/L (ref 3–14)
BUN SERPL-MCNC: 19 MG/DL (ref 7–30)
CALCIUM SERPL-MCNC: 9.9 MG/DL (ref 8.5–10.1)
CHLORIDE BLD-SCNC: 101 MMOL/L (ref 94–109)
CHOLEST SERPL-MCNC: 125 MG/DL
CO2 SERPL-SCNC: 27 MMOL/L (ref 20–32)
CREAT SERPL-MCNC: 1.08 MG/DL (ref 0.66–1.25)
CREAT UR-MCNC: 102 MG/DL
ERYTHROCYTE [DISTWIDTH] IN BLOOD BY AUTOMATED COUNT: 12.5 % (ref 10–15)
FASTING STATUS PATIENT QL REPORTED: YES
GFR SERPL CREATININE-BSD FRML MDRD: 77 ML/MIN/1.73M2
GLUCOSE BLD-MCNC: 115 MG/DL (ref 70–99)
HBA1C MFR BLD: 5.3 % (ref 0–5.6)
HCT VFR BLD AUTO: 36.3 % (ref 40–53)
HDLC SERPL-MCNC: 67 MG/DL
HGB BLD-MCNC: 12.6 G/DL (ref 13.3–17.7)
LDLC SERPL CALC-MCNC: 48 MG/DL
MCH RBC QN AUTO: 31.4 PG (ref 26.5–33)
MCHC RBC AUTO-ENTMCNC: 34.7 G/DL (ref 31.5–36.5)
MCV RBC AUTO: 91 FL (ref 78–100)
MICROALBUMIN UR-MCNC: 40 MG/L
MICROALBUMIN/CREAT UR: 39.22 MG/G CR (ref 0–17)
NONHDLC SERPL-MCNC: 58 MG/DL
PLATELET # BLD AUTO: 236 10E3/UL (ref 150–450)
POTASSIUM BLD-SCNC: 4.3 MMOL/L (ref 3.4–5.3)
RBC # BLD AUTO: 4.01 10E6/UL (ref 4.4–5.9)
SODIUM SERPL-SCNC: 133 MMOL/L (ref 133–144)
TRIGL SERPL-MCNC: 52 MG/DL
WBC # BLD AUTO: 8.1 10E3/UL (ref 4–11)

## 2022-02-16 PROCEDURE — 84460 ALANINE AMINO (ALT) (SGPT): CPT | Performed by: INTERNAL MEDICINE

## 2022-02-16 PROCEDURE — 85027 COMPLETE CBC AUTOMATED: CPT | Performed by: INTERNAL MEDICINE

## 2022-02-16 PROCEDURE — 36415 COLL VENOUS BLD VENIPUNCTURE: CPT | Performed by: INTERNAL MEDICINE

## 2022-02-16 PROCEDURE — 80061 LIPID PANEL: CPT | Performed by: INTERNAL MEDICINE

## 2022-02-16 PROCEDURE — 83036 HEMOGLOBIN GLYCOSYLATED A1C: CPT | Performed by: INTERNAL MEDICINE

## 2022-02-16 PROCEDURE — 99396 PREV VISIT EST AGE 40-64: CPT | Performed by: INTERNAL MEDICINE

## 2022-02-16 PROCEDURE — 82043 UR ALBUMIN QUANTITATIVE: CPT | Performed by: INTERNAL MEDICINE

## 2022-02-16 PROCEDURE — 80048 BASIC METABOLIC PNL TOTAL CA: CPT | Performed by: INTERNAL MEDICINE

## 2022-02-16 RX ORDER — FAMOTIDINE 40 MG/1
40 TABLET, FILM COATED ORAL 2 TIMES DAILY
Qty: 180 TABLET | Refills: 1 | Status: SHIPPED | OUTPATIENT
Start: 2022-02-16 | End: 2022-09-07

## 2022-02-16 RX ORDER — METOPROLOL TARTRATE 50 MG
50 TABLET ORAL 2 TIMES DAILY
Qty: 180 TABLET | Refills: 3 | Status: SHIPPED | OUTPATIENT
Start: 2022-02-16 | End: 2023-02-03
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
## 2022-02-16 NOTE — LETTER
March 1, 2022      Sushil Diana  [id number] [address] [address]        Dear ,    We are writing to inform you of your test results.    There was no blood in stool sample tested.   Microalbumin is minimally elevated improved over previous values..     You will be contacted with any outstanding results as they are available.   Feel free to contact me via the office or My Chart if you have any questions regarding the above.     maria eugenia Matson DO     Resulted Orders   CBC with Platelets   Result Value Ref Range    WBC Count 8.1 4.0 - 11.0 10e3/uL    RBC Count 4.01 (L) 4.40 - 5.90 10e6/uL    Hemoglobin 12.6 (L) 13.3 - 17.7 g/dL    Hematocrit 36.3 (L) 40.0 - 53.0 %    MCV 91 78 - 100 fL    MCH 31.4 26.5 - 33.0 pg    MCHC 34.7 31.5 - 36.5 g/dL    RDW 12.5 10.0 - 15.0 %    Platelet Count 236 150 - 450 10e3/uL   Lipid panel reflex to direct LDL Fasting   Result Value Ref Range    Cholesterol 125 <200 mg/dL    Triglycerides 52 <150 mg/dL    Direct Measure HDL 67 >=40 mg/dL    LDL Cholesterol Calculated 48 <=100 mg/dL    Non HDL Cholesterol 58 <130 mg/dL    Patient Fasting > 8hrs? Yes     Narrative    Cholesterol  Desirable:  <200 mg/dL    Triglycerides  Normal:  Less than 150 mg/dL  Borderline High:  150-199 mg/dL  High:  200-499 mg/dL  Very High:  Greater than or equal to 500 mg/dL    Direct Measure HDL  Female:  Greater than or equal to 50 mg/dL   Male:  Greater than or equal to 40 mg/dL    LDL Cholesterol  Desirable:  <100mg/dL  Above Desirable:  100-129 mg/dL   Borderline High:  130-159 mg/dL   High:  160-189 mg/dL   Very High:  >= 190 mg/dL    Non HDL Cholesterol  Desirable:  130 mg/dL  Above Desirable:  130-159 mg/dL  Borderline High:  160-189 mg/dL  High:  190-219 mg/dL  Very High:  Greater than or equal to 220 mg/dL   ALT   Result Value Ref Range    ALT 25 0 - 70 U/L   BASIC METABOLIC PANEL   Result Value Ref Range    Sodium 133 133 - 144 mmol/L    Potassium 4.3 3.4 - 5.3 mmol/L    Chloride 101  94 - 109 mmol/L    Carbon Dioxide (CO2) 27 20 - 32 mmol/L    Anion Gap 5 3 - 14 mmol/L    Urea Nitrogen 19 7 - 30 mg/dL    Creatinine 1.08 0.66 - 1.25 mg/dL    Calcium 9.9 8.5 - 10.1 mg/dL    Glucose 115 (H) 70 - 99 mg/dL    GFR Estimate 77 >60 mL/min/1.73m2      Comment:      Effective December 21, 2021 eGFRcr in adults is calculated using the 2021 CKD-EPI creatinine equation which includes age and gender (Camryn et al., NEJ, DOI: 10.1056/NAWUps7472718)   HEMOGLOBIN A1C   Result Value Ref Range    Hemoglobin A1C 5.3 0.0 - 5.6 %      Comment:      Normal <5.7%   Prediabetes 5.7-6.4%    Diabetes 6.5% or higher     Note: Adopted from ADA consensus guidelines.   Albumin Random Urine Quantitative with Creat Ratio   Result Value Ref Range    Creatinine Urine mg/dL 102 mg/dL    Albumin Urine mg/L 40 mg/L    Albumin Urine mg/g Cr 39.22 (H) 0.00 - 17.00 mg/g Cr   Fecal colorectal cancer screen FIT - Future (S+30)   Result Value Ref Range    Occult Blood Screen FIT Negative Negative       If you have any questions or concerns, please call the clinic at the number listed above.

## 2022-02-17 NOTE — TELEPHONE ENCOUNTER
Pending Prescriptions:                       Disp   Refills    lisinopril (ZESTRIL) 40 MG tablet [Pharmac*90 tab*1        Sig: TAKE 1 TABLET (40 MG) BY MOUTH DAILY    Routing refill request to provider for review/approval because:  Labs out of range:    BP Readings from Last 3 Encounters:   02/16/22 (!) 140/70   12/15/20 (!) 156/90   01/27/20 138/88

## 2022-02-18 PROCEDURE — 82274 ASSAY TEST FOR BLOOD FECAL: CPT | Performed by: INTERNAL MEDICINE

## 2022-02-18 RX ORDER — LISINOPRIL 40 MG/1
40 TABLET ORAL DAILY
Qty: 90 TABLET | Refills: 1 | Status: SHIPPED | OUTPATIENT
Start: 2022-02-18 | End: 2022-08-04

## 2022-02-22 LAB — HEMOCCULT STL QL IA: NEGATIVE

## 2022-03-29 DIAGNOSIS — E11.9 DIABETES MELLITUS WITHOUT COMPLICATION (H): ICD-10-CM

## 2022-03-29 DIAGNOSIS — F43.22 ADJUSTMENT DISORDER WITH ANXIOUS MOOD: ICD-10-CM

## 2022-03-30 RX ORDER — ATORVASTATIN CALCIUM 20 MG/1
20 TABLET, FILM COATED ORAL DAILY
Qty: 90 TABLET | Refills: 3 | Status: SHIPPED | OUTPATIENT
Start: 2022-03-30 | End: 2023-04-07

## 2022-03-30 RX ORDER — CITALOPRAM HYDROBROMIDE 20 MG/1
20 TABLET ORAL DAILY
Qty: 90 TABLET | Refills: 1 | Status: SHIPPED | OUTPATIENT
Start: 2022-03-30 | End: 2022-10-03

## 2022-06-04 DIAGNOSIS — I10 HYPERTENSION, UNSPECIFIED TYPE: ICD-10-CM

## 2022-06-07 RX ORDER — HYDRALAZINE HYDROCHLORIDE 100 MG/1
TABLET, FILM COATED ORAL
Qty: 90 TABLET | Refills: 3 | Status: SHIPPED | OUTPATIENT
Start: 2022-06-07 | End: 2023-01-05

## 2022-06-07 NOTE — TELEPHONE ENCOUNTER
Pending Prescriptions:                       Disp   Refills    hydrALAZINE (APRESOLINE) 100 MG tablet [Ph*90 tab*3        Sig: TAKE ONE TABLET BY MOUTH THREE TIMES A DAY    Routing refill request to provider for review/approval because:  Labs out of range:    BP Readings from Last 3 Encounters:   02/16/22 (!) 140/70   12/15/20 (!) 156/90   01/27/20 138/88     Felisa Rodarte, BRYANNAN, RN

## 2022-08-03 DIAGNOSIS — I10 HYPERTENSION, UNSPECIFIED TYPE: ICD-10-CM

## 2022-08-03 DIAGNOSIS — I50.41 ACUTE COMBINED SYSTOLIC AND DIASTOLIC CONGESTIVE HEART FAILURE (H): ICD-10-CM

## 2022-08-04 RX ORDER — LISINOPRIL 40 MG/1
40 TABLET ORAL DAILY
Qty: 90 TABLET | Refills: 1 | Status: SHIPPED | OUTPATIENT
Start: 2022-08-04 | End: 2023-02-03

## 2022-09-03 DIAGNOSIS — K21.9 GASTROESOPHAGEAL REFLUX DISEASE WITHOUT ESOPHAGITIS: ICD-10-CM

## 2022-09-07 RX ORDER — FAMOTIDINE 40 MG/1
TABLET, FILM COATED ORAL
Qty: 180 TABLET | Refills: 0 | Status: SHIPPED | OUTPATIENT
Start: 2022-09-07 | End: 2022-12-06

## 2022-12-05 DIAGNOSIS — K21.9 GASTROESOPHAGEAL REFLUX DISEASE WITHOUT ESOPHAGITIS: ICD-10-CM

## 2022-12-05 DIAGNOSIS — E11.9 DIABETES MELLITUS WITHOUT COMPLICATION (H): ICD-10-CM

## 2022-12-05 DIAGNOSIS — I10 HYPERTENSION, UNSPECIFIED TYPE: ICD-10-CM

## 2022-12-06 RX ORDER — METFORMIN HCL 500 MG
TABLET, EXTENDED RELEASE 24 HR ORAL
Qty: 180 TABLET | Refills: 3 | Status: SHIPPED | OUTPATIENT
Start: 2022-12-06 | End: 2023-11-24

## 2022-12-06 RX ORDER — AMLODIPINE BESYLATE 10 MG/1
TABLET ORAL
Qty: 90 TABLET | Refills: 3 | Status: SHIPPED | OUTPATIENT
Start: 2022-12-06 | End: 2023-11-24

## 2022-12-06 RX ORDER — FAMOTIDINE 40 MG/1
TABLET, FILM COATED ORAL
Qty: 180 TABLET | Refills: 0 | Status: SHIPPED | OUTPATIENT
Start: 2022-12-06 | End: 2023-04-07

## 2022-12-06 RX ORDER — GLIPIZIDE 5 MG/1
TABLET ORAL
Qty: 90 TABLET | Refills: 3 | Status: SHIPPED | OUTPATIENT
Start: 2022-12-06 | End: 2023-11-24

## 2022-12-06 NOTE — TELEPHONE ENCOUNTER
Metformin  Glucotrol  Routing refill request to provider for review/approval because:  Failed - Patient has documented A1c within the specified period of time.   If HgbA1C is 8 or greater, it needs to be on file within the past 3 months.  If less than 8, must be on file within the past 6 months.     Recent Labs   Lab Test 02/16/22  0954   A1C 5.3            Pepcid  Prescription approved per [facility] Refill Protocol.      Norvasc  Routing refill request to provider for review/approval because:  Failed - Blood pressure under 140/90 in past 12 months   BP Readings from Last 3 Encounters:   02/16/22 (!) 140/70   12/15/20 (!) 156/90   01/27/20 138/88        Arleth Hidalgo RN

## 2023-01-02 DIAGNOSIS — I10 HYPERTENSION, UNSPECIFIED TYPE: ICD-10-CM

## 2023-01-04 NOTE — TELEPHONE ENCOUNTER
"Requested Prescriptions   Pending Prescriptions Disp Refills    hydrALAZINE (APRESOLINE) 100 MG tablet [Pharmacy Med Name: hydrALAZINE * 100MG * TAB] 90 tablet 3     Sig: TAKE ONE TABLET BY MOUTH THREE TIMES A DAY       Vasodilators Failed - 1/2/2023 12:41 PM        Failed - Most recent BP less than 140/90 on record     BP Readings from Last 3 Encounters:   02/16/22 (!) 140/70   12/15/20 (!) 156/90   01/27/20 138/88                 Passed - Most recent encounter is not a hospital encounter. Patient has recent (12 mos) or future (1 mos) visit with authorizing provider's specialty     Patient's most recent encounter is NOT a hospital encounter and has had an office visit in the last 12 months or has a visit in the next 30 days with authorizing provider or within the authorizing provider's specialty.      See \"Patient Info\" tab in inbasket, or \"Choose Columns\" in Meds & Orders section of the refill encounter.      If most recent encounter is a hospital encounter AND the patient does NOT have an appointment scheduled with the authorizing provider or authorizing provider's specialty within the next 30 days, forward refill to authorizing provider for medication review.          Passed - Medication is active on med list        Passed - Patient is of age 18 years or older             "

## 2023-01-05 RX ORDER — HYDRALAZINE HYDROCHLORIDE 100 MG/1
TABLET, FILM COATED ORAL
Qty: 90 TABLET | Refills: 3 | Status: SHIPPED | OUTPATIENT
Start: 2023-01-05 | End: 2023-06-27

## 2023-02-03 DIAGNOSIS — E11.9 DIABETES MELLITUS WITHOUT COMPLICATION (H): ICD-10-CM

## 2023-02-03 DIAGNOSIS — I50.41 ACUTE COMBINED SYSTOLIC AND DIASTOLIC CONGESTIVE HEART FAILURE (H): ICD-10-CM

## 2023-02-03 DIAGNOSIS — I10 HYPERTENSION, UNSPECIFIED TYPE: ICD-10-CM

## 2023-02-03 RX ORDER — LISINOPRIL 40 MG/1
40 TABLET ORAL DAILY
Qty: 90 TABLET | Refills: 1 | Status: SHIPPED | OUTPATIENT
Start: 2023-02-03 | End: 2023-08-04

## 2023-02-03 RX ORDER — HYDROCHLOROTHIAZIDE 25 MG/1
25 TABLET ORAL DAILY
Qty: 90 TABLET | Refills: 3 | Status: SHIPPED | OUTPATIENT
Start: 2023-02-03 | End: 2024-02-05

## 2023-02-03 RX ORDER — METOPROLOL TARTRATE 50 MG
TABLET ORAL
Qty: 180 TABLET | Refills: 3 | Status: SHIPPED | OUTPATIENT
Start: 2023-02-03 | End: 2024-02-05

## 2023-02-03 RX ORDER — LANCETS
EACH MISCELLANEOUS
Qty: 100 EACH | Refills: 11 | Status: SHIPPED | OUTPATIENT
Start: 2023-02-03 | End: 2024-06-24

## 2023-02-03 NOTE — TELEPHONE ENCOUNTER
"Requested Prescriptions   Pending Prescriptions Disp Refills    hydrochlorothiazide (HYDRODIURIL) 25 MG tablet [Pharmacy Med Name: HYDROCHLOROTHIAZIDE 25MG TABS] 90 tablet 3     Sig: TAKE 1 TABLET (25 MG) BY MOUTH DAILY       Diuretics (Including Combos) Protocol Failed - 2/3/2023  9:14 AM        Failed - Blood pressure under 140/90 in past 12 months     BP Readings from Last 3 Encounters:   02/16/22 (!) 140/70   12/15/20 (!) 156/90   01/27/20 138/88                 Passed - Recent (12 mo) or future (30 days) visit within the authorizing provider's specialty     Patient has had an office visit with the authorizing provider or a provider within the authorizing providers department within the previous 12 mos or has a future within next 30 days. See \"Patient Info\" tab in inbasket, or \"Choose Columns\" in Meds & Orders section of the refill encounter.              Passed - Medication is active on med list        Passed - Patient is age 18 or older        Passed - Normal serum creatinine on file in past 12 months     Recent Labs   Lab Test 02/16/22  0954   CR 1.08              Passed - Normal serum potassium on file in past 12 months     Recent Labs   Lab Test 02/16/22  0954   POTASSIUM 4.3                    Passed - Normal serum sodium on file in past 12 months     Recent Labs   Lab Test 02/16/22  0954                   lisinopril (ZESTRIL) 40 MG tablet [Pharmacy Med Name: LISINOPRIL 40MG TABS] 90 tablet 1     Sig: TAKE 1 TABLET (40 MG) BY MOUTH DAILY       ACE Inhibitors (Including Combos) Protocol Failed - 2/3/2023  9:14 AM        Failed - Blood pressure under 140/90 in past 12 months     BP Readings from Last 3 Encounters:   02/16/22 (!) 140/70   12/15/20 (!) 156/90   01/27/20 138/88                 Passed - Recent (12 mo) or future (30 days) visit within the authorizing provider's specialty     Patient has had an office visit with the authorizing provider or a provider within the authorizing providers " "department within the previous 12 mos or has a future within next 30 days. See \"Patient Info\" tab in inbasket, or \"Choose Columns\" in Meds & Orders section of the refill encounter.              Passed - Medication is active on med list        Passed - Patient is age 18 or older        Passed - Normal serum creatinine on file in past 12 months     Recent Labs   Lab Test 02/16/22  0954   CR 1.08       Ok to refill medication if creatinine is low          Passed - Normal serum potassium on file in past 12 months     Recent Labs   Lab Test 02/16/22  0954   POTASSIUM 4.3               metoprolol tartrate (LOPRESSOR) 50 MG tablet [Pharmacy Med Name: METOPROLOL TARTRATE 50MG TABS] 180 tablet 3     Sig: TAKE ONE TABLET BY MOUTH TWICE A DAY       Beta-Blockers Protocol Failed - 2/3/2023  9:14 AM        Failed - Blood pressure under 140/90 in past 12 months     BP Readings from Last 3 Encounters:   02/16/22 (!) 140/70   12/15/20 (!) 156/90   01/27/20 138/88                 Passed - Patient is age 6 or older        Passed - Recent (12 mo) or future (30 days) visit within the authorizing provider's specialty     Patient has had an office visit with the authorizing provider or a provider within the authorizing providers department within the previous 12 mos or has a future within next 30 days. See \"Patient Info\" tab in inSharewiresket, or \"Choose Columns\" in Meds & Orders section of the refill encounter.              Passed - Medication is active on med list          CONTOUR NEXT TEST test strip [Pharmacy Med Name: CONTOUR NEXT TEST  STRP] 100 strip 1     Sig: USE TO TEST BLOOD SUGARS ONE TO THREE TIMES DAILY  OR AS DIRECTED       Diabetic Supplies Protocol Failed - 2/3/2023  9:14 AM        Failed - Recent (6 mo) or future (30 days) visit within the authorizing provider's specialty     Patient had office visit in the last 6 months or has a visit in the next 30 days with authorizing provider.  See \"Patient Info\" tab in inbasket, or " "\"Choose Columns\" in Meds & Orders section of the refill encounter.            Passed - Medication is active on med list        Passed - Patient is 18 years of age or older          Microlet Lancets MISC [Pharmacy Med Name: MICROLET LANCETS  MISC]  0     Sig: USE TO TEST BLOOD SUGAR 1-3 TIMES DAILY OR AS DIRECTED.       Diabetic Supplies Protocol Failed - 2/3/2023  9:14 AM        Failed - Recent (6 mo) or future (30 days) visit within the authorizing provider's specialty     Patient had office visit in the last 6 months or has a visit in the next 30 days with authorizing provider.  See \"Patient Info\" tab in inbasket, or \"Choose Columns\" in Meds & Orders section of the refill encounter.            Passed - Medication is active on med list        Passed - Patient is 18 years of age or older               "

## 2023-03-30 ENCOUNTER — TELEPHONE (OUTPATIENT)
Dept: INTERNAL MEDICINE | Facility: CLINIC | Age: 63
End: 2023-03-30
Payer: COMMERCIAL

## 2023-04-07 DIAGNOSIS — E11.9 DIABETES MELLITUS WITHOUT COMPLICATION (H): ICD-10-CM

## 2023-04-07 DIAGNOSIS — K21.9 GASTROESOPHAGEAL REFLUX DISEASE WITHOUT ESOPHAGITIS: ICD-10-CM

## 2023-04-07 DIAGNOSIS — F43.22 ADJUSTMENT DISORDER WITH ANXIOUS MOOD: ICD-10-CM

## 2023-04-07 RX ORDER — ATORVASTATIN CALCIUM 20 MG/1
20 TABLET, FILM COATED ORAL DAILY
Qty: 90 TABLET | Refills: 0 | Status: SHIPPED | OUTPATIENT
Start: 2023-04-07 | End: 2023-06-27

## 2023-04-07 RX ORDER — FAMOTIDINE 40 MG/1
TABLET, FILM COATED ORAL
Qty: 180 TABLET | Refills: 0 | Status: SHIPPED | OUTPATIENT
Start: 2023-04-07 | End: 2023-07-12

## 2023-04-07 RX ORDER — CITALOPRAM HYDROBROMIDE 20 MG/1
TABLET ORAL
Qty: 90 TABLET | Refills: 0 | Status: SHIPPED | OUTPATIENT
Start: 2023-04-07 | End: 2023-06-27

## 2023-05-05 ENCOUNTER — OFFICE VISIT (OUTPATIENT)
Dept: INTERNAL MEDICINE | Facility: CLINIC | Age: 63
End: 2023-05-05
Payer: MEDICARE

## 2023-05-05 VITALS
WEIGHT: 171 LBS | SYSTOLIC BLOOD PRESSURE: 138 MMHG | OXYGEN SATURATION: 98 % | BODY MASS INDEX: 24.48 KG/M2 | DIASTOLIC BLOOD PRESSURE: 60 MMHG | RESPIRATION RATE: 10 BRPM | TEMPERATURE: 98.4 F | HEIGHT: 70 IN | HEART RATE: 80 BPM

## 2023-05-05 DIAGNOSIS — Z00.00 MEDICARE ANNUAL WELLNESS VISIT, SUBSEQUENT: ICD-10-CM

## 2023-05-05 DIAGNOSIS — Z12.11 SCREEN FOR COLON CANCER: ICD-10-CM

## 2023-05-05 DIAGNOSIS — I10 HYPERTENSION, UNSPECIFIED TYPE: Primary | ICD-10-CM

## 2023-05-05 DIAGNOSIS — Z12.5 SCREENING FOR PROSTATE CANCER: ICD-10-CM

## 2023-05-05 DIAGNOSIS — E11.65 TYPE 2 DIABETES MELLITUS WITH HYPERGLYCEMIA, WITHOUT LONG-TERM CURRENT USE OF INSULIN (H): ICD-10-CM

## 2023-05-05 DIAGNOSIS — N18.31 CHRONIC KIDNEY DISEASE, STAGE 3A (H): ICD-10-CM

## 2023-05-05 DIAGNOSIS — I50.43 ACUTE ON CHRONIC COMBINED SYSTOLIC AND DIASTOLIC CONGESTIVE HEART FAILURE (H): ICD-10-CM

## 2023-05-05 LAB
ALBUMIN SERPL BCG-MCNC: 4.6 G/DL (ref 3.5–5.2)
ALP SERPL-CCNC: 380 U/L (ref 40–129)
ALT SERPL W P-5'-P-CCNC: 15 U/L (ref 10–50)
ANION GAP SERPL CALCULATED.3IONS-SCNC: 13 MMOL/L (ref 7–15)
AST SERPL W P-5'-P-CCNC: 25 U/L (ref 10–50)
BILIRUB DIRECT SERPL-MCNC: 0.23 MG/DL (ref 0–0.3)
BILIRUB SERPL-MCNC: 1.1 MG/DL
BUN SERPL-MCNC: 17.3 MG/DL (ref 8–23)
CALCIUM SERPL-MCNC: 9 MG/DL (ref 8.8–10.2)
CHLORIDE SERPL-SCNC: 95 MMOL/L (ref 98–107)
CHOLEST SERPL-MCNC: 132 MG/DL
CREAT SERPL-MCNC: 1.1 MG/DL (ref 0.67–1.17)
DEPRECATED HCO3 PLAS-SCNC: 23 MMOL/L (ref 22–29)
ERYTHROCYTE [DISTWIDTH] IN BLOOD BY AUTOMATED COUNT: 13.3 % (ref 10–15)
GFR SERPL CREATININE-BSD FRML MDRD: 74 ML/MIN/1.73M2
GLUCOSE SERPL-MCNC: 195 MG/DL (ref 70–99)
HBA1C MFR BLD: 5.3 %
HCT VFR BLD AUTO: 34.7 % (ref 40–53)
HDLC SERPL-MCNC: 57 MG/DL
HGB BLD-MCNC: 11.7 G/DL (ref 13.3–17.7)
LDLC SERPL CALC-MCNC: 59 MG/DL
MCH RBC QN AUTO: 29.8 PG (ref 26.5–33)
MCHC RBC AUTO-ENTMCNC: 33.7 G/DL (ref 31.5–36.5)
MCV RBC AUTO: 88 FL (ref 78–100)
NONHDLC SERPL-MCNC: 75 MG/DL
PLATELET # BLD AUTO: 202 10E3/UL (ref 150–450)
POTASSIUM SERPL-SCNC: 4.3 MMOL/L (ref 3.4–5.3)
PROT SERPL-MCNC: 7.3 G/DL (ref 6.4–8.3)
PSA SERPL DL<=0.01 NG/ML-MCNC: 272.9 NG/ML (ref 0–4.5)
RBC # BLD AUTO: 3.93 10E6/UL (ref 4.4–5.9)
SODIUM SERPL-SCNC: 131 MMOL/L (ref 136–145)
TRIGL SERPL-MCNC: 81 MG/DL
TSH SERPL DL<=0.005 MIU/L-ACNC: 2.46 UIU/ML (ref 0.3–4.2)
WBC # BLD AUTO: 7.6 10E3/UL (ref 4–11)

## 2023-05-05 PROCEDURE — 99207 PR INITIAL PREVENTIVE EXAM STAT: CPT | Performed by: INTERNAL MEDICINE

## 2023-05-05 PROCEDURE — 83036 HEMOGLOBIN GLYCOSYLATED A1C: CPT | Performed by: INTERNAL MEDICINE

## 2023-05-05 PROCEDURE — 80061 LIPID PANEL: CPT | Performed by: INTERNAL MEDICINE

## 2023-05-05 PROCEDURE — 99214 OFFICE O/P EST MOD 30 MIN: CPT | Mod: 25 | Performed by: INTERNAL MEDICINE

## 2023-05-05 PROCEDURE — 85027 COMPLETE CBC AUTOMATED: CPT | Performed by: INTERNAL MEDICINE

## 2023-05-05 PROCEDURE — 84443 ASSAY THYROID STIM HORMONE: CPT | Performed by: INTERNAL MEDICINE

## 2023-05-05 PROCEDURE — 82248 BILIRUBIN DIRECT: CPT | Performed by: INTERNAL MEDICINE

## 2023-05-05 PROCEDURE — G0103 PSA SCREENING: HCPCS | Performed by: INTERNAL MEDICINE

## 2023-05-05 PROCEDURE — 81003 URINALYSIS AUTO W/O SCOPE: CPT | Performed by: INTERNAL MEDICINE

## 2023-05-05 PROCEDURE — 82570 ASSAY OF URINE CREATININE: CPT | Performed by: INTERNAL MEDICINE

## 2023-05-05 PROCEDURE — 80053 COMPREHEN METABOLIC PANEL: CPT | Performed by: INTERNAL MEDICINE

## 2023-05-05 PROCEDURE — 36415 COLL VENOUS BLD VENIPUNCTURE: CPT | Performed by: INTERNAL MEDICINE

## 2023-05-05 PROCEDURE — 82043 UR ALBUMIN QUANTITATIVE: CPT | Performed by: INTERNAL MEDICINE

## 2023-05-05 ASSESSMENT — ANXIETY QUESTIONNAIRES
IF YOU CHECKED OFF ANY PROBLEMS ON THIS QUESTIONNAIRE, HOW DIFFICULT HAVE THESE PROBLEMS MADE IT FOR YOU TO DO YOUR WORK, TAKE CARE OF THINGS AT HOME, OR GET ALONG WITH OTHER PEOPLE: SOMEWHAT DIFFICULT
6. BECOMING EASILY ANNOYED OR IRRITABLE: MORE THAN HALF THE DAYS
8. IF YOU CHECKED OFF ANY PROBLEMS, HOW DIFFICULT HAVE THESE MADE IT FOR YOU TO DO YOUR WORK, TAKE CARE OF THINGS AT HOME, OR GET ALONG WITH OTHER PEOPLE?: SOMEWHAT DIFFICULT
GAD7 TOTAL SCORE: 4
2. NOT BEING ABLE TO STOP OR CONTROL WORRYING: NOT AT ALL
4. TROUBLE RELAXING: NOT AT ALL
7. FEELING AFRAID AS IF SOMETHING AWFUL MIGHT HAPPEN: NOT AT ALL
7. FEELING AFRAID AS IF SOMETHING AWFUL MIGHT HAPPEN: NOT AT ALL
3. WORRYING TOO MUCH ABOUT DIFFERENT THINGS: NOT AT ALL
5. BEING SO RESTLESS THAT IT IS HARD TO SIT STILL: NOT AT ALL
GAD7 TOTAL SCORE: 4
1. FEELING NERVOUS, ANXIOUS, OR ON EDGE: MORE THAN HALF THE DAYS
GAD7 TOTAL SCORE: 4

## 2023-05-05 NOTE — LETTER
"    5/5/2023        RE: Sushil Diana  [address] [facility] [id number]      Dear  of Courts;   Please excuse the above noted individual from jury duty.  He suffers from extensive anxiety which is exacerbated by being in close proximity to \"strangers\".  Given his progressive symptoms, I do not believe that he will be of any benefit to you, or the judicial system at this time.    Thank you for your understanding.      Sincerely,        Mark Matson, DO      "

## 2023-05-05 NOTE — PROGRESS NOTES
Assessment & Plan     Medicare annual wellness visit, subsequent      Type 2 diabetes mellitus with hyperglycemia, without long-term current use of insulin (H)    - TSH WITH FREE T4 REFLEX; Future  - HEMOGLOBIN A1C; Future  - Lipid panel reflex to direct LDL Non-fasting; Future  - Hepatic panel (Albumin, ALT, AST, Bili, Alk Phos, TP); Future  - Hepatic panel (Albumin, ALT, AST, Bili, Alk Phos, TP)  - Lipid panel reflex to direct LDL Non-fasting  - HEMOGLOBIN A1C  - TSH WITH FREE T4 REFLEX    Hypertension, unspecified type      Acute on chronic combined systolic and diastolic congestive heart failure (H)    - CBC with Platelets; Future  - CBC with Platelets    Chronic kidney disease, stage 3a (H)    - UA Macroscopic with reflex to Microscopic and Culture; Future  - BASIC METABOLIC PANEL; Future  - Albumin Random Urine Quantitative with Creat Ratio; Future  - Albumin Random Urine Quantitative with Creat Ratio  - BASIC METABOLIC PANEL  - UA Macroscopic with reflex to Microscopic and Culture    Screen for colon cancer    - Fecal colorectal cancer screen FIT - Future (S+30); Future  - Fecal colorectal cancer screen FIT - Future (S+30)    Screening for prostate cancer    - PSA, screen                                      FOLLOW UP   I have asked the patient to make an appointment for followup with:  1) Me  2) the patient's preferred provider  3) Any available provider  In 6 months predicated on lab results    Mark Matson [facility] ADRIANNE Ling is a 63 year old, presenting for the following health issues:  Diabetes        5/5/2023     7:49 AM   Additional Questions   Roomed by Kristie calhoun   Accompanied by Friend Lolis     History of Present Illness       Reason for visit:  Yearly chekup    He eats 2-3 servings of fruits and vegetables daily.He consumes 1 sweetened beverage(s) daily.He exercises with enough effort to increase his heart rate 20 to 29 minutes per day.  He  exercises with enough effort to increase his heart rate 7 days per week.   He is taking medications regularly.       Diabetes Follow-up    How often are you checking your blood sugar? A few times a month  What time of day are you checking your blood sugars (select all that apply)?  Before meals  Have you had any blood sugars above 200?  No  Have you had any blood sugars below 70?  No    What symptoms do you notice when your blood sugar is low?  Shaky    What concerns do you have today about your diabetes? None     Do you have any of these symptoms? (Select all that apply)  No numbness or tingling in feet.  No redness, sores or blisters on feet.  No complaints of excessive thirst.  No reports of blurry vision.  No significant changes to weight.    Have you had a diabetic eye exam in the last 12 months?         BP Readings from Last 2 Encounters:   05/05/23 138/60   02/16/22 (!) 140/70     Hemoglobin A1C (%)   Date Value   02/16/2022 5.3   12/15/2020 5.3   09/27/2019 5.2     LDL Cholesterol Calculated (mg/dL)   Date Value   02/16/2022 48   12/15/2020 49   09/27/2019 73               Review of Systems   CONSTITUTIONAL: NEGATIVE for fever, chills, change in weight  INTEGUMENTARY/SKIN: NEGATIVE for worrisome rashes, moles or lesions  EYES: NEGATIVE for vision changes or irritation  ENT/MOUTH: NEGATIVE for ear, mouth and throat problems  RESP: NEGATIVE for significant cough or SOB  BREAST: NEGATIVE for masses, tenderness or discharge  CV: NEGATIVE for chest pain, palpitations or peripheral edema  GI: NEGATIVE for nausea, abdominal pain, heartburn, or change in bowel habits  : NEGATIVE for frequency, dysuria, or hematuria  MUSCULOSKELETAL: NEGATIVE for significant arthralgias or myalgia  NEURO: NEGATIVE for weakness, dizziness or paresthesias  ENDOCRINE: NEGATIVE for temperature intolerance, skin/hair changes  HEME: NEGATIVE for bleeding problems  PSYCHIATRIC: NEGATIVE for changes in mood or affect      Objective    BP  "138/60   Pulse 80   Temp 98.4  F (36.9  C)   Resp 10   Ht 1.77 m (5' 9.69\")   Wt 77.6 kg (171 lb)   SpO2 98%   BMI 24.76 kg/m    Body mass index is 24.76 kg/m .  Physical Exam   GENERAL: healthy, alert and no distress  EYES: Eyes grossly normal to inspection, PERRL and conjunctivae and sclerae normal  HENT: ear canals and TM's normal, nose and mouth without ulcers or lesions  NECK: no adenopathy, no asymmetry, masses, or scars and thyroid normal to palpation  RESP: lungs clear to auscultation - no rales, rhonchi or wheezes  CV: regular rate and rhythm, normal S1 S2, no S3 or S4, no murmur, click or rub, no peripheral edema and peripheral pulses strong  ABDOMEN: soft, nontender, no hepatosplenomegaly, no masses and bowel sounds normal  MS: no gross musculoskeletal defects noted, no edema  SKIN: no suspicious lesions or rashes  NEURO: Normal strength and tone, mentation intact and speech normal  PSYCH: mentation appears normal, affect normal/bright  LYMPH: no cervical, supraclavicular, axillary, or inguinal adenopathy  Diabetic foot exam: normal DP and PT pulses, no trophic changes or ulcerative lesions and normal sensory exam                    "

## 2023-05-05 NOTE — LETTER
May 10, 2023      Sushil Diana  [address] [address] [place] [id number]        Dear ,    We are writing to inform you of your test results.    Screening prostate-specific antigen test has returned elevated.  This could be consistent with prostate cancer.  I have placed a referral for a urology consultation to better determine the extent of any possible disease.   Liver tests show a slight increase in alkaline phosphatase.  This is sometimes associated with bone disease.   Blood sugar is elevated at 195 and kidney function is normal.   Mild anemia is noted with a hemoglobin of 11.7.   The microalbumin is within acceptable limits.  Minimal protein loss is noted in the urine.   Urinary analysis shows no outstanding irregularities.   The glycosylated hemoglobin is within acceptable limits suggesting adequate blood sugar control.   The TSH is within acceptable limits suggesting well adjusted thyroid levels.   The cholesterol is within acceptable limits.       You will be contacted with any outstanding results as they are available.   Feel free to contact me via the office or My Chart if you have any questions regarding the above.     Resulted Orders   Hepatic panel (Albumin, ALT, AST, Bili, Alk Phos, TP)   Result Value Ref Range    Protein Total 7.3 6.4 - 8.3 g/dL    Albumin 4.6 3.5 - 5.2 g/dL    Bilirubin Total 1.1 <=1.2 mg/dL    Alkaline Phosphatase 380 (H) 40 - 129 U/L    AST 25 10 - 50 U/L    ALT 15 10 - 50 U/L    Bilirubin Direct 0.23 0.00 - 0.30 mg/dL   CBC with Platelets   Result Value Ref Range    WBC Count 7.6 4.0 - 11.0 10e3/uL    RBC Count 3.93 (L) 4.40 - 5.90 10e6/uL    Hemoglobin 11.7 (L) 13.3 - 17.7 g/dL    Hematocrit 34.7 (L) 40.0 - 53.0 %    MCV 88 78 - 100 fL    MCH 29.8 26.5 - 33.0 pg    MCHC 33.7 31.5 - 36.5 g/dL    RDW 13.3 10.0 - 15.0 %    Platelet Count 202 150 - 450 10e3/uL   Albumin Random Urine Quantitative with Creat Ratio   Result Value Ref Range    Creatinine Urine mg/dL 28.1 mg/dL       Comment:      The reference ranges have not been established in urine creatinine. The results should be integrated into the clinical context for interpretation.    Albumin Urine mg/L <12.0 mg/L      Comment:      The reference ranges have not been established in urine albumin. The results should be integrated into the clinical context for interpretation.    Albumin Urine mg/g Cr        Comment:      Unable to calculate, urine albumin and/or urine creatinine is outside detectable limits.  Microalbuminuria is defined as an albumin:creatinine ratio of 17 to 299 for males and 25 to 299 for females. A ratio of albumin:creatinine of 300 or higher is indicative of overt proteinuria.  Due to biologic variability, positive results should be confirmed by a second, first-morning random or 24-hour timed urine specimen. If there is discrepancy, a third specimen is recommended. When 2 out of 3 results are in the microalbuminuria range, this is evidence for incipient nephropathy and warrants increased efforts at glucose control, blood pressure control, and institution of therapy with an angiotensin-converting-enzyme (ACE) inhibitor (if the patient can tolerate it).     Lipid panel reflex to direct LDL Non-fasting   Result Value Ref Range    Cholesterol 132 <200 mg/dL    Triglycerides 81 <150 mg/dL    Direct Measure HDL 57 >=40 mg/dL    LDL Cholesterol Calculated 59 <=100 mg/dL    Non HDL Cholesterol 75 <130 mg/dL    Narrative    Cholesterol  Desirable:  <200 mg/dL    Triglycerides  Normal:  Less than 150 mg/dL  Borderline High:  150-199 mg/dL  High:  200-499 mg/dL  Very High:  Greater than or equal to 500 mg/dL    Direct Measure HDL  Female:  Greater than or equal to 50 mg/dL   Male:  Greater than or equal to 40 mg/dL    LDL Cholesterol  Desirable:  <100mg/dL  Above Desirable:  100-129 mg/dL   Borderline High:  130-159 mg/dL   High:  160-189 mg/dL   Very High:  >= 190 mg/dL    Non HDL Cholesterol  Desirable:  130 mg/dL  Above  Desirable:  130-159 mg/dL  Borderline High:  160-189 mg/dL  High:  190-219 mg/dL  Very High:  Greater than or equal to 220 mg/dL   BASIC METABOLIC PANEL   Result Value Ref Range    Sodium 131 (L) 136 - 145 mmol/L    Potassium 4.3 3.4 - 5.3 mmol/L    Chloride 95 (L) 98 - 107 mmol/L    Carbon Dioxide (CO2) 23 22 - 29 mmol/L    Anion Gap 13 7 - 15 mmol/L    Urea Nitrogen 17.3 8.0 - 23.0 mg/dL    Creatinine 1.10 0.67 - 1.17 mg/dL    Calcium 9.0 8.8 - 10.2 mg/dL    Glucose 195 (H) 70 - 99 mg/dL    GFR Estimate 74 >60 mL/min/1.73m2      Comment:      eGFR calculated using 2021 CKD-EPI equation.   HEMOGLOBIN A1C   Result Value Ref Range    Hemoglobin A1C 5.3 <5.7 %      Comment:      Normal <5.7%   Prediabetes 5.7-6.4%    Diabetes 6.5% or higher     Note: Adopted from ADA consensus guidelines.   TSH WITH FREE T4 REFLEX   Result Value Ref Range    TSH 2.46 0.30 - 4.20 uIU/mL   UA Macroscopic with reflex to Microscopic and Culture   Result Value Ref Range    Color Urine Straw Colorless, Straw, Light Yellow, Yellow    Appearance Urine Clear Clear    Glucose Urine Negative Negative mg/dL    Bilirubin Urine Negative Negative    Ketones Urine Negative Negative mg/dL    Specific Gravity Urine 1.006 1.003 - 1.035    Blood Urine Negative Negative    pH Urine 7.0 5.0 - 7.0    Protein Albumin Urine Negative Negative mg/dL    Urobilinogen Urine Normal Normal, 2.0 mg/dL    Nitrite Urine Negative Negative    Leukocyte Esterase Urine Negative Negative    Narrative    Microscopic not indicated   PSA, screen   Result Value Ref Range    Prostate Specific Antigen Screen 272.90 (H) 0.00 - 4.50 ng/mL    Narrative    This result is obtained using the Roche Elecsys total PSA method on the janny [id number] immunoassay analyzer. Results obtained with different assay methods or kits cannot be used interchangeably.       If you have any questions or concerns, please call the clinic at the number listed above.       Sincerely,      Mark Burt  DO [place]

## 2023-05-07 ENCOUNTER — APPOINTMENT (OUTPATIENT)
Dept: LAB | Facility: CLINIC | Age: 63
End: 2023-05-07
Payer: MEDICARE

## 2023-05-07 LAB
ALBUMIN UR-MCNC: NEGATIVE MG/DL
APPEARANCE UR: CLEAR
BILIRUB UR QL STRIP: NEGATIVE
COLOR UR AUTO: NORMAL
CREAT UR-MCNC: 28.1 MG/DL
GLUCOSE UR STRIP-MCNC: NEGATIVE MG/DL
HGB UR QL STRIP: NEGATIVE
KETONES UR STRIP-MCNC: NEGATIVE MG/DL
LEUKOCYTE ESTERASE UR QL STRIP: NEGATIVE
MICROALBUMIN UR-MCNC: <12 MG/L
MICROALBUMIN/CREAT UR: NORMAL MG/G{CREAT}
NITRATE UR QL: NEGATIVE
PH UR STRIP: 7 [PH] (ref 5–7)
SP GR UR STRIP: 1.01 (ref 1–1.03)
UROBILINOGEN UR STRIP-MCNC: NORMAL MG/DL

## 2023-05-08 ENCOUNTER — DOCUMENTATION ONLY (OUTPATIENT)
Dept: INTERNAL MEDICINE | Facility: CLINIC | Age: 63
End: 2023-05-08
Payer: MEDICARE

## 2023-05-08 NOTE — PROGRESS NOTES
The patient's PSA is significantly elevated.  This is consistent with probable prostate cancer.  I have already contacted the patient and spoke to him on the telephone.  He is aware of this.  I have placed a referral for urology consultation.  Could you please help him set this up and then contact him with the time and date?    Thank you.    [facility]

## 2023-06-19 ENCOUNTER — TRANSFERRED RECORDS (OUTPATIENT)
Dept: HEALTH INFORMATION MANAGEMENT | Facility: CLINIC | Age: 63
End: 2023-06-19
Payer: MEDICARE

## 2023-06-19 LAB — RETINOPATHY: POSITIVE

## 2023-06-26 DIAGNOSIS — F43.22 ADJUSTMENT DISORDER WITH ANXIOUS MOOD: ICD-10-CM

## 2023-06-26 DIAGNOSIS — I10 HYPERTENSION, UNSPECIFIED TYPE: ICD-10-CM

## 2023-06-26 DIAGNOSIS — E11.9 DIABETES MELLITUS WITHOUT COMPLICATION (H): ICD-10-CM

## 2023-06-27 RX ORDER — CITALOPRAM HYDROBROMIDE 20 MG/1
TABLET ORAL
Qty: 90 TABLET | Refills: 1 | Status: SHIPPED | OUTPATIENT
Start: 2023-06-27 | End: 2023-12-26

## 2023-06-27 RX ORDER — ATORVASTATIN CALCIUM 20 MG/1
TABLET, FILM COATED ORAL
Qty: 90 TABLET | Refills: 3 | Status: SHIPPED | OUTPATIENT
Start: 2023-06-27 | End: 2024-06-21

## 2023-06-27 RX ORDER — HYDRALAZINE HYDROCHLORIDE 100 MG/1
TABLET, FILM COATED ORAL
Qty: 90 TABLET | Refills: 3 | Status: SHIPPED | OUTPATIENT
Start: 2023-06-27 | End: 2023-09-25

## 2023-06-27 NOTE — TELEPHONE ENCOUNTER
Prescription approved per [facility] Refill Protocol.  Lucero Edgar RN on 6/27/2023 at 11:43 AM

## 2023-07-10 DIAGNOSIS — K21.9 GASTROESOPHAGEAL REFLUX DISEASE WITHOUT ESOPHAGITIS: ICD-10-CM

## 2023-07-12 RX ORDER — FAMOTIDINE 40 MG/1
TABLET, FILM COATED ORAL
Qty: 180 TABLET | Refills: 3 | Status: SHIPPED | OUTPATIENT
Start: 2023-07-12 | End: 2024-09-19

## 2023-07-12 NOTE — TELEPHONE ENCOUNTER
Prescription approved per [facility] Refill Protocol.  Lucero Edgar RN on 7/12/2023 at 9:30 AM

## 2023-08-09 ENCOUNTER — OFFICE VISIT (OUTPATIENT)
Dept: UROLOGY | Facility: CLINIC | Age: 63
End: 2023-08-09
Payer: MEDICARE

## 2023-08-09 VITALS
DIASTOLIC BLOOD PRESSURE: 50 MMHG | HEIGHT: 70 IN | BODY MASS INDEX: 23.59 KG/M2 | SYSTOLIC BLOOD PRESSURE: 140 MMHG | TEMPERATURE: 98.1 F | WEIGHT: 164.8 LBS

## 2023-08-09 DIAGNOSIS — N40.1 BENIGN PROSTATIC HYPERPLASIA WITH LOWER URINARY TRACT SYMPTOMS, SYMPTOM DETAILS UNSPECIFIED: ICD-10-CM

## 2023-08-09 DIAGNOSIS — I10 ESSENTIAL HYPERTENSION, BENIGN: ICD-10-CM

## 2023-08-09 DIAGNOSIS — R97.20 ELEVATED PROSTATE SPECIFIC ANTIGEN (PSA): Primary | ICD-10-CM

## 2023-08-09 PROCEDURE — 99203 OFFICE O/P NEW LOW 30 MIN: CPT | Performed by: UROLOGY

## 2023-08-09 RX ORDER — LEVOFLOXACIN 500 MG/1
500 TABLET, FILM COATED ORAL DAILY
Qty: 3 TABLET | Refills: 0 | Status: SHIPPED | OUTPATIENT
Start: 2023-08-09 | End: 2023-08-12

## 2023-08-09 ASSESSMENT — PAIN SCALES - GENERAL: PAINLEVEL: NO PAIN (0)

## 2023-08-09 NOTE — PROGRESS NOTES
S: Sushil Diana is a pleasant  63 year old male who was requested to be seen by  Mark Matson for a consult with regard to patient's urinary complaints and elevated psa.  Patient complains of Nocturia x 6.  He has no history of elevated PSA.  Symptoms have been on going for   several years(s).  Seems to be worsened over time.  His recent PSA was found to be   Prostate Specific Antigen Screen   Date Value Ref Range Status   05/05/2023 272.90 (H) 0.00 - 4.50 ng/mL Final   Urinary symptoms are not bothersome however.      Current Outpatient Medications   Medication Sig Dispense Refill    amLODIPine (NORVASC) 10 MG tablet TAKE ONE TABLET BY MOUTH ONCE DAILY 90 tablet 3    aspirin (ASA) 81 MG chewable tablet CHEW AND SWALLOW ONE TABLET BY MOUTH ONCE DAILY 108 tablet 3    atorvastatin (LIPITOR) 20 MG tablet TAKE ONE TABLET BY MOUTH ONCE DAILY 90 tablet 3    blood glucose monitoring (NO BRAND SPECIFIED) meter device kit Use to test blood sugar 1-3 times daily or as directed. -Contour Next meter 1 kit 0    citalopram (CELEXA) 20 MG tablet TAKE ONE TABLET BY MOUTH ONCE DAILY 90 tablet 1    CONTOUR NEXT TEST test strip USE TO TEST BLOOD SUGARS ONE TO THREE TIMES DAILY  OR AS DIRECTED 100 strip 1    famotidine (PEPCID) 40 MG tablet TAKE ONE TABLET BY MOUTH TWICE A  tablet 3    FLUBLOK QUADRIVALENT 0.5 ML injection       glipiZIDE (GLUCOTROL) 5 MG tablet TAKE ONE TABLET BY MOUTH EVERY MORNING 90 tablet 3    hydrALAZINE (APRESOLINE) 100 MG tablet TAKE ONE TABLET BY MOUTH THREE TIMES A DAY 90 tablet 3    hydrochlorothiazide (HYDRODIURIL) 25 MG tablet TAKE 1 TABLET (25 MG) BY MOUTH DAILY 90 tablet 3    levofloxacin (LEVAQUIN) 500 MG tablet Take 1 tablet (500 mg) by mouth daily for 3 days To start one day before biopsy 3 tablet 0    lisinopril (ZESTRIL) 40 MG tablet TAKE 1 TABLET (40 MG) BY MOUTH DAILY 90 tablet 1    metFORMIN (GLUCOPHAGE XR) 500 MG 24 hr tablet TAKE TWO TABLETS BY MOUTH ONCE DAILY WITH  "DINNER 180 tablet 3    metoprolol tartrate (LOPRESSOR) 50 MG tablet TAKE ONE TABLET BY MOUTH TWICE A  tablet 3    Microlet Lancets MISC USE TO TEST BLOOD SUGAR 1-3 TIMES DAILY OR AS DIRECTED. 100 each 11    SHINGRIX injection        Allergies   Allergen Reactions    No Known Drug Allergy      Past Medical History:   Diagnosis Date    GERD (gastroesophageal reflux disease)     Gout      Past Surgical History:   Procedure Laterality Date    NONE OF THE ABOVE CORE MEASURE DIAGNOSES        Family History   Problem Relation Age of Onset    Diabetes Other      He does not have a family history of prostate cancer.  Social History     Socioeconomic History    Marital status: Single     Spouse name: None    Number of children: None    Years of education: None    Highest education level: None   Tobacco Use    Smoking status: Some Days     Packs/day: 1.00     Years: 28.00     Pack years: 28.00     Types: Cigarettes    Smokeless tobacco: Never    Tobacco comments:     quit June 2018   Substance and Sexual Activity    Alcohol use: Yes     Comment: occasional    Drug use: No    Sexual activity: Yes     Partners: Female        REVIEW OF SYSTEMS  =================  C: NEGATIVE for fever, chills, change in weight  I: NEGATIVE for worrisome rashes, moles or lesions  E/M: NEGATIVE for ear, mouth and throat problems  R: NEGATIVE for significant cough or SHORTNESS OF BREATH  CV:  NEGATIVE for chest pain, palpitations or peripheral edema  GI: NEGATIVE for nausea, abdominal pain, heartburn, or change in bowel habits  NEURO: NEGATIVE numbness/weakness  : see HPI  PSYCH: NEGATIVE depression/anxiety  LYmph: no new enlarged lymph nodes  Ortho: no new trauma/movements           O: Exam:BP (!) 140/50   Temp 98.1  F (36.7  C) (Temporal)   Ht 1.77 m (5' 9.69\")   Wt 74.8 kg (164 lb 12.8 oz)   BMI 23.86 kg/m     Constitutional: healthy, alert and no distress  Cardiovascular: negative, PMI normal.   Respiratory: negative, no evidence of " respiratory distress  Gastrointestinal: Abdomen soft, non-tender. BS normal. No masses, organomegaly  : penis no discharge.  Testis no masses.  No scrotal skin lesion.  Prostate 50 gm plus.  Musculoskeletal: extremities normal- no gross deformities noted, gait normal and normal muscle tone  Skin: no suspicious lesions or rashes  Neurologic: Alert and oriented  Psychiatric: mentation appears normal. and affect normal/bright  Hematologic/Lymphatic/Immunologic: normal ant/post cervical, axillary, supraclavicular and inguinal nodes    Assessment/Plan:   (R97.20) Elevated prostate specific antigen (PSA)  (primary encounter diagnosis)  Comment:  discussed psa elevation/prostate cancer.  High likelihood of having mets prostate cancer given high psa.  Plan: levofloxacin (LEVAQUIN) 500 MG tablet, BIOPSY         PROSTATE NEEDLE/PUNCH, US Prostate w Biopsy         Schedule for trus and biopsy next.          Risks of bleeding/infection discussed     (N40.1) Benign prostatic hyperplasia with lower urinary tract symptoms, symptom details unspecified  Comment:    Plan: symptoms not bothersome.    HTN: Sushil to follow up with Primary Care provider regarding elevated blood pressure.

## 2023-08-09 NOTE — PATIENT INSTRUCTIONS
Prostate Ultrasound Instructions  Dr. Keys  [address] [facility] [address]  [phone]    NURSE ONLY- Appointment Date: 8/23/23   Time: 9:30 am  Appointment Date: 8/23/23   Time: 10 am    Take antibiotics as directed on label. START THE MORNING BEFORE THE BIOPSY   1 Fleets enema to be done 1    - 2 hours before procedure   NOTHING BY MOUTH AFTER THE ENEMA   If you are taking blood thinners (Aspirin, ibuprofen, Aleve) this is to be stopped ONE WEEK before the procedure. Tylenol is ok. If you are taking Coumadin, you must check with your primary doctor for further instructions  Please follow up in the office with the doctor ONE WEEK after the procedure to go over results      Follow up appointment:  Date: 8/30/23 Time:  8:30 am    PROSTATE BIOPSY  Patient information:  You have had an examination of the prostate gland called a BHAKTI (digital rectal examination) where a finger was inserted into your rectum to enable the doctor to feel your prostate gland. You may also have had a PSA (prostate specific antigen) blood test.  Sometimes an elevated PSA level and an abnormal BHAKTI can be signs of prostate cancer. The doctor has recommended that you have a prostate biopsy.  What is a prostate biopsy:  You will be positioned for the biopsy as preferred by the physician.  An ultrasound probe is inserted into the rectum and a needle is then passed through the wall of the rectum and an injection of local anesthetic is given. Following the injection of local anesthetic, a needle will be inserted into the prostate gland to take a sample of cells. The doctor will usually take 12 separate biopsies.  You can hear some clicking sounds from the biopsy instrument whilst the biopsies are taken.  The procedure will take approximately five to ten minutes.   What are the risks?  Infection: As there is a chance of infection we give you antibiotics before the procedure to reduce the risk. However is you experience a fever, chills,  nausea, or just not feeling well up to 72 hours after the biopsy you need to go directly to the emergency room.   Antibiotics: The physician will send a prescription to your preferred pharmacy. You will follow the directions on the bottle. Start the antibiotics on THE MORNING BEFORE THE BIOPSY.   Bleeding: Some men will see blood in the urine, semen and stool.  Bleeding can last for 6-8 weeks intermittently.  If you take any medication such as aspirin, warfarin, clopidogrel then the risk of bleeding will be higher.   Retention: Some men are unable to pass urine after the biopsy. This is called urine retention. This is very rare.  If you are unable to urinate please call our clinic or go to urgent care or the emergency room.   Getting the results:  The biopsy samples will be sent to a laboratory for examination by a pathologist.  It can take up to one week, sometimes longer, for your doctor to receive the results.  You will be given an appointment to return to clinic for the results of your biopsy within 1-2 weeks. If you do not receive an appointment for the results of the biopsy at the time of the scheduling, please contact the clinic to arrange an appointment for the results.   WE ARE NOT IN ANY CIRCUMSTANCES PERMITTED TO GIVE RESULTS OVER THE PHONE    What to expect following a biopsy:  You are advised to take it easy for the remainder of the day.  You may eat and drink as normal.  No restrictions to your normal daily routine.  As there is a risk of infection you will be given a course of antibiotics.  PLEASE COMPLETE THE FULL COURSE AS INSTRUCTED   Contact information:  Please call the clinic with any further questions [phone]   *Do not leave an urgent message on this voicemail as we may not receive it until the following business day*

## 2023-08-22 ENCOUNTER — TELEPHONE (OUTPATIENT)
Dept: UROLOGY | Facility: CLINIC | Age: 63
End: 2023-08-22
Payer: MEDICARE

## 2023-08-22 NOTE — TELEPHONE ENCOUNTER
Patient spouse calling with questions about TRUSP bx scheduled 8/23/23. Consent to communicate on file.  Questions answered about where to go and medication timing.   Felisa Johnston CMA

## 2023-08-23 ENCOUNTER — ALLIED HEALTH/NURSE VISIT (OUTPATIENT)
Dept: UROLOGY | Facility: CLINIC | Age: 63
End: 2023-08-23
Payer: MEDICARE

## 2023-08-23 ENCOUNTER — TRANSFERRED RECORDS (OUTPATIENT)
Dept: HEALTH INFORMATION MANAGEMENT | Facility: CLINIC | Age: 63
End: 2023-08-23

## 2023-08-23 ENCOUNTER — HOSPITAL ENCOUNTER (OUTPATIENT)
Dept: ULTRASOUND IMAGING | Facility: CLINIC | Age: 63
Discharge: HOME | End: 2023-08-23
Attending: UROLOGY | Admitting: UROLOGY
Payer: MEDICARE

## 2023-08-23 DIAGNOSIS — N40.1 BENIGN PROSTATIC HYPERPLASIA WITH LOWER URINARY TRACT SYMPTOMS, SYMPTOM DETAILS UNSPECIFIED: ICD-10-CM

## 2023-08-23 DIAGNOSIS — R97.20 ELEVATED PROSTATE SPECIFIC ANTIGEN (PSA): ICD-10-CM

## 2023-08-23 DIAGNOSIS — R97.20 ELEVATED PROSTATE SPECIFIC ANTIGEN (PSA): Primary | ICD-10-CM

## 2023-08-23 PROCEDURE — 76942 ECHO GUIDE FOR BIOPSY: CPT | Performed by: UROLOGY

## 2023-08-23 PROCEDURE — 96372 THER/PROPH/DIAG INJ SC/IM: CPT | Mod: 59 | Performed by: UROLOGY

## 2023-08-23 PROCEDURE — 55700 PR BIOPSY OF PROSTATE,NEEDLE/PUNCH: CPT | Performed by: UROLOGY

## 2023-08-23 PROCEDURE — 88305 TISSUE EXAM BY PATHOLOGIST: CPT | Mod: TC | Performed by: UROLOGY

## 2023-08-23 PROCEDURE — 272N000707 US PROSTATE WITH BIOPSY: Mod: TC

## 2023-08-23 PROCEDURE — 250N000009 HC RX 250: Performed by: UROLOGY

## 2023-08-23 RX ORDER — LIDOCAINE HYDROCHLORIDE 10 MG/ML
10 INJECTION, SOLUTION EPIDURAL; INFILTRATION; INTRACAUDAL; PERINEURAL ONCE
Status: COMPLETED | OUTPATIENT
Start: 2023-08-23 | End: 2023-08-23

## 2023-08-23 RX ORDER — GENTAMICIN 40 MG/ML
80 INJECTION, SOLUTION INTRAMUSCULAR; INTRAVENOUS ONCE
Status: COMPLETED | OUTPATIENT
Start: 2023-08-23 | End: 2023-08-23

## 2023-08-23 RX ADMIN — GENTAMICIN 80 MG: 40 INJECTION, SOLUTION INTRAMUSCULAR; INTRAVENOUS at 09:51

## 2023-08-23 RX ADMIN — LIDOCAINE HYDROCHLORIDE 10 ML: 10 INJECTION, SOLUTION EPIDURAL; INFILTRATION; INTRACAUDAL; PERINEURAL at 10:13

## 2023-08-23 NOTE — PROGRESS NOTES
Clinic Administered Medication Documentation        Patient was given Gentamicin. Prior to medication administration, verified patient's identity using patient s name and date of birth. Please see MAR and medication order for additional information. Patient instructed to report any adverse reaction to staff immediately.    Vial/Syringe: Single dose vial. Was entire vial of medication used? Yes    LOT: [id number]  EXP: 12/23  NDC: 58462-718-45    Cami Ibrahim RN on 8/23/2023 at 9:52 AM

## 2023-08-23 NOTE — PROCEDURES
Patient is here for prostate biopsy.  He was placed in the dorsal lithotomy position.  Prostate ultrasound placed transrectally.  The neurovascular bundle was anesthetized using 1% lidocaine.  Prostate measurements obtained.  Prostate size is 35 gms.  12 biopsies obtained under guidance of prostate ultrasound using biopsy needle.  Patient tolerated procedure.  Return to clinic in one week for biopsy result.

## 2023-08-26 LAB
PATH REPORT.COMMENTS IMP SPEC: ABNORMAL
PATH REPORT.COMMENTS IMP SPEC: YES
PATH REPORT.FINAL DX SPEC: ABNORMAL
PATH REPORT.GROSS SPEC: ABNORMAL
PATH REPORT.MICROSCOPIC SPEC OTHER STN: ABNORMAL
PATH REPORT.RELEVANT HX SPEC: ABNORMAL
PHOTO IMAGE: ABNORMAL

## 2023-08-26 PROCEDURE — G0416 PROSTATE BIOPSY, ANY MTHD: HCPCS | Mod: 26

## 2023-08-30 ENCOUNTER — PATIENT OUTREACH (OUTPATIENT)
Dept: ONCOLOGY | Facility: CLINIC | Age: 63
End: 2023-08-30

## 2023-08-30 ENCOUNTER — OFFICE VISIT (OUTPATIENT)
Dept: UROLOGY | Facility: CLINIC | Age: 63
End: 2023-08-30
Payer: MEDICARE

## 2023-08-30 ENCOUNTER — TELEPHONE (OUTPATIENT)
Dept: UROLOGY | Facility: CLINIC | Age: 63
End: 2023-08-30

## 2023-08-30 VITALS
BODY MASS INDEX: 23.34 KG/M2 | TEMPERATURE: 97.9 F | SYSTOLIC BLOOD PRESSURE: 139 MMHG | HEIGHT: 70 IN | DIASTOLIC BLOOD PRESSURE: 64 MMHG | WEIGHT: 163 LBS

## 2023-08-30 DIAGNOSIS — N40.1 BENIGN PROSTATIC HYPERPLASIA WITH LOWER URINARY TRACT SYMPTOMS, SYMPTOM DETAILS UNSPECIFIED: ICD-10-CM

## 2023-08-30 DIAGNOSIS — C61 PROSTATE CANCER (H): Primary | ICD-10-CM

## 2023-08-30 PROCEDURE — 99214 OFFICE O/P EST MOD 30 MIN: CPT | Performed by: UROLOGY

## 2023-08-30 RX ORDER — TAMSULOSIN HYDROCHLORIDE 0.4 MG/1
0.4 CAPSULE ORAL EVERY EVENING
Qty: 90 CAPSULE | Refills: 3 | Status: SHIPPED | OUTPATIENT
Start: 2023-08-30 | End: 2023-10-05

## 2023-08-30 RX ORDER — BICALUTAMIDE 50 MG/1
50 TABLET, FILM COATED ORAL DAILY
Qty: 30 TABLET | Refills: 0 | Status: SHIPPED | OUTPATIENT
Start: 2023-08-30 | End: 2023-10-05

## 2023-08-30 ASSESSMENT — PAIN SCALES - GENERAL: PAINLEVEL: NO PAIN (0)

## 2023-08-30 NOTE — TELEPHONE ENCOUNTER
Eligard sent to cam. Patient scheduled to have injection 9/27 per Dr. Keys.      Cami Ibrahim, RN on 8/30/2023 at 12:17 PM

## 2023-08-30 NOTE — PATIENT INSTRUCTIONS
To schedule your bone scan and CT scan please call [phone]      ANOTHER RECOMMENDATION DR. MAN HAS WITH PATIENTS WHO ARE UNDERGOING HORMONE THERAPY (Eligard) ARE TO TAKE OVER THE COUNTER VITAMIN D AND CALCIUM TABLETS. THESE ARE IMPORTANT BECAUSE THIS MEDICATION CAN CAUSE WEAKER BONES WHICH PUTS YOU AT A HIGHER RISK FOR OSTEOPOROSIS AND BREAKS. THESE MEDICATIONS DO NOT REQUIRE A PRESCRIPTION AND CAN BE PICKED UP OVER THE COUNTER.

## 2023-08-30 NOTE — PROGRESS NOTES
New Patient Oncology Nurse Navigator Note     Referring provider:   Javed Keys MD  Ph Urology  [facility]  [phone]     Referred to (specialty): Medical Oncology    Date Referral Received:   08/30/23     Evaluation for :   Prostate cancer     Clinical History (per Nurse review of records provided):    Patient with newly diagnosed prostate cancer Wally 4+5=9.  He is currently under the care of urology, Dr. Keys, being referred to medical oncology.  ADT to begin with urology 9/27/23, Yifan.  PSA: 272.90    Pathology, urology notes, imaging bookmarked**         Records Location (Care Everywhere, Media, etc.):   Epic     I called Angelina, to discuss referral to oncology.  She is listed on consent to communicate and coordinates appointments for Sushil.  I introduced my role and reviewed what this consult visit will entail/what to expect.  I reviewed the location and gave contact numbers including new patient scheduling and clinic phone numbers.  I let Angelina know that I will verify plan with Dr. Holliday since this is only a 45 min spot and is 1 month out.  Patient is having CT and bone scan on 9/20, so we will want this prior.  Angelina, has no other questions at this time.    I will transfer on referral as soon as I hear back from Dr. Holliday.    PLAN: HOLD: Dr. Holliday, PH, 1-2 PM, overbook 15 min, NEW in person     9/6/23  Dr. Holliday is good with the above plan.  I called Angelina, to confirm we are good to go ahead with this.  I reviewed again the visit specifics and transferred her to our new patient scheduling to finalize.    Sobeida Johansen, RN, BSN  Oncology New Patient Nurse Navigator   [facility]  [phone]

## 2023-08-30 NOTE — PROGRESS NOTES
Chief Complaint   Patient presents with    Surgical Followup     TRUSP bx   DOS 8/23       Sushil Diana is a 63 year old male who presents today for follow up of   Chief Complaint   Patient presents with    Surgical Followup     TRUSP bx   DOS 8/23    Follow up after recent biopsy of prostate for elevated psa.   He has no complaints.    Current Outpatient Medications   Medication Sig Dispense Refill    amLODIPine (NORVASC) 10 MG tablet TAKE ONE TABLET BY MOUTH ONCE DAILY 90 tablet 3    aspirin (ASA) 81 MG chewable tablet CHEW AND SWALLOW ONE TABLET BY MOUTH ONCE DAILY 108 tablet 3    atorvastatin (LIPITOR) 20 MG tablet TAKE ONE TABLET BY MOUTH ONCE DAILY 90 tablet 3    bicalutamide (CASODEX) 50 MG tablet Take 1 tablet (50 mg) by mouth daily 30 tablet 0    blood glucose monitoring (NO BRAND SPECIFIED) meter device kit Use to test blood sugar 1-3 times daily or as directed. -Contour Next meter 1 kit 0    citalopram (CELEXA) 20 MG tablet TAKE ONE TABLET BY MOUTH ONCE DAILY 90 tablet 1    CONTOUR NEXT TEST test strip USE TO TEST BLOOD SUGARS ONE TO THREE TIMES DAILY  OR AS DIRECTED 100 strip 1    famotidine (PEPCID) 40 MG tablet TAKE ONE TABLET BY MOUTH TWICE A  tablet 3    FLUBLOK QUADRIVALENT 0.5 ML injection       glipiZIDE (GLUCOTROL) 5 MG tablet TAKE ONE TABLET BY MOUTH EVERY MORNING 90 tablet 3    hydrALAZINE (APRESOLINE) 100 MG tablet TAKE ONE TABLET BY MOUTH THREE TIMES A DAY 90 tablet 3    hydrochlorothiazide (HYDRODIURIL) 25 MG tablet TAKE 1 TABLET (25 MG) BY MOUTH DAILY 90 tablet 3    lisinopril (ZESTRIL) 40 MG tablet TAKE 1 TABLET (40 MG) BY MOUTH DAILY 90 tablet 1    metFORMIN (GLUCOPHAGE XR) 500 MG 24 hr tablet TAKE TWO TABLETS BY MOUTH ONCE DAILY WITH DINNER 180 tablet 3    metoprolol tartrate (LOPRESSOR) 50 MG tablet TAKE ONE TABLET BY MOUTH TWICE A  tablet 3    Microlet Lancets MISC USE TO TEST BLOOD SUGAR 1-3 TIMES DAILY OR AS DIRECTED. 100 each 11    SHINGRIX injection        "tamsulosin (FLOMAX) 0.4 MG capsule Take 1 capsule (0.4 mg) by mouth every evening 90 capsule 3     Allergies   Allergen Reactions    No Known Drug Allergy       Past Medical History:   Diagnosis Date    GERD (gastroesophageal reflux disease)     Gout      Past Surgical History:   Procedure Laterality Date    NONE OF THE ABOVE CORE MEASURE DIAGNOSES       Family History   Problem Relation Age of Onset    Diabetes Other      Social History     Socioeconomic History    Marital status: Single     Spouse name: None    Number of children: None    Years of education: None    Highest education level: None   Tobacco Use    Smoking status: Some Days     Packs/day: 1.00     Years: 28.00     Pack years: 28.00     Types: Cigarettes    Smokeless tobacco: Never    Tobacco comments:     quit June 2018   Substance and Sexual Activity    Alcohol use: Yes     Comment: occasional    Drug use: No    Sexual activity: Yes     Partners: Female       REVIEW OF SYSTEMS  =================  C: NEGATIVE for fever, chills, change in weight  I: NEGATIVE for worrisome rashes, moles or lesions  E/M: NEGATIVE for ear, mouth and throat problems  R: NEGATIVE for significant cough or SHORTNESS OF BREATH  CV:  NEGATIVE for chest pain, palpitations or peripheral edema  GI: NEGATIVE for nausea, abdominal pain, heartburn, or change in bowel habits  NEURO: NEGATIVE numbness/weakness  : see HPI  PSYCH: NEGATIVE depression/anxiety  LYmph: no new enlarged lymph nodes  Ortho: no new trauma/movements    Physical Exam:  Blood pressure 139/64, temperature 97.9  F (36.6  C), temperature source Temporal, height 1.77 m (5' 9.69\"), weight 73.9 kg (163 lb).    GENERAL: healthy, alert and no distress  EYES: Eyes grossly normal to inspection, conjunctivae and sclerae normal  RESP: no audible wheeze, cough, or visible cyanosis.  No visible retractions or increased work of breathing.  Able to speak fully in complete sentences.  NEURO: Cranial nerves grossly intact, " "mentation intact and speech normal  PSYCH: mentation appears normal, affect normal/bright, judgement and insight intact, normal speech and appearance well-groomed    Case Report   Surgical Pathology Report                         Case: [id number]                                   Authorizing Provider:  Javed Keys MD     Collected:           08/23/2023 10:11 AM           Ordering Location:     [facility]          Received:            08/23/2023 10:25 AM                                  [facility]                                                             Pathologist:           Nancy Fernandez MD                                                           Specimens:   A) - Prostate, Right                                                                                B) - Prostate, Left                                                                        Final Diagnosis   A.  Prostate, right, needle biopsy:  -Prostatic adenocarcinoma, acinar-type.  Grade group-5 (Wally score 4+ 5 = 9). Tumor involves 6 of 6 cores.  Percentage of prostatic tissue involved by tumor: Approximately 35%. Perineural invasion present, multifocal.     B.  Prostate, left, needle biopsy:  -Prostatic adenocarcinoma, acinar-type.  Grade group-5 (Wally score 4+ 5 = 9). Tumor involves 5 of 5 cores.  Percentage of prostatic tissue involved by tumor: Approximately 50%.    Electronically signed by Nancy Fernandez MD on 8/26/2023 at  8:35 PM   Comment   [facility]   Negative for intraductal carcinoma, periprostatic fat invasion, or seminal vesicle invasion within sampled tissue.   Clinical Information   [facility]   Elevated PSA   Gross Description   [facility]   A(A). Prostate, Right, :  The specimen is received in formalin labeled with the patient's name, medical record number and other identifying information and designated \"prostate, right\". It consists of 6 tan-white tissue cores, 1.5-2.0 cm.  The diameters average 0.1 cm.  The " "specimen is submitted entirely in 2 cassettes.     B(B). Prostate, Left, :  The specimen is received in formalin labeled with the patient's name, medical record number and other identifying information and designated \"prostate, left\". It consists of 5 tan-white tissue cores, 1.2-1.7 cm.  Diameters average 0.1 cm.  Also received are 2 tan-white tissue fragments, 0.1-0.2 cm.  The specimen is submitted in entirely in 2 cassettes.   [Ben Pimentel]   Microscopic Description   [facility]   A and B.  Microscopic examination is performed.       MCRS N/A Yes Abnormal         Assessment/Plan:   (C61) Prostate cancer (H)  (primary encounter diagnosis)  Comment: group 5 prostate cancer found most likely mets  Plan: schedule for bone scan/abd and pelvis CT scan next            Start patient on casodex            Plan for eligard in several weeks (discussed side effects of hot flashes,  muscle and bone loss,  increase cardiovascular risks).            Patient to start vit D/calcium            Patient to see Dr. Holliday    (N40.1) Benign prostatic hyperplasia with lower urinary tract symptoms, symptom details unspecified  Comment:    Plan: trial of flomax                    "

## 2023-09-20 ENCOUNTER — HOSPITAL ENCOUNTER (OUTPATIENT)
Dept: CT IMAGING | Facility: CLINIC | Age: 63
Discharge: HOME | End: 2023-09-20
Attending: UROLOGY
Payer: MEDICARE

## 2023-09-20 ENCOUNTER — HOSPITAL ENCOUNTER (OUTPATIENT)
Dept: NUCLEAR MEDICINE | Facility: CLINIC | Age: 63
Setting detail: NUCLEAR MEDICINE
Discharge: HOME | End: 2023-09-20
Attending: UROLOGY
Payer: MEDICARE

## 2023-09-20 DIAGNOSIS — N40.1 BENIGN PROSTATIC HYPERPLASIA WITH LOWER URINARY TRACT SYMPTOMS, SYMPTOM DETAILS UNSPECIFIED: ICD-10-CM

## 2023-09-20 DIAGNOSIS — C61 PROSTATE CANCER (H): ICD-10-CM

## 2023-09-20 PROCEDURE — 250N000011 HC RX IP 250 OP 636: Performed by: UROLOGY

## 2023-09-20 PROCEDURE — 250N000009 HC RX 250: Performed by: UROLOGY

## 2023-09-20 PROCEDURE — A9561 TC99M OXIDRONATE: HCPCS | Performed by: UROLOGY

## 2023-09-20 PROCEDURE — 74177 CT ABD & PELVIS W/CONTRAST: CPT | Mod: MG

## 2023-09-20 PROCEDURE — G1010 CDSM STANSON: HCPCS

## 2023-09-20 PROCEDURE — 343N000001 HC RX 343: Performed by: UROLOGY

## 2023-09-20 PROCEDURE — 78306 BONE IMAGING WHOLE BODY: CPT | Mod: MG

## 2023-09-20 RX ORDER — IOPAMIDOL 755 MG/ML
500 INJECTION, SOLUTION INTRAVASCULAR ONCE
Status: COMPLETED | OUTPATIENT
Start: 2023-09-20 | End: 2023-09-20

## 2023-09-20 RX ADMIN — IOPAMIDOL 80 ML: 755 INJECTION, SOLUTION INTRAVENOUS at 08:47

## 2023-09-20 RX ADMIN — SODIUM CHLORIDE 60 ML: 9 INJECTION, SOLUTION INTRAVENOUS at 08:47

## 2023-09-20 RX ADMIN — Medication 25 MILLICURIE: at 08:25

## 2023-09-24 DIAGNOSIS — I10 HYPERTENSION, UNSPECIFIED TYPE: ICD-10-CM

## 2023-09-25 RX ORDER — HYDRALAZINE HYDROCHLORIDE 100 MG/1
TABLET, FILM COATED ORAL
Qty: 90 TABLET | Refills: 3 | Status: SHIPPED | OUTPATIENT
Start: 2023-09-25

## 2023-09-26 ENCOUNTER — TELEPHONE (OUTPATIENT)
Dept: UROLOGY | Facility: CLINIC | Age: 63
End: 2023-09-26
Payer: MEDICARE

## 2023-09-26 NOTE — TELEPHONE ENCOUNTER
Patient contacted and advised he does need his appointment for mabel 9/27/23 per last MD note.  Patient agrees.  Felisa Johnston CMA

## 2023-09-26 NOTE — TELEPHONE ENCOUNTER
Reason for Call:  Other appointment    Detailed comments: Kaleigh and Sushil received a message from Dr Keys's team stating they no longer need to follow up with Dr Keys and they should follow up with oncology. They would like to know if Sushil should still come for his nurse only appointment tomorrow, 9/27/23. Please advise.    Phone Number Patient can be reached at: Home number on file [phone] (home)    Best Time:     Can we leave a detailed message on this number? YES    Call taken on 9/26/2023 at 8:57 AM by Reina Jeffries

## 2023-09-27 ENCOUNTER — ALLIED HEALTH/NURSE VISIT (OUTPATIENT)
Dept: UROLOGY | Facility: CLINIC | Age: 63
End: 2023-09-27
Payer: MEDICARE

## 2023-09-27 DIAGNOSIS — C61 PROSTATE CANCER (H): Primary | ICD-10-CM

## 2023-09-27 PROCEDURE — 96402 CHEMO HORMON ANTINEOPL SQ/IM: CPT | Performed by: UROLOGY

## 2023-09-27 NOTE — PROGRESS NOTES
Clinic Administered Medication Documentation      Injectable Medication Documentation    Is there an active order (written within the past 365 days, with administrations remaining, not ) in the chart? Yes.     Patient was given  Eligard . Prior to medication administration, verified patient's identity using patient s name and date of birth. Please see MAR and medication order for additional information. Patient instructed to remain in clinic for 15 minutes and report any adverse reaction to staff immediately.    Vial/Syringe: Syringe  Was this medication supplied by the patient? No  Is this a medication the patient will need to receive again? No - not necessary to check for refills remaining.    LOT: [id number]    NDC: 53247-059-09     EXP: 2024    TITI Chang RN on 2023 at 9:39 AM

## 2023-09-29 ENCOUNTER — TELEPHONE (OUTPATIENT)
Dept: UROLOGY | Facility: CLINIC | Age: 63
End: 2023-09-29
Payer: MEDICARE

## 2023-09-29 NOTE — TELEPHONE ENCOUNTER
Kaleigh Anderson calling for patient stating he will run out of his casodex medication tonight.   Asking for refill to [facility] pharmacy.  Per Dr. Keys, Casodex medication was temporary. No need to refill at this time.  Felisa Johnston, CMA

## 2023-10-03 NOTE — TELEPHONE ENCOUNTER
RECORDS STATUS - ALL OTHER DIAGNOSIS      RECORDS RECEIVED FROM: [facility] - Internal Records   DATE RECEIVED: 10/3

## 2023-10-05 ENCOUNTER — ONCOLOGY VISIT (OUTPATIENT)
Dept: ONCOLOGY | Facility: CLINIC | Age: 63
End: 2023-10-05
Attending: UROLOGY
Payer: MEDICARE

## 2023-10-05 ENCOUNTER — TELEPHONE (OUTPATIENT)
Dept: ONCOLOGY | Facility: CLINIC | Age: 63
End: 2023-10-05

## 2023-10-05 ENCOUNTER — TELEPHONE (OUTPATIENT)
Dept: UROLOGY | Facility: CLINIC | Age: 63
End: 2023-10-05

## 2023-10-05 ENCOUNTER — PRE VISIT (OUTPATIENT)
Dept: ONCOLOGY | Facility: CLINIC | Age: 63
End: 2023-10-05

## 2023-10-05 ENCOUNTER — PATIENT OUTREACH (OUTPATIENT)
Dept: ONCOLOGY | Facility: CLINIC | Age: 63
End: 2023-10-05

## 2023-10-05 VITALS
DIASTOLIC BLOOD PRESSURE: 58 MMHG | OXYGEN SATURATION: 99 % | WEIGHT: 167.19 LBS | HEART RATE: 75 BPM | BODY MASS INDEX: 24.76 KG/M2 | SYSTOLIC BLOOD PRESSURE: 138 MMHG | HEIGHT: 69 IN | TEMPERATURE: 97.2 F

## 2023-10-05 DIAGNOSIS — C61 PROSTATE CANCER (H): Primary | ICD-10-CM

## 2023-10-05 DIAGNOSIS — C61 PROSTATE CANCER METASTATIC TO BONE (H): ICD-10-CM

## 2023-10-05 DIAGNOSIS — C79.51 PROSTATE CANCER METASTATIC TO BONE (H): ICD-10-CM

## 2023-10-05 DIAGNOSIS — Z23 NEED FOR PROPHYLACTIC VACCINATION AND INOCULATION AGAINST INFLUENZA: ICD-10-CM

## 2023-10-05 PROCEDURE — G0008 ADMIN INFLUENZA VIRUS VAC: HCPCS | Performed by: INTERNAL MEDICINE

## 2023-10-05 PROCEDURE — 90662 IIV NO PRSV INCREASED AG IM: CPT | Performed by: INTERNAL MEDICINE

## 2023-10-05 PROCEDURE — 99203 OFFICE O/P NEW LOW 30 MIN: CPT | Mod: 25 | Performed by: INTERNAL MEDICINE

## 2023-10-05 RX ORDER — PREDNISONE 5 MG/1
5 TABLET ORAL DAILY
Qty: 180 TABLET | Refills: 3 | Status: SHIPPED | OUTPATIENT
Start: 2023-10-05 | End: 2023-10-06

## 2023-10-05 RX ORDER — ZOLEDRONIC ACID 0.04 MG/ML
4 INJECTION, SOLUTION INTRAVENOUS ONCE
Status: CANCELLED | OUTPATIENT
Start: 2024-04-26 | End: 2024-02-21

## 2023-10-05 RX ORDER — HEPARIN SODIUM,PORCINE 10 UNIT/ML
5-20 VIAL (ML) INTRAVENOUS DAILY PRN
Status: CANCELLED | OUTPATIENT
Start: 2024-02-28

## 2023-10-05 RX ORDER — ZOLEDRONIC ACID 0.04 MG/ML
4 INJECTION, SOLUTION INTRAVENOUS ONCE
Status: CANCELLED | OUTPATIENT
Start: 2024-02-28 | End: 2023-12-27

## 2023-10-05 RX ORDER — HEPARIN SODIUM,PORCINE 10 UNIT/ML
5-20 VIAL (ML) INTRAVENOUS DAILY PRN
Status: CANCELLED | OUTPATIENT
Start: 2024-05-24

## 2023-10-05 RX ORDER — HEPARIN SODIUM (PORCINE) LOCK FLUSH IV SOLN 100 UNIT/ML 100 UNIT/ML
5 SOLUTION INTRAVENOUS
Status: CANCELLED | OUTPATIENT
Start: 2024-03-29

## 2023-10-05 RX ORDER — ZOLEDRONIC ACID 0.04 MG/ML
4 INJECTION, SOLUTION INTRAVENOUS ONCE
Status: CANCELLED | OUTPATIENT
Start: 2024-05-24 | End: 2024-03-20

## 2023-10-05 RX ORDER — HEPARIN SODIUM,PORCINE 10 UNIT/ML
5-20 VIAL (ML) INTRAVENOUS DAILY PRN
Status: CANCELLED | OUTPATIENT
Start: 2024-03-29

## 2023-10-05 RX ORDER — HEPARIN SODIUM (PORCINE) LOCK FLUSH IV SOLN 100 UNIT/ML 100 UNIT/ML
5 SOLUTION INTRAVENOUS
Status: CANCELLED | OUTPATIENT
Start: 2024-04-26

## 2023-10-05 RX ORDER — HEPARIN SODIUM,PORCINE 10 UNIT/ML
5-20 VIAL (ML) INTRAVENOUS DAILY PRN
Status: CANCELLED | OUTPATIENT
Start: 2023-12-27

## 2023-10-05 RX ORDER — HEPARIN SODIUM,PORCINE 10 UNIT/ML
5-20 VIAL (ML) INTRAVENOUS DAILY PRN
Status: CANCELLED | OUTPATIENT
Start: 2024-04-26

## 2023-10-05 RX ORDER — HEPARIN SODIUM (PORCINE) LOCK FLUSH IV SOLN 100 UNIT/ML 100 UNIT/ML
5 SOLUTION INTRAVENOUS
Status: CANCELLED | OUTPATIENT
Start: 2024-05-24

## 2023-10-05 RX ORDER — HEPARIN SODIUM (PORCINE) LOCK FLUSH IV SOLN 100 UNIT/ML 100 UNIT/ML
5 SOLUTION INTRAVENOUS
Status: CANCELLED | OUTPATIENT
Start: 2023-12-27

## 2023-10-05 RX ORDER — ZOLEDRONIC ACID 0.04 MG/ML
4 INJECTION, SOLUTION INTRAVENOUS ONCE
Status: CANCELLED | OUTPATIENT
Start: 2024-03-29 | End: 2024-01-24

## 2023-10-05 RX ORDER — ZOLEDRONIC ACID 0.04 MG/ML
4 INJECTION, SOLUTION INTRAVENOUS ONCE
Status: CANCELLED | OUTPATIENT
Start: 2023-12-27 | End: 2023-11-29

## 2023-10-05 RX ORDER — HEPARIN SODIUM (PORCINE) LOCK FLUSH IV SOLN 100 UNIT/ML 100 UNIT/ML
5 SOLUTION INTRAVENOUS
Status: CANCELLED | OUTPATIENT
Start: 2024-02-28

## 2023-10-05 ASSESSMENT — ACTIVITIES OF DAILY LIVING (ADL): DEPENDENT_IADLS:: INDEPENDENT

## 2023-10-05 ASSESSMENT — PAIN SCALES - GENERAL: PAINLEVEL: NO PAIN (0)

## 2023-10-05 NOTE — TELEPHONE ENCOUNTER
Left message on machine to call back. Per Dr. Holliday request, patient scheduled with Dr. Keys next week. When patient returns call please inform him of appt date and time.    Cami Ibrahim RN on 10/5/2023 at 2:49 PM

## 2023-10-05 NOTE — TELEPHONE ENCOUNTER
Prior Authorization Approval    Medication: ABIRATERONE ACETATE 250 MG PO TABS  Authorization Effective Date: 10/5/2023  Authorization Expiration Date: 4/2/2024  Approved Dose/Quantity: 120/30 days  Reference #: [id number]   Insurance Company: [employer] - Phone [phone] Fax [phone]  Expected CoPay: $ 1.45  CoPay Card Available: No    Financial Assistance Needed: not needed  Which Pharmacy is filling the prescription: [facility] - [place], [place] - [address]  Pharmacy Notified: yes   Patient Notified: yes

## 2023-10-05 NOTE — TELEPHONE ENCOUNTER
[facility]: Cancer Care Initial Note                                    Discussion with Patient:                                                      Spoke with patient and Lolis. This RN introduced myself and role during treatment.     CHEMO TEACH        Assessment:                                                      Initial  Current living arrangement:: I live in a private home  Informal Support system:: Significant other  Bed or wheelchair confined:: No  Mobility Status: Independent  Transportation means:: Accessible car  Medication adherence problem (GOAL):: No  Knowledgeable about how to use meds:: Yes  Medication side effects suspected:: Yes  Referrals Placed: None  Patient Reported Pain?: No  Pain Score: No Pain (0)    No assessment indicated    Intervention/Education provided during outreach:                                                       This RN informed patient/Lolis to expect a call from the oral chemotherapy pharmacist for medication education.    Follow up call in 1-2 weeks    Signature:  Justine Pandya RN  [facility]: Cancer Care Long Assessment    Discussion with Patient:                                                        SEE ABOVE                                     Dates of Treament:                                                      PENDING    Assessment:                                                      Constitutional  Patient Reported Constitutional Symptoms?: No    Neurosensory  Patient Reported Neurosensory Symptoms?: No    Eye Disorders  Patient Reported Eye Disorders?: No    Ear Disorders  Ear Disorders  Patient Reported Ear Disorder?: No    Cardiovascular  Patient Reported Cardiovascular Symptoms?: No    Respiratory  Patient Reported Respiratory Symptoms?: No    Gastrointestinal  Patient Reported Gastrointestinal Symptoms?: No    Genitourinary  Patient Reported Genitourinary Symptoms?: No    Lymph System Disorders  Patient Reported Lymph System  Disorders?: No    Musculoskeletal and Connective Tissue Disorders  Patient Reported Musculoskeletal or Connective Tissue Disorders?: No    Integumentary  Patient Reported Integumentary Symptoms?: No    Pain  Patient Reported Pain?: No  Pain Score: No Pain (0)    Patient Coping  Accepting;Anxiety    Clinic Utilization  Patient Aware of Next Appointment?: Yes    Other Patient Concerns  Other Patient Reported Concerns: Yes, see notes    No assessment indicated    Intervention/Education provided during outreach:                                                       Follow up call in 1-2 weeks         Signature:  Justine Pandya RN  [facility]: Cancer Care Plan of Care Education Note                                    Discussion with Patient:                                                      SEE ABOVE    Assessment:                                                      Assessment completed with:: Patient    Current living arrangement  I live in a private home    Plan of Care Education   Transportation means:: Accessible car  Informal Support system:: Significant other    Evaluation of Learning       No assessment indicated    Intervention/Education provided during outreach:                                                       Chemotherapy Education    Patient is here today for chemotherapy education, accompanied by Lolis.  Pt has a cancer diagnosis of Prostate Cancer.  Their Oncologist is Dr. Holliday, and PCP is Dr. Matson.    Reviewed the following with the patient and their support person:    Treatment Goal: Palliative    Regimen: Abiraterone (Zytiga) / Prednisone / Leuprolide     Duration: x 6 Cycles and rationale for strict adherence, side effects (including long-term and short-term) and management.    Infection prevention and monitoring of lab values, what lab tests and what changes of these values meant, along with the possibility of hydration or blood product transfusion, or the need to defer or  "hold treatment.      General Chemotherapy Information, including ways it is excreted from the body and cleaning and containment of vomitus or other bodily fluid, use of the bathroom, sexual health and intimacy, what to do if needing to miss a treatment, when to call a provider and the need for staff to wear protective equipment.    Oral Chemotherapy: Safe Handling of Oral Chemotherapy Medicines  Storing your medicine  \" Store the medicine in the original container (unless you use a pill box).  \" Do not store where it is hot, humid or yassine.  \" Keep away from children or pets.  Handling your medicine  \" Wash your hands before and after touching the pills.  \" No one but you should handle the pills.  \" If you need help with the pills, have your helper wear special chemotherapy gloves. They should also wash their hands before and after handling the pills.  \" Do not crush, split, chew or dissolve the pills unless your health care team says it's okay.  Handling body waste  While taking this medicine, and for a while afterward, your urine, stool, vomit and sweat may contain the drug.  Each time you use the toilet, close the lid and flush twice. Do this for at least two days after the last time you take the chemotherapy.  If you vomit, empty the basin into the toilet, close the lid and flush twice. Your helper should wear waterproof, disposable (throw-away) gloves. After taking off the gloves, they should wash their hands.  If your clothes, sheets or towels are soiled with urine, stool or vomit, do not wash them with other items. Wash them twice in hot water.  Getting rid of unused medicine  \" Do not flush unused pills down the toilet. This will pollute the water.  \" Work with your health care team to safely dispose of your medicine.     For informational purposes only. Not to replace the advice of your health care provider.  Developed in collaboration with [facility].  Copyright   2013 [employer]. All " "rights reserved. [employer] [place] 976471 - 12/15.      Written Information:   \"My Cancer Guidebook\" Binder   \"Getting Ready for Chemotherapy: What to Expect, Before, During, and After  your Treatment\",   Via Oncology medication information sheets,   Information on when to contact the provider,   Various programs offered at [facility], and   Business card with contact information given; Oncology Clinic, RN Case  Manager, and the after-hours Nurse Advise Line.  No barriers to learning identified. Patient and family verbalized understanding of all written and verbal information. All questions answered to patients satisfaction.   General Orientation to the Medical Oncology department, Infusion Services department, Huc/scheduling, bathrooms and usual flow of the treatment day provided as well as introduction to the Infusion nurses.        Pt instructed to call with further questions or concerns.  Patient states understanding and is in agreement with this plan.  Copy of appointments, and after visit summary (AVS) given to patient. Patient discharged home.    Justine Pandya RN, BSN  Oncology Hematology RN Care Coordinator  [facility]  Phone [phone]     Follow up call in 1-2 weeks    Signature:  Justine Pandya RN  [facility]: Cancer Care                                                                                              Signature:  Justine Pandya RN   "

## 2023-10-05 NOTE — PATIENT INSTRUCTIONS
1) Started PA for Zytiga / Prednisone.  2) SELENE NM 4 weeks after starting Zytiga w/ labs (CBC, CMP, PSA, vitamin D) and Zometa.  3) Zometa 8 weeks after starting Zytiga.  4) BJT NM 12 weeks after starting Zytiga w/ labs (CBC, CMP, PSA) and Zometa.    Eduardo Holliday MD.

## 2023-10-05 NOTE — PROGRESS NOTES
Reviewed pathology, imaging and natural history of stage IV prostate cancer.  Status post Eliguard 45 mg; will be due March 2024.  Start Zytiga / Prednisone; reviewed logistics and potential side effects.  Start Zometa monthly as bone strengthening agent; reviewed logistics and potential side effects.  Send NGS from prostate biopsy.    Eduardo Holliday MD.

## 2023-10-05 NOTE — LETTER
10/5/2023         RE: Sushil Diana  [id number] [place] [id number]        Dear Colleague,    Thank you for referring your patient, Sushil Diana, to the [facility]. Please see a copy of my visit note below.    Reviewed pathology, imaging and natural history of stage IV prostate cancer.  Status post Eliguard 45 mg; will be due March 2024.  Start Zytiga / Prednisone; reviewed logistics and potential side effects.  Start Zometa monthly as bone strengthening agent; reviewed logistics and potential side effects.  Send NGS from prostate biopsy.    Eduardo Holliday MD.            Again, thank you for allowing me to participate in the care of your patient.        Sincerely,        Eduardo Holliday MD

## 2023-10-06 ENCOUNTER — TELEPHONE (OUTPATIENT)
Dept: ADMISSION | Facility: CLINIC | Age: 63
End: 2023-10-06
Payer: MEDICARE

## 2023-10-06 ENCOUNTER — TELEPHONE (OUTPATIENT)
Dept: ONCOLOGY | Facility: CLINIC | Age: 63
End: 2023-10-06
Payer: MEDICARE

## 2023-10-06 DIAGNOSIS — C79.51 PROSTATE CANCER METASTATIC TO BONE (H): Primary | ICD-10-CM

## 2023-10-06 DIAGNOSIS — C61 PROSTATE CANCER METASTATIC TO BONE (H): Primary | ICD-10-CM

## 2023-10-06 DIAGNOSIS — C61 PROSTATE CANCER (H): ICD-10-CM

## 2023-10-06 RX ORDER — PREDNISONE 5 MG/1
5 TABLET ORAL DAILY
Qty: 180 TABLET | Refills: 3 | Status: SHIPPED | OUTPATIENT
Start: 2023-10-06 | End: 2024-09-26

## 2023-10-06 RX ORDER — ABIRATERONE ACETATE 250 MG/1
1000 TABLET ORAL DAILY
Qty: 120 TABLET | Refills: 0 | Status: SHIPPED | OUTPATIENT
Start: 2023-10-06 | End: 2023-11-05

## 2023-10-06 NOTE — TELEPHONE ENCOUNTER
"Oral Chemotherapy Monitoring Program    Primary Oncologist: Dr. Holliday  Primary Oncology Clinic: [facility]  Cancer Diagnosis: Prostate Cancer    Drug: Zytiga (abiraterone)  Start Date: TBD, once baseline labs have been collected  Expected duration of therapy: Until disease progression or unacceptable toxicity    Drug Interaction Assessment: Nonw    Lab Monitoring Plan  Per Dr. Holliday recommendations (10/5) Labs/provider visit 4 weeks after starting therapy and then again 12 weeks after starting therapy    Subjective/Objective:  Sushil Diana is a 63 year old male contacted by phone for an initial visit for oral chemotherapy education.         10/6/2023     9:00 AM   ORAL CHEMOTHERAPY   Assessment Type New Teach   Diagnosis Code Prostate Cancer   Providers Miya   Clinic Name/Location [facility]   Drug Name Zytiga (abiraterone)   Dose 1,000 mg   Current Schedule Daily   Cycle Details Continuous       Vitals:  BP:   BP Readings from Last 1 Encounters:   10/05/23 138/58     Wt Readings from Last 1 Encounters:   10/05/23 75.8 kg (167 lb 3 oz)     Estimated body surface area is 1.92 meters squared as calculated from the following:    Height as of 10/5/23: 1.753 m (5' 9\").    Weight as of 10/5/23: 75.8 kg (167 lb 3 oz).    Assessment:  Patient is appropriate to start therapy.    Plan:  Basic chemotherapy teaching was reviewed with the patients wife Lolis including indication, start date of therapy, dose, administration, adverse effects, missed doses, food and drug interactions, monitoring, side effect management, office contact information, and safe handling. Written materials were provided and all questions answered.    Follow-Up:  10/18 initial follow up call, ensure lab/provider visit scheduled 4 weeks after starting therapy     Martina MessinaD, [facility]  Oncology Pharmacy Manager  [facility]  [phone]      "

## 2023-10-09 ENCOUNTER — PATIENT OUTREACH (OUTPATIENT)
Dept: ONCOLOGY | Facility: CLINIC | Age: 63
End: 2023-10-09
Payer: MEDICARE

## 2023-10-09 NOTE — TELEPHONE ENCOUNTER
All labs and Zometa have been made has the pt started Zytiga. Thank You Vinita  Please let Geneva and I know when the pt starts this med so we can make follow-up visits

## 2023-10-11 ENCOUNTER — LAB (OUTPATIENT)
Dept: LAB | Facility: CLINIC | Age: 63
End: 2023-10-11
Payer: MEDICARE

## 2023-10-11 ENCOUNTER — OFFICE VISIT (OUTPATIENT)
Dept: UROLOGY | Facility: CLINIC | Age: 63
End: 2023-10-11
Payer: MEDICARE

## 2023-10-11 VITALS
DIASTOLIC BLOOD PRESSURE: 62 MMHG | WEIGHT: 167 LBS | SYSTOLIC BLOOD PRESSURE: 128 MMHG | BODY MASS INDEX: 24.73 KG/M2 | TEMPERATURE: 98.1 F | HEIGHT: 69 IN

## 2023-10-11 DIAGNOSIS — R35.1 NOCTURIA: ICD-10-CM

## 2023-10-11 DIAGNOSIS — C79.51 PROSTATE CANCER METASTATIC TO BONE (H): ICD-10-CM

## 2023-10-11 DIAGNOSIS — C61 PROSTATE CANCER METASTATIC TO BONE (H): ICD-10-CM

## 2023-10-11 DIAGNOSIS — E55.9 VITAMIN D DEFICIENCY: Primary | ICD-10-CM

## 2023-10-11 DIAGNOSIS — C61 PROSTATE CANCER (H): Primary | ICD-10-CM

## 2023-10-11 DIAGNOSIS — C61 PROSTATE CANCER (H): ICD-10-CM

## 2023-10-11 LAB
ALBUMIN SERPL BCG-MCNC: 4.5 G/DL (ref 3.5–5.2)
ALP SERPL-CCNC: 1004 U/L (ref 40–129)
ALT SERPL W P-5'-P-CCNC: 12 U/L (ref 0–70)
ANION GAP SERPL CALCULATED.3IONS-SCNC: 13 MMOL/L (ref 7–15)
AST SERPL W P-5'-P-CCNC: 22 U/L (ref 0–45)
BASO+EOS+MONOS # BLD AUTO: ABNORMAL 10*3/UL
BASO+EOS+MONOS NFR BLD AUTO: ABNORMAL %
BASOPHILS # BLD AUTO: 0 10E3/UL (ref 0–0.2)
BASOPHILS NFR BLD AUTO: 1 %
BILIRUB SERPL-MCNC: 0.7 MG/DL
BUN SERPL-MCNC: 18.4 MG/DL (ref 8–23)
CALCIUM SERPL-MCNC: 8.8 MG/DL (ref 8.8–10.2)
CHLORIDE SERPL-SCNC: 99 MMOL/L (ref 98–107)
CREAT SERPL-MCNC: 0.99 MG/DL (ref 0.67–1.17)
DEPRECATED HCO3 PLAS-SCNC: 21 MMOL/L (ref 22–29)
EGFRCR SERPLBLD CKD-EPI 2021: 85 ML/MIN/1.73M2
EOSINOPHIL # BLD AUTO: 0.1 10E3/UL (ref 0–0.7)
EOSINOPHIL NFR BLD AUTO: 3 %
ERYTHROCYTE [DISTWIDTH] IN BLOOD BY AUTOMATED COUNT: 14.7 % (ref 10–15)
GLUCOSE SERPL-MCNC: 169 MG/DL (ref 70–99)
HCT VFR BLD AUTO: 29 % (ref 40–53)
HGB BLD-MCNC: 9.3 G/DL (ref 13.3–17.7)
IMM GRANULOCYTES # BLD: 0 10E3/UL
IMM GRANULOCYTES NFR BLD: 1 %
LYMPHOCYTES # BLD AUTO: 0.8 10E3/UL (ref 0.8–5.3)
LYMPHOCYTES NFR BLD AUTO: 19 %
MCH RBC QN AUTO: 29.4 PG (ref 26.5–33)
MCHC RBC AUTO-ENTMCNC: 32.1 G/DL (ref 31.5–36.5)
MCV RBC AUTO: 92 FL (ref 78–100)
MONOCYTES # BLD AUTO: 0.4 10E3/UL (ref 0–1.3)
MONOCYTES NFR BLD AUTO: 10 %
NEUTROPHILS # BLD AUTO: 2.7 10E3/UL (ref 1.6–8.3)
NEUTROPHILS NFR BLD AUTO: 66 %
NRBC # BLD AUTO: 0 10E3/UL
NRBC BLD AUTO-RTO: 0 /100
PLATELET # BLD AUTO: 170 10E3/UL (ref 150–450)
POTASSIUM SERPL-SCNC: 4.7 MMOL/L (ref 3.4–5.3)
PROT SERPL-MCNC: 6.7 G/DL (ref 6.4–8.3)
PSA SERPL DL<=0.01 NG/ML-MCNC: 70.98 NG/ML (ref 0–4.5)
RBC # BLD AUTO: 3.16 10E6/UL (ref 4.4–5.9)
SODIUM SERPL-SCNC: 133 MMOL/L (ref 135–145)
VIT D+METAB SERPL-MCNC: 13 NG/ML (ref 20–50)
WBC # BLD AUTO: 4.1 10E3/UL (ref 4–11)

## 2023-10-11 PROCEDURE — 85025 COMPLETE CBC W/AUTO DIFF WBC: CPT | Performed by: INTERNAL MEDICINE

## 2023-10-11 PROCEDURE — 80053 COMPREHEN METABOLIC PANEL: CPT | Performed by: INTERNAL MEDICINE

## 2023-10-11 PROCEDURE — 84153 ASSAY OF PSA TOTAL: CPT | Performed by: INTERNAL MEDICINE

## 2023-10-11 PROCEDURE — 36415 COLL VENOUS BLD VENIPUNCTURE: CPT

## 2023-10-11 PROCEDURE — 99213 OFFICE O/P EST LOW 20 MIN: CPT | Mod: 25 | Performed by: UROLOGY

## 2023-10-11 PROCEDURE — 51798 US URINE CAPACITY MEASURE: CPT | Performed by: UROLOGY

## 2023-10-11 RX ORDER — ERGOCALCIFEROL 1.25 MG/1
50000 CAPSULE, LIQUID FILLED ORAL WEEKLY
Qty: 12 CAPSULE | Refills: 0 | Status: SHIPPED | OUTPATIENT
Start: 2023-10-11 | End: 2023-12-28

## 2023-10-11 ASSESSMENT — PAIN SCALES - GENERAL: PAINLEVEL: NO PAIN (0)

## 2023-10-11 NOTE — PROGRESS NOTES
Chief Complaint   Patient presents with    Prostate Cancer       Sushil Diana is a 63 year old male who presents today for follow up of   Chief Complaint   Patient presents with    Prostate Cancer   Patient is a pleasant 63-year-old male with history of metastatic prostate cancer.  He is here today to discuss his nocturia.  He denies any daytime problems.  At night he gets up several times.  However he drinks at least 3 beers in the evening.  He is on Flomax without much improvements.  He is doing well with his cancer treatment.    Current Outpatient Medications   Medication Sig Dispense Refill    abiraterone (ZYTIGA) 250 MG tablet Take 4 tablets (1,000 mg) by mouth daily for 30 doses Take on empty stomach. 120 tablet 0    amLODIPine (NORVASC) 10 MG tablet TAKE ONE TABLET BY MOUTH ONCE DAILY 90 tablet 3    aspirin (ASA) 81 MG chewable tablet CHEW AND SWALLOW ONE TABLET BY MOUTH ONCE DAILY 108 tablet 3    atorvastatin (LIPITOR) 20 MG tablet TAKE ONE TABLET BY MOUTH ONCE DAILY 90 tablet 3    blood glucose monitoring (NO BRAND SPECIFIED) meter device kit Use to test blood sugar 1-3 times daily or as directed. -Contour Next meter 1 kit 0    citalopram (CELEXA) 20 MG tablet TAKE ONE TABLET BY MOUTH ONCE DAILY 90 tablet 1    CONTOUR NEXT TEST test strip USE TO TEST BLOOD SUGARS ONE TO THREE TIMES DAILY  OR AS DIRECTED 100 strip 1    famotidine (PEPCID) 40 MG tablet TAKE ONE TABLET BY MOUTH TWICE A  tablet 3    glipiZIDE (GLUCOTROL) 5 MG tablet TAKE ONE TABLET BY MOUTH EVERY MORNING 90 tablet 3    hydrALAZINE (APRESOLINE) 100 MG tablet TAKE ONE TABLET BY MOUTH THREE TIMES A DAY 90 tablet 3    hydrochlorothiazide (HYDRODIURIL) 25 MG tablet TAKE 1 TABLET (25 MG) BY MOUTH DAILY 90 tablet 3    lisinopril (ZESTRIL) 40 MG tablet TAKE 1 TABLET (40 MG) BY MOUTH DAILY 90 tablet 1    metFORMIN (GLUCOPHAGE XR) 500 MG 24 hr tablet TAKE TWO TABLETS BY MOUTH ONCE DAILY WITH DINNER 180 tablet 3    metoprolol tartrate  "(LOPRESSOR) 50 MG tablet TAKE ONE TABLET BY MOUTH TWICE A  tablet 3    Microlet Lancets MISC USE TO TEST BLOOD SUGAR 1-3 TIMES DAILY OR AS DIRECTED. 100 each 11    predniSONE (DELTASONE) 5 MG tablet Take 1 tablet (5 mg) by mouth daily START WITH ZYTIGA 180 tablet 3     Allergies   Allergen Reactions    No Known Drug Allergy       Past Medical History:   Diagnosis Date    GERD (gastroesophageal reflux disease)     Gout      Past Surgical History:   Procedure Laterality Date    NONE OF THE ABOVE CORE MEASURE DIAGNOSES       Family History   Problem Relation Age of Onset    Diabetes Other      Social History     Socioeconomic History    Marital status: Single     Spouse name: None    Number of children: None    Years of education: None    Highest education level: None   Tobacco Use    Smoking status: Some Days     Packs/day: 1.00     Years: 28.00     Additional pack years: 0.00     Total pack years: 28.00     Types: Cigarettes    Smokeless tobacco: Never    Tobacco comments:     quit June 2018   Substance and Sexual Activity    Alcohol use: Yes     Comment: occasional    Drug use: No    Sexual activity: Yes     Partners: Female       REVIEW OF SYSTEMS  =================  C: NEGATIVE for fever, chills, change in weight  I: NEGATIVE for worrisome rashes, moles or lesions  E/M: NEGATIVE for ear, mouth and throat problems  R: NEGATIVE for significant cough or SHORTNESS OF BREATH  CV:  NEGATIVE for chest pain, palpitations or peripheral edema  GI: NEGATIVE for nausea, abdominal pain, heartburn, or change in bowel habits  NEURO: NEGATIVE numbness/weakness  : see HPI  PSYCH: NEGATIVE depression/anxiety  LYmph: no new enlarged lymph nodes  Ortho: no new trauma/movements    Physical Exam:  Blood pressure 128/62, temperature 98.1  F (36.7  C), temperature source Temporal, height 1.753 m (5' 9\"), weight 75.8 kg (167 lb).    GENERAL: healthy, alert and no distress  EYES: Eyes grossly normal to inspection, conjunctivae and " sclerae normal  RESP: no audible wheeze, cough, or visible cyanosis.  No visible retractions or increased work of breathing.  Able to speak fully in complete sentences.  NEURO: Cranial nerves grossly intact, mentation intact and speech normal  PSYCH: mentation appears normal, affect normal/bright, judgement and insight intact, normal speech and appearance well-groomed    Assessment/Plan:   (C61) Prostate cancer (H)  (primary encounter diagnosis)  Comment:    Plan: MEASURE POST-VOID RESIDUAL URINE/BLADDER         CAPACITY, US NON-IMAGING         Per Dr. Holliday    (R35.1) Nocturia  Comment: Discussed discontinuation of beers prior to bedtime.  Plan: As needed

## 2023-10-11 NOTE — PROGRESS NOTES
Discussed lab add on appointment to be done prior to Dr. Keys appointment. Patient aware. No further questions or concerns.  Justine Pandya RNCC

## 2023-10-18 ENCOUNTER — TELEPHONE (OUTPATIENT)
Dept: PHARMACY | Facility: CLINIC | Age: 63
End: 2023-10-18
Payer: MEDICARE

## 2023-10-18 NOTE — ORAL ONC MGMT
"Oral Chemotherapy Monitoring Program    Subjective/Objective:  Sushil Diana is a 63 year old male contacted by phone for a follow-up visit for oral chemotherapy. Sushil and his wife report that therapy has been going very well and he feels he has more energy since starting treatment. He denies any fatigue, changes in bowel function, or hyperglycemia. They haven't started monitoring blood pressure but will start as they now have a blood pressure cuff at home.         10/6/2023     9:00 AM 10/18/2023    10:00 AM   ORAL CHEMOTHERAPY   Assessment Type New Teach Initial Follow up   Diagnosis Code Prostate Cancer Prostate Cancer   Providers [facility]   Clinic Name/Location [facility]   Drug Name Zytiga (abiraterone) Zytiga (abiraterone)   Dose 1,000 mg 1,000 mg   Current Schedule Daily Daily   Cycle Details Continuous Continuous   Start Date of Last Cycle  10/12/2023   Planned next cycle start date  11/11/2023   Doses missed in last 2 weeks  0   Adherence Assessment  Adherent   Adverse Effects  No AE identified during assessment   Any new drug interactions?  No   Is the dose as ordered appropriate for the patient?  Yes       Last PHQ-2 Score on record:       5/5/2023     7:42 AM 1/15/2020    10:25 AM   PHQ-2 ( 1999 [employer])   Q1: Little interest or pleasure in doing things 0 0   Q2: Feeling down, depressed or hopeless 0 0   PHQ-2 Score 0 0   PHQ-2 Total Score (12-17 Years)- Positive if 3 or more points; Administer PHQ-A if positive  0   Q1: Little interest or pleasure in doing things Not at all    Q2: Feeling down, depressed or hopeless Not at all    PHQ-2 Score 0        Vitals:  BP:   BP Readings from Last 1 Encounters:   10/11/23 128/62     Wt Readings from Last 1 Encounters:   10/11/23 75.8 kg (167 lb)     Estimated body surface area is 1.92 meters squared as calculated from the following:    Height as of 10/11/23: 1.753 m (5' 9\").    Weight as of 10/11/23: 75.8 kg (167 lb).    Labs:  _  Result " Component Current Result Ref Range   Sodium 133 (L) (10/11/2023) 135 - 145 mmol/L     _  Result Component Current Result Ref Range   Potassium 4.7 (10/11/2023) 3.4 - 5.3 mmol/L     _  Result Component Current Result Ref Range   Calcium 8.8 (10/11/2023) 8.8 - 10.2 mg/dL     No results found for Mag within last 30 days.     No results found for Phos within last 30 days.     _  Result Component Current Result Ref Range   Albumin 4.5 (10/11/2023) 3.5 - 5.2 g/dL     _  Result Component Current Result Ref Range   Urea Nitrogen 18.4 (10/11/2023) 8.0 - 23.0 mg/dL     _  Result Component Current Result Ref Range   Creatinine 0.99 (10/11/2023) 0.67 - 1.17 mg/dL     _  Result Component Current Result Ref Range   AST 22 (10/11/2023) 0 - 45 U/L     _  Result Component Current Result Ref Range   ALT 12 (10/11/2023) 0 - 70 U/L     _  Result Component Current Result Ref Range   Bilirubin Total 0.7 (10/11/2023) <=1.2 mg/dL     _  Result Component Current Result Ref Range   WBC Count 4.1 (10/11/2023) 4.0 - 11.0 10e3/uL     _  Result Component Current Result Ref Range   Hemoglobin 9.3 (L) (10/11/2023) 13.3 - 17.7 g/dL     _  Result Component Current Result Ref Range   Platelet Count 170 (10/11/2023) 150 - 450 10e3/uL     No results found for ANC within last 30 days.     _  Result Component Current Result Ref Range   Absolute Neutrophils 2.7 (10/11/2023) 1.6 - 8.3 10e3/uL          Assessment/Plan:  Sushil is tolerating therapy well, continue treatment as planned.  Start monitoring blood pressure at home.     Follow-Up:  11/7 Patrick appointment and labs    Refill Due:  11/11/23    Zack Delgadillo  Oncology Pharmacy Resident  October 18, 2023

## 2023-10-25 ENCOUNTER — PATIENT OUTREACH (OUTPATIENT)
Dept: ONCOLOGY | Facility: CLINIC | Age: 63
End: 2023-10-25
Payer: MEDICARE

## 2023-10-25 NOTE — TELEPHONE ENCOUNTER
[facility]/Voicemail    Clinical Data: Care Coordinator Outreach    Outreach attempted x 1.  Left message on patient's voicemail with call back information and requested return call.    Plan: Care Coordinator will try to reach patient again in 1-2 business days.    Justine Pandya RNCC

## 2023-10-25 NOTE — PROGRESS NOTES
Pt calling and stated that everything is going great he has been taking the Zytiga every morning. He said sorry he missed your call he has been very busy just him at home his wife is in [place]. He stated unless you have any further questions for him you wouldn't need to call him back? If you do could it wait until tomorrow. Thank You Vinita

## 2023-10-28 ENCOUNTER — TRANSFERRED RECORDS (OUTPATIENT)
Dept: ONCOLOGY | Facility: CLINIC | Age: 63
End: 2023-10-28
Payer: MEDICARE

## 2023-10-30 ENCOUNTER — PATIENT OUTREACH (OUTPATIENT)
Dept: ONCOLOGY | Facility: CLINIC | Age: 63
End: 2023-10-30
Payer: MEDICARE

## 2023-10-30 DIAGNOSIS — C61 PROSTATE CANCER METASTATIC TO BONE (H): Primary | ICD-10-CM

## 2023-10-30 DIAGNOSIS — C79.51 PROSTATE CANCER METASTATIC TO BONE (H): Primary | ICD-10-CM

## 2023-10-30 LAB — SCANNED LAB RESULT: NORMAL

## 2023-11-03 DIAGNOSIS — C61 PROSTATE CANCER METASTATIC TO BONE (H): Primary | ICD-10-CM

## 2023-11-03 DIAGNOSIS — C79.51 PROSTATE CANCER METASTATIC TO BONE (H): Primary | ICD-10-CM

## 2023-11-03 RX ORDER — ABIRATERONE ACETATE 250 MG/1
1000 TABLET ORAL DAILY
Qty: 120 TABLET | Refills: 0 | Status: SHIPPED | OUTPATIENT
Start: 2023-11-04 | End: 2023-12-04

## 2023-11-06 NOTE — TELEPHONE ENCOUNTER
[facility]: Cancer Care - RN CC Symptom Call      Situation                                                               Patient is not feeling well                                                   Assessment                                                      Presenting Problem: Fever  Cancer Diagnosis: Prostate cancer metastatic to bone   Cancer treatment & last dose: Started Zytiga C1D1 one month ago. Canceled lab/provider visit due to feeling sick.    Subjective/Objective: Common Cold per patient    Onset: gradual  Duration: 4 days     Progression of Symptoms: improving    Accompanying signs and symptoms:        Headache: No       Cough: YES - sputum clear       Difficulty breathing: No       Chills (shaking chills): No       Nausea/Vomiting: No       Diarrhea: No       Mouth sores: No       Sore throat: No       Decreased PO intake: No       UTI symptoms?: No       Port site/IV site changes (if applicable): No       Skin concerns: No    History:        Recent antibiotic use: No       History of neutropenia: No       Allergies verified: Yes       Recent infusions: No       Oral chemotherapy: Yes: Zytiga/Prednisone, Zometa and Lupron    Recent labs:  Lab Results   Component Value Date    HGB 9.3 (L) 10/11/2023    WBC 4.1 10/11/2023    ANEUTAUTO 2.7 10/11/2023     10/11/2023     Lab Results   Component Value Date     (L) 10/11/2023    POTASSIUM 4.7 10/11/2023    CR 0.99 10/11/2023    ROBERT 8.8 10/11/2023    BILITOTAL 0.7 10/11/2023    ALBUMIN 4.5 10/11/2023    ALT 12 10/11/2023    AST 22 10/11/2023     Therapies tried and outcome: antipyretics (acetaminophen, etc.), increase fluid intake, and rest    Intervention                                                      Homecare instructions:      -   Monitor temperature (every 2-4 hours)     -   Take antipyretic medication     -   Increase fluid intake     -   Rest     -   Call if no improvement    Plan                                                          -   Patient scheduled for a lab only appointment on Saturday 11/11/23 and follow-up on Monday via video visit.    Patient verbalizes understanding of and agrees with plan? YES    Signature:  Justine Pandya RN      Protocol used: Cancer Care Nursing Triage - Fever

## 2023-11-07 ENCOUNTER — TELEPHONE (OUTPATIENT)
Dept: ONCOLOGY | Facility: CLINIC | Age: 63
End: 2023-11-07

## 2023-11-07 NOTE — TELEPHONE ENCOUNTER
Reason for Call:  Other FYI    Detailed comments: Sushil wanted to let Justine know that everything is ok, he just has a cold and the phlegm he is coughing up is clear. He doesn't need a call back.    Phone Number Patient can be reached at: Home number on file [phone] (home)    Best Time:     Can we leave a detailed message on this number? YES    Call taken on 11/7/2023 at 1:46 PM by Reina Jeffries

## 2023-11-08 NOTE — TELEPHONE ENCOUNTER
[facility]/Voicemail    Clinical Data: Care Coordinator Outreach    Outreach attempted x 1.  Left message on patient's voicemail with call back information and requested return call.    Plan: Care Coordinator will try to reach patient again in 1-2 business days.    Justine Pandya RNCC

## 2023-11-08 NOTE — TELEPHONE ENCOUNTER
See patient outreach. This RNCC will need to discuss symptoms and rescheduling lab/provider visit this week.  Justine Pandya RNCC

## 2023-11-10 NOTE — PROGRESS NOTES
Virtual Visit Details    Type of service:  Video Visit   Video Start Time: 9:05 AM  Video End Time:9:18 AM    Originating Location (pt. Location): Home    Distant Location (provider location):  On-site  Platform used for Video Visit: [facility]      Oncology/Hematology Visit Note    Nov 13, 2023    Reason for visit: Follow up metastatic prostate cancer    Oncology HPI: Sushil Diana is a 63 year old male with metastatic prostate cancer.  He had nocturia for few years with no elevation of PSA, followed up with his PCP and PSA was elevated at 272.  He was referred to urology in August 2023, biopsy performed and noted to have prostatic adenocarcinoma, [place] 4+9.  Follow-up CT abdomen/pelvis with evidence of bony metastasis, therefore follow-up bone scan on 9/20/2023 with diffuse osseous metastasis.  He was given a dose of Eligard on 9/27/23 and referred to Dr. Holliday, who recommended abiraterone/prednisone with monthly Zometa.  NGS testing was requested as well.    First dose of abiraterone/prednisone was 10/12/23.  Video visit today for close follow-up.    Interval History: Sushil is on video today with his wife, Lolis.  His first dose of abiraterone was 10/12 and he seems to be tolerating this pretty well.  He did have some mild hot flashes after his leuprolide injection in September, but they are extremely manageable.  Since the abiraterone, the hot flashes are about the same.  Appetite has been really good.  No fever, chills, vomiting, diarrhea, headache, urinary changes or skin changes.    Review of Systems: See interval hx. Denies fevers, chills, HA, dizziness, changes in vision, CP, SOB, abdominal pain, N/V, diarrhea, changes in urination, bleeding, bruising.     PMHx and Social Hx reviewed per EPIC.      Medications:  Current Outpatient Medications   Medication Sig Dispense Refill    abiraterone (ZYTIGA) 250 MG tablet Take 4 tablets (1,000 mg) by mouth daily for 30 doses Take on empty stomach. 120 tablet 0     "amLODIPine (NORVASC) 10 MG tablet TAKE ONE TABLET BY MOUTH ONCE DAILY 90 tablet 3    aspirin (ASA) 81 MG chewable tablet CHEW AND SWALLOW ONE TABLET BY MOUTH ONCE DAILY 108 tablet 3    atorvastatin (LIPITOR) 20 MG tablet TAKE ONE TABLET BY MOUTH ONCE DAILY 90 tablet 3    blood glucose monitoring (NO BRAND SPECIFIED) meter device kit Use to test blood sugar 1-3 times daily or as directed. -Contour Next meter 1 kit 0    citalopram (CELEXA) 20 MG tablet TAKE ONE TABLET BY MOUTH ONCE DAILY 90 tablet 1    CONTOUR NEXT TEST test strip USE TO TEST BLOOD SUGARS ONE TO THREE TIMES DAILY  OR AS DIRECTED 100 strip 1    famotidine (PEPCID) 40 MG tablet TAKE ONE TABLET BY MOUTH TWICE A  tablet 3    glipiZIDE (GLUCOTROL) 5 MG tablet TAKE ONE TABLET BY MOUTH EVERY MORNING 90 tablet 3    hydrALAZINE (APRESOLINE) 100 MG tablet TAKE ONE TABLET BY MOUTH THREE TIMES A DAY 90 tablet 3    hydrochlorothiazide (HYDRODIURIL) 25 MG tablet TAKE 1 TABLET (25 MG) BY MOUTH DAILY 90 tablet 3    lisinopril (ZESTRIL) 40 MG tablet TAKE 1 TABLET (40 MG) BY MOUTH DAILY 90 tablet 1    metFORMIN (GLUCOPHAGE XR) 500 MG 24 hr tablet TAKE TWO TABLETS BY MOUTH ONCE DAILY WITH DINNER 180 tablet 3    metoprolol tartrate (LOPRESSOR) 50 MG tablet TAKE ONE TABLET BY MOUTH TWICE A  tablet 3    Microlet Lancets MISC USE TO TEST BLOOD SUGAR 1-3 TIMES DAILY OR AS DIRECTED. 100 each 11    predniSONE (DELTASONE) 5 MG tablet Take 1 tablet (5 mg) by mouth daily START WITH ZYTIGA 180 tablet 3    vitamin D2 (ERGOCALCIFEROL) 29376 units (1250 mcg) capsule Take 1 capsule (50,000 Units) by mouth once a week for 12 doses 12 capsule 0       Allergies   Allergen Reactions    No Known Drug Allergy        EXAM:    Ht 1.753 m (5' 9\")   Wt 77.1 kg (170 lb)   BMI 25.10 kg/m  Video, no full VS.     GENERAL:  Male, in no acute distress.  Alert and oriented x3. Well groomed.   HEENT:  Normocephalic, atraumatic. No conjunctival injection or eye swelling.   LUNGS:  " Nonlabored breathing, no cough or audible wheezing, able to speak full sentences.  MSK: Full ROM UE.    SKIN: No rash on exposed skin.   NEURO: CN grossly intact, speech normal  PSYCH: Mentation appears normal, insight and judgement intact      Labs:    11/11/23 09:59   Sodium 131 (L)   Potassium 4.1   Chloride 94 (L)   Carbon Dioxide (CO2) 25   Urea Nitrogen 16.2   Creatinine 0.90   GFR Estimate >90   Calcium 8.9   Anion Gap 12   Albumin 4.4   Protein Total 6.6   Alkaline Phosphatase 552 (H)   ALT 11   AST 19   Bilirubin Total 0.6   Glucose 187 (H)   PSA Tumor Marker 30.55 (H)   WBC 5.6   Hemoglobin 10.1 (L)   Hematocrit 29.7 (L)   Platelet Count 189   RBC Count 3.31 (L)   MCV 90   MCH 30.5   MCHC 34.0   RDW 14.2   % Neutrophils 61   % Lymphocytes 23   % Monocytes 11   % Eosinophils 3   % Basophils 1   Absolute Basophils 0.0   Absolute Eosinophils 0.2   Absolute Immature Granulocytes 0.0   Absolute Lymphocytes 1.3   Absolute Monocytes 0.6   % Immature Granulocytes 1   Absolute Neutrophils 3.5   Absolute NRBCs 0.0   NRBCs per 100 WBC 0     Imaging: n/a    Impression/Plan: Sushil Diaan is a 63 year old male with metastatic prostate cancer to the bones, currently on leuprolide, abiraterone and prednisone.    Metastatic prostate cancer: Currently on leuprolide, abiraterone and prednisone.  Leuprolide given in September 2023 and he has very mild hot flashes, otherwise very tolerable.  First dose of abiraterone was 10/12/2023 and no issues receiving the drug.  He is tolerating this well.  Nice response in PSA to 30.55 and his alk phos has come down to 552, suggesting treatment response as well.  -continue leuprolide every 6 mo, given Sept 2023, due March 2024  -Continue abiraterone/prednisone  -12/27 Pradip, labs, follow-up    Bony metastasis: Plan to give Zometa, first dose is scheduled on 11/29/2023.    URI: Cough and congestion last week, now significantly improved.  No concerns today.      Chart documentation with  [employer] Voice recognition Software. Although reviewed after completion, some words and grammatical errors may remain.    30 minutes spent on the date of the encounter doing chart review, review of test results, interpretation of tests, patient visit, documentation, and discussion with family     Kelsey Nguyen PA-C  Hematology/Oncology  [facility]

## 2023-11-11 ENCOUNTER — LAB (OUTPATIENT)
Dept: LAB | Facility: CLINIC | Age: 63
End: 2023-11-11
Payer: MEDICARE

## 2023-11-11 DIAGNOSIS — E11.65 TYPE 2 DIABETES MELLITUS WITH HYPERGLYCEMIA, WITHOUT LONG-TERM CURRENT USE OF INSULIN (H): Primary | ICD-10-CM

## 2023-11-11 DIAGNOSIS — C61 PROSTATE CANCER METASTATIC TO BONE (H): ICD-10-CM

## 2023-11-11 DIAGNOSIS — C79.51 PROSTATE CANCER METASTATIC TO BONE (H): ICD-10-CM

## 2023-11-11 DIAGNOSIS — C61 PROSTATE CANCER (H): ICD-10-CM

## 2023-11-11 LAB
ALBUMIN SERPL BCG-MCNC: 4.4 G/DL (ref 3.5–5.2)
ALP SERPL-CCNC: 552 U/L (ref 40–129)
ALT SERPL W P-5'-P-CCNC: 11 U/L (ref 0–70)
ANION GAP SERPL CALCULATED.3IONS-SCNC: 12 MMOL/L (ref 7–15)
AST SERPL W P-5'-P-CCNC: 19 U/L (ref 0–45)
BASOPHILS # BLD AUTO: 0 10E3/UL (ref 0–0.2)
BASOPHILS NFR BLD AUTO: 1 %
BILIRUB SERPL-MCNC: 0.6 MG/DL
BUN SERPL-MCNC: 16.2 MG/DL (ref 8–23)
CALCIUM SERPL-MCNC: 8.9 MG/DL (ref 8.8–10.2)
CHLORIDE SERPL-SCNC: 94 MMOL/L (ref 98–107)
CREAT SERPL-MCNC: 0.9 MG/DL (ref 0.67–1.17)
DEPRECATED HCO3 PLAS-SCNC: 25 MMOL/L (ref 22–29)
EGFRCR SERPLBLD CKD-EPI 2021: >90 ML/MIN/1.73M2
EOSINOPHIL # BLD AUTO: 0.2 10E3/UL (ref 0–0.7)
EOSINOPHIL NFR BLD AUTO: 3 %
ERYTHROCYTE [DISTWIDTH] IN BLOOD BY AUTOMATED COUNT: 14.2 % (ref 10–15)
GLUCOSE SERPL-MCNC: 187 MG/DL (ref 70–99)
HCT VFR BLD AUTO: 29.7 % (ref 40–53)
HGB BLD-MCNC: 10.1 G/DL (ref 13.3–17.7)
IMM GRANULOCYTES # BLD: 0 10E3/UL
IMM GRANULOCYTES NFR BLD: 1 %
LYMPHOCYTES # BLD AUTO: 1.3 10E3/UL (ref 0.8–5.3)
LYMPHOCYTES NFR BLD AUTO: 23 %
MCH RBC QN AUTO: 30.5 PG (ref 26.5–33)
MCHC RBC AUTO-ENTMCNC: 34 G/DL (ref 31.5–36.5)
MCV RBC AUTO: 90 FL (ref 78–100)
MONOCYTES # BLD AUTO: 0.6 10E3/UL (ref 0–1.3)
MONOCYTES NFR BLD AUTO: 11 %
NEUTROPHILS # BLD AUTO: 3.5 10E3/UL (ref 1.6–8.3)
NEUTROPHILS NFR BLD AUTO: 61 %
NRBC # BLD AUTO: 0 10E3/UL
NRBC BLD AUTO-RTO: 0 /100
PLATELET # BLD AUTO: 189 10E3/UL (ref 150–450)
POTASSIUM SERPL-SCNC: 4.1 MMOL/L (ref 3.4–5.3)
PROT SERPL-MCNC: 6.6 G/DL (ref 6.4–8.3)
PSA SERPL DL<=0.01 NG/ML-MCNC: 30.55 NG/ML (ref 0–4.5)
RBC # BLD AUTO: 3.31 10E6/UL (ref 4.4–5.9)
SODIUM SERPL-SCNC: 131 MMOL/L (ref 135–145)
WBC # BLD AUTO: 5.6 10E3/UL (ref 4–11)

## 2023-11-11 PROCEDURE — 36415 COLL VENOUS BLD VENIPUNCTURE: CPT

## 2023-11-11 PROCEDURE — 84153 ASSAY OF PSA TOTAL: CPT | Performed by: INTERNAL MEDICINE

## 2023-11-11 PROCEDURE — 80053 COMPREHEN METABOLIC PANEL: CPT | Performed by: INTERNAL MEDICINE

## 2023-11-11 PROCEDURE — 85025 COMPLETE CBC W/AUTO DIFF WBC: CPT | Performed by: INTERNAL MEDICINE

## 2023-11-13 ENCOUNTER — VIRTUAL VISIT (OUTPATIENT)
Dept: ONCOLOGY | Facility: CLINIC | Age: 63
End: 2023-11-13
Attending: PHYSICIAN ASSISTANT
Payer: MEDICARE

## 2023-11-13 VITALS — HEIGHT: 69 IN | BODY MASS INDEX: 25.18 KG/M2 | WEIGHT: 170 LBS

## 2023-11-13 DIAGNOSIS — C79.51 PROSTATE CANCER METASTATIC TO BONE (H): Primary | ICD-10-CM

## 2023-11-13 DIAGNOSIS — C61 PROSTATE CANCER METASTATIC TO BONE (H): Primary | ICD-10-CM

## 2023-11-13 DIAGNOSIS — C61 PROSTATE CANCER (H): ICD-10-CM

## 2023-11-13 PROCEDURE — 99214 OFFICE O/P EST MOD 30 MIN: CPT | Mod: 95 | Performed by: PHYSICIAN ASSISTANT

## 2023-11-13 ASSESSMENT — PAIN SCALES - GENERAL: PAINLEVEL: NO PAIN (0)

## 2023-11-13 NOTE — NURSING NOTE
Is the patient currently in the state of [place]? YES    Visit mode:VIDEO    If the visit is dropped, the patient can be reconnected by: VIDEO VISIT: Text to cell phone:   Telephone Information:   Mobile [phone]       Will anyone else be joining the visit? NO  (If patient encounters technical issues they should call [phone]    How would you like to obtain your AVS? MyChart    Are changes needed to the allergy or medication list? Pt stated no changes to allergies and Pt stated no med changes    Reason for visit: REESE Monroy LPN

## 2023-11-13 NOTE — LETTER
11/13/2023         RE: Sushil Diana  [address] [facility] [address]        Dear Colleague,    Thank you for referring your patient, Sushil Diana, to the [facility]. Please see a copy of my visit note below.    Virtual Visit Details    Type of service:  Video Visit   Video Start Time: 9:05 AM  Video End Time:9:18 AM    Originating Location (pt. Location): Home    Distant Location (provider location):  On-site  Platform used for Video Visit: [facility]      Oncology/Hematology Visit Note    Nov 13, 2023    Reason for visit: Follow up metastatic prostate cancer    Oncology HPI: Sushil Diana is a 63 year old male with metastatic prostate cancer.  He had nocturia for few years with no elevation of PSA, followed up with his PCP and PSA was elevated at 272.  He was referred to urology in August 2023, biopsy performed and noted to have prostatic adenocarcinoma, [place] 4+9.  Follow-up CT abdomen/pelvis with evidence of bony metastasis, therefore follow-up bone scan on 9/20/2023 with diffuse osseous metastasis.  He was given a dose of Eligard on 9/27/23 and referred to Dr. Holliday, who recommended abiraterone/prednisone with monthly Zometa.  NGS testing was requested as well.    First dose of abiraterone/prednisone was 10/12/23.  Video visit today for close follow-up.    Interval History: Sushil is on video today with his wife, Lolis.  His first dose of abiraterone was 10/12 and he seems to be tolerating this pretty well.  He did have some mild hot flashes after his leuprolide injection in September, but they are extremely manageable.  Since the abiraterone, the hot flashes are about the same.  Appetite has been really good.  No fever, chills, vomiting, diarrhea, headache, urinary changes or skin changes.    Review of Systems: See interval hx. Denies fevers, chills, HA, dizziness, changes in vision, CP, SOB, abdominal pain, N/V, diarrhea, changes in urination, bleeding,  bruising.     PMHx and Social Hx reviewed per EPIC.      Medications:  Current Outpatient Medications   Medication Sig Dispense Refill     abiraterone (ZYTIGA) 250 MG tablet Take 4 tablets (1,000 mg) by mouth daily for 30 doses Take on empty stomach. 120 tablet 0     amLODIPine (NORVASC) 10 MG tablet TAKE ONE TABLET BY MOUTH ONCE DAILY 90 tablet 3     aspirin (ASA) 81 MG chewable tablet CHEW AND SWALLOW ONE TABLET BY MOUTH ONCE DAILY 108 tablet 3     atorvastatin (LIPITOR) 20 MG tablet TAKE ONE TABLET BY MOUTH ONCE DAILY 90 tablet 3     blood glucose monitoring (NO BRAND SPECIFIED) meter device kit Use to test blood sugar 1-3 times daily or as directed. -Contour Next meter 1 kit 0     citalopram (CELEXA) 20 MG tablet TAKE ONE TABLET BY MOUTH ONCE DAILY 90 tablet 1     CONTOUR NEXT TEST test strip USE TO TEST BLOOD SUGARS ONE TO THREE TIMES DAILY  OR AS DIRECTED 100 strip 1     famotidine (PEPCID) 40 MG tablet TAKE ONE TABLET BY MOUTH TWICE A  tablet 3     glipiZIDE (GLUCOTROL) 5 MG tablet TAKE ONE TABLET BY MOUTH EVERY MORNING 90 tablet 3     hydrALAZINE (APRESOLINE) 100 MG tablet TAKE ONE TABLET BY MOUTH THREE TIMES A DAY 90 tablet 3     hydrochlorothiazide (HYDRODIURIL) 25 MG tablet TAKE 1 TABLET (25 MG) BY MOUTH DAILY 90 tablet 3     lisinopril (ZESTRIL) 40 MG tablet TAKE 1 TABLET (40 MG) BY MOUTH DAILY 90 tablet 1     metFORMIN (GLUCOPHAGE XR) 500 MG 24 hr tablet TAKE TWO TABLETS BY MOUTH ONCE DAILY WITH DINNER 180 tablet 3     metoprolol tartrate (LOPRESSOR) 50 MG tablet TAKE ONE TABLET BY MOUTH TWICE A  tablet 3     Microlet Lancets MISC USE TO TEST BLOOD SUGAR 1-3 TIMES DAILY OR AS DIRECTED. 100 each 11     predniSONE (DELTASONE) 5 MG tablet Take 1 tablet (5 mg) by mouth daily START WITH ZYTIGA 180 tablet 3     vitamin D2 (ERGOCALCIFEROL) 56117 units (1250 mcg) capsule Take 1 capsule (50,000 Units) by mouth once a week for 12 doses 12 capsule 0       Allergies   Allergen Reactions     No Known  "Drug Allergy        EXAM:    Ht 1.753 m (5' 9\")   Wt 77.1 kg (170 lb)   BMI 25.10 kg/m  Video, no full VS.     GENERAL:  Male, in no acute distress.  Alert and oriented x3. Well groomed.   HEENT:  Normocephalic, atraumatic. No conjunctival injection or eye swelling.   LUNGS:  Nonlabored breathing, no cough or audible wheezing, able to speak full sentences.  MSK: Full ROM UE.    SKIN: No rash on exposed skin.   NEURO: CN grossly intact, speech normal  PSYCH: Mentation appears normal, insight and judgement intact      Labs:    11/11/23 09:59   Sodium 131 (L)   Potassium 4.1   Chloride 94 (L)   Carbon Dioxide (CO2) 25   Urea Nitrogen 16.2   Creatinine 0.90   GFR Estimate >90   Calcium 8.9   Anion Gap 12   Albumin 4.4   Protein Total 6.6   Alkaline Phosphatase 552 (H)   ALT 11   AST 19   Bilirubin Total 0.6   Glucose 187 (H)   PSA Tumor Marker 30.55 (H)   WBC 5.6   Hemoglobin 10.1 (L)   Hematocrit 29.7 (L)   Platelet Count 189   RBC Count 3.31 (L)   MCV 90   MCH 30.5   MCHC 34.0   RDW 14.2   % Neutrophils 61   % Lymphocytes 23   % Monocytes 11   % Eosinophils 3   % Basophils 1   Absolute Basophils 0.0   Absolute Eosinophils 0.2   Absolute Immature Granulocytes 0.0   Absolute Lymphocytes 1.3   Absolute Monocytes 0.6   % Immature Granulocytes 1   Absolute Neutrophils 3.5   Absolute NRBCs 0.0   NRBCs per 100 WBC 0     Imaging: n/a    Impression/Plan: Sushil Diana is a 63 year old male with metastatic prostate cancer to the bones, currently on leuprolide, abiraterone and prednisone.    Metastatic prostate cancer: Currently on leuprolide, abiraterone and prednisone.  Leuprolide given in September 2023 and he has very mild hot flashes, otherwise very tolerable.  First dose of abiraterone was 10/12/2023 and no issues receiving the drug.  He is tolerating this well.  Nice response in PSA to 30.55 and his alk phos has come down to 552, suggesting treatment response as well.  -continue leuprolide every 6 mo, given Sept 2023, " due March 2024  -Continue abiraterone/prednisone  -12/27 Pradip, labs, follow-up    Bony metastasis: Plan to give Zometa, first dose is scheduled on 11/29/2023.    URI: Cough and congestion last week, now significantly improved.  No concerns today.      Chart documentation with Dragon Voice recognition Software. Although reviewed after completion, some words and grammatical errors may remain.    30 minutes spent on the date of the encounter doing chart review, review of test results, interpretation of tests, patient visit, documentation, and discussion with family     Kelsey Nguyen PA-C  Hematology/Oncology  [facility]                  Again, thank you for allowing me to participate in the care of your patient.        Sincerely,        Kelsey Nguyen PA-C

## 2023-11-13 NOTE — LETTER
11/13/2023         RE: Sushil Diana  [address] [facility] [address]        Dear Colleague,    Thank you for referring your patient, Sushil Diana, to the [facility]. Please see a copy of my visit note below.    Virtual Visit Details    Type of service:  Video Visit   Video Start Time: 9:05 AM  Video End Time:9:18 AM    Originating Location (pt. Location): Home    Distant Location (provider location):  On-site  Platform used for Video Visit: [facility]      Oncology/Hematology Visit Note    Nov 13, 2023    Reason for visit: Follow up metastatic prostate cancer    Oncology HPI: Sushil Diana is a 63 year old male with metastatic prostate cancer.  He had nocturia for few years with no elevation of PSA, followed up with his PCP and PSA was elevated at 272.  He was referred to urology in August 2023, biopsy performed and noted to have prostatic adenocarcinoma, [place] 4+9.  Follow-up CT abdomen/pelvis with evidence of bony metastasis, therefore follow-up bone scan on 9/20/2023 with diffuse osseous metastasis.  He was given a dose of Eligard on 9/27/23 and referred to Dr. Holliday, who recommended abiraterone/prednisone with monthly Zometa.  NGS testing was requested as well.    First dose of abiraterone/prednisone was 10/12/23.  Video visit today for close follow-up.    Interval History: Sushil is on video today with his wife, Lolis.  His first dose of abiraterone was 10/12 and he seems to be tolerating this pretty well.  He did have some mild hot flashes after his leuprolide injection in September, but they are extremely manageable.  Since the abiraterone, the hot flashes are about the same.  Appetite has been really good.  No fever, chills, vomiting, diarrhea, headache, urinary changes or skin changes.    Review of Systems: See interval hx. Denies fevers, chills, HA, dizziness, changes in vision, CP, SOB, abdominal pain, N/V, diarrhea, changes in urination, bleeding,  bruising.     PMHx and Social Hx reviewed per EPIC.      Medications:  Current Outpatient Medications   Medication Sig Dispense Refill     abiraterone (ZYTIGA) 250 MG tablet Take 4 tablets (1,000 mg) by mouth daily for 30 doses Take on empty stomach. 120 tablet 0     amLODIPine (NORVASC) 10 MG tablet TAKE ONE TABLET BY MOUTH ONCE DAILY 90 tablet 3     aspirin (ASA) 81 MG chewable tablet CHEW AND SWALLOW ONE TABLET BY MOUTH ONCE DAILY 108 tablet 3     atorvastatin (LIPITOR) 20 MG tablet TAKE ONE TABLET BY MOUTH ONCE DAILY 90 tablet 3     blood glucose monitoring (NO BRAND SPECIFIED) meter device kit Use to test blood sugar 1-3 times daily or as directed. -Contour Next meter 1 kit 0     citalopram (CELEXA) 20 MG tablet TAKE ONE TABLET BY MOUTH ONCE DAILY 90 tablet 1     CONTOUR NEXT TEST test strip USE TO TEST BLOOD SUGARS ONE TO THREE TIMES DAILY  OR AS DIRECTED 100 strip 1     famotidine (PEPCID) 40 MG tablet TAKE ONE TABLET BY MOUTH TWICE A  tablet 3     glipiZIDE (GLUCOTROL) 5 MG tablet TAKE ONE TABLET BY MOUTH EVERY MORNING 90 tablet 3     hydrALAZINE (APRESOLINE) 100 MG tablet TAKE ONE TABLET BY MOUTH THREE TIMES A DAY 90 tablet 3     hydrochlorothiazide (HYDRODIURIL) 25 MG tablet TAKE 1 TABLET (25 MG) BY MOUTH DAILY 90 tablet 3     lisinopril (ZESTRIL) 40 MG tablet TAKE 1 TABLET (40 MG) BY MOUTH DAILY 90 tablet 1     metFORMIN (GLUCOPHAGE XR) 500 MG 24 hr tablet TAKE TWO TABLETS BY MOUTH ONCE DAILY WITH DINNER 180 tablet 3     metoprolol tartrate (LOPRESSOR) 50 MG tablet TAKE ONE TABLET BY MOUTH TWICE A  tablet 3     Microlet Lancets MISC USE TO TEST BLOOD SUGAR 1-3 TIMES DAILY OR AS DIRECTED. 100 each 11     predniSONE (DELTASONE) 5 MG tablet Take 1 tablet (5 mg) by mouth daily START WITH ZYTIGA 180 tablet 3     vitamin D2 (ERGOCALCIFEROL) 22050 units (1250 mcg) capsule Take 1 capsule (50,000 Units) by mouth once a week for 12 doses 12 capsule 0       Allergies   Allergen Reactions     No Known  "Drug Allergy        EXAM:    Ht 1.753 m (5' 9\")   Wt 77.1 kg (170 lb)   BMI 25.10 kg/m  Video, no full VS.     GENERAL:  Male, in no acute distress.  Alert and oriented x3. Well groomed.   HEENT:  Normocephalic, atraumatic. No conjunctival injection or eye swelling.   LUNGS:  Nonlabored breathing, no cough or audible wheezing, able to speak full sentences.  MSK: Full ROM UE.    SKIN: No rash on exposed skin.   NEURO: CN grossly intact, speech normal  PSYCH: Mentation appears normal, insight and judgement intact      Labs:    11/11/23 09:59   Sodium 131 (L)   Potassium 4.1   Chloride 94 (L)   Carbon Dioxide (CO2) 25   Urea Nitrogen 16.2   Creatinine 0.90   GFR Estimate >90   Calcium 8.9   Anion Gap 12   Albumin 4.4   Protein Total 6.6   Alkaline Phosphatase 552 (H)   ALT 11   AST 19   Bilirubin Total 0.6   Glucose 187 (H)   PSA Tumor Marker 30.55 (H)   WBC 5.6   Hemoglobin 10.1 (L)   Hematocrit 29.7 (L)   Platelet Count 189   RBC Count 3.31 (L)   MCV 90   MCH 30.5   MCHC 34.0   RDW 14.2   % Neutrophils 61   % Lymphocytes 23   % Monocytes 11   % Eosinophils 3   % Basophils 1   Absolute Basophils 0.0   Absolute Eosinophils 0.2   Absolute Immature Granulocytes 0.0   Absolute Lymphocytes 1.3   Absolute Monocytes 0.6   % Immature Granulocytes 1   Absolute Neutrophils 3.5   Absolute NRBCs 0.0   NRBCs per 100 WBC 0     Imaging: n/a    Impression/Plan: Sushil Diana is a 63 year old male with metastatic prostate cancer to the bones, currently on leuprolide, abiraterone and prednisone.    Metastatic prostate cancer: Currently on leuprolide, abiraterone and prednisone.  Leuprolide given in September 2023 and he has very mild hot flashes, otherwise very tolerable.  First dose of abiraterone was 10/12/2023 and no issues receiving the drug.  He is tolerating this well.  Nice response in PSA to 30.55 and his alk phos has come down to 552, suggesting treatment response as well.  -continue leuprolide every 6 mo, given Sept 2023, " due March 2024  -Continue abiraterone/prednisone  -12/27 Pradip, labs, follow-up    Bony metastasis: Plan to give Zometa, first dose is scheduled on 11/29/2023.    URI: Cough and congestion last week, now significantly improved.  No concerns today.      Chart documentation with Dragon Voice recognition Software. Although reviewed after completion, some words and grammatical errors may remain.    30 minutes spent on the date of the encounter doing chart review, review of test results, interpretation of tests, patient visit, documentation, and discussion with family     Kelsey Nguyen PA-C  Hematology/Oncology  [facility]                  Again, thank you for allowing me to participate in the care of your patient.        Sincerely,        Kelsey Nguyen PA-C

## 2023-11-24 DIAGNOSIS — E11.9 DIABETES MELLITUS WITHOUT COMPLICATION (H): ICD-10-CM

## 2023-11-24 DIAGNOSIS — I10 HYPERTENSION, UNSPECIFIED TYPE: ICD-10-CM

## 2023-11-24 RX ORDER — GLIPIZIDE 5 MG/1
TABLET ORAL
Qty: 60 TABLET | Refills: 0 | Status: SHIPPED | OUTPATIENT
Start: 2023-11-24 | End: 2024-02-05

## 2023-11-24 RX ORDER — AMLODIPINE BESYLATE 10 MG/1
TABLET ORAL
Qty: 90 TABLET | Refills: 2 | Status: SHIPPED | OUTPATIENT
Start: 2023-11-24 | End: 2024-08-20

## 2023-11-24 RX ORDER — METFORMIN HCL 500 MG
TABLET, EXTENDED RELEASE 24 HR ORAL
Qty: 180 TABLET | Refills: 3 | Status: SHIPPED | OUTPATIENT
Start: 2023-11-24

## 2023-11-24 NOTE — TELEPHONE ENCOUNTER
Keren given for Glipizide. Patient due for labs    Appointments in Next Year      Nov 29, 2023 11:15 AM  LAB with PH LAB  [facility] ([facility] ) [phone]

## 2023-11-29 ENCOUNTER — VIRTUAL VISIT (OUTPATIENT)
Dept: ONCOLOGY | Facility: CLINIC | Age: 63
End: 2023-11-29
Attending: NURSE PRACTITIONER
Payer: MEDICARE

## 2023-11-29 ENCOUNTER — LAB (OUTPATIENT)
Dept: LAB | Facility: CLINIC | Age: 63
End: 2023-11-29
Payer: MEDICARE

## 2023-11-29 ENCOUNTER — ALLIED HEALTH/NURSE VISIT (OUTPATIENT)
Dept: FAMILY MEDICINE | Facility: CLINIC | Age: 63
End: 2023-11-29
Payer: MEDICARE

## 2023-11-29 VITALS — HEIGHT: 69 IN | BODY MASS INDEX: 24.44 KG/M2 | WEIGHT: 165 LBS

## 2023-11-29 DIAGNOSIS — C61 PROSTATE CANCER METASTATIC TO BONE (H): ICD-10-CM

## 2023-11-29 DIAGNOSIS — C61 PROSTATE CANCER (H): Primary | ICD-10-CM

## 2023-11-29 DIAGNOSIS — E11.65 TYPE 2 DIABETES MELLITUS WITH HYPERGLYCEMIA, WITHOUT LONG-TERM CURRENT USE OF INSULIN (H): ICD-10-CM

## 2023-11-29 DIAGNOSIS — C79.51 PROSTATE CANCER METASTATIC TO BONE (H): ICD-10-CM

## 2023-11-29 DIAGNOSIS — Z09 ONCOLOGY FOLLOW-UP ENCOUNTER: Primary | ICD-10-CM

## 2023-11-29 LAB — HBA1C MFR BLD: 5.2 %

## 2023-11-29 PROCEDURE — 83036 HEMOGLOBIN GLYCOSYLATED A1C: CPT

## 2023-11-29 PROCEDURE — 99214 OFFICE O/P EST MOD 30 MIN: CPT | Mod: 95 | Performed by: NURSE PRACTITIONER

## 2023-11-29 PROCEDURE — 99207 PR NO CHARGE LOS: CPT

## 2023-11-29 ASSESSMENT — PAIN SCALES - GENERAL: PAINLEVEL: NO PAIN (0)

## 2023-11-29 NOTE — NURSING NOTE
No other vitals to report today      Is the patient currently in the state of [place]? YES    Visit mode:VIDEO    If the visit is dropped, the patient can be reconnected by: VIDEO VISIT: Text to cell phone:   Telephone Information:   Mobile [phone]    and VIDEO VISIT: Send to e-mail at: [email or web address]    Will anyone else be joining the visit? Pts significant other  (If patient encounters technical issues they should call [phone]    How would you like to obtain your AVS? MyChart    Are changes needed to the allergy or medication list? No    Patient declined individual allergy and medication review by support staff because patient denies any changes since echeck-in completion and states all information entered during echeck-in remains accurate.    Reason for visit: REESE Lara MA VVF

## 2023-11-29 NOTE — PROGRESS NOTES
Virtual Visit Details    Type of service:  Video Visit   Video Start Time:  1158  Video End Time: 1220    Originating Location (pt. Location): [facility]    Distant Location (provider location):  On-site [facility]  Platform used for Video Visit: [facility] Hematology and Oncology Outpatient Progress Note    Patient: Sushil Diana  MRN: [id number]  Date of Service: Nov 29, 2023          Reason for Visit    Stage IV prostate cancer    Primary Oncologist: Dr. 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    Minimal hot flashes on Leuprolide. No side effects on abiraterone he has noted.     Responding well with decline in PSA and alk phos.  No symptoms suggestive of progression.     He was due to start monthly Zometa today. I reviewed rationale for this and possible side effects. He just had 2 teeth extracted two weeks ago, he does not have formal dental clearance on when it may be safe to start.     He is on vit D weekly. Is not on calcium.  Serum calcium slightly low 8.7.    Plan:  -I elected to defer his Zometa today since he just had teeth extracted.  In the interim, he will confirm with his dentist on a timely and they be comfortable with him starting a biphosphonate.  We will tentatively hold his first Zometa infusion for 12/27 after his next follow-up visit if he gets dental clearance for that time.  -He will initiate calcium 1882-6409 mg/day in addition to his weekly vitamin D 50,000 IU x 12 weeks.  Once he is finished the higher weekly doses, he could transition to 1000 IU daily doses in combination with his calcium.  -Continue abiraterone and prednisone at current doses  -Return visit in 1 month as scheduled with labs, NP and infusion appointment for Zometa as held  -Leuprolide every 6 mo, next due March, 2024  -Trend PSA response     2.   Health maintenance  He plans to get his Covid vaccine and wanted  to ensure there was no contraindications with his treatment.    Plan:  -I told him there is no contraindications to getting his COVID-vaccine and it should be considered since he is considered high risk with his underlying cancer/treatments     ______________________________________________________________________________    History of Present Illness/ Interval History    Mr. Sushil Diana is a 63 year old who I am seeing for via virtual visit for a 2-week interval appointment ahead of his first Zometa infusion.    He was just evaluated a few weeks ago on his treatment, in which he has continued to tolerate very well.  He reports no new side effects of concern.  Mild hot flashes he finds very manageable.  Good appetite.  No new sites of pain.    He had 2 teeth extracted a few weeks ago.  This is healing well.  While his dentist was aware he be starting a biphosphonate at some point, he did not get direct clearance as to when his dentist may be comfortable with him starting this.      ECOG Performance    0      Oncology History/Treatment  Diagnosis/Stage:   8/2023: Stage IV prostate cancer with diffuse osseous metastasis (Gary 4+9)   -Nocturia x 2 years without PSA elevation  -Summer, 2023: Routine PSA assessment showed elevation to 272  -Prostate biopsy: Prostatic adenocarcinoma, Gary 4+9  -CT abdomen/pelvis: Bone metastases  -Bone scan: Diffuse osseous metastases  -[facility] NGS testing: No targetable findings.  [id number] loss.  BELIA.  TMB 0    Treatment:  9/27/2023: Lupron every 6 months  10/12/2023: Initiated abiraterone/prednisone    Zometa is planned for (deferred for dental extraction in November)      Physical Exam    GENERAL: Alert and oriented to time place and person. Seated comfortably. In no distress.  Wife accompanies.  HEAD: Atraumatic and normocephalic. No alopecia.  CHEST: Regular respiratory rate  NEURO: No gross deficit noted. Non-antalgic gait.      Lab Results    Recent Results (from the  past 168 hour(s))   Calcium   Result Value Ref Range    Calcium 8.7 (L) 8.8 - 10.2 mg/dL   Creatinine   Result Value Ref Range    Creatinine 1.15 0.67 - 1.17 mg/dL    GFR Estimate 71 >60 mL/min/1.73m2   Albumin level   Result Value Ref Range    Albumin 4.4 3.5 - 5.2 g/dL   HEMOGLOBIN A1C   Result Value Ref Range    Hemoglobin A1C 5.2 <5.7 %       Imaging    No results found.    Billing  Total time 35 minutes, to include face to face visit, review of EMR, ordering, documentation and coordination of care on date of service    Signed by: Alejandra Garcia, NP

## 2023-11-29 NOTE — LETTER
11/29/2023         RE: Sushil Diana  [address] [place] [address]        Dear Colleague,    Thank you for referring your patient, Sushil Diana, to the [facility]. Please see a copy of my visit note below.    Virtual Visit Details    Type of service:  Video Visit   Video Start Time:  1158  Video End Time: 1220    Originating Location (pt. Location): [facility]    Distant Location (provider location):  On-site [facility]  Platform used for Video Visit: [facility] Hematology and Oncology Outpatient Progress Note    Patient: Sushil Diana  MRN: [id number]  Date of Service: Nov 29, 2023          Reason for Visit    Stage IV prostate cancer    Primary Oncologist: Dr. 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    Minimal hot flashes on Leuprolide. No side effects on abiraterone he has noted.     Responding well with decline in PSA and alk phos.  No symptoms suggestive of progression.     He was due to start monthly Zometa today. I reviewed rationale for this and possible side effects. He just had 2 teeth extracted two weeks ago, he does not have formal dental clearance on when it may be safe to start.     He is on vit D weekly. Is not on calcium.  Serum calcium slightly low 8.7.    Plan:  -I elected to defer his Zometa today since he just had teeth extracted.  In the interim, he will confirm with his dentist on a timely and they be comfortable with him starting a biphosphonate.  We will tentatively hold his first Zometa infusion for 12/27 after his next follow-up visit if he gets dental clearance for that time.  -He will initiate calcium 8080-4667 mg/day in addition to his weekly vitamin D 50,000 IU x 12 weeks.  Once he is finished the higher weekly doses, he could transition to 1000 IU daily doses in combination with his calcium.  -Continue abiraterone and prednisone  at current doses  -Return visit in 1 month as scheduled with labs, NP and infusion appointment for Zometa as held  -Leuprolide every 6 mo, next due March, 2024  -Trend PSA response     2.   Health maintenance  He plans to get his Covid vaccine and wanted to ensure there was no contraindications with his treatment.    Plan:  -I told him there is no contraindications to getting his COVID-vaccine and it should be considered since he is considered high risk with his underlying cancer/treatments     ______________________________________________________________________________    History of Present Illness/ Interval History    Mr. Suhsil Diana is a 63 year old who I am seeing for via virtual visit for a 2-week interval appointment ahead of his first Zometa infusion.    He was just evaluated a few weeks ago on his treatment, in which he has continued to tolerate very well.  He reports no new side effects of concern.  Mild hot flashes he finds very manageable.  Good appetite.  No new sites of pain.    He had 2 teeth extracted a few weeks ago.  This is healing well.  While his dentist was aware he be starting a biphosphonate at some point, he did not get direct clearance as to when his dentist may be comfortable with him starting this.      ECOG Performance    0      Oncology History/Treatment  Diagnosis/Stage:   8/2023: Stage IV prostate cancer with diffuse osseous metastasis (Wally 4+9)   -Nocturia x 2 years without PSA elevation  -Summer, 2023: Routine PSA assessment showed elevation to 272  -Prostate biopsy: Prostatic adenocarcinoma, Wally 4+9  -CT abdomen/pelvis: Bone metastases  -Bone scan: Diffuse osseous metastases  -[employer] NGS testing: No targetable findings.  VYS2N1O loss.  BELIA.  TMB 0    Treatment:  9/27/2023: Lupron every 6 months  10/12/2023: Initiated abiraterone/prednisone    Zometa is planned for (deferred for dental extraction in November)      Physical Exam    GENERAL: Alert and oriented to  time place and person. Seated comfortably. In no distress.  Wife accompanies.  HEAD: Atraumatic and normocephalic. No alopecia.  CHEST: Regular respiratory rate  NEURO: No gross deficit noted. Non-antalgic gait.      Lab Results    Recent Results (from the past 168 hour(s))   Calcium   Result Value Ref Range    Calcium 8.7 (L) 8.8 - 10.2 mg/dL   Creatinine   Result Value Ref Range    Creatinine 1.15 0.67 - 1.17 mg/dL    GFR Estimate 71 >60 mL/min/1.73m2   Albumin level   Result Value Ref Range    Albumin 4.4 3.5 - 5.2 g/dL   HEMOGLOBIN A1C   Result Value Ref Range    Hemoglobin A1C 5.2 <5.7 %       Imaging    No results found.    Billing  Total time 35 minutes, to include face to face visit, review of EMR, ordering, documentation and coordination of care on date of service    Signed by: Alejandra Garcia NP      Again, thank you for allowing me to participate in the care of your patient.        Sincerely,        Alejandra Garcia NP

## 2023-11-29 NOTE — LETTER
11/29/2023         RE: Sushil Diana  [address] [place] [address]        Dear Colleague,    Thank you for referring your patient, Sushil Diana, to the [facility]. Please see a copy of my visit note below.    Virtual Visit Details    Type of service:  Video Visit   Video Start Time:  1158  Video End Time: 1220    Originating Location (pt. Location): [facility]    Distant Location (provider location):  On-site [facility]  Platform used for Video Visit: [facility] Hematology and Oncology Outpatient Progress Note    Patient: Sushil Diana  MRN: [id number]  Date of Service: Nov 29, 2023          Reason for Visit    Stage IV prostate cancer    Primary Oncologist: Dr. 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    Minimal hot flashes on Leuprolide. No side effects on abiraterone he has noted.     Responding well with decline in PSA and alk phos.  No symptoms suggestive of progression.     He was due to start monthly Zometa today. I reviewed rationale for this and possible side effects. He just had 2 teeth extracted two weeks ago, he does not have formal dental clearance on when it may be safe to start.     He is on vit D weekly. Is not on calcium.  Serum calcium slightly low 8.7.    Plan:  -I elected to defer his Zometa today since he just had teeth extracted.  In the interim, he will confirm with his dentist on a timely and they be comfortable with him starting a biphosphonate.  We will tentatively hold his first Zometa infusion for 12/27 after his next follow-up visit if he gets dental clearance for that time.  -He will initiate calcium 4199-4292 mg/day in addition to his weekly vitamin D 50,000 IU x 12 weeks.  Once he is finished the higher weekly doses, he could transition to 1000 IU daily doses in combination with his calcium.  -Continue abiraterone and prednisone  at current doses  -Return visit in 1 month as scheduled with labs, NP and infusion appointment for Zometa as held  -Leuprolide every 6 mo, next due March, 2024  -Trend PSA response     2.   Health maintenance  He plans to get his Covid vaccine and wanted to ensure there was no contraindications with his treatment.    Plan:  -I told him there is no contraindications to getting his COVID-vaccine and it should be considered since he is considered high risk with his underlying cancer/treatments     ______________________________________________________________________________    History of Present Illness/ Interval History    Mr. Sushil Diana is a 63 year old who I am seeing for via virtual visit for a 2-week interval appointment ahead of his first Zometa infusion.    He was just evaluated a few weeks ago on his treatment, in which he has continued to tolerate very well.  He reports no new side effects of concern.  Mild hot flashes he finds very manageable.  Good appetite.  No new sites of pain.    He had 2 teeth extracted a few weeks ago.  This is healing well.  While his dentist was aware he be starting a biphosphonate at some point, he did not get direct clearance as to when his dentist may be comfortable with him starting this.      ECOG Performance    0      Oncology History/Treatment  Diagnosis/Stage:   8/2023: Stage IV prostate cancer with diffuse osseous metastasis (Wally 4+9)   -Nocturia x 2 years without PSA elevation  -Summer, 2023: Routine PSA assessment showed elevation to 272  -Prostate biopsy: Prostatic adenocarcinoma, Wally 4+9  -CT abdomen/pelvis: Bone metastases  -Bone scan: Diffuse osseous metastases  -[employer] NGS testing: No targetable findings.  [id number] loss.  BELIA.  TMB 0    Treatment:  9/27/2023: Lupron every 6 months  10/12/2023: Initiated abiraterone/prednisone    Zometa is planned for (deferred for dental extraction in November)      Physical Exam    GENERAL: Alert and oriented to  time place and person. Seated comfortably. In no distress.  Wife accompanies.  HEAD: Atraumatic and normocephalic. No alopecia.  CHEST: Regular respiratory rate  NEURO: No gross deficit noted. Non-antalgic gait.      Lab Results    Recent Results (from the past 168 hour(s))   Calcium   Result Value Ref Range    Calcium 8.7 (L) 8.8 - 10.2 mg/dL   Creatinine   Result Value Ref Range    Creatinine 1.15 0.67 - 1.17 mg/dL    GFR Estimate 71 >60 mL/min/1.73m2   Albumin level   Result Value Ref Range    Albumin 4.4 3.5 - 5.2 g/dL   HEMOGLOBIN A1C   Result Value Ref Range    Hemoglobin A1C 5.2 <5.7 %       Imaging    No results found.    Billing  Total time 35 minutes, to include face to face visit, review of EMR, ordering, documentation and coordination of care on date of service    Signed by: Alejandra Garcia NP      Again, thank you for allowing me to participate in the care of your patient.        Sincerely,        Alejandra Garcia NP

## 2023-12-04 DIAGNOSIS — C79.51 PROSTATE CANCER METASTATIC TO BONE (H): Primary | ICD-10-CM

## 2023-12-04 DIAGNOSIS — C61 PROSTATE CANCER METASTATIC TO BONE (H): Primary | ICD-10-CM

## 2023-12-04 RX ORDER — ABIRATERONE ACETATE 250 MG/1
1000 TABLET ORAL DAILY
Qty: 120 TABLET | Refills: 0 | Status: SHIPPED | OUTPATIENT
Start: 2023-12-04 | End: 2024-01-03

## 2023-12-12 ENCOUNTER — TELEPHONE (OUTPATIENT)
Dept: ONCOLOGY | Facility: CLINIC | Age: 63
End: 2023-12-12

## 2023-12-12 NOTE — TELEPHONE ENCOUNTER
Reason for Call:  Other call back    Detailed comments: Angelina Florence is calling today to talk with Cami or Justine.     She says Jim had a video visit on 11/29 with Alejandra Garcia from [place] and it was suggested at that time that he be started on calcium.     They state that Dr Holliday did not think it was a good idea for Jim to be on calcium.     Phone Number Patient can be reached at: Home number on file [phone] (home)    Best Time: any, Angelina will be home all day.     Can we leave a detailed message on this number? YES    Call taken on 12/12/2023 at 9:00 AM by Nolan De Los Santos

## 2023-12-20 NOTE — PROGRESS NOTES
Virtual Visit Details    Type of service:  Video Visit   Video Start Time:  1:30pm  Video End Time: 1:45pm    Originating Location (pt. Location): Home    Distant Location (provider location):  On-site  Platform used for Video Visit: [facility]    Oncology/Hematology Visit Note  Dec 27, 2023    Reason for visit: Follow up metastatic prostate cancer     Oncology HPI: Sushil Diana is a 63 year old male with metastatic prostate cancer.  He had nocturia for few years with no elevation of PSA, followed up with his PCP and PSA was elevated at 272.  He was referred to urology in August 2023, biopsy performed and noted to have prostatic adenocarcinoma, Wally 4+9.  Follow-up CT abdomen/pelvis with evidence of bony metastasis, therefore follow-up bone scan on 9/20/2023 with diffuse osseous metastasis.  He was given a dose of Eligard on 9/27/23 and referred to Dr. Holliday, who recommended abiraterone/prednisone with monthly Zometa.  NGS testing was requested as well.     First dose of abiraterone/prednisone was 10/12/23. Will be due for Lupron March 2024. Has not yet started Zometa due to dental work. Video visit today for close follow-up.    Interval History:  Sushil is seen on video with his wife. He overall is doing well. He has mild intermittent hot flashes with treatment, otherwise denies any side effects. No fevers, respiratory symptoms, GI concerns. He does have a tooth ache that started a couple weeks ago. Not getting worse but also not improving. He denies any swelling in this area. He is not taking calcium consistently. Doing well with eating and drinking. Has taken some Ibuprofen.     Current Outpatient Medications   Medication Sig Dispense Refill    abiraterone (ZYTIGA) 250 MG tablet Take 4 tablets (1,000 mg) by mouth daily for 30 doses Take on empty stomach. 120 tablet 0    amLODIPine (NORVASC) 10 MG tablet TAKE ONE TABLET BY MOUTH ONCE DAILY 90 tablet 2    aspirin (ASA) 81 MG chewable tablet CHEW AND SWALLOW ONE  TABLET BY MOUTH ONCE DAILY 108 tablet 3    atorvastatin (LIPITOR) 20 MG tablet TAKE ONE TABLET BY MOUTH ONCE DAILY 90 tablet 3    blood glucose monitoring (NO BRAND SPECIFIED) meter device kit Use to test blood sugar 1-3 times daily or as directed. -Contour Next meter 1 kit 0    citalopram (CELEXA) 20 MG tablet TAKE ONE TABLET BY MOUTH ONCE DAILY 90 tablet 1    CONTOUR NEXT TEST test strip USE TO TEST BLOOD SUGARS ONE TO THREE TIMES DAILY  OR AS DIRECTED 100 strip 1    famotidine (PEPCID) 40 MG tablet TAKE ONE TABLET BY MOUTH TWICE A  tablet 3    glipiZIDE (GLUCOTROL) 5 MG tablet TAKE ONE TABLET BY MOUTH EVERY MORNING 60 tablet 0    hydrALAZINE (APRESOLINE) 100 MG tablet TAKE ONE TABLET BY MOUTH THREE TIMES A DAY 90 tablet 3    hydrochlorothiazide (HYDRODIURIL) 25 MG tablet TAKE 1 TABLET (25 MG) BY MOUTH DAILY 90 tablet 3    lisinopril (ZESTRIL) 40 MG tablet TAKE 1 TABLET (40 MG) BY MOUTH DAILY 90 tablet 1    metFORMIN (GLUCOPHAGE XR) 500 MG 24 hr tablet TAKE TWO TABLETS BY MOUTH ONCE DAILY WITH DINNER 180 tablet 3    metoprolol tartrate (LOPRESSOR) 50 MG tablet TAKE ONE TABLET BY MOUTH TWICE A  tablet 3    Microlet Lancets MISC USE TO TEST BLOOD SUGAR 1-3 TIMES DAILY OR AS DIRECTED. 100 each 11    predniSONE (DELTASONE) 5 MG tablet Take 1 tablet (5 mg) by mouth daily START WITH ZYTIGA 180 tablet 3    vitamin D2 (ERGOCALCIFEROL) 56269 units (1250 mcg) capsule Take 1 capsule (50,000 Units) by mouth once a week for 12 doses 12 capsule 0       Past Medical History  Past Medical History:   Diagnosis Date    GERD (gastroesophageal reflux disease)     Gout      Past Surgical History:   Procedure Laterality Date    NONE OF THE ABOVE CORE MEASURE DIAGNOSES       Allergies   Allergen Reactions    No Known Drug Allergy      Social History   Social History     Tobacco Use    Smoking status: Some Days     Packs/day: 1.00     Years: 28.00     Additional pack years: 0.00     Total pack years: 28.00     Types:  "Cigarettes    Smokeless tobacco: Never    Tobacco comments:     quit June 2018   Substance Use Topics    Alcohol use: Yes     Comment: occasional    Drug use: No      Past medical history and social history were reviewed.    Physical Examination:  Ht 1.753 m (5' 9\")   Wt 75.3 kg (166 lb)   BMI 24.51 kg/m    Wt Readings from Last 10 Encounters:   11/29/23 74.8 kg (165 lb)   11/13/23 77.1 kg (170 lb)   10/11/23 75.8 kg (167 lb)   10/05/23 75.8 kg (167 lb 3 oz)   08/30/23 73.9 kg (163 lb)   08/09/23 74.8 kg (164 lb 12.8 oz)   05/05/23 77.6 kg (171 lb)   02/16/22 78.9 kg (174 lb)   12/15/20 80 kg (176 lb 5 oz)   01/27/20 80.7 kg (178 lb)     Video physical exam  General: Patient appears well in no acute distress.   Skin: No visualized rash or lesions on visualized skin  Eyes: EOMI, no erythema, sclera icterus or discharge noted  Resp: Appears to be breathing comfortably without accessory muscle usage, speaking in full sentences, no cough  MSK: Appears to have normal range of motion based on visualized movements  Neurologic: No apparent tremors, facial movements symmetric  Psych: affect normal, alert and oriented    Laboratory Data:   Latest Reference Range & Units 12/27/23 12:51 12/27/23 14:11   Sodium 135 - 145 mmol/L 132 (L)    Potassium 3.4 - 5.3 mmol/L 3.9    Chloride 98 - 107 mmol/L 97 (L)    Carbon Dioxide (CO2) 22 - 29 mmol/L 25    Urea Nitrogen 8.0 - 23.0 mg/dL 18.7    Creatinine 0.67 - 1.17 mg/dL  0.67 - 1.17 mg/dL 1.20 (H)  1.21 (H)    GFR Estimate >60 mL/min/1.73m2  >60 mL/min/1.73m2 67  66    Calcium 8.8 - 10.2 mg/dL  8.8 - 10.2 mg/dL 8.8  8.7 (L)    Anion Gap 7 - 15 mmol/L 10    Albumin 3.5 - 5.2 g/dL  3.5 - 5.2 g/dL 4.4  4.4    Protein Total 6.4 - 8.3 g/dL 6.5    Alkaline Phosphatase 40 - 150 U/L 239 (H)    ALT 0 - 70 U/L 11    AST 0 - 45 U/L 22    Bilirubin Total <=1.2 mg/dL 0.9    Glucose 70 - 99 mg/dL 267 (H)    PSA Tumor Marker 0.00 - 4.50 ng/mL 13.34 (H)    WBC 4.0 - 11.0 10e3/uL  8.3   Hemoglobin " 13.3 - 17.7 g/dL  10.1 (L)   Hematocrit 40.0 - 53.0 %  30.3 (L)   Platelet Count 150 - 450 10e3/uL  192   RBC Count 4.40 - 5.90 10e6/uL  3.34 (L)   MCV 78 - 100 fL  91   MCH 26.5 - 33.0 pg  30.2   MCHC 31.5 - 36.5 g/dL  33.3   RDW 10.0 - 15.0 %  13.4   % Neutrophils %  85   % Lymphocytes %  7   % Monocytes %  7   % Eosinophils %  1   % Basophils %  0   Absolute Basophils 0.0 - 0.2 10e3/uL  0.0   Absolute Eosinophils 0.0 - 0.7 10e3/uL  0.1   Absolute Immature Granulocytes <=0.4 10e3/uL  0.0   Absolute Lymphocytes 0.8 - 5.3 10e3/uL  0.6 (L)   Absolute Monocytes 0.0 - 1.3 10e3/uL  0.6   % Immature Granulocytes %  0   Absolute Neutrophils 1.6 - 8.3 10e3/uL  7.0   Absolute NRBCs 10e3/uL  0.0   NRBCs per 100 WBC <1 /100  0   (L): Data is abnormally low  (H): Data is abnormally high    Assessment and Plan:  # 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 Baseline .9.   - Started on Lupron Sept 2023 45mg. Due March 2024  - Started on Zytiga 1000mg daily + Prednisone 5mg daily October 2023. Tolerating well. Continue  - PSA continues to improve, now 13.34  - Will check testosterone next visit to ensure within castration range   - No plans for repeat imaging at this time as long as he clinically is doing well and PSA improving  - SELENE in one month. MD in two months    # Bone Mets  # Hypocalcemia  # Vit D Deficiency   Denies any pain. Was supposed to have started Zometa but this has been delayed for dental work  - Due for Zometa today however having persistent tooth pain. Would want him to see dentist to evaluate prior to starting. DEFER Zometa today.   - If he is able to see dentist soon we could do Zometa in the next couple weeks. Otherwise will plan for 1/24/23 as scheduled. Patient will update us when he sees dentist  -Will be completing high dose Vit D this week (12 doses)  - Encouraged him to take Calcium + Vit D BID consistently   - Alk phos is improving which is reassuring  sign that bone mets are responding to treatment     # Renal Insufficiency  Mild increase today. Possible poor intake vs Ibuprofen use  - Discussed importance of staying well hydrated  - Avoid Ibuprofen and use Tylenol PRN pain     # DM2  Currently on metformin and glipizide. Blood sugar higher today.   - PCP managing  - Hyponatremia 2/2 hyperglycemia     # Anemia   Long standing (since at least 2020). Cannot tell that he has ever had this evaluated   - CBC checked today, hgb still low but stable at 10.1  - Will check additional anemia labs (iron, B12, folate, TSH) next visit     35 minutes spent on the date of the encounter doing chart review, review of test results, interpretation of tests, patient visit, and documentation     Pradip Wiley PA-C  Department of Hematology and Oncology  [facility]

## 2023-12-20 NOTE — PROGRESS NOTES
Patient was assisted with a virtual visit using the clinic iPad.     Melissa Mack RN on 12/20/2023 at 2:45 PM

## 2023-12-23 DIAGNOSIS — F43.22 ADJUSTMENT DISORDER WITH ANXIOUS MOOD: ICD-10-CM

## 2023-12-26 RX ORDER — CITALOPRAM HYDROBROMIDE 20 MG/1
TABLET ORAL
Qty: 90 TABLET | Refills: 1 | Status: SHIPPED | OUTPATIENT
Start: 2023-12-26 | End: 2024-06-21

## 2023-12-27 ENCOUNTER — APPOINTMENT (OUTPATIENT)
Dept: FAMILY MEDICINE | Facility: CLINIC | Age: 63
End: 2023-12-27
Payer: MEDICARE

## 2023-12-27 ENCOUNTER — INFUSION THERAPY VISIT (OUTPATIENT)
Dept: INFUSION THERAPY | Facility: CLINIC | Age: 63
End: 2023-12-27
Attending: INTERNAL MEDICINE
Payer: MEDICARE

## 2023-12-27 ENCOUNTER — VIRTUAL VISIT (OUTPATIENT)
Dept: ONCOLOGY | Facility: CLINIC | Age: 63
End: 2023-12-27
Attending: PHYSICIAN ASSISTANT
Payer: MEDICARE

## 2023-12-27 ENCOUNTER — APPOINTMENT (OUTPATIENT)
Dept: LAB | Facility: CLINIC | Age: 63
End: 2023-12-27
Payer: MEDICARE

## 2023-12-27 VITALS — BODY MASS INDEX: 24.59 KG/M2 | WEIGHT: 166 LBS | HEIGHT: 69 IN

## 2023-12-27 DIAGNOSIS — C79.9 METASTASIS FROM MALIGNANT NEOPLASM OF PROSTATE (H): ICD-10-CM

## 2023-12-27 DIAGNOSIS — C79.51 PROSTATE CANCER METASTATIC TO BONE (H): ICD-10-CM

## 2023-12-27 DIAGNOSIS — C61 PROSTATE CANCER METASTATIC TO BONE (H): ICD-10-CM

## 2023-12-27 DIAGNOSIS — C61 METASTASIS FROM MALIGNANT NEOPLASM OF PROSTATE (H): ICD-10-CM

## 2023-12-27 DIAGNOSIS — C61 PROSTATE CANCER METASTATIC TO BONE (H): Primary | ICD-10-CM

## 2023-12-27 DIAGNOSIS — C79.51 PROSTATE CANCER METASTATIC TO BONE (H): Primary | ICD-10-CM

## 2023-12-27 DIAGNOSIS — C61 PROSTATE CANCER (H): Primary | ICD-10-CM

## 2023-12-27 DIAGNOSIS — D63.0 ANEMIA IN NEOPLASTIC DISEASE: ICD-10-CM

## 2023-12-27 LAB
ALBUMIN SERPL BCG-MCNC: 4.4 G/DL (ref 3.5–5.2)
ALBUMIN SERPL BCG-MCNC: 4.4 G/DL (ref 3.5–5.2)
ALP SERPL-CCNC: 239 U/L (ref 40–150)
ALT SERPL W P-5'-P-CCNC: 11 U/L (ref 0–70)
ANION GAP SERPL CALCULATED.3IONS-SCNC: 10 MMOL/L (ref 7–15)
AST SERPL W P-5'-P-CCNC: 22 U/L (ref 0–45)
BILIRUB SERPL-MCNC: 0.9 MG/DL
BUN SERPL-MCNC: 18.7 MG/DL (ref 8–23)
CALCIUM SERPL-MCNC: 8.7 MG/DL (ref 8.8–10.2)
CALCIUM SERPL-MCNC: 8.8 MG/DL (ref 8.8–10.2)
CHLORIDE SERPL-SCNC: 97 MMOL/L (ref 98–107)
CREAT SERPL-MCNC: 1.2 MG/DL (ref 0.67–1.17)
CREAT SERPL-MCNC: 1.21 MG/DL (ref 0.67–1.17)
DEPRECATED HCO3 PLAS-SCNC: 25 MMOL/L (ref 22–29)
EGFRCR SERPLBLD CKD-EPI 2021: 66 ML/MIN/1.73M2
EGFRCR SERPLBLD CKD-EPI 2021: 67 ML/MIN/1.73M2
GLUCOSE SERPL-MCNC: 267 MG/DL (ref 70–99)
POTASSIUM SERPL-SCNC: 3.9 MMOL/L (ref 3.4–5.3)
PROT SERPL-MCNC: 6.5 G/DL (ref 6.4–8.3)
PSA SERPL DL<=0.01 NG/ML-MCNC: 13.34 NG/ML (ref 0–4.5)
SODIUM SERPL-SCNC: 132 MMOL/L (ref 135–145)

## 2023-12-27 PROCEDURE — 36415 COLL VENOUS BLD VENIPUNCTURE: CPT | Performed by: NURSE PRACTITIONER

## 2023-12-27 PROCEDURE — 82310 ASSAY OF CALCIUM: CPT | Performed by: NURSE PRACTITIONER

## 2023-12-27 PROCEDURE — 82565 ASSAY OF CREATININE: CPT | Performed by: NURSE PRACTITIONER

## 2023-12-27 PROCEDURE — 82310 ASSAY OF CALCIUM: CPT | Mod: GT | Performed by: PHYSICIAN ASSISTANT

## 2023-12-27 PROCEDURE — 99214 OFFICE O/P EST MOD 30 MIN: CPT | Mod: 95 | Performed by: PHYSICIAN ASSISTANT

## 2023-12-27 PROCEDURE — 82040 ASSAY OF SERUM ALBUMIN: CPT | Performed by: NURSE PRACTITIONER

## 2023-12-27 PROCEDURE — 84153 ASSAY OF PSA TOTAL: CPT | Mod: GT | Performed by: PHYSICIAN ASSISTANT

## 2023-12-27 ASSESSMENT — PAIN SCALES - GENERAL: PAINLEVEL: NO PAIN (0)

## 2023-12-27 NOTE — NURSING NOTE
Is the patient currently in the state of [place]? YES    Visit mode:VIDEO    If the visit is dropped, the patient can be reconnected by: VIDEO VISIT: Send to e-mail at: [email or web address]    Will anyone else be joining the visit? NO  (If patient encounters technical issues they should call [phone]    How would you like to obtain your AVS? MyChart    Are changes needed to the allergy or medication list?  Pt's wife states no changes have been made to pt's allergies and medications since last reviewed.    Reason for visit: RECHECK    Tatum PAULINO

## 2023-12-27 NOTE — LETTER
12/27/2023         RE: Sushil Diana  [address] [facility] [address]        Dear Colleague,    Thank you for referring your patient, Sushil Diana, to the [facility]. Please see a copy of my visit note below.    Virtual Visit Details    Type of service:  Video Visit   Video Start Time:  1:30pm  Video End Time: 1:45pm    Originating Location (pt. Location): Home    Distant Location (provider location):  On-site  Platform used for Video Visit: [facility]    Oncology/Hematology Visit Note  Dec 27, 2023    Reason for visit: Follow up metastatic prostate cancer     Oncology HPI: Sushil Diana is a 63 year old male with metastatic prostate cancer.  He had nocturia for few years with no elevation of PSA, followed up with his PCP and PSA was elevated at 272.  He was referred to urology in August 2023, biopsy performed and noted to have prostatic adenocarcinoma, Wally 4+9.  Follow-up CT abdomen/pelvis with evidence of bony metastasis, therefore follow-up bone scan on 9/20/2023 with diffuse osseous metastasis.  He was given a dose of Eligard on 9/27/23 and referred to Dr. Holliday, who recommended abiraterone/prednisone with monthly Zometa.  NGS testing was requested as well.     First dose of abiraterone/prednisone was 10/12/23. Will be due for Lupron March 2024. Has not yet started Zometa due to dental work. Video visit today for close follow-up.    Interval History:  Sushil is seen on video with his wife. He overall is doing well. He has mild intermittent hot flashes with treatment, otherwise denies any side effects. No fevers, respiratory symptoms, GI concerns. He does have a tooth ache that started a couple weeks ago. Not getting worse but also not improving. He denies any swelling in this area. He is not taking calcium consistently. Doing well with eating and drinking. Has taken some Ibuprofen.     Current Outpatient Medications   Medication Sig Dispense Refill     abiraterone  (ZYTIGA) 250 MG tablet Take 4 tablets (1,000 mg) by mouth daily for 30 doses Take on empty stomach. 120 tablet 0     amLODIPine (NORVASC) 10 MG tablet TAKE ONE TABLET BY MOUTH ONCE DAILY 90 tablet 2     aspirin (ASA) 81 MG chewable tablet CHEW AND SWALLOW ONE TABLET BY MOUTH ONCE DAILY 108 tablet 3     atorvastatin (LIPITOR) 20 MG tablet TAKE ONE TABLET BY MOUTH ONCE DAILY 90 tablet 3     blood glucose monitoring (NO BRAND SPECIFIED) meter device kit Use to test blood sugar 1-3 times daily or as directed. -Contour Next meter 1 kit 0     citalopram (CELEXA) 20 MG tablet TAKE ONE TABLET BY MOUTH ONCE DAILY 90 tablet 1     CONTOUR NEXT TEST test strip USE TO TEST BLOOD SUGARS ONE TO THREE TIMES DAILY  OR AS DIRECTED 100 strip 1     famotidine (PEPCID) 40 MG tablet TAKE ONE TABLET BY MOUTH TWICE A  tablet 3     glipiZIDE (GLUCOTROL) 5 MG tablet TAKE ONE TABLET BY MOUTH EVERY MORNING 60 tablet 0     hydrALAZINE (APRESOLINE) 100 MG tablet TAKE ONE TABLET BY MOUTH THREE TIMES A DAY 90 tablet 3     hydrochlorothiazide (HYDRODIURIL) 25 MG tablet TAKE 1 TABLET (25 MG) BY MOUTH DAILY 90 tablet 3     lisinopril (ZESTRIL) 40 MG tablet TAKE 1 TABLET (40 MG) BY MOUTH DAILY 90 tablet 1     metFORMIN (GLUCOPHAGE XR) 500 MG 24 hr tablet TAKE TWO TABLETS BY MOUTH ONCE DAILY WITH DINNER 180 tablet 3     metoprolol tartrate (LOPRESSOR) 50 MG tablet TAKE ONE TABLET BY MOUTH TWICE A  tablet 3     Microlet Lancets MISC USE TO TEST BLOOD SUGAR 1-3 TIMES DAILY OR AS DIRECTED. 100 each 11     predniSONE (DELTASONE) 5 MG tablet Take 1 tablet (5 mg) by mouth daily START WITH ZYTIGA 180 tablet 3     vitamin D2 (ERGOCALCIFEROL) 87756 units (1250 mcg) capsule Take 1 capsule (50,000 Units) by mouth once a week for 12 doses 12 capsule 0       Past Medical History  Past Medical History:   Diagnosis Date     GERD (gastroesophageal reflux disease)      Gout      Past Surgical History:   Procedure Laterality Date     NONE OF THE ABOVE CORE  "MEASURE DIAGNOSES       Allergies   Allergen Reactions     No Known Drug Allergy      Social History   Social History     Tobacco Use     Smoking status: Some Days     Packs/day: 1.00     Years: 28.00     Additional pack years: 0.00     Total pack years: 28.00     Types: Cigarettes     Smokeless tobacco: Never     Tobacco comments:     quit June 2018   Substance Use Topics     Alcohol use: Yes     Comment: occasional     Drug use: No      Past medical history and social history were reviewed.    Physical Examination:  Ht 1.753 m (5' 9\")   Wt 75.3 kg (166 lb)   BMI 24.51 kg/m    Wt Readings from Last 10 Encounters:   11/29/23 74.8 kg (165 lb)   11/13/23 77.1 kg (170 lb)   10/11/23 75.8 kg (167 lb)   10/05/23 75.8 kg (167 lb 3 oz)   08/30/23 73.9 kg (163 lb)   08/09/23 74.8 kg (164 lb 12.8 oz)   05/05/23 77.6 kg (171 lb)   02/16/22 78.9 kg (174 lb)   12/15/20 80 kg (176 lb 5 oz)   01/27/20 80.7 kg (178 lb)     Video physical exam  General: Patient appears well in no acute distress.   Skin: No visualized rash or lesions on visualized skin  Eyes: EOMI, no erythema, sclera icterus or discharge noted  Resp: Appears to be breathing comfortably without accessory muscle usage, speaking in full sentences, no cough  MSK: Appears to have normal range of motion based on visualized movements  Neurologic: No apparent tremors, facial movements symmetric  Psych: affect normal, alert and oriented    Laboratory Data:   Latest Reference Range & Units 12/27/23 12:51 12/27/23 14:11   Sodium 135 - 145 mmol/L 132 (L)    Potassium 3.4 - 5.3 mmol/L 3.9    Chloride 98 - 107 mmol/L 97 (L)    Carbon Dioxide (CO2) 22 - 29 mmol/L 25    Urea Nitrogen 8.0 - 23.0 mg/dL 18.7    Creatinine 0.67 - 1.17 mg/dL  0.67 - 1.17 mg/dL 1.20 (H)  1.21 (H)    GFR Estimate >60 mL/min/1.73m2  >60 mL/min/1.73m2 67  66    Calcium 8.8 - 10.2 mg/dL  8.8 - 10.2 mg/dL 8.8  8.7 (L)    Anion Gap 7 - 15 mmol/L 10    Albumin 3.5 - 5.2 g/dL  3.5 - 5.2 g/dL 4.4  4.4  "   Protein Total 6.4 - 8.3 g/dL 6.5    Alkaline Phosphatase 40 - 150 U/L 239 (H)    ALT 0 - 70 U/L 11    AST 0 - 45 U/L 22    Bilirubin Total <=1.2 mg/dL 0.9    Glucose 70 - 99 mg/dL 267 (H)    PSA Tumor Marker 0.00 - 4.50 ng/mL 13.34 (H)    WBC 4.0 - 11.0 10e3/uL  8.3   Hemoglobin 13.3 - 17.7 g/dL  10.1 (L)   Hematocrit 40.0 - 53.0 %  30.3 (L)   Platelet Count 150 - 450 10e3/uL  192   RBC Count 4.40 - 5.90 10e6/uL  3.34 (L)   MCV 78 - 100 fL  91   MCH 26.5 - 33.0 pg  30.2   MCHC 31.5 - 36.5 g/dL  33.3   RDW 10.0 - 15.0 %  13.4   % Neutrophils %  85   % Lymphocytes %  7   % Monocytes %  7   % Eosinophils %  1   % Basophils %  0   Absolute Basophils 0.0 - 0.2 10e3/uL  0.0   Absolute Eosinophils 0.0 - 0.7 10e3/uL  0.1   Absolute Immature Granulocytes <=0.4 10e3/uL  0.0   Absolute Lymphocytes 0.8 - 5.3 10e3/uL  0.6 (L)   Absolute Monocytes 0.0 - 1.3 10e3/uL  0.6   % Immature Granulocytes %  0   Absolute Neutrophils 1.6 - 8.3 10e3/uL  7.0   Absolute NRBCs 10e3/uL  0.0   NRBCs per 100 WBC <1 /100  0   (L): Data is abnormally low  (H): Data is abnormally high    Assessment and Plan:  # 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 Baseline .9.   - Started on Lupron Sept 2023 45mg. Due March 2024  - Started on Zytiga 1000mg daily + Prednisone 5mg daily October 2023. Tolerating well. Continue  - PSA continues to improve, now 13.34  - Will check testosterone next visit to ensure within castration range   - No plans for repeat imaging at this time as long as he clinically is doing well and PSA improving  - SELENE in one month. MD in two months    # Bone Mets  # Hypocalcemia  # Vit D Deficiency   Denies any pain. Was supposed to have started Zometa but this has been delayed for dental work  - Due for Zometa today however having persistent tooth pain. Would want him to see dentist to evaluate prior to starting. DEFER Zometa today.   - If he is able to see dentist soon we could do Zometa  in the next couple weeks. Otherwise will plan for 1/24/23 as scheduled. Patient will update us when he sees dentist  -Will be completing high dose Vit D this week (12 doses)  - Encouraged him to take Calcium + Vit D BID consistently   - Alk phos is improving which is reassuring sign that bone mets are responding to treatment     # Renal Insufficiency  Mild increase today. Possible poor intake vs Ibuprofen use  - Discussed importance of staying well hydrated  - Avoid Ibuprofen and use Tylenol PRN pain     # DM2  Currently on metformin and glipizide. Blood sugar higher today.   - PCP managing  - Hyponatremia 2/2 hyperglycemia     # Anemia   Long standing (since at least 2020). Cannot tell that he has ever had this evaluated   - CBC checked today, hgb still low but stable at 10.1  - Will check additional anemia labs (iron, B12, folate, TSH) next visit     35 minutes spent on the date of the encounter doing chart review, review of test results, interpretation of tests, patient visit, and documentation     Pradip Wiley PA-C  Department of Hematology and Oncology  [facility]       Again, thank you for allowing me to participate in the care of your patient.        Sincerely,        COLUMBA Harrell

## 2023-12-27 NOTE — LETTER
12/27/2023         RE: Sushil Diana  [address] [facility] [address]        Dear Colleague,    Thank you for referring your patient, Sushil Diana, to the [facility]. Please see a copy of my visit note below.    Virtual Visit Details    Type of service:  Video Visit   Video Start Time:  1:30pm  Video End Time: 1:45pm    Originating Location (pt. Location): Home    Distant Location (provider location):  On-site  Platform used for Video Visit: [facility]    Oncology/Hematology Visit Note  Dec 27, 2023    Reason for visit: Follow up metastatic prostate cancer     Oncology HPI: Sushil Diana is a 63 year old male with metastatic prostate cancer.  He had nocturia for few years with no elevation of PSA, followed up with his PCP and PSA was elevated at 272.  He was referred to urology in August 2023, biopsy performed and noted to have prostatic adenocarcinoma, Wally 4+9.  Follow-up CT abdomen/pelvis with evidence of bony metastasis, therefore follow-up bone scan on 9/20/2023 with diffuse osseous metastasis.  He was given a dose of Eligard on 9/27/23 and referred to Dr. Holliday, who recommended abiraterone/prednisone with monthly Zometa.  NGS testing was requested as well.     First dose of abiraterone/prednisone was 10/12/23. Will be due for Lupron March 2024. Has not yet started Zometa due to dental work. Video visit today for close follow-up.    Interval History:  Sushil is seen on video with his wife. He overall is doing well. He has mild intermittent hot flashes with treatment, otherwise denies any side effects. No fevers, respiratory symptoms, GI concerns. He does have a tooth ache that started a couple weeks ago. Not getting worse but also not improving. He denies any swelling in this area. He is not taking calcium consistently. Doing well with eating and drinking. Has taken some Ibuprofen.     Current Outpatient Medications   Medication Sig Dispense Refill     abiraterone  (ZYTIGA) 250 MG tablet Take 4 tablets (1,000 mg) by mouth daily for 30 doses Take on empty stomach. 120 tablet 0     amLODIPine (NORVASC) 10 MG tablet TAKE ONE TABLET BY MOUTH ONCE DAILY 90 tablet 2     aspirin (ASA) 81 MG chewable tablet CHEW AND SWALLOW ONE TABLET BY MOUTH ONCE DAILY 108 tablet 3     atorvastatin (LIPITOR) 20 MG tablet TAKE ONE TABLET BY MOUTH ONCE DAILY 90 tablet 3     blood glucose monitoring (NO BRAND SPECIFIED) meter device kit Use to test blood sugar 1-3 times daily or as directed. -Contour Next meter 1 kit 0     citalopram (CELEXA) 20 MG tablet TAKE ONE TABLET BY MOUTH ONCE DAILY 90 tablet 1     CONTOUR NEXT TEST test strip USE TO TEST BLOOD SUGARS ONE TO THREE TIMES DAILY  OR AS DIRECTED 100 strip 1     famotidine (PEPCID) 40 MG tablet TAKE ONE TABLET BY MOUTH TWICE A  tablet 3     glipiZIDE (GLUCOTROL) 5 MG tablet TAKE ONE TABLET BY MOUTH EVERY MORNING 60 tablet 0     hydrALAZINE (APRESOLINE) 100 MG tablet TAKE ONE TABLET BY MOUTH THREE TIMES A DAY 90 tablet 3     hydrochlorothiazide (HYDRODIURIL) 25 MG tablet TAKE 1 TABLET (25 MG) BY MOUTH DAILY 90 tablet 3     lisinopril (ZESTRIL) 40 MG tablet TAKE 1 TABLET (40 MG) BY MOUTH DAILY 90 tablet 1     metFORMIN (GLUCOPHAGE XR) 500 MG 24 hr tablet TAKE TWO TABLETS BY MOUTH ONCE DAILY WITH DINNER 180 tablet 3     metoprolol tartrate (LOPRESSOR) 50 MG tablet TAKE ONE TABLET BY MOUTH TWICE A  tablet 3     Microlet Lancets MISC USE TO TEST BLOOD SUGAR 1-3 TIMES DAILY OR AS DIRECTED. 100 each 11     predniSONE (DELTASONE) 5 MG tablet Take 1 tablet (5 mg) by mouth daily START WITH ZYTIGA 180 tablet 3     vitamin D2 (ERGOCALCIFEROL) 33498 units (1250 mcg) capsule Take 1 capsule (50,000 Units) by mouth once a week for 12 doses 12 capsule 0       Past Medical History  Past Medical History:   Diagnosis Date     GERD (gastroesophageal reflux disease)      Gout      Past Surgical History:   Procedure Laterality Date     NONE OF THE ABOVE CORE  "MEASURE DIAGNOSES       Allergies   Allergen Reactions     No Known Drug Allergy      Social History   Social History     Tobacco Use     Smoking status: Some Days     Packs/day: 1.00     Years: 28.00     Additional pack years: 0.00     Total pack years: 28.00     Types: Cigarettes     Smokeless tobacco: Never     Tobacco comments:     quit June 2018   Substance Use Topics     Alcohol use: Yes     Comment: occasional     Drug use: No      Past medical history and social history were reviewed.    Physical Examination:  Ht 1.753 m (5' 9\")   Wt 75.3 kg (166 lb)   BMI 24.51 kg/m    Wt Readings from Last 10 Encounters:   11/29/23 74.8 kg (165 lb)   11/13/23 77.1 kg (170 lb)   10/11/23 75.8 kg (167 lb)   10/05/23 75.8 kg (167 lb 3 oz)   08/30/23 73.9 kg (163 lb)   08/09/23 74.8 kg (164 lb 12.8 oz)   05/05/23 77.6 kg (171 lb)   02/16/22 78.9 kg (174 lb)   12/15/20 80 kg (176 lb 5 oz)   01/27/20 80.7 kg (178 lb)     Video physical exam  General: Patient appears well in no acute distress.   Skin: No visualized rash or lesions on visualized skin  Eyes: EOMI, no erythema, sclera icterus or discharge noted  Resp: Appears to be breathing comfortably without accessory muscle usage, speaking in full sentences, no cough  MSK: Appears to have normal range of motion based on visualized movements  Neurologic: No apparent tremors, facial movements symmetric  Psych: affect normal, alert and oriented    Laboratory Data:   Latest Reference Range & Units 12/27/23 12:51 12/27/23 14:11   Sodium 135 - 145 mmol/L 132 (L)    Potassium 3.4 - 5.3 mmol/L 3.9    Chloride 98 - 107 mmol/L 97 (L)    Carbon Dioxide (CO2) 22 - 29 mmol/L 25    Urea Nitrogen 8.0 - 23.0 mg/dL 18.7    Creatinine 0.67 - 1.17 mg/dL  0.67 - 1.17 mg/dL 1.20 (H)  1.21 (H)    GFR Estimate >60 mL/min/1.73m2  >60 mL/min/1.73m2 67  66    Calcium 8.8 - 10.2 mg/dL  8.8 - 10.2 mg/dL 8.8  8.7 (L)    Anion Gap 7 - 15 mmol/L 10    Albumin 3.5 - 5.2 g/dL  3.5 - 5.2 g/dL 4.4  4.4  "   Protein Total 6.4 - 8.3 g/dL 6.5    Alkaline Phosphatase 40 - 150 U/L 239 (H)    ALT 0 - 70 U/L 11    AST 0 - 45 U/L 22    Bilirubin Total <=1.2 mg/dL 0.9    Glucose 70 - 99 mg/dL 267 (H)    PSA Tumor Marker 0.00 - 4.50 ng/mL 13.34 (H)    WBC 4.0 - 11.0 10e3/uL  8.3   Hemoglobin 13.3 - 17.7 g/dL  10.1 (L)   Hematocrit 40.0 - 53.0 %  30.3 (L)   Platelet Count 150 - 450 10e3/uL  192   RBC Count 4.40 - 5.90 10e6/uL  3.34 (L)   MCV 78 - 100 fL  91   MCH 26.5 - 33.0 pg  30.2   MCHC 31.5 - 36.5 g/dL  33.3   RDW 10.0 - 15.0 %  13.4   % Neutrophils %  85   % Lymphocytes %  7   % Monocytes %  7   % Eosinophils %  1   % Basophils %  0   Absolute Basophils 0.0 - 0.2 10e3/uL  0.0   Absolute Eosinophils 0.0 - 0.7 10e3/uL  0.1   Absolute Immature Granulocytes <=0.4 10e3/uL  0.0   Absolute Lymphocytes 0.8 - 5.3 10e3/uL  0.6 (L)   Absolute Monocytes 0.0 - 1.3 10e3/uL  0.6   % Immature Granulocytes %  0   Absolute Neutrophils 1.6 - 8.3 10e3/uL  7.0   Absolute NRBCs 10e3/uL  0.0   NRBCs per 100 WBC <1 /100  0   (L): Data is abnormally low  (H): Data is abnormally high    Assessment and Plan:  # 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 Baseline .9.   - Started on Lupron Sept 2023 45mg. Due March 2024  - Started on Zytiga 1000mg daily + Prednisone 5mg daily October 2023. Tolerating well. Continue  - PSA continues to improve, now 13.34  - Will check testosterone next visit to ensure within castration range   - No plans for repeat imaging at this time as long as he clinically is doing well and PSA improving  - SELENE in one month. MD in two months    # Bone Mets  # Hypocalcemia  # Vit D Deficiency   Denies any pain. Was supposed to have started Zometa but this has been delayed for dental work  - Due for Zometa today however having persistent tooth pain. Would want him to see dentist to evaluate prior to starting. DEFER Zometa today.   - If he is able to see dentist soon we could do Zometa  in the next couple weeks. Otherwise will plan for 1/24/23 as scheduled. Patient will update us when he sees dentist  -Will be completing high dose Vit D this week (12 doses)  - Encouraged him to take Calcium + Vit D BID consistently   - Alk phos is improving which is reassuring sign that bone mets are responding to treatment     # Renal Insufficiency  Mild increase today. Possible poor intake vs Ibuprofen use  - Discussed importance of staying well hydrated  - Avoid Ibuprofen and use Tylenol PRN pain     # DM2  Currently on metformin and glipizide. Blood sugar higher today.   - PCP managing  - Hyponatremia 2/2 hyperglycemia     # Anemia   Long standing (since at least 2020). Cannot tell that he has ever had this evaluated   - CBC checked today, hgb still low but stable at 10.1  - Will check additional anemia labs (iron, B12, folate, TSH) next visit     35 minutes spent on the date of the encounter doing chart review, review of test results, interpretation of tests, patient visit, and documentation     Pradip Wiley PA-C  Department of Hematology and Oncology  [facility]       Again, thank you for allowing me to participate in the care of your patient.        Sincerely,        COLUMBA Harrell

## 2023-12-28 NOTE — NURSING NOTE
DISCHARGE PLAN:  Next appointments: See patient instruction section  Departure Mode:   Accompanied by:    minutes for medical assistant discharge (face to face time)     Sushil Diana is here today for onc follow up.  Patient was not seen by medical assistant at time of the appointment.   Appointments scheduled for everything requested. I called pt and relayed appts, they also look at mychart and don't need AVS and calendars mailed. See patient instructions and Oncologist's Progress note for further details. Questions and concerns addressed to patient's satisfaction. Patient verbalized and demonstrated understanding of plan.  Contact information provided and patient is encouraged to call with any that arise in the interim of care.    Geneva [facility]  [phone]  12/28/2023, 2:19 PM

## 2024-01-02 DIAGNOSIS — C79.51 PROSTATE CANCER METASTATIC TO BONE (H): Primary | ICD-10-CM

## 2024-01-02 DIAGNOSIS — C61 PROSTATE CANCER METASTATIC TO BONE (H): Primary | ICD-10-CM

## 2024-01-02 RX ORDER — ABIRATERONE ACETATE 250 MG/1
1000 TABLET ORAL DAILY
Qty: 120 TABLET | Refills: 0 | Status: SHIPPED | OUTPATIENT
Start: 2024-01-24 | End: 2024-02-23

## 2024-01-22 NOTE — PROGRESS NOTES
Virtual Visit Details    Type of service:  Video Visit   Video Start Time:  2:30pm  Video End Time: 2:45pm    Originating Location (pt. Location): Home    Distant Location (provider location):  On-site  Platform used for Video Visit: [facility]      Oncology/Hematology Visit Note  Jan 24, 2024    Reason for visit: Follow up metastatic prostate cancer     Oncology HPI: Sushil Diana is a 63 year old male with metastatic prostate cancer.  He had nocturia for few years with no elevation of PSA, followed up with his PCP and PSA was elevated at 272.  He was referred to urology in August 2023, biopsy performed and noted to have prostatic adenocarcinoma, [place] 4+9.  Follow-up CT abdomen/pelvis with evidence of bony metastasis, therefore follow-up bone scan on 9/20/2023 with diffuse osseous metastasis.  He was given a dose of Eligard on 9/27/23 and referred to Dr. Holliday, who recommended abiraterone/prednisone with monthly Zometa.  NGS testing was requested as well.     First dose of abiraterone/prednisone was 10/12/23. Will be due for Lupron March 2024. Has not yet started Zometa due to dental work. Video visit today for close follow-up.    Interval History:  Sushil is seen on video with his wife. Overall he is feeling well. Prior toothache resolved, no ongoing dental concerns. Did have rash the other day, resolved with hydrocortisone cream. Did recently switch body wash. Has intermittent hot flashes. Otherwise is feeling well.     Review of Systems:  Patient denies fevers, chills, night sweats, unexplained weight changes, headaches, dizziness, vision or hearing changes, new lumps or bumps, chest pain, shortness of breath, cough, abdominal pain, nausea, vomiting, changes to bowel or bladder, swelling of extremities, bleeding issues, or rash.    Current Outpatient Medications   Medication Sig Dispense Refill    [START ON 1/24/2024] abiraterone (ZYTIGA) 250 MG tablet Take 4 tablets (1,000 mg) by mouth daily for 30 doses Take  on empty stomach. 120 tablet 0    amLODIPine (NORVASC) 10 MG tablet TAKE ONE TABLET BY MOUTH ONCE DAILY 90 tablet 2    aspirin (ASA) 81 MG chewable tablet CHEW AND SWALLOW ONE TABLET BY MOUTH ONCE DAILY 108 tablet 3    atorvastatin (LIPITOR) 20 MG tablet TAKE ONE TABLET BY MOUTH ONCE DAILY 90 tablet 3    blood glucose monitoring (NO BRAND SPECIFIED) meter device kit Use to test blood sugar 1-3 times daily or as directed. -Contour Next meter 1 kit 0    citalopram (CELEXA) 20 MG tablet TAKE ONE TABLET BY MOUTH ONCE DAILY 90 tablet 1    CONTOUR NEXT TEST test strip USE TO TEST BLOOD SUGARS ONE TO THREE TIMES DAILY  OR AS DIRECTED 100 strip 1    famotidine (PEPCID) 40 MG tablet TAKE ONE TABLET BY MOUTH TWICE A  tablet 3    glipiZIDE (GLUCOTROL) 5 MG tablet TAKE ONE TABLET BY MOUTH EVERY MORNING 60 tablet 0    hydrALAZINE (APRESOLINE) 100 MG tablet TAKE ONE TABLET BY MOUTH THREE TIMES A DAY 90 tablet 3    hydrochlorothiazide (HYDRODIURIL) 25 MG tablet TAKE 1 TABLET (25 MG) BY MOUTH DAILY 90 tablet 3    lisinopril (ZESTRIL) 40 MG tablet TAKE 1 TABLET (40 MG) BY MOUTH DAILY 90 tablet 1    metFORMIN (GLUCOPHAGE XR) 500 MG 24 hr tablet TAKE TWO TABLETS BY MOUTH ONCE DAILY WITH DINNER 180 tablet 3    metoprolol tartrate (LOPRESSOR) 50 MG tablet TAKE ONE TABLET BY MOUTH TWICE A  tablet 3    Microlet Lancets MISC USE TO TEST BLOOD SUGAR 1-3 TIMES DAILY OR AS DIRECTED. 100 each 11    predniSONE (DELTASONE) 5 MG tablet Take 1 tablet (5 mg) by mouth daily START WITH ZYTIGA 180 tablet 3       Past Medical History  Past Medical History:   Diagnosis Date    GERD (gastroesophageal reflux disease)     Gout      Past Surgical History:   Procedure Laterality Date    NONE OF THE ABOVE CORE MEASURE DIAGNOSES       Allergies   Allergen Reactions    No Known Drug Allergy      Social History   Social History     Tobacco Use    Smoking status: Some Days     Packs/day: 1.00     Years: 28.00     Additional pack years: 0.00      "Total pack years: 28.00     Types: Cigarettes    Smokeless tobacco: Never    Tobacco comments:     quit June 2018   Substance Use Topics    Alcohol use: Yes     Comment: occasional    Drug use: No      Past medical history and social history were reviewed.    Physical Examination:  Ht 1.753 m (5' 9\")   BMI 24.51 kg/m    Wt Readings from Last 10 Encounters:   12/27/23 75.3 kg (166 lb)   11/29/23 74.8 kg (165 lb)   11/13/23 77.1 kg (170 lb)   10/11/23 75.8 kg (167 lb)   10/05/23 75.8 kg (167 lb 3 oz)   08/30/23 73.9 kg (163 lb)   08/09/23 74.8 kg (164 lb 12.8 oz)   05/05/23 77.6 kg (171 lb)   02/16/22 78.9 kg (174 lb)   12/15/20 80 kg (176 lb 5 oz)     Video physical exam  General: Patient appears well in no acute distress.   Skin: No visualized rash or lesions on visualized skin  Eyes: EOMI, no erythema, sclera icterus or discharge noted  Resp: Appears to be breathing comfortably without accessory muscle usage, speaking in full sentences, no cough  MSK: Appears to have normal range of motion based on visualized movements  Neurologic: No apparent tremors, facial movements symmetric  Psych: affect normal, alert and oriented    Laboratory Data:   Latest Reference Range & Units 01/24/24 13:51   Sodium 135 - 145 mmol/L 129 (L)   Potassium 3.4 - 5.3 mmol/L 4.1   Chloride 98 - 107 mmol/L 94 (L)   Carbon Dioxide (CO2) 22 - 29 mmol/L 24   Urea Nitrogen 8.0 - 23.0 mg/dL 17.1   Creatinine 0.67 - 1.17 mg/dL 1.03   GFR Estimate >60 mL/min/1.73m2 81   Calcium 8.8 - 10.2 mg/dL 9.3   Anion Gap 7 - 15 mmol/L 11   Albumin 3.5 - 5.2 g/dL 4.7   Protein Total 6.4 - 8.3 g/dL 7.0   Alkaline Phosphatase 40 - 150 U/L 169 (H)   ALT 0 - 70 U/L 12   AST 0 - 45 U/L 20   Bilirubin Total <=1.2 mg/dL 0.9   Ferritin 31 - 409 ng/mL 71   Folate 4.6 - 34.8 ng/mL 9.6   Glucose 70 - 99 mg/dL 154 (H)   Iron 61 - 157 ug/dL 90   Iron Binding Capacity 240 - 430 ug/dL 320   Iron Sat Index 15 - 46 % 28   PSA Tumor Marker 0.00 - 4.50 ng/mL 7.30 (H)   TSH 0.30 " - 4.20 uIU/mL 2.30   Vitamin B12 232 - 1,245 pg/mL 1,591 (H)   WBC 4.0 - 11.0 10e3/uL 7.2   Hemoglobin 13.3 - 17.7 g/dL 10.3 (L)   Hematocrit 40.0 - 53.0 % 30.2 (L)   Platelet Count 150 - 450 10e3/uL 214   RBC Count 4.40 - 5.90 10e6/uL 3.37 (L)   MCV 78 - 100 fL 90   MCH 26.5 - 33.0 pg 30.6   MCHC 31.5 - 36.5 g/dL 34.1   RDW 10.0 - 15.0 % 13.2   % Neutrophils % 84   % Lymphocytes % 7   % Monocytes % 7   % Eosinophils % 2   % Basophils % 0   Absolute Basophils 0.0 - 0.2 10e3/uL 0.0   Absolute Eosinophils 0.0 - 0.7 10e3/uL 0.1   Absolute Immature Granulocytes <=0.4 10e3/uL 0.0   Absolute Lymphocytes 0.8 - 5.3 10e3/uL 0.5 (L)   Absolute Monocytes 0.0 - 1.3 10e3/uL 0.5   % Immature Granulocytes % 0   Absolute Neutrophils 1.6 - 8.3 10e3/uL 6.0   Absolute NRBCs 10e3/uL 0.0   NRBCs per 100 WBC <1 /100 0   (L): Data is abnormally low  (H): Data is abnormally high      Assessment and Plan:  # 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 Baseline .9.   - Started on Lupron Sept 2023 45mg. Due March 2024  - Started on Zytiga 1000mg daily + Prednisone 5mg daily October 2023. Tolerating well. Continue  - PSA continues to improve, now 7.3  - Testosterone level pending    - No plans for repeat imaging at this time as long as he clinically is doing well and PSA improving  - MD next month      # Bone Mets  # Hypocalcemia  # Vit D Deficiency   Denies any pain. Was supposed to have started Zometa but this has been delayed for dental work  - Due for Zometa today. Dental issues resolved. OK to start and will plan to continue monthly   - Continue Calcium + Vit D BID consistently. Calcium level WNL   - Alk phos is improving which is reassuring sign that bone mets are responding to treatment      # Renal Insufficiency  # Hyponatremia, chronic   Creat better today, sodium is slightly worse though he has long standing mild hyponatremia .   - Discussed importance of staying well hydrated.  Discussed adding electrolyte drinks   - Avoid Ibuprofen and use Tylenol PRN pain      # DM2  Currently on metformin and glipizide. Blood sugar better today.   - PCP managing  - Hyperglycemia likely contributing to hyponatremia      # Anemia   Long standing (since at least 2020). Cannot tell that he has ever had this evaluated   - CBC checked today, hgb still low but stable at 10.3  - Iron studies WNL. Folate WNL. No B12 deficiency (is a bit high). TSH WNL.   - Suspect anemia of chronic disease/treatment. Will monitor     25 minutes spent on the date of the encounter doing chart review, review of test results, interpretation of tests, patient visit, and documentation     Pradip Wiley PA-C  Department of Hematology and Oncology  [facility]

## 2024-01-24 ENCOUNTER — INFUSION THERAPY VISIT (OUTPATIENT)
Dept: INFUSION THERAPY | Facility: CLINIC | Age: 64
End: 2024-01-24
Attending: INTERNAL MEDICINE
Payer: MEDICARE

## 2024-01-24 ENCOUNTER — ALLIED HEALTH/NURSE VISIT (OUTPATIENT)
Dept: FAMILY MEDICINE | Facility: CLINIC | Age: 64
End: 2024-01-24
Payer: COMMERCIAL

## 2024-01-24 ENCOUNTER — APPOINTMENT (OUTPATIENT)
Dept: LAB | Facility: CLINIC | Age: 64
End: 2024-01-24
Payer: COMMERCIAL

## 2024-01-24 ENCOUNTER — VIRTUAL VISIT (OUTPATIENT)
Dept: ONCOLOGY | Facility: CLINIC | Age: 64
End: 2024-01-24
Attending: INTERNAL MEDICINE
Payer: MEDICARE

## 2024-01-24 VITALS — HEIGHT: 69 IN | BODY MASS INDEX: 24.51 KG/M2

## 2024-01-24 VITALS
OXYGEN SATURATION: 98 % | RESPIRATION RATE: 16 BRPM | SYSTOLIC BLOOD PRESSURE: 145 MMHG | HEART RATE: 74 BPM | BODY MASS INDEX: 24.96 KG/M2 | DIASTOLIC BLOOD PRESSURE: 63 MMHG | TEMPERATURE: 96.4 F | WEIGHT: 169 LBS

## 2024-01-24 DIAGNOSIS — Z09 ONCOLOGY FOLLOW-UP ENCOUNTER: Primary | ICD-10-CM

## 2024-01-24 DIAGNOSIS — C61 PROSTATE CANCER METASTATIC TO BONE (H): ICD-10-CM

## 2024-01-24 DIAGNOSIS — D63.0 ANEMIA IN NEOPLASTIC DISEASE: ICD-10-CM

## 2024-01-24 DIAGNOSIS — C79.9 METASTASIS FROM MALIGNANT NEOPLASM OF PROSTATE (H): ICD-10-CM

## 2024-01-24 DIAGNOSIS — C79.51 PROSTATE CANCER METASTATIC TO BONE (H): ICD-10-CM

## 2024-01-24 DIAGNOSIS — C61 METASTASIS FROM MALIGNANT NEOPLASM OF PROSTATE (H): ICD-10-CM

## 2024-01-24 DIAGNOSIS — C61 PROSTATE CANCER (H): Primary | ICD-10-CM

## 2024-01-24 LAB
ALBUMIN SERPL BCG-MCNC: 4.7 G/DL (ref 3.5–5.2)
ALP SERPL-CCNC: 169 U/L (ref 40–150)
ALT SERPL W P-5'-P-CCNC: 12 U/L (ref 0–70)
ANION GAP SERPL CALCULATED.3IONS-SCNC: 11 MMOL/L (ref 7–15)
AST SERPL W P-5'-P-CCNC: 20 U/L (ref 0–45)
BASOPHILS # BLD AUTO: 0 10E3/UL (ref 0–0.2)
BASOPHILS NFR BLD AUTO: 0 %
BILIRUB SERPL-MCNC: 0.9 MG/DL
BUN SERPL-MCNC: 17.1 MG/DL (ref 8–23)
CALCIUM SERPL-MCNC: 9.3 MG/DL (ref 8.8–10.2)
CHLORIDE SERPL-SCNC: 94 MMOL/L (ref 98–107)
CREAT SERPL-MCNC: 1.03 MG/DL (ref 0.67–1.17)
DEPRECATED HCO3 PLAS-SCNC: 24 MMOL/L (ref 22–29)
EGFRCR SERPLBLD CKD-EPI 2021: 81 ML/MIN/1.73M2
EOSINOPHIL # BLD AUTO: 0.1 10E3/UL (ref 0–0.7)
EOSINOPHIL NFR BLD AUTO: 2 %
ERYTHROCYTE [DISTWIDTH] IN BLOOD BY AUTOMATED COUNT: 13.2 % (ref 10–15)
FERRITIN SERPL-MCNC: 71 NG/ML (ref 31–409)
FOLATE SERPL-MCNC: 9.6 NG/ML (ref 4.6–34.8)
GLUCOSE SERPL-MCNC: 154 MG/DL (ref 70–99)
HCT VFR BLD AUTO: 30.2 % (ref 40–53)
HGB BLD-MCNC: 10.3 G/DL (ref 13.3–17.7)
IMM GRANULOCYTES # BLD: 0 10E3/UL
IMM GRANULOCYTES NFR BLD: 0 %
IRON BINDING CAPACITY (ROCHE): 320 UG/DL (ref 240–430)
IRON SATN MFR SERPL: 28 % (ref 15–46)
IRON SERPL-MCNC: 90 UG/DL (ref 61–157)
LYMPHOCYTES # BLD AUTO: 0.5 10E3/UL (ref 0.8–5.3)
LYMPHOCYTES NFR BLD AUTO: 7 %
MCH RBC QN AUTO: 30.6 PG (ref 26.5–33)
MCHC RBC AUTO-ENTMCNC: 34.1 G/DL (ref 31.5–36.5)
MCV RBC AUTO: 90 FL (ref 78–100)
MONOCYTES # BLD AUTO: 0.5 10E3/UL (ref 0–1.3)
MONOCYTES NFR BLD AUTO: 7 %
NEUTROPHILS # BLD AUTO: 6 10E3/UL (ref 1.6–8.3)
NEUTROPHILS NFR BLD AUTO: 84 %
NRBC # BLD AUTO: 0 10E3/UL
NRBC BLD AUTO-RTO: 0 /100
PLATELET # BLD AUTO: 214 10E3/UL (ref 150–450)
POTASSIUM SERPL-SCNC: 4.1 MMOL/L (ref 3.4–5.3)
PROT SERPL-MCNC: 7 G/DL (ref 6.4–8.3)
PSA SERPL DL<=0.01 NG/ML-MCNC: 7.3 NG/ML (ref 0–4.5)
RBC # BLD AUTO: 3.37 10E6/UL (ref 4.4–5.9)
SODIUM SERPL-SCNC: 129 MMOL/L (ref 135–145)
TSH SERPL DL<=0.005 MIU/L-ACNC: 2.3 UIU/ML (ref 0.3–4.2)
VIT B12 SERPL-MCNC: 1591 PG/ML (ref 232–1245)
WBC # BLD AUTO: 7.2 10E3/UL (ref 4–11)

## 2024-01-24 PROCEDURE — 36415 COLL VENOUS BLD VENIPUNCTURE: CPT

## 2024-01-24 PROCEDURE — 258N000003 HC RX IP 258 OP 636: Performed by: PHYSICIAN ASSISTANT

## 2024-01-24 PROCEDURE — 84443 ASSAY THYROID STIM HORMONE: CPT

## 2024-01-24 PROCEDURE — 83550 IRON BINDING TEST: CPT

## 2024-01-24 PROCEDURE — 84153 ASSAY OF PSA TOTAL: CPT

## 2024-01-24 PROCEDURE — 82728 ASSAY OF FERRITIN: CPT

## 2024-01-24 PROCEDURE — 85025 COMPLETE CBC W/AUTO DIFF WBC: CPT

## 2024-01-24 PROCEDURE — 99207 PR NO CHARGE NURSE ONLY: CPT

## 2024-01-24 PROCEDURE — 82607 VITAMIN B-12: CPT

## 2024-01-24 PROCEDURE — 84403 ASSAY OF TOTAL TESTOSTERONE: CPT

## 2024-01-24 PROCEDURE — 96365 THER/PROPH/DIAG IV INF INIT: CPT

## 2024-01-24 PROCEDURE — 99214 OFFICE O/P EST MOD 30 MIN: CPT | Mod: 95 | Performed by: PHYSICIAN ASSISTANT

## 2024-01-24 PROCEDURE — 250N000011 HC RX IP 250 OP 636: Mod: JZ | Performed by: PHYSICIAN ASSISTANT

## 2024-01-24 PROCEDURE — 82746 ASSAY OF FOLIC ACID SERUM: CPT

## 2024-01-24 PROCEDURE — 80053 COMPREHEN METABOLIC PANEL: CPT

## 2024-01-24 RX ORDER — HEPARIN SODIUM (PORCINE) LOCK FLUSH IV SOLN 100 UNIT/ML 100 UNIT/ML
5 SOLUTION INTRAVENOUS
Status: CANCELLED | OUTPATIENT
Start: 2024-01-24

## 2024-01-24 RX ORDER — ZOLEDRONIC ACID 0.04 MG/ML
4 INJECTION, SOLUTION INTRAVENOUS ONCE
Status: COMPLETED | OUTPATIENT
Start: 2024-01-24 | End: 2024-01-24

## 2024-01-24 RX ORDER — ZOLEDRONIC ACID 0.04 MG/ML
4 INJECTION, SOLUTION INTRAVENOUS ONCE
Status: CANCELLED | OUTPATIENT
Start: 2024-01-24 | End: 2024-01-24

## 2024-01-24 RX ORDER — HEPARIN SODIUM,PORCINE 10 UNIT/ML
5-20 VIAL (ML) INTRAVENOUS DAILY PRN
Status: CANCELLED | OUTPATIENT
Start: 2024-01-24

## 2024-01-24 RX ADMIN — ZOLEDRONIC ACID 4 MG: 0.04 INJECTION, SOLUTION INTRAVENOUS at 15:28

## 2024-01-24 RX ADMIN — SODIUM CHLORIDE 250 ML: 9 INJECTION, SOLUTION INTRAVENOUS at 15:27

## 2024-01-24 ASSESSMENT — PAIN SCALES - GENERAL: PAINLEVEL: NO PAIN (0)

## 2024-01-24 NOTE — PROGRESS NOTES
Patient came to clinic for Ipad assistance. Nurse only visit. Louisa Turner, [facility]

## 2024-01-24 NOTE — PROGRESS NOTES
Infusion Nursing Note:  Sushil Diana presents today for 1st dose Zometa.    Patient seen by provider today: Yes: Pradip MORTON.   present during visit today: Not Applicable.    Note: Patient arrives with spouse for first visit here. Zometa medication discussed with patient including possible side effects and possible signs of reaction. VSS, afebrile.       Intravenous Access:  Peripheral IV placed.    Treatment Conditions:  Lab Results   Component Value Date     (L) 01/24/2024    POTASSIUM 4.1 01/24/2024    CR 1.03 01/24/2024    ROBERT 9.3 01/24/2024    BILITOTAL 0.9 01/24/2024    ALBUMIN 4.7 01/24/2024    ALT 12 01/24/2024    AST 20 01/24/2024       Results reviewed, labs MET treatment parameters, ok to proceed with treatment.      Post Infusion Assessment:  Patient tolerated infusion without incident. VSS, asymptomatic.   Patient observed for 15 minutes post Zometa.  Blood return noted pre and post infusion.  Site patent and intact, free from redness, edema or discomfort.  No evidence of extravasations.  PIV access discontinued per protocol.       Discharge Plan:   [facility]  Patient will return 2/29 for next appointment.   Patient discharged in stable condition accompanied by: wife.  Departure Mode: Ambulatory.      Smitha Michael RN

## 2024-01-24 NOTE — NURSING NOTE
Is the patient currently in the state of [place]? YES    Visit mode:VIDEO    If the visit is dropped, the patient can be reconnected by: VIDEO VISIT: Text to cell phone:   Telephone Information:   Mobile [phone]       Will anyone else be joining the visit? NO  (If patient encounters technical issues they should call [phone]    How would you like to obtain your AVS? MyChart    Are changes needed to the allergy or medication list? Yes See info below.  Pt started taking Calcium with Vitamin D one tablet twice per day.     Reason for visit: RECHECK (Return [facility])    Komal PAULINO

## 2024-01-24 NOTE — LETTER
1/24/2024         RE: Sushil Diana  [id number] [facility] [address]        Dear Colleague,    Thank you for referring your patient, Sushil Diana, to the [facility]. Please see a copy of my visit note below.    Virtual Visit Details    Type of service:  Video Visit   Video Start Time:  2:30pm  Video End Time: 2:45pm    Originating Location (pt. Location): Home    Distant Location (provider location):  On-site  Platform used for Video Visit: [facility]      Oncology/Hematology Visit Note  Jan 24, 2024    Reason for visit: Follow up metastatic prostate cancer     Oncology HPI: Sushil Diana is a 63 year old male with metastatic prostate cancer.  He had nocturia for few years with no elevation of PSA, followed up with his PCP and PSA was elevated at 272.  He was referred to urology in August 2023, biopsy performed and noted to have prostatic adenocarcinoma, [place] 4+9.  Follow-up CT abdomen/pelvis with evidence of bony metastasis, therefore follow-up bone scan on 9/20/2023 with diffuse osseous metastasis.  He was given a dose of Eligard on 9/27/23 and referred to Dr. Holliday, who recommended abiraterone/prednisone with monthly Zometa.  NGS testing was requested as well.     First dose of abiraterone/prednisone was 10/12/23. Will be due for Lupron March 2024. Has not yet started Zometa due to dental work. Video visit today for close follow-up.    Interval History:  Sushil is seen on video with his wife. Overall he is feeling well. Prior toothache resolved, no ongoing dental concerns. Did have rash the other day, resolved with hydrocortisone cream. Did recently switch body wash. Has intermittent hot flashes. Otherwise is feeling well.     Review of Systems:  Patient denies fevers, chills, night sweats, unexplained weight changes, headaches, dizziness, vision or hearing changes, new lumps or bumps, chest pain, shortness of breath, cough, abdominal pain, nausea, vomiting, changes  to bowel or bladder, swelling of extremities, bleeding issues, or rash.    Current Outpatient Medications   Medication Sig Dispense Refill     [START ON 1/24/2024] abiraterone (ZYTIGA) 250 MG tablet Take 4 tablets (1,000 mg) by mouth daily for 30 doses Take on empty stomach. 120 tablet 0     amLODIPine (NORVASC) 10 MG tablet TAKE ONE TABLET BY MOUTH ONCE DAILY 90 tablet 2     aspirin (ASA) 81 MG chewable tablet CHEW AND SWALLOW ONE TABLET BY MOUTH ONCE DAILY 108 tablet 3     atorvastatin (LIPITOR) 20 MG tablet TAKE ONE TABLET BY MOUTH ONCE DAILY 90 tablet 3     blood glucose monitoring (NO BRAND SPECIFIED) meter device kit Use to test blood sugar 1-3 times daily or as directed. -Contour Next meter 1 kit 0     citalopram (CELEXA) 20 MG tablet TAKE ONE TABLET BY MOUTH ONCE DAILY 90 tablet 1     CONTOUR NEXT TEST test strip USE TO TEST BLOOD SUGARS ONE TO THREE TIMES DAILY  OR AS DIRECTED 100 strip 1     famotidine (PEPCID) 40 MG tablet TAKE ONE TABLET BY MOUTH TWICE A  tablet 3     glipiZIDE (GLUCOTROL) 5 MG tablet TAKE ONE TABLET BY MOUTH EVERY MORNING 60 tablet 0     hydrALAZINE (APRESOLINE) 100 MG tablet TAKE ONE TABLET BY MOUTH THREE TIMES A DAY 90 tablet 3     hydrochlorothiazide (HYDRODIURIL) 25 MG tablet TAKE 1 TABLET (25 MG) BY MOUTH DAILY 90 tablet 3     lisinopril (ZESTRIL) 40 MG tablet TAKE 1 TABLET (40 MG) BY MOUTH DAILY 90 tablet 1     metFORMIN (GLUCOPHAGE XR) 500 MG 24 hr tablet TAKE TWO TABLETS BY MOUTH ONCE DAILY WITH DINNER 180 tablet 3     metoprolol tartrate (LOPRESSOR) 50 MG tablet TAKE ONE TABLET BY MOUTH TWICE A  tablet 3     Microlet Lancets MISC USE TO TEST BLOOD SUGAR 1-3 TIMES DAILY OR AS DIRECTED. 100 each 11     predniSONE (DELTASONE) 5 MG tablet Take 1 tablet (5 mg) by mouth daily START WITH ZYTIGA 180 tablet 3       Past Medical History  Past Medical History:   Diagnosis Date     GERD (gastroesophageal reflux disease)      Gout      Past Surgical History:   Procedure  "Laterality Date     NONE OF THE ABOVE CORE MEASURE DIAGNOSES       Allergies   Allergen Reactions     No Known Drug Allergy      Social History   Social History     Tobacco Use     Smoking status: Some Days     Packs/day: 1.00     Years: 28.00     Additional pack years: 0.00     Total pack years: 28.00     Types: Cigarettes     Smokeless tobacco: Never     Tobacco comments:     quit June 2018   Substance Use Topics     Alcohol use: Yes     Comment: occasional     Drug use: No      Past medical history and social history were reviewed.    Physical Examination:  Ht 1.753 m (5' 9\")   BMI 24.51 kg/m    Wt Readings from Last 10 Encounters:   12/27/23 75.3 kg (166 lb)   11/29/23 74.8 kg (165 lb)   11/13/23 77.1 kg (170 lb)   10/11/23 75.8 kg (167 lb)   10/05/23 75.8 kg (167 lb 3 oz)   08/30/23 73.9 kg (163 lb)   08/09/23 74.8 kg (164 lb 12.8 oz)   05/05/23 77.6 kg (171 lb)   02/16/22 78.9 kg (174 lb)   12/15/20 80 kg (176 lb 5 oz)     Video physical exam  General: Patient appears well in no acute distress.   Skin: No visualized rash or lesions on visualized skin  Eyes: EOMI, no erythema, sclera icterus or discharge noted  Resp: Appears to be breathing comfortably without accessory muscle usage, speaking in full sentences, no cough  MSK: Appears to have normal range of motion based on visualized movements  Neurologic: No apparent tremors, facial movements symmetric  Psych: affect normal, alert and oriented    Laboratory Data:   Latest Reference Range & Units 01/24/24 13:51   Sodium 135 - 145 mmol/L 129 (L)   Potassium 3.4 - 5.3 mmol/L 4.1   Chloride 98 - 107 mmol/L 94 (L)   Carbon Dioxide (CO2) 22 - 29 mmol/L 24   Urea Nitrogen 8.0 - 23.0 mg/dL 17.1   Creatinine 0.67 - 1.17 mg/dL 1.03   GFR Estimate >60 mL/min/1.73m2 81   Calcium 8.8 - 10.2 mg/dL 9.3   Anion Gap 7 - 15 mmol/L 11   Albumin 3.5 - 5.2 g/dL 4.7   Protein Total 6.4 - 8.3 g/dL 7.0   Alkaline Phosphatase 40 - 150 U/L 169 (H)   ALT 0 - 70 U/L 12   AST 0 - 45 U/L " 20   Bilirubin Total <=1.2 mg/dL 0.9   Ferritin 31 - 409 ng/mL 71   Folate 4.6 - 34.8 ng/mL 9.6   Glucose 70 - 99 mg/dL 154 (H)   Iron 61 - 157 ug/dL 90   Iron Binding Capacity 240 - 430 ug/dL 320   Iron Sat Index 15 - 46 % 28   PSA Tumor Marker 0.00 - 4.50 ng/mL 7.30 (H)   TSH 0.30 - 4.20 uIU/mL 2.30   Vitamin B12 232 - 1,245 pg/mL 1,591 (H)   WBC 4.0 - 11.0 10e3/uL 7.2   Hemoglobin 13.3 - 17.7 g/dL 10.3 (L)   Hematocrit 40.0 - 53.0 % 30.2 (L)   Platelet Count 150 - 450 10e3/uL 214   RBC Count 4.40 - 5.90 10e6/uL 3.37 (L)   MCV 78 - 100 fL 90   MCH 26.5 - 33.0 pg 30.6   MCHC 31.5 - 36.5 g/dL 34.1   RDW 10.0 - 15.0 % 13.2   % Neutrophils % 84   % Lymphocytes % 7   % Monocytes % 7   % Eosinophils % 2   % Basophils % 0   Absolute Basophils 0.0 - 0.2 10e3/uL 0.0   Absolute Eosinophils 0.0 - 0.7 10e3/uL 0.1   Absolute Immature Granulocytes <=0.4 10e3/uL 0.0   Absolute Lymphocytes 0.8 - 5.3 10e3/uL 0.5 (L)   Absolute Monocytes 0.0 - 1.3 10e3/uL 0.5   % Immature Granulocytes % 0   Absolute Neutrophils 1.6 - 8.3 10e3/uL 6.0   Absolute NRBCs 10e3/uL 0.0   NRBCs per 100 WBC <1 /100 0   (L): Data is abnormally low  (H): Data is abnormally high      Assessment and Plan:  # 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 Baseline .9.   - Started on Lupron Sept 2023 45mg. Due March 2024  - Started on Zytiga 1000mg daily + Prednisone 5mg daily October 2023. Tolerating well. Continue  - PSA continues to improve, now 7.3  - Testosterone level pending    - No plans for repeat imaging at this time as long as he clinically is doing well and PSA improving  - MD next month      # Bone Mets  # Hypocalcemia  # Vit D Deficiency   Denies any pain. Was supposed to have started Zometa but this has been delayed for dental work  - Due for Zometa today. Dental issues resolved. OK to start and will plan to continue monthly   - Continue Calcium + Vit D BID consistently. Calcium level WNL   - Alk phos is  improving which is reassuring sign that bone mets are responding to treatment      # Renal Insufficiency  # Hyponatremia, chronic   Creat better today, sodium is slightly worse though he has long standing mild hyponatremia .   - Discussed importance of staying well hydrated. Discussed adding electrolyte drinks   - Avoid Ibuprofen and use Tylenol PRN pain      # DM2  Currently on metformin and glipizide. Blood sugar better today.   - PCP managing  - Hyperglycemia likely contributing to hyponatremia      # Anemia   Long standing (since at least 2020). Cannot tell that he has ever had this evaluated   - CBC checked today, hgb still low but stable at 10.3  - Iron studies WNL. Folate WNL. No B12 deficiency (is a bit high). TSH WNL.   - Suspect anemia of chronic disease/treatment. Will monitor     25 minutes spent on the date of the encounter doing chart review, review of test results, interpretation of tests, patient visit, and documentation     Pradip Wiley PA-C  Department of Hematology and Oncology  [facility]       Again, thank you for allowing me to participate in the care of your patient.        Sincerely,        COLUMBA Harrell

## 2024-01-24 NOTE — LETTER
1/24/2024         RE: Sushil Diana  [id number] [facility] [address]        Dear Colleague,    Thank you for referring your patient, Sushil Diana, to the [facility]. Please see a copy of my visit note below.    Virtual Visit Details    Type of service:  Video Visit   Video Start Time:  2:30pm  Video End Time: 2:45pm    Originating Location (pt. Location): Home    Distant Location (provider location):  On-site  Platform used for Video Visit: [facility]      Oncology/Hematology Visit Note  Jan 24, 2024    Reason for visit: Follow up metastatic prostate cancer     Oncology HPI: Sushil Diana is a 63 year old male with metastatic prostate cancer.  He had nocturia for few years with no elevation of PSA, followed up with his PCP and PSA was elevated at 272.  He was referred to urology in August 2023, biopsy performed and noted to have prostatic adenocarcinoma, [place] 4+9.  Follow-up CT abdomen/pelvis with evidence of bony metastasis, therefore follow-up bone scan on 9/20/2023 with diffuse osseous metastasis.  He was given a dose of Eligard on 9/27/23 and referred to Dr. Holliday, who recommended abiraterone/prednisone with monthly Zometa.  NGS testing was requested as well.     First dose of abiraterone/prednisone was 10/12/23. Will be due for Lupron March 2024. Has not yet started Zometa due to dental work. Video visit today for close follow-up.    Interval History:  Sushil is seen on video with his wife. Overall he is feeling well. Prior toothache resolved, no ongoing dental concerns. Did have rash the other day, resolved with hydrocortisone cream. Did recently switch body wash. Has intermittent hot flashes. Otherwise is feeling well.     Review of Systems:  Patient denies fevers, chills, night sweats, unexplained weight changes, headaches, dizziness, vision or hearing changes, new lumps or bumps, chest pain, shortness of breath, cough, abdominal pain, nausea, vomiting, changes  to bowel or bladder, swelling of extremities, bleeding issues, or rash.    Current Outpatient Medications   Medication Sig Dispense Refill     [START ON 1/24/2024] abiraterone (ZYTIGA) 250 MG tablet Take 4 tablets (1,000 mg) by mouth daily for 30 doses Take on empty stomach. 120 tablet 0     amLODIPine (NORVASC) 10 MG tablet TAKE ONE TABLET BY MOUTH ONCE DAILY 90 tablet 2     aspirin (ASA) 81 MG chewable tablet CHEW AND SWALLOW ONE TABLET BY MOUTH ONCE DAILY 108 tablet 3     atorvastatin (LIPITOR) 20 MG tablet TAKE ONE TABLET BY MOUTH ONCE DAILY 90 tablet 3     blood glucose monitoring (NO BRAND SPECIFIED) meter device kit Use to test blood sugar 1-3 times daily or as directed. -Contour Next meter 1 kit 0     citalopram (CELEXA) 20 MG tablet TAKE ONE TABLET BY MOUTH ONCE DAILY 90 tablet 1     CONTOUR NEXT TEST test strip USE TO TEST BLOOD SUGARS ONE TO THREE TIMES DAILY  OR AS DIRECTED 100 strip 1     famotidine (PEPCID) 40 MG tablet TAKE ONE TABLET BY MOUTH TWICE A  tablet 3     glipiZIDE (GLUCOTROL) 5 MG tablet TAKE ONE TABLET BY MOUTH EVERY MORNING 60 tablet 0     hydrALAZINE (APRESOLINE) 100 MG tablet TAKE ONE TABLET BY MOUTH THREE TIMES A DAY 90 tablet 3     hydrochlorothiazide (HYDRODIURIL) 25 MG tablet TAKE 1 TABLET (25 MG) BY MOUTH DAILY 90 tablet 3     lisinopril (ZESTRIL) 40 MG tablet TAKE 1 TABLET (40 MG) BY MOUTH DAILY 90 tablet 1     metFORMIN (GLUCOPHAGE XR) 500 MG 24 hr tablet TAKE TWO TABLETS BY MOUTH ONCE DAILY WITH DINNER 180 tablet 3     metoprolol tartrate (LOPRESSOR) 50 MG tablet TAKE ONE TABLET BY MOUTH TWICE A  tablet 3     Microlet Lancets MISC USE TO TEST BLOOD SUGAR 1-3 TIMES DAILY OR AS DIRECTED. 100 each 11     predniSONE (DELTASONE) 5 MG tablet Take 1 tablet (5 mg) by mouth daily START WITH ZYTIGA 180 tablet 3       Past Medical History  Past Medical History:   Diagnosis Date     GERD (gastroesophageal reflux disease)      Gout      Past Surgical History:   Procedure  "Laterality Date     NONE OF THE ABOVE CORE MEASURE DIAGNOSES       Allergies   Allergen Reactions     No Known Drug Allergy      Social History   Social History     Tobacco Use     Smoking status: Some Days     Packs/day: 1.00     Years: 28.00     Additional pack years: 0.00     Total pack years: 28.00     Types: Cigarettes     Smokeless tobacco: Never     Tobacco comments:     quit June 2018   Substance Use Topics     Alcohol use: Yes     Comment: occasional     Drug use: No      Past medical history and social history were reviewed.    Physical Examination:  Ht 1.753 m (5' 9\")   BMI 24.51 kg/m    Wt Readings from Last 10 Encounters:   12/27/23 75.3 kg (166 lb)   11/29/23 74.8 kg (165 lb)   11/13/23 77.1 kg (170 lb)   10/11/23 75.8 kg (167 lb)   10/05/23 75.8 kg (167 lb 3 oz)   08/30/23 73.9 kg (163 lb)   08/09/23 74.8 kg (164 lb 12.8 oz)   05/05/23 77.6 kg (171 lb)   02/16/22 78.9 kg (174 lb)   12/15/20 80 kg (176 lb 5 oz)     Video physical exam  General: Patient appears well in no acute distress.   Skin: No visualized rash or lesions on visualized skin  Eyes: EOMI, no erythema, sclera icterus or discharge noted  Resp: Appears to be breathing comfortably without accessory muscle usage, speaking in full sentences, no cough  MSK: Appears to have normal range of motion based on visualized movements  Neurologic: No apparent tremors, facial movements symmetric  Psych: affect normal, alert and oriented    Laboratory Data:   Latest Reference Range & Units 01/24/24 13:51   Sodium 135 - 145 mmol/L 129 (L)   Potassium 3.4 - 5.3 mmol/L 4.1   Chloride 98 - 107 mmol/L 94 (L)   Carbon Dioxide (CO2) 22 - 29 mmol/L 24   Urea Nitrogen 8.0 - 23.0 mg/dL 17.1   Creatinine 0.67 - 1.17 mg/dL 1.03   GFR Estimate >60 mL/min/1.73m2 81   Calcium 8.8 - 10.2 mg/dL 9.3   Anion Gap 7 - 15 mmol/L 11   Albumin 3.5 - 5.2 g/dL 4.7   Protein Total 6.4 - 8.3 g/dL 7.0   Alkaline Phosphatase 40 - 150 U/L 169 (H)   ALT 0 - 70 U/L 12   AST 0 - 45 U/L " 20   Bilirubin Total <=1.2 mg/dL 0.9   Ferritin 31 - 409 ng/mL 71   Folate 4.6 - 34.8 ng/mL 9.6   Glucose 70 - 99 mg/dL 154 (H)   Iron 61 - 157 ug/dL 90   Iron Binding Capacity 240 - 430 ug/dL 320   Iron Sat Index 15 - 46 % 28   PSA Tumor Marker 0.00 - 4.50 ng/mL 7.30 (H)   TSH 0.30 - 4.20 uIU/mL 2.30   Vitamin B12 232 - 1,245 pg/mL 1,591 (H)   WBC 4.0 - 11.0 10e3/uL 7.2   Hemoglobin 13.3 - 17.7 g/dL 10.3 (L)   Hematocrit 40.0 - 53.0 % 30.2 (L)   Platelet Count 150 - 450 10e3/uL 214   RBC Count 4.40 - 5.90 10e6/uL 3.37 (L)   MCV 78 - 100 fL 90   MCH 26.5 - 33.0 pg 30.6   MCHC 31.5 - 36.5 g/dL 34.1   RDW 10.0 - 15.0 % 13.2   % Neutrophils % 84   % Lymphocytes % 7   % Monocytes % 7   % Eosinophils % 2   % Basophils % 0   Absolute Basophils 0.0 - 0.2 10e3/uL 0.0   Absolute Eosinophils 0.0 - 0.7 10e3/uL 0.1   Absolute Immature Granulocytes <=0.4 10e3/uL 0.0   Absolute Lymphocytes 0.8 - 5.3 10e3/uL 0.5 (L)   Absolute Monocytes 0.0 - 1.3 10e3/uL 0.5   % Immature Granulocytes % 0   Absolute Neutrophils 1.6 - 8.3 10e3/uL 6.0   Absolute NRBCs 10e3/uL 0.0   NRBCs per 100 WBC <1 /100 0   (L): Data is abnormally low  (H): Data is abnormally high      Assessment and Plan:  # 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 Baseline .9.   - Started on Lupron Sept 2023 45mg. Due March 2024  - Started on Zytiga 1000mg daily + Prednisone 5mg daily October 2023. Tolerating well. Continue  - PSA continues to improve, now 7.3  - Testosterone level pending    - No plans for repeat imaging at this time as long as he clinically is doing well and PSA improving  - MD next month      # Bone Mets  # Hypocalcemia  # Vit D Deficiency   Denies any pain. Was supposed to have started Zometa but this has been delayed for dental work  - Due for Zometa today. Dental issues resolved. OK to start and will plan to continue monthly   - Continue Calcium + Vit D BID consistently. Calcium level WNL   - Alk phos is  improving which is reassuring sign that bone mets are responding to treatment      # Renal Insufficiency  # Hyponatremia, chronic   Creat better today, sodium is slightly worse though he has long standing mild hyponatremia .   - Discussed importance of staying well hydrated. Discussed adding electrolyte drinks   - Avoid Ibuprofen and use Tylenol PRN pain      # DM2  Currently on metformin and glipizide. Blood sugar better today.   - PCP managing  - Hyperglycemia likely contributing to hyponatremia      # Anemia   Long standing (since at least 2020). Cannot tell that he has ever had this evaluated   - CBC checked today, hgb still low but stable at 10.3  - Iron studies WNL. Folate WNL. No B12 deficiency (is a bit high). TSH WNL.   - Suspect anemia of chronic disease/treatment. Will monitor     25 minutes spent on the date of the encounter doing chart review, review of test results, interpretation of tests, patient visit, and documentation     Pradip Wiley PA-C  Department of Hematology and Oncology  [facility]       Again, thank you for allowing me to participate in the care of your patient.        Sincerely,        COLUMBA Harrell

## 2024-01-26 LAB — TESTOST SERPL-MCNC: <2 NG/DL (ref 240–950)

## 2024-02-01 DIAGNOSIS — C61 PROSTATE CANCER METASTATIC TO BONE (H): Primary | ICD-10-CM

## 2024-02-01 DIAGNOSIS — C79.51 PROSTATE CANCER METASTATIC TO BONE (H): Primary | ICD-10-CM

## 2024-02-01 RX ORDER — ABIRATERONE ACETATE 250 MG/1
1000 TABLET ORAL DAILY
Qty: 120 TABLET | Refills: 0 | Status: SHIPPED | OUTPATIENT
Start: 2024-02-02 | End: 2024-03-03

## 2024-02-05 DIAGNOSIS — E11.9 DIABETES MELLITUS WITHOUT COMPLICATION (H): ICD-10-CM

## 2024-02-05 DIAGNOSIS — I50.41 ACUTE COMBINED SYSTOLIC AND DIASTOLIC CONGESTIVE HEART FAILURE (H): ICD-10-CM

## 2024-02-05 DIAGNOSIS — I10 HYPERTENSION, UNSPECIFIED TYPE: ICD-10-CM

## 2024-02-05 RX ORDER — HYDROCHLOROTHIAZIDE 25 MG/1
25 TABLET ORAL DAILY
Qty: 90 TABLET | Refills: 3 | Status: SHIPPED | OUTPATIENT
Start: 2024-02-05

## 2024-02-05 RX ORDER — LISINOPRIL 40 MG/1
40 TABLET ORAL DAILY
Qty: 90 TABLET | Refills: 1 | Status: SHIPPED | OUTPATIENT
Start: 2024-02-05 | End: 2024-07-26

## 2024-02-05 RX ORDER — METOPROLOL TARTRATE 50 MG
TABLET ORAL
Qty: 180 TABLET | Refills: 3 | Status: SHIPPED | OUTPATIENT
Start: 2024-02-05

## 2024-02-05 RX ORDER — GLIPIZIDE 5 MG/1
TABLET ORAL
Qty: 90 TABLET | Refills: 0 | Status: SHIPPED | OUTPATIENT
Start: 2024-02-05 | End: 2024-05-02

## 2024-02-05 NOTE — TELEPHONE ENCOUNTER
Pt requesting 90 day supply on all.      Thank you,  Lucero Becerra, Pharmacy Tech  [facility]

## 2024-02-27 NOTE — PROGRESS NOTES
Virtual Visit Details    Type of service:  Video Visit   Video Start Time:  11:00am  Video End Time: 11:10am    Originating Location (pt. Location): Home    Distant Location (provider location):  On-site  Platform used for Video Visit: [facility]    Oncology/Hematology Visit Note  Feb 28, 2024    Reason for visit: Follow up metastatic prostate cancer     Oncology HPI: Sushil Diana is a 63 year old male with metastatic prostate cancer.  He had nocturia for few years with no elevation of PSA, followed up with his PCP and PSA was elevated at 272.  He was referred to urology in August 2023, biopsy performed and noted to have prostatic adenocarcinoma, [place] 4+9.  Follow-up CT abdomen/pelvis with evidence of bony metastasis, therefore follow-up bone scan on 9/20/2023 with diffuse osseous metastasis.  He was given a dose of Eligard on 9/27/23 and referred to Dr. Holliday, who recommended abiraterone/prednisone with monthly Zometa.  NGS testing was requested as well.     First dose of abiraterone/prednisone was 10/12/23. Will be due for Lupron March 2024. Started Zometa 1/24/24. Video visit today for close follow-up.    Interval History:  Sushil continues to do well. Notes mild GI upset if he eats too much. Had one day of flu like symptoms after first Zometa. Otherwise ROS negative. Continues on Zytiga + Pred.     Review of Systems:  Patient denies fevers, chills, night sweats, unexplained weight changes, headaches, dizziness, vision or hearing changes, new lumps or bumps, chest pain, shortness of breath, cough, abdominal pain, nausea, vomiting, changes to bowel or bladder, swelling of extremities, bleeding issues, or rash.    Current Outpatient Medications   Medication Sig Dispense Refill    abiraterone (ZYTIGA) 250 MG tablet Take 4 tablets (1,000 mg) by mouth daily for 30 doses Take on empty stomach. 120 tablet 0    amLODIPine (NORVASC) 10 MG tablet TAKE ONE TABLET BY MOUTH ONCE DAILY 90 tablet 2    aspirin (ASA) 81 MG  chewable tablet CHEW AND SWALLOW ONE TABLET BY MOUTH ONCE DAILY 108 tablet 3    atorvastatin (LIPITOR) 20 MG tablet TAKE ONE TABLET BY MOUTH ONCE DAILY 90 tablet 3    blood glucose monitoring (NO BRAND SPECIFIED) meter device kit Use to test blood sugar 1-3 times daily or as directed. -Contour Next meter 1 kit 0    citalopram (CELEXA) 20 MG tablet TAKE ONE TABLET BY MOUTH ONCE DAILY 90 tablet 1    CONTOUR NEXT TEST test strip USE TO TEST BLOOD SUGARS ONE TO THREE TIMES DAILY  OR AS DIRECTED 100 strip 1    famotidine (PEPCID) 40 MG tablet TAKE ONE TABLET BY MOUTH TWICE A  tablet 3    glipiZIDE (GLUCOTROL) 5 MG tablet TAKE ONE TABLET BY MOUTH EVERY MORNING 90 tablet 0    hydrALAZINE (APRESOLINE) 100 MG tablet TAKE ONE TABLET BY MOUTH THREE TIMES A DAY 90 tablet 3    hydrochlorothiazide (HYDRODIURIL) 25 MG tablet TAKE 1 TABLET (25 MG) BY MOUTH DAILY 90 tablet 3    lisinopril (ZESTRIL) 40 MG tablet TAKE ONE TABLET BY MOUTH ONCE DAILY 90 tablet 1    metFORMIN (GLUCOPHAGE XR) 500 MG 24 hr tablet TAKE TWO TABLETS BY MOUTH ONCE DAILY WITH DINNER 180 tablet 3    metoprolol tartrate (LOPRESSOR) 50 MG tablet TAKE ONE TABLET BY MOUTH TWICE A  tablet 3    Microlet Lancets MISC USE TO TEST BLOOD SUGAR 1-3 TIMES DAILY OR AS DIRECTED. 100 each 11    predniSONE (DELTASONE) 5 MG tablet Take 1 tablet (5 mg) by mouth daily START WITH ZYTIGA 180 tablet 3       Past Medical History  Past Medical History:   Diagnosis Date    GERD (gastroesophageal reflux disease)     Gout      Past Surgical History:   Procedure Laterality Date    NONE OF THE ABOVE CORE MEASURE DIAGNOSES       Allergies   Allergen Reactions    No Known Drug Allergy      Social History   Social History     Tobacco Use    Smoking status: Some Days     Packs/day: 1.00     Years: 28.00     Additional pack years: 0.00     Total pack years: 28.00     Types: Cigarettes    Smokeless tobacco: Never    Tobacco comments:     quit June 2018   Substance Use Topics     "Alcohol use: Yes     Comment: occasional    Drug use: No      Past medical history and social history were reviewed.    Physical Examination:  Ht 1.727 m (5' 8\")   Wt 76.2 kg (168 lb)   BMI 25.54 kg/m    Wt Readings from Last 10 Encounters:   01/24/24 76.7 kg (169 lb)   12/27/23 75.3 kg (166 lb)   11/29/23 74.8 kg (165 lb)   11/13/23 77.1 kg (170 lb)   10/11/23 75.8 kg (167 lb)   10/05/23 75.8 kg (167 lb 3 oz)   08/30/23 73.9 kg (163 lb)   08/09/23 74.8 kg (164 lb 12.8 oz)   05/05/23 77.6 kg (171 lb)   02/16/22 78.9 kg (174 lb)     Video physical exam  General: Patient appears well in no acute distress.   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 Baseline .9.   - Started on Lupron Sept 2023 45mg. Due March 2024-requested   - Started on Zytiga 1000mg daily + Prednisone 5mg daily October 2023. Tolerating well. Continue  - PSA continues to improve, last check 7.3, repeat labs tomorrow   - Testosterone level has remained within castration range   - No plans for repeat imaging at this time as long as he clinically is doing well and PSA improving  - MD in April      # Bone Mets  # Hypocalcemia  # Vit D Deficiency   Denies any pain. Started Zometa 1/24/24  - Continue Zometa monthly, will get tomorrow pending labs   - Continue Calcium + Vit D BID consistently.   - Alk phos is improving which is reassuring sign that bone mets are responding to treatment      # Renal Insufficiency  # " Hyponatremia, chronic   Has chronic hyponatremia, suspect 2/2 DM and hydrochlorothiazide  - Discussed importance of staying well hydrated. Discussed adding electrolyte drinks   - Avoid Ibuprofen and use Tylenol PRN pain   - CMP monthly      # DM2  Currently on metformin and glipizide.   - PCP managing  - Hyperglycemia likely contributing to hyponatremia      # Anemia   Long standing (since at least 2020). Cannot tell that he has ever had this evaluated   - CBC monitoring monthly   - Iron studies WNL. Folate WNL. No B12 deficiency (is a bit high). TSH WNL.   - Suspect anemia of chronic disease/treatment. Will monitor    15 minutes spent on the date of the encounter doing chart review, review of test results, interpretation of tests, patient visit, and documentation     Pradip Wiley PA-C  Department of Hematology and Oncology  [facility]

## 2024-02-28 ENCOUNTER — VIRTUAL VISIT (OUTPATIENT)
Dept: ONCOLOGY | Facility: CLINIC | Age: 64
End: 2024-02-28
Attending: PHYSICIAN ASSISTANT
Payer: MEDICARE

## 2024-02-28 VITALS — BODY MASS INDEX: 25.46 KG/M2 | HEIGHT: 68 IN | WEIGHT: 168 LBS

## 2024-02-28 DIAGNOSIS — C61 PROSTATE CANCER METASTATIC TO BONE (H): Primary | ICD-10-CM

## 2024-02-28 DIAGNOSIS — C79.51 PROSTATE CANCER METASTATIC TO BONE (H): Primary | ICD-10-CM

## 2024-02-28 PROCEDURE — 99213 OFFICE O/P EST LOW 20 MIN: CPT | Mod: 95 | Performed by: PHYSICIAN ASSISTANT

## 2024-02-28 ASSESSMENT — PAIN SCALES - GENERAL: PAINLEVEL: NO PAIN (0)

## 2024-02-28 NOTE — LETTER
2/28/2024         RE: Sushil Diana  [address] [facility] [address]        Dear Colleague,    Thank you for referring your patient, Sushil Diana, to the [facility]. Please see a copy of my visit note below.    Virtual Visit Details    Type of service:  Video Visit   Video Start Time:  11:00am  Video End Time: 11:10am    Originating Location (pt. Location): Home    Distant Location (provider location):  On-site  Platform used for Video Visit: [facility]    Oncology/Hematology Visit Note  Feb 28, 2024    Reason for visit: Follow up metastatic prostate cancer     Oncology HPI: Sushil Diana is a 63 year old male with metastatic prostate cancer.  He had nocturia for few years with no elevation of PSA, followed up with his PCP and PSA was elevated at 272.  He was referred to urology in August 2023, biopsy performed and noted to have prostatic adenocarcinoma, [place] 4+9.  Follow-up CT abdomen/pelvis with evidence of bony metastasis, therefore follow-up bone scan on 9/20/2023 with diffuse osseous metastasis.  He was given a dose of Eligard on 9/27/23 and referred to Dr. Holliday, who recommended abiraterone/prednisone with monthly Zometa.  NGS testing was requested as well.     First dose of abiraterone/prednisone was 10/12/23. Will be due for Lupron March 2024. Started Zometa 1/24/24. Video visit today for close follow-up.    Interval History:  Sushil continues to do well. Notes mild GI upset if he eats too much. Had one day of flu like symptoms after first Zometa. Otherwise ROS negative. Continues on Zytiga + Pred.     Review of Systems:  Patient denies fevers, chills, night sweats, unexplained weight changes, headaches, dizziness, vision or hearing changes, new lumps or bumps, chest pain, shortness of breath, cough, abdominal pain, nausea, vomiting, changes to bowel or bladder, swelling of extremities, bleeding issues, or rash.    Current Outpatient Medications   Medication  Sig Dispense Refill     abiraterone (ZYTIGA) 250 MG tablet Take 4 tablets (1,000 mg) by mouth daily for 30 doses Take on empty stomach. 120 tablet 0     amLODIPine (NORVASC) 10 MG tablet TAKE ONE TABLET BY MOUTH ONCE DAILY 90 tablet 2     aspirin (ASA) 81 MG chewable tablet CHEW AND SWALLOW ONE TABLET BY MOUTH ONCE DAILY 108 tablet 3     atorvastatin (LIPITOR) 20 MG tablet TAKE ONE TABLET BY MOUTH ONCE DAILY 90 tablet 3     blood glucose monitoring (NO BRAND SPECIFIED) meter device kit Use to test blood sugar 1-3 times daily or as directed. -Contour Next meter 1 kit 0     citalopram (CELEXA) 20 MG tablet TAKE ONE TABLET BY MOUTH ONCE DAILY 90 tablet 1     CONTOUR NEXT TEST test strip USE TO TEST BLOOD SUGARS ONE TO THREE TIMES DAILY  OR AS DIRECTED 100 strip 1     famotidine (PEPCID) 40 MG tablet TAKE ONE TABLET BY MOUTH TWICE A  tablet 3     glipiZIDE (GLUCOTROL) 5 MG tablet TAKE ONE TABLET BY MOUTH EVERY MORNING 90 tablet 0     hydrALAZINE (APRESOLINE) 100 MG tablet TAKE ONE TABLET BY MOUTH THREE TIMES A DAY 90 tablet 3     hydrochlorothiazide (HYDRODIURIL) 25 MG tablet TAKE 1 TABLET (25 MG) BY MOUTH DAILY 90 tablet 3     lisinopril (ZESTRIL) 40 MG tablet TAKE ONE TABLET BY MOUTH ONCE DAILY 90 tablet 1     metFORMIN (GLUCOPHAGE XR) 500 MG 24 hr tablet TAKE TWO TABLETS BY MOUTH ONCE DAILY WITH DINNER 180 tablet 3     metoprolol tartrate (LOPRESSOR) 50 MG tablet TAKE ONE TABLET BY MOUTH TWICE A  tablet 3     Microlet Lancets MISC USE TO TEST BLOOD SUGAR 1-3 TIMES DAILY OR AS DIRECTED. 100 each 11     predniSONE (DELTASONE) 5 MG tablet Take 1 tablet (5 mg) by mouth daily START WITH ZYTIGA 180 tablet 3       Past Medical History  Past Medical History:   Diagnosis Date     GERD (gastroesophageal reflux disease)      Gout      Past Surgical History:   Procedure Laterality Date     NONE OF THE ABOVE CORE MEASURE DIAGNOSES       Allergies   Allergen Reactions     No Known Drug Allergy      Social History  "  Social History     Tobacco Use     Smoking status: Some Days     Packs/day: 1.00     Years: 28.00     Additional pack years: 0.00     Total pack years: 28.00     Types: Cigarettes     Smokeless tobacco: Never     Tobacco comments:     quit June 2018   Substance Use Topics     Alcohol use: Yes     Comment: occasional     Drug use: No      Past medical history and social history were reviewed.    Physical Examination:  Ht 1.727 m (5' 8\")   Wt 76.2 kg (168 lb)   BMI 25.54 kg/m    Wt Readings from Last 10 Encounters:   01/24/24 76.7 kg (169 lb)   12/27/23 75.3 kg (166 lb)   11/29/23 74.8 kg (165 lb)   11/13/23 77.1 kg (170 lb)   10/11/23 75.8 kg (167 lb)   10/05/23 75.8 kg (167 lb 3 oz)   08/30/23 73.9 kg (163 lb)   08/09/23 74.8 kg (164 lb 12.8 oz)   05/05/23 77.6 kg (171 lb)   02/16/22 78.9 kg (174 lb)     Video physical exam  General: Patient appears well in no acute distress.   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 Baseline .9.   - Started on Lupron Sept 2023 45mg. Due March 2024-requested   - Started on Zytiga 1000mg daily + Prednisone 5mg daily October 2023. Tolerating well. Continue  - PSA continues to improve, last check 7.3, repeat labs tomorrow   - Testosterone level has remained within castration range   - No plans for repeat imaging at this time as long as he clinically is doing well and PSA improving  - MD in April      # Bone Mets  # Hypocalcemia  # Vit D Deficiency "   Denies any pain. Started Zometa 1/24/24  - Continue Zometa monthly, will get tomorrow pending labs   - Continue Calcium + Vit D BID consistently.   - Alk phos is improving which is reassuring sign that bone mets are responding to treatment      # Renal Insufficiency  # Hyponatremia, chronic   Has chronic hyponatremia, suspect 2/2 DM and hydrochlorothiazide  - Discussed importance of staying well hydrated. Discussed adding electrolyte drinks   - Avoid Ibuprofen and use Tylenol PRN pain   - CMP monthly      # DM2  Currently on metformin and glipizide.   - PCP managing  - Hyperglycemia likely contributing to hyponatremia      # Anemia   Long standing (since at least 2020). Cannot tell that he has ever had this evaluated   - CBC monitoring monthly   - Iron studies WNL. Folate WNL. No B12 deficiency (is a bit high). TSH WNL.   - Suspect anemia of chronic disease/treatment. Will monitor    15 minutes spent on the date of the encounter doing chart review, review of test results, interpretation of tests, patient visit, and documentation     Pradip Wiley PA-C  Department of Hematology and Oncology  [facility]       Again, thank you for allowing me to participate in the care of your patient.        Sincerely,        COLUMBA Harrell

## 2024-02-28 NOTE — LETTER
2/28/2024         RE: Sushil Diana  [address] [facility] [address]        Dear Colleague,    Thank you for referring your patient, Sushil Diana, to the [facility]. Please see a copy of my visit note below.    Virtual Visit Details    Type of service:  Video Visit   Video Start Time:  11:00am  Video End Time: 11:10am    Originating Location (pt. Location): Home    Distant Location (provider location):  On-site  Platform used for Video Visit: [facility]    Oncology/Hematology Visit Note  Feb 28, 2024    Reason for visit: Follow up metastatic prostate cancer     Oncology HPI: Sushil Diana is a 63 year old male with metastatic prostate cancer.  He had nocturia for few years with no elevation of PSA, followed up with his PCP and PSA was elevated at 272.  He was referred to urology in August 2023, biopsy performed and noted to have prostatic adenocarcinoma, [place] 4+9.  Follow-up CT abdomen/pelvis with evidence of bony metastasis, therefore follow-up bone scan on 9/20/2023 with diffuse osseous metastasis.  He was given a dose of Eligard on 9/27/23 and referred to Dr. Holliday, who recommended abiraterone/prednisone with monthly Zometa.  NGS testing was requested as well.     First dose of abiraterone/prednisone was 10/12/23. Will be due for Lupron March 2024. Started Zometa 1/24/24. Video visit today for close follow-up.    Interval History:  Sushil continues to do well. Notes mild GI upset if he eats too much. Had one day of flu like symptoms after first Zometa. Otherwise ROS negative. Continues on Zytiga + Pred.     Review of Systems:  Patient denies fevers, chills, night sweats, unexplained weight changes, headaches, dizziness, vision or hearing changes, new lumps or bumps, chest pain, shortness of breath, cough, abdominal pain, nausea, vomiting, changes to bowel or bladder, swelling of extremities, bleeding issues, or rash.    Current Outpatient Medications   Medication  Sig Dispense Refill     abiraterone (ZYTIGA) 250 MG tablet Take 4 tablets (1,000 mg) by mouth daily for 30 doses Take on empty stomach. 120 tablet 0     amLODIPine (NORVASC) 10 MG tablet TAKE ONE TABLET BY MOUTH ONCE DAILY 90 tablet 2     aspirin (ASA) 81 MG chewable tablet CHEW AND SWALLOW ONE TABLET BY MOUTH ONCE DAILY 108 tablet 3     atorvastatin (LIPITOR) 20 MG tablet TAKE ONE TABLET BY MOUTH ONCE DAILY 90 tablet 3     blood glucose monitoring (NO BRAND SPECIFIED) meter device kit Use to test blood sugar 1-3 times daily or as directed. -Contour Next meter 1 kit 0     citalopram (CELEXA) 20 MG tablet TAKE ONE TABLET BY MOUTH ONCE DAILY 90 tablet 1     CONTOUR NEXT TEST test strip USE TO TEST BLOOD SUGARS ONE TO THREE TIMES DAILY  OR AS DIRECTED 100 strip 1     famotidine (PEPCID) 40 MG tablet TAKE ONE TABLET BY MOUTH TWICE A  tablet 3     glipiZIDE (GLUCOTROL) 5 MG tablet TAKE ONE TABLET BY MOUTH EVERY MORNING 90 tablet 0     hydrALAZINE (APRESOLINE) 100 MG tablet TAKE ONE TABLET BY MOUTH THREE TIMES A DAY 90 tablet 3     hydrochlorothiazide (HYDRODIURIL) 25 MG tablet TAKE 1 TABLET (25 MG) BY MOUTH DAILY 90 tablet 3     lisinopril (ZESTRIL) 40 MG tablet TAKE ONE TABLET BY MOUTH ONCE DAILY 90 tablet 1     metFORMIN (GLUCOPHAGE XR) 500 MG 24 hr tablet TAKE TWO TABLETS BY MOUTH ONCE DAILY WITH DINNER 180 tablet 3     metoprolol tartrate (LOPRESSOR) 50 MG tablet TAKE ONE TABLET BY MOUTH TWICE A  tablet 3     Microlet Lancets MISC USE TO TEST BLOOD SUGAR 1-3 TIMES DAILY OR AS DIRECTED. 100 each 11     predniSONE (DELTASONE) 5 MG tablet Take 1 tablet (5 mg) by mouth daily START WITH ZYTIGA 180 tablet 3       Past Medical History  Past Medical History:   Diagnosis Date     GERD (gastroesophageal reflux disease)      Gout      Past Surgical History:   Procedure Laterality Date     NONE OF THE ABOVE CORE MEASURE DIAGNOSES       Allergies   Allergen Reactions     No Known Drug Allergy      Social History  "  Social History     Tobacco Use     Smoking status: Some Days     Packs/day: 1.00     Years: 28.00     Additional pack years: 0.00     Total pack years: 28.00     Types: Cigarettes     Smokeless tobacco: Never     Tobacco comments:     quit June 2018   Substance Use Topics     Alcohol use: Yes     Comment: occasional     Drug use: No      Past medical history and social history were reviewed.    Physical Examination:  Ht 1.727 m (5' 8\")   Wt 76.2 kg (168 lb)   BMI 25.54 kg/m    Wt Readings from Last 10 Encounters:   01/24/24 76.7 kg (169 lb)   12/27/23 75.3 kg (166 lb)   11/29/23 74.8 kg (165 lb)   11/13/23 77.1 kg (170 lb)   10/11/23 75.8 kg (167 lb)   10/05/23 75.8 kg (167 lb 3 oz)   08/30/23 73.9 kg (163 lb)   08/09/23 74.8 kg (164 lb 12.8 oz)   05/05/23 77.6 kg (171 lb)   02/16/22 78.9 kg (174 lb)     Video physical exam  General: Patient appears well in no acute distress.   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 Baseline .9.   - Started on Lupron Sept 2023 45mg. Due March 2024-requested   - Started on Zytiga 1000mg daily + Prednisone 5mg daily October 2023. Tolerating well. Continue  - PSA continues to improve, last check 7.3, repeat labs tomorrow   - Testosterone level has remained within castration range   - No plans for repeat imaging at this time as long as he clinically is doing well and PSA improving  - MD in April      # Bone Mets  # Hypocalcemia  # Vit D Deficiency "   Denies any pain. Started Zometa 1/24/24  - Continue Zometa monthly, will get tomorrow pending labs   - Continue Calcium + Vit D BID consistently.   - Alk phos is improving which is reassuring sign that bone mets are responding to treatment      # Renal Insufficiency  # Hyponatremia, chronic   Has chronic hyponatremia, suspect 2/2 DM and hydrochlorothiazide  - Discussed importance of staying well hydrated. Discussed adding electrolyte drinks   - Avoid Ibuprofen and use Tylenol PRN pain   - CMP monthly      # DM2  Currently on metformin and glipizide.   - PCP managing  - Hyperglycemia likely contributing to hyponatremia      # Anemia   Long standing (since at least 2020). Cannot tell that he has ever had this evaluated   - CBC monitoring monthly   - Iron studies WNL. Folate WNL. No B12 deficiency (is a bit high). TSH WNL.   - Suspect anemia of chronic disease/treatment. Will monitor    15 minutes spent on the date of the encounter doing chart review, review of test results, interpretation of tests, patient visit, and documentation     Pradip Wiley PA-C  Department of Hematology and Oncology  [facility]       Again, thank you for allowing me to participate in the care of your patient.        Sincerely,        COLUMBA Harrell

## 2024-02-28 NOTE — NURSING NOTE
Is the patient currently in the state of [place]? YES    Visit mode:VIDEO    If the visit is dropped, the patient can be reconnected by: VIDEO VISIT: Text to cell phone:   Telephone Information:   Mobile [phone]       Will anyone else be joining the visit? YES: How would they like to receive their invitation? Text to cell phone: [phone]  Kaleigh-Pt's S/O  (If patient encounters technical issues they should call [phone])    How would you like to obtain your AVS? MyChart    Are changes needed to the allergy or medication list? Pt stated no med changes    Reason for visit: REESE Hernández F

## 2024-02-29 ENCOUNTER — INFUSION THERAPY VISIT (OUTPATIENT)
Dept: INFUSION THERAPY | Facility: CLINIC | Age: 64
End: 2024-02-29
Attending: INTERNAL MEDICINE
Payer: MEDICARE

## 2024-02-29 ENCOUNTER — LAB (OUTPATIENT)
Dept: LAB | Facility: CLINIC | Age: 64
End: 2024-02-29
Payer: MEDICARE

## 2024-02-29 VITALS
HEART RATE: 74 BPM | OXYGEN SATURATION: 99 % | WEIGHT: 167.7 LBS | SYSTOLIC BLOOD PRESSURE: 152 MMHG | BODY MASS INDEX: 25.5 KG/M2 | RESPIRATION RATE: 16 BRPM | DIASTOLIC BLOOD PRESSURE: 65 MMHG | TEMPERATURE: 97.6 F

## 2024-02-29 DIAGNOSIS — C79.51 PROSTATE CANCER METASTATIC TO BONE (H): ICD-10-CM

## 2024-02-29 DIAGNOSIS — C61 PROSTATE CANCER METASTATIC TO BONE (H): ICD-10-CM

## 2024-02-29 DIAGNOSIS — C61 PROSTATE CANCER (H): Primary | ICD-10-CM

## 2024-02-29 LAB
ALBUMIN SERPL BCG-MCNC: 4.5 G/DL (ref 3.5–5.2)
ALP SERPL-CCNC: 93 U/L (ref 40–150)
ALT SERPL W P-5'-P-CCNC: 12 U/L (ref 0–70)
ANION GAP SERPL CALCULATED.3IONS-SCNC: 13 MMOL/L (ref 7–15)
AST SERPL W P-5'-P-CCNC: 23 U/L (ref 0–45)
BASOPHILS # BLD AUTO: 0 10E3/UL (ref 0–0.2)
BASOPHILS NFR BLD AUTO: 1 %
BILIRUB SERPL-MCNC: 0.8 MG/DL
BUN SERPL-MCNC: 22.1 MG/DL (ref 8–23)
CALCIUM SERPL-MCNC: 9.1 MG/DL (ref 8.8–10.2)
CALCIUM SERPL-MCNC: 9.1 MG/DL (ref 8.8–10.2)
CHLORIDE SERPL-SCNC: 100 MMOL/L (ref 98–107)
CREAT SERPL-MCNC: 1.25 MG/DL (ref 0.67–1.17)
DEPRECATED HCO3 PLAS-SCNC: 22 MMOL/L (ref 22–29)
EGFRCR SERPLBLD CKD-EPI 2021: 64 ML/MIN/1.73M2
EOSINOPHIL # BLD AUTO: 0.1 10E3/UL (ref 0–0.7)
EOSINOPHIL NFR BLD AUTO: 1 %
ERYTHROCYTE [DISTWIDTH] IN BLOOD BY AUTOMATED COUNT: 13.3 % (ref 10–15)
GLUCOSE SERPL-MCNC: 104 MG/DL (ref 70–99)
HCT VFR BLD AUTO: 30.9 % (ref 40–53)
HGB BLD-MCNC: 10.6 G/DL (ref 13.3–17.7)
IMM GRANULOCYTES # BLD: 0 10E3/UL
IMM GRANULOCYTES NFR BLD: 0 %
LYMPHOCYTES # BLD AUTO: 0.6 10E3/UL (ref 0–5.3)
LYMPHOCYTES NFR BLD AUTO: 8 %
MCH RBC QN AUTO: 31.1 PG (ref 26.5–33)
MCHC RBC AUTO-ENTMCNC: 34.3 G/DL (ref 31.5–36.5)
MCV RBC AUTO: 91 FL (ref 78–100)
MONOCYTES # BLD AUTO: 0.6 10E3/UL (ref 0–1.3)
MONOCYTES NFR BLD AUTO: 7 %
NEUTROPHILS # BLD AUTO: 6.8 10E3/UL (ref 1.6–8.3)
NEUTROPHILS NFR BLD AUTO: 84 %
NRBC # BLD AUTO: 0 10E3/UL
NRBC BLD AUTO-RTO: 0 /100
PLATELET # BLD AUTO: 213 10E3/UL (ref 150–450)
POTASSIUM SERPL-SCNC: 4.1 MMOL/L (ref 3.4–5.3)
PROT SERPL-MCNC: 7 G/DL (ref 6.4–8.3)
PSA SERPL DL<=0.01 NG/ML-MCNC: 4.38 NG/ML (ref 0–4.5)
RBC # BLD AUTO: 3.41 10E6/UL (ref 4.4–5.9)
SODIUM SERPL-SCNC: 135 MMOL/L (ref 135–145)
WBC # BLD AUTO: 8.1 10E3/UL (ref 4–11)

## 2024-02-29 PROCEDURE — 80053 COMPREHEN METABOLIC PANEL: CPT

## 2024-02-29 PROCEDURE — 250N000011 HC RX IP 250 OP 636: Mod: JZ | Performed by: INTERNAL MEDICINE

## 2024-02-29 PROCEDURE — 36415 COLL VENOUS BLD VENIPUNCTURE: CPT

## 2024-02-29 PROCEDURE — 258N000003 HC RX IP 258 OP 636: Performed by: INTERNAL MEDICINE

## 2024-02-29 PROCEDURE — 85025 COMPLETE CBC W/AUTO DIFF WBC: CPT

## 2024-02-29 PROCEDURE — 84403 ASSAY OF TOTAL TESTOSTERONE: CPT

## 2024-02-29 PROCEDURE — 82310 ASSAY OF CALCIUM: CPT | Performed by: INTERNAL MEDICINE

## 2024-02-29 PROCEDURE — 96365 THER/PROPH/DIAG IV INF INIT: CPT

## 2024-02-29 PROCEDURE — 84153 ASSAY OF PSA TOTAL: CPT

## 2024-02-29 RX ORDER — ZOLEDRONIC ACID 0.04 MG/ML
4 INJECTION, SOLUTION INTRAVENOUS ONCE
Status: COMPLETED | OUTPATIENT
Start: 2024-02-29 | End: 2024-02-29

## 2024-02-29 RX ADMIN — ZOLEDRONIC ACID 4 MG: 0.04 INJECTION, SOLUTION INTRAVENOUS at 14:21

## 2024-02-29 RX ADMIN — SODIUM CHLORIDE 250 ML: 9 INJECTION, SOLUTION INTRAVENOUS at 14:17

## 2024-02-29 NOTE — PROGRESS NOTES
Infusion Nursing Note:  Sushil Diana presents today for Zometa.    Patient seen by provider today: No. Visited with MUKUL WATERMAN yesterday.   present during visit today: Not Applicable.    Note: Arrives with JENNIFER Felix; no complaints.      Intravenous Access:  Peripheral IV placed.    Treatment Conditions:  Calcium 9.1.      Post Infusion Assessment:  Patient tolerated infusion without incident.       Discharge Plan:   Patient discharged in stable condition accompanied by: friend.  Departure Mode: Ambulatory.      Aleah Lozoya RN

## 2024-03-03 LAB — TESTOST SERPL-MCNC: <2 NG/DL (ref 240–950)

## 2024-03-04 DIAGNOSIS — C61 PROSTATE CANCER METASTATIC TO BONE (H): Primary | ICD-10-CM

## 2024-03-04 DIAGNOSIS — C79.51 PROSTATE CANCER METASTATIC TO BONE (H): Primary | ICD-10-CM

## 2024-03-04 RX ORDER — ABIRATERONE ACETATE 250 MG/1
1000 TABLET ORAL DAILY
Qty: 120 TABLET | Refills: 0 | Status: SHIPPED | OUTPATIENT
Start: 2024-03-04 | End: 2024-04-03

## 2024-03-15 ENCOUNTER — TELEPHONE (OUTPATIENT)
Dept: ONCOLOGY | Facility: CLINIC | Age: 64
End: 2024-03-15
Payer: MEDICARE

## 2024-03-15 NOTE — TELEPHONE ENCOUNTER
Prior Authorization Approval    Medication: ABIRATERONE ACETATE 250 MG PO TABS  Authorization Effective Date: 3/15/2024  Authorization Expiration Date: 12/31/2024  Approved Dose/Quantity: 120 tabs/30 days  Reference #: [id number]   Insurance Company: [employer] Part D - Phone [phone] Fax [phone]  Expected CoPay: $    CoPay Card Available:      Financial Assistance Needed: N/A  Which Pharmacy is filling the prescription:    Pharmacy Notified: No, renewal  Patient Notified: No, renewal

## 2024-03-15 NOTE — TELEPHONE ENCOUNTER
PA Initiation    Medication: ABIRATERONE ACETATE 250 MG PO TABS  Insurance Company: [employer] Part D - Phone [phone] Fax [phone]  Pharmacy Filling the Rx:    Filling Pharmacy Phone:    Filling Pharmacy Fax:    Start Date: 3/15/2024

## 2024-03-29 ENCOUNTER — APPOINTMENT (OUTPATIENT)
Dept: LAB | Facility: CLINIC | Age: 64
End: 2024-03-29
Payer: COMMERCIAL

## 2024-03-29 ENCOUNTER — INFUSION THERAPY VISIT (OUTPATIENT)
Dept: INFUSION THERAPY | Facility: CLINIC | Age: 64
End: 2024-03-29
Attending: INTERNAL MEDICINE
Payer: MEDICARE

## 2024-03-29 ENCOUNTER — ALLIED HEALTH/NURSE VISIT (OUTPATIENT)
Dept: FAMILY MEDICINE | Facility: CLINIC | Age: 64
End: 2024-03-29
Payer: COMMERCIAL

## 2024-03-29 ENCOUNTER — VIRTUAL VISIT (OUTPATIENT)
Dept: ONCOLOGY | Facility: CLINIC | Age: 64
End: 2024-03-29
Attending: INTERNAL MEDICINE
Payer: MEDICARE

## 2024-03-29 VITALS — BODY MASS INDEX: 25.54 KG/M2 | WEIGHT: 168 LBS

## 2024-03-29 VITALS
DIASTOLIC BLOOD PRESSURE: 72 MMHG | SYSTOLIC BLOOD PRESSURE: 138 MMHG | WEIGHT: 164.8 LBS | TEMPERATURE: 97.9 F | HEART RATE: 69 BPM | RESPIRATION RATE: 18 BRPM | BODY MASS INDEX: 25.06 KG/M2 | OXYGEN SATURATION: 96 %

## 2024-03-29 DIAGNOSIS — C61 PROSTATE CANCER METASTATIC TO BONE (H): ICD-10-CM

## 2024-03-29 DIAGNOSIS — C61 PROSTATE CANCER (H): ICD-10-CM

## 2024-03-29 DIAGNOSIS — C61 PROSTATE CANCER METASTATIC TO BONE (H): Primary | ICD-10-CM

## 2024-03-29 DIAGNOSIS — C79.51 PROSTATE CANCER METASTATIC TO BONE (H): ICD-10-CM

## 2024-03-29 DIAGNOSIS — C79.51 PROSTATE CANCER METASTATIC TO BONE (H): Primary | ICD-10-CM

## 2024-03-29 DIAGNOSIS — C61 PROSTATE CANCER (H): Primary | ICD-10-CM

## 2024-03-29 LAB
CALCIUM SERPL-MCNC: 8.8 MG/DL (ref 8.8–10.2)
CREAT SERPL-MCNC: 1.15 MG/DL (ref 0.67–1.17)
EGFRCR SERPLBLD CKD-EPI 2021: 71 ML/MIN/1.73M2
PSA SERPL DL<=0.01 NG/ML-MCNC: 3.14 NG/ML (ref 0–4.5)

## 2024-03-29 PROCEDURE — 82565 ASSAY OF CREATININE: CPT | Performed by: INTERNAL MEDICINE

## 2024-03-29 PROCEDURE — 258N000003 HC RX IP 258 OP 636: Performed by: INTERNAL MEDICINE

## 2024-03-29 PROCEDURE — 250N000011 HC RX IP 250 OP 636: Mod: JZ | Performed by: INTERNAL MEDICINE

## 2024-03-29 PROCEDURE — 96365 THER/PROPH/DIAG IV INF INIT: CPT

## 2024-03-29 PROCEDURE — 99213 OFFICE O/P EST LOW 20 MIN: CPT | Mod: 95

## 2024-03-29 PROCEDURE — 36415 COLL VENOUS BLD VENIPUNCTURE: CPT | Performed by: INTERNAL MEDICINE

## 2024-03-29 PROCEDURE — 96402 CHEMO HORMON ANTINEOPL SQ/IM: CPT

## 2024-03-29 PROCEDURE — 250N000011 HC RX IP 250 OP 636: Mod: JZ | Performed by: PHYSICIAN ASSISTANT

## 2024-03-29 PROCEDURE — 82310 ASSAY OF CALCIUM: CPT | Performed by: INTERNAL MEDICINE

## 2024-03-29 PROCEDURE — 84153 ASSAY OF PSA TOTAL: CPT

## 2024-03-29 PROCEDURE — 99207 PR NO CHARGE NURSE ONLY: CPT

## 2024-03-29 RX ORDER — ZOLEDRONIC ACID 0.04 MG/ML
4 INJECTION, SOLUTION INTRAVENOUS ONCE
Status: COMPLETED | OUTPATIENT
Start: 2024-03-29 | End: 2024-03-29

## 2024-03-29 RX ADMIN — ZOLEDRONIC ACID 4 MG: 0.04 INJECTION, SOLUTION INTRAVENOUS at 14:37

## 2024-03-29 RX ADMIN — SODIUM CHLORIDE 250 ML: 9 INJECTION, SOLUTION INTRAVENOUS at 14:35

## 2024-03-29 RX ADMIN — LEUPROLIDE ACETATE 45 MG: KIT at 15:02

## 2024-03-29 ASSESSMENT — PAIN SCALES - GENERAL: PAINLEVEL: NO PAIN (0)

## 2024-03-29 NOTE — PROGRESS NOTES
Infusion Nursing Note:  Sushil Diana presents today for Zometa, Lupron.    Patient seen by provider today: Yes: LUBA Berman   present during visit today: Not Applicable.    Note: Sushil is accompanied by wife today.  Feeling well, no complaints.      Intravenous Access:  Peripheral IV placed.    Treatment Conditions:  Lab Results   Component Value Date     02/29/2024    POTASSIUM 4.1 02/29/2024    CR 1.15 03/29/2024    ROBERT 8.8 03/29/2024    BILITOTAL 0.8 02/29/2024    ALBUMIN 4.5 02/29/2024    ALT 12 02/29/2024    AST 23 02/29/2024       Results reviewed, labs MET treatment parameters, ok to proceed with treatment.      Post Infusion Assessment:  Patient tolerated infusion without incident.  Patient tolerated injection without incident.  Site patent and intact, free from redness, edema or discomfort.  No evidence of extravasations.  Access discontinued per protocol.       Discharge Plan:   Discharge instructions reviewed with: Patient.  Patient and/or family verbalized understanding of discharge instructions and all questions answered.  Patient discharged in stable condition accompanied by: wife.  Departure Mode: Ambulatory.      Jennifer Shelton RN

## 2024-03-29 NOTE — Clinical Note
"    3/29/2024         RE: Sushil Diana  [address] [place] [id number]        Dear Colleague,    Thank you for referring your patient, Sushil Diana, to the [facility]. Please see a copy of my visit note below.    Virtual Visit Details    Type of service:  Video Visit   Video Start Time: {video visit start/end time for provider to select:109037}  Video End Time:{video visit start/end time for provider to select:349465}    Originating Location (pt. Location): {video visit patient location:233745::\"Home\"}  {PROVIDER LOCATION On-site should be selected for visits conducted from your clinic location or adjoining [facility] hospital, academic office, or other nearby [facility] building. Off-site should be selected for all other provider locations, including home:228381}  Distant Location (provider location):  {virtual location provider:682105}  Platform used for Video Visit: {Virtual Visit Platforms:846451::\"WiFast\"}      Virtual Visit Details    Type of service:  Video Visit   Video Start Time:  11:00am  Video End Time: 11:10am    Originating Location (pt. Location): Home  {PROVIDER LOCATION On-site should be selected for visits conducted from your clinic location or adjoining [facility] hospital, academic office, or other nearby [facility] building. Off-site should be selected for all other provider locations, including home:886337}  Distant Location (provider location):  On-site  Platform used for Video Visit: WiFast    Oncology/Hematology Visit Note  Mar 29, 2024    Reason for visit: Follow up metastatic prostate cancer     Oncology HPI: Sushil Diana is a 63 year old male with metastatic prostate cancer.  He had nocturia for few years with no elevation of PSA, followed up with his PCP and PSA was elevated at 272.  He was referred to urology in August 2023, biopsy performed and noted to have prostatic adenocarcinoma, [place] 4+9.  Follow-up CT abdomen/pelvis with evidence of bony metastasis, " therefore follow-up bone scan on 9/20/2023 with diffuse osseous metastasis.  He was given a dose of Eligard on 9/27/23 and referred to Dr. Holliday, who recommended abiraterone/prednisone with monthly Zometa.  NGS testing was requested as well.     First dose of abiraterone/prednisone was 10/12/23. Will be due for Lupron March 2024. Started Zometa 1/24/24. Video visit today for close follow-up.    Interval History:  Sushil is seen virtually today. He has no issues with his appetite. No nausea or vomiting unless he eats too much. He has a little bit of fatigue. He has occasional hot flashes. No urinary or bowel concerns. He is able to get out to the store. No breathing concerns. No chest pain. No vision changes. No dental concerns.     He had minor flu like symptoms after the first zometa.      Review of Systems:  Patient denies fevers, chills, night sweats, unexplained weight changes, headaches, dizziness, vision or hearing changes, new lumps or bumps, chest pain, shortness of breath, cough, abdominal pain, nausea, vomiting, changes to bowel or bladder, swelling of extremities, bleeding issues, or rash.    Current Outpatient Medications   Medication Sig Dispense Refill    abiraterone (ZYTIGA) 250 MG tablet Take 4 tablets (1,000 mg) by mouth daily for 30 doses Take on empty stomach. 120 tablet 0    amLODIPine (NORVASC) 10 MG tablet TAKE ONE TABLET BY MOUTH ONCE DAILY 90 tablet 2    aspirin (ASA) 81 MG chewable tablet CHEW AND SWALLOW ONE TABLET BY MOUTH ONCE DAILY 108 tablet 3    atorvastatin (LIPITOR) 20 MG tablet TAKE ONE TABLET BY MOUTH ONCE DAILY 90 tablet 3    blood glucose monitoring (NO BRAND SPECIFIED) meter device kit Use to test blood sugar 1-3 times daily or as directed. -Contour Next meter 1 kit 0    citalopram (CELEXA) 20 MG tablet TAKE ONE TABLET BY MOUTH ONCE DAILY 90 tablet 1    CONTOUR NEXT TEST test strip USE TO TEST BLOOD SUGARS ONE TO THREE TIMES DAILY  OR AS DIRECTED 100 strip 1    famotidine  (PEPCID) 40 MG tablet TAKE ONE TABLET BY MOUTH TWICE A  tablet 3    glipiZIDE (GLUCOTROL) 5 MG tablet TAKE ONE TABLET BY MOUTH EVERY MORNING 90 tablet 0    hydrALAZINE (APRESOLINE) 100 MG tablet TAKE ONE TABLET BY MOUTH THREE TIMES A DAY 90 tablet 3    hydrochlorothiazide (HYDRODIURIL) 25 MG tablet TAKE 1 TABLET (25 MG) BY MOUTH DAILY 90 tablet 3    lisinopril (ZESTRIL) 40 MG tablet TAKE ONE TABLET BY MOUTH ONCE DAILY 90 tablet 1    metFORMIN (GLUCOPHAGE XR) 500 MG 24 hr tablet TAKE TWO TABLETS BY MOUTH ONCE DAILY WITH DINNER 180 tablet 3    metoprolol tartrate (LOPRESSOR) 50 MG tablet TAKE ONE TABLET BY MOUTH TWICE A  tablet 3    Microlet Lancets MISC USE TO TEST BLOOD SUGAR 1-3 TIMES DAILY OR AS DIRECTED. 100 each 11    predniSONE (DELTASONE) 5 MG tablet Take 1 tablet (5 mg) by mouth daily START WITH ZYTIGA 180 tablet 3       Past Medical History  Past Medical History:   Diagnosis Date    GERD (gastroesophageal reflux disease)     Gout      Past Surgical History:   Procedure Laterality Date    NONE OF THE ABOVE CORE MEASURE DIAGNOSES       Allergies   Allergen Reactions    No Known Drug Allergy      Social History   Social History     Tobacco Use    Smoking status: Some Days     Packs/day: 1.00     Years: 28.00     Additional pack years: 0.00     Total pack years: 28.00     Types: Cigarettes     Passive exposure: Current    Smokeless tobacco: Never    Tobacco comments:     quit June 2018   Substance Use Topics    Alcohol use: Yes     Comment: occasional    Drug use: No      Past medical history and social history were reviewed.    Physical Examination:  Wt 76.2 kg (168 lb)   BMI 25.54 kg/m    Wt Readings from Last 10 Encounters:   03/29/24 76.2 kg (168 lb)   02/29/24 76.1 kg (167 lb 11.2 oz)   02/28/24 76.2 kg (168 lb)   01/24/24 76.7 kg (169 lb)   12/27/23 75.3 kg (166 lb)   11/29/23 74.8 kg (165 lb)   11/13/23 77.1 kg (170 lb)   10/11/23 75.8 kg (167 lb)   10/05/23 75.8 kg (167 lb 3 oz)   08/30/23  73.9 kg (163 lb)     Video physical exam    General: Patient appears well in no acute distress.   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 Baseline .9.   - Started on Lupron Sept 2023 45mg. Due March 2024-requested   - Started on Zytiga 1000mg daily + Prednisone 5mg daily October 2023. Tolerating well. Continue  - PSA continues to improve, last check 7.3, repeat labs tomorrow   - Testosterone level has remained within castration range   - No plans for repeat imaging at this time as long as he clinically is doing well and PSA improving  - MD in April      # Bone Mets  # Hypocalcemia  # Vit D Deficiency   Denies any pain. Started Zometa 1/24/24  - Continue Zometa monthly, will get tomorrow pending labs   - Continue Calcium + Vit D BID consistently.   - Alk phos is improving which is reassuring sign that bone mets are responding to treatment      # Renal Insufficiency  # Hyponatremia, chronic   Has chronic hyponatremia, suspect 2/2 DM and hydrochlorothiazide  - Discussed importance of staying well hydrated. Discussed adding electrolyte drinks   - Avoid Ibuprofen and use Tylenol PRN pain   - CMP monthly      # DM2  Currently on metformin and glipizide.   - PCP managing  - Hyperglycemia likely contributing to hyponatremia      # Anemia   Long standing (since at least 2020). Cannot tell that he has ever had this evaluated   - CBC monitoring monthly   - Iron studies WNL. Folate WNL. No B12  deficiency (is a bit high). TSH WNL.   - Suspect anemia of chronic disease/treatment. Will monitor    15 minutes spent on the date of the encounter doing chart review, review of test results, interpretation of tests, patient visit, and documentation     Pradip Wiley PA-C  Department of Hematology and Oncology  [facility]         Again, thank you for allowing me to participate in the care of your patient.        Sincerely,        Meredith Berman PA-C

## 2024-03-29 NOTE — PROGRESS NOTES
Virtual Visit Details    Type of service:  Video Visit   Video Start Time:  1:50  PM  Video End Time: 2:03 PM    Originating Location (pt. Location): Other      Distant Location (provider location):  Off-site  Platform used for Video Visit: [facility]      Oncology/Hematology Visit Note  Mar 29, 2024    Reason for visit: Follow up metastatic prostate cancer     Oncology HPI: Sushil Diana is a 63 year old male with metastatic prostate cancer.  He had nocturia for few years with no elevation of PSA, followed up with his PCP and PSA was elevated at 272.  He was referred to urology in August 2023, biopsy performed and noted to have prostatic adenocarcinoma, Challenge 4+9.  Follow-up CT abdomen/pelvis with evidence of bony metastasis, therefore follow-up bone scan on 9/20/2023 with diffuse osseous metastasis.  He was given a dose of Eligard on 9/27/23 and referred to Dr. Holliday, who recommended abiraterone/prednisone with monthly Zometa.  NGS testing was requested as well.     First dose of abiraterone/prednisone was 10/12/23. Will be due for Lupron March 2024. Started Zometa 1/24/24. Video visit today for close follow-up.    Interval History:  Sushil is seen virtually today. He has no issues with his appetite. No nausea or vomiting unless he eats too much. He has a little bit of fatigue. He has occasional hot flashes. No urinary or bowel concerns. He is able to get out to the store. No breathing concerns. No chest pain. No vision changes. No dental concerns or recent/upcoming dental procedures.     He had minor flu like symptoms after the first zometa but no significant side effects with the second dose.       Review of Systems:  Patient denies fevers, chills, night sweats, unexplained weight changes, headaches, dizziness, vision or hearing changes, new lumps or bumps, chest pain, shortness of breath, cough, abdominal pain, nausea, vomiting, changes to bowel or bladder, swelling of extremities, bleeding issues, or  rash.    Current Outpatient Medications   Medication Sig Dispense Refill    abiraterone (ZYTIGA) 250 MG tablet Take 4 tablets (1,000 mg) by mouth daily for 30 doses Take on empty stomach. 120 tablet 0    amLODIPine (NORVASC) 10 MG tablet TAKE ONE TABLET BY MOUTH ONCE DAILY 90 tablet 2    aspirin (ASA) 81 MG chewable tablet CHEW AND SWALLOW ONE TABLET BY MOUTH ONCE DAILY 108 tablet 3    atorvastatin (LIPITOR) 20 MG tablet TAKE ONE TABLET BY MOUTH ONCE DAILY 90 tablet 3    blood glucose monitoring (NO BRAND SPECIFIED) meter device kit Use to test blood sugar 1-3 times daily or as directed. -Contour Next meter 1 kit 0    citalopram (CELEXA) 20 MG tablet TAKE ONE TABLET BY MOUTH ONCE DAILY 90 tablet 1    CONTOUR NEXT TEST test strip USE TO TEST BLOOD SUGARS ONE TO THREE TIMES DAILY  OR AS DIRECTED 100 strip 1    famotidine (PEPCID) 40 MG tablet TAKE ONE TABLET BY MOUTH TWICE A  tablet 3    glipiZIDE (GLUCOTROL) 5 MG tablet TAKE ONE TABLET BY MOUTH EVERY MORNING 90 tablet 0    hydrALAZINE (APRESOLINE) 100 MG tablet TAKE ONE TABLET BY MOUTH THREE TIMES A DAY 90 tablet 3    hydrochlorothiazide (HYDRODIURIL) 25 MG tablet TAKE 1 TABLET (25 MG) BY MOUTH DAILY 90 tablet 3    lisinopril (ZESTRIL) 40 MG tablet TAKE ONE TABLET BY MOUTH ONCE DAILY 90 tablet 1    metFORMIN (GLUCOPHAGE XR) 500 MG 24 hr tablet TAKE TWO TABLETS BY MOUTH ONCE DAILY WITH DINNER 180 tablet 3    metoprolol tartrate (LOPRESSOR) 50 MG tablet TAKE ONE TABLET BY MOUTH TWICE A  tablet 3    Microlet Lancets MISC USE TO TEST BLOOD SUGAR 1-3 TIMES DAILY OR AS DIRECTED. 100 each 11    predniSONE (DELTASONE) 5 MG tablet Take 1 tablet (5 mg) by mouth daily START WITH ZYTIGA 180 tablet 3       Past Medical History  Past Medical History:   Diagnosis Date    GERD (gastroesophageal reflux disease)     Gout      Past Surgical History:   Procedure Laterality Date    NONE OF THE ABOVE CORE MEASURE DIAGNOSES       Allergies   Allergen Reactions    No Known  Drug Allergy      Social History   Social History     Tobacco Use    Smoking status: Some Days     Packs/day: 1.00     Years: 28.00     Additional pack years: 0.00     Total pack years: 28.00     Types: Cigarettes     Passive exposure: Current    Smokeless tobacco: Never    Tobacco comments:     quit June 2018   Substance Use Topics    Alcohol use: Yes     Comment: occasional    Drug use: No      Past medical history and social history were reviewed.    Physical Examination:  Wt 76.2 kg (168 lb)   BMI 25.54 kg/m    Wt Readings from Last 10 Encounters:   03/29/24 76.2 kg (168 lb)   02/29/24 76.1 kg (167 lb 11.2 oz)   02/28/24 76.2 kg (168 lb)   01/24/24 76.7 kg (169 lb)   12/27/23 75.3 kg (166 lb)   11/29/23 74.8 kg (165 lb)   11/13/23 77.1 kg (170 lb)   10/11/23 75.8 kg (167 lb)   10/05/23 75.8 kg (167 lb 3 oz)   08/30/23 73.9 kg (163 lb)     Video physical exam    General: Patient appears well in no acute distress.   Skin: No visualized rash or lesions on visualized skin  Eyes: EOMI, no erythema, sclera icterus or discharge noted  Resp: Appears to be breathing comfortably without accessory muscle usage, speaking in full sentences, no cough  MSK: Appears to have normal range of motion based on visualized movements  Neurologic: No apparent tremors, facial movements symmetric  Psych: affect normal, alert and oriented    Laboratory Data:  Most Recent 3 CBC's:  Recent Labs   Lab Test 02/29/24  1327 01/24/24  1351 12/27/23  1411   WBC 8.1 7.2 8.3   HGB 10.6* 10.3* 10.1*   MCV 91 90 91    214 192   ANEUTAUTO 6.8 6.0 7.0     Most Recent 3 BMP's:  Recent Labs   Lab Test 03/29/24  1256 02/29/24  1327 01/24/24  1351 12/27/23  1251   NA  --  135 129* 132*   POTASSIUM  --  4.1 4.1 3.9   CHLORIDE  --  100 94* 97*   CO2  --  22 24 25   BUN  --  22.1 17.1 18.7   CR 1.15 1.25* 1.03 1.20*  1.21*   ANIONGAP  --  13 11 10   ROBERT 8.8 9.1  9.1 9.3 8.8  8.7*   GLC  --  104* 154* 267*   PROTTOTAL  --  7.0 7.0 6.5   ALBUMIN  --   4.5 4.7 4.4  4.4    Most Recent 3 LFT's:  Recent Labs   Lab Test 02/29/24  1327 01/24/24  1351 12/27/23  1251   AST 23 20 22   ALT 12 12 11   ALKPHOS 93 169* 239*   BILITOTAL 0.8 0.9 0.9    Most Recent 2 TSH and T4:  Recent Labs   Lab Test 01/24/24  1351 05/05/23  0821 08/12/18  1830   TSH 2.30 2.46 4.88*   T4  --   --  1.41     PSA 3/29/24: 3.14     I reviewed the above labs today.      Assessment and Plan:  # 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 Baseline .9.   - Started on Lupron Sept 2023 45mg. Due today, 3/29/24 will administer locally as scheduled after our visit today.   - Started on Zytiga 1000mg daily + Prednisone 5mg daily October 2023. Tolerating well. Continue  - PSA continues to improve, 3.14 today 3/29/24.   - Testosterone level has remained within castration range. Unfortunately this was missed with labs today. Plan to recheck in April 2024.    - No plans for repeat imaging at this time as long as he clinically is doing well and PSA improving  - MD visit in April as scheduled.      # Bone Mets  # Hypocalcemia  # Vit D Deficiency   Denies any pain. Started Zometa 1/24/24  - Continue Zometa monthly, labs appropriate to proceed.   - Continue Calcium + Vit D BID consistently. Stressed BID dosing again today as he has been hypocalemic in the past. Calcium is WNL today on 3/29 labs.   - Alk phos is been improving which is reassuring sign that bone mets are responding to treatment. Recheck in CMP in April.       # Renal Insufficiency  # Hyponatremia, chronic   Has chronic hyponatremia, suspect 2/2 DM and hydrochlorothiazide  - Continue to focus on adequate hydration water and electrolyte drinks.   - Avoid Ibuprofen, reviewed this again today 3/29, and use Tylenol PRN pain   - Creatinine/CMP monthly.      # DM2  Currently on metformin and glipizide.   - PCP managing  - Hyperglycemia likely contributing to hyponatremia      # Anemia   Long standing (since  at least 2020). Cannot tell that he has ever had this evaluated   - CBC monitoring monthly, unfortunately was missed with labs 3/29/24 and cannot be added on. To be obtained 4/2024.    - Iron studies WNL. Folate WNL. No B12 deficiency (is a bit high). TSH WNL.   - Suspect anemia of chronic disease/treatment. Will monitor    20 minutes spent on the date of the encounter doing chart review, review of test results, interpretation of tests, patient visit, and documentation     Meredith Berman PA-C

## 2024-03-29 NOTE — NURSING NOTE
Is the patient currently in the state of [place]? YES    Visit mode:VIDEO    If the visit is dropped, the patient can be reconnected by: VIDEO VISIT: Text to cell phone:   Telephone Information:   Mobile [phone]       Will anyone else be joining the visit? NO  (If patient encounters technical issues they should call [phone]    How would you like to obtain your AVS? MyChart    Are changes needed to the allergy or medication list?  Pt at  clinic for labs/infusion which have been reviewed.    Reason for visit: RECHECK    Felisa Monroy LPN

## 2024-03-29 NOTE — PROGRESS NOTES
Patient assisted with virtual visit using clinic ipad.    Cami Ibrahim RN on 3/29/2024 at 2:46 PM

## 2024-04-03 DIAGNOSIS — C79.51 PROSTATE CANCER METASTATIC TO BONE (H): Primary | ICD-10-CM

## 2024-04-03 DIAGNOSIS — C61 PROSTATE CANCER METASTATIC TO BONE (H): Primary | ICD-10-CM

## 2024-04-03 RX ORDER — ABIRATERONE ACETATE 250 MG/1
1000 TABLET ORAL DAILY
Qty: 120 TABLET | Refills: 0 | Status: SHIPPED | OUTPATIENT
Start: 2024-04-03 | End: 2024-05-03

## 2024-04-05 ENCOUNTER — PATIENT OUTREACH (OUTPATIENT)
Dept: CARE COORDINATION | Facility: CLINIC | Age: 64
End: 2024-04-05
Payer: MEDICARE

## 2024-04-09 ENCOUNTER — PATIENT OUTREACH (OUTPATIENT)
Dept: ONCOLOGY | Facility: CLINIC | Age: 64
End: 2024-04-09
Payer: MEDICARE

## 2024-04-09 NOTE — TELEPHONE ENCOUNTER
[facility]/Voicemail    Clinical Data: Care Coordinator Outreach    Outreach attempted x 1.  Left message on patient's voicemail with call back information and requested return call.    Plan: Care Coordinator will try to reach patient again in 1-2 business days. Plab to check-in and see if patient would like to complete labs prior to visit with Dr. Holliday.    Justine Pandya RNCC

## 2024-04-19 ENCOUNTER — PATIENT OUTREACH (OUTPATIENT)
Dept: CARE COORDINATION | Facility: CLINIC | Age: 64
End: 2024-04-19
Payer: MEDICARE

## 2024-04-22 ENCOUNTER — TRANSFERRED RECORDS (OUTPATIENT)
Dept: HEALTH INFORMATION MANAGEMENT | Facility: CLINIC | Age: 64
End: 2024-04-22
Payer: MEDICARE

## 2024-04-22 LAB — RETINOPATHY: POSITIVE

## 2024-04-23 ENCOUNTER — MYC MEDICAL ADVICE (OUTPATIENT)
Dept: ONCOLOGY | Facility: CLINIC | Age: 64
End: 2024-04-23
Payer: MEDICARE

## 2024-04-24 ENCOUNTER — APPOINTMENT (OUTPATIENT)
Dept: LAB | Facility: CLINIC | Age: 64
End: 2024-04-24
Payer: MEDICARE

## 2024-04-24 ENCOUNTER — INFUSION THERAPY VISIT (OUTPATIENT)
Dept: INFUSION THERAPY | Facility: CLINIC | Age: 64
End: 2024-04-24
Attending: INTERNAL MEDICINE
Payer: MEDICARE

## 2024-04-24 VITALS
RESPIRATION RATE: 18 BRPM | WEIGHT: 166.38 LBS | BODY MASS INDEX: 25.3 KG/M2 | TEMPERATURE: 98.6 F | HEART RATE: 78 BPM | DIASTOLIC BLOOD PRESSURE: 78 MMHG | OXYGEN SATURATION: 97 % | SYSTOLIC BLOOD PRESSURE: 141 MMHG

## 2024-04-24 DIAGNOSIS — C61 PROSTATE CANCER (H): Primary | ICD-10-CM

## 2024-04-24 DIAGNOSIS — C79.51 PROSTATE CANCER METASTATIC TO BONE (H): ICD-10-CM

## 2024-04-24 DIAGNOSIS — C61 PROSTATE CANCER METASTATIC TO BONE (H): ICD-10-CM

## 2024-04-24 LAB
ALBUMIN SERPL BCG-MCNC: 4.3 G/DL (ref 3.5–5.2)
ALBUMIN SERPL BCG-MCNC: 4.4 G/DL (ref 3.5–5.2)
ALP SERPL-CCNC: 61 U/L (ref 40–150)
ALT SERPL W P-5'-P-CCNC: 12 U/L (ref 0–70)
ANION GAP SERPL CALCULATED.3IONS-SCNC: 12 MMOL/L (ref 7–15)
AST SERPL W P-5'-P-CCNC: 23 U/L (ref 0–45)
BASOPHILS # BLD AUTO: 0 10E3/UL (ref 0–0.2)
BASOPHILS NFR BLD AUTO: 0 %
BILIRUB SERPL-MCNC: 1.1 MG/DL
BUN SERPL-MCNC: 15.5 MG/DL (ref 8–23)
CALCIUM SERPL-MCNC: 9.3 MG/DL (ref 8.8–10.2)
CALCIUM SERPL-MCNC: 9.4 MG/DL (ref 8.8–10.2)
CHLORIDE SERPL-SCNC: 96 MMOL/L (ref 98–107)
CREAT SERPL-MCNC: 1.02 MG/DL (ref 0.67–1.17)
CREAT SERPL-MCNC: 1.05 MG/DL (ref 0.67–1.17)
DEPRECATED HCO3 PLAS-SCNC: 23 MMOL/L (ref 22–29)
EGFRCR SERPLBLD CKD-EPI 2021: 78 ML/MIN/1.73M2
EGFRCR SERPLBLD CKD-EPI 2021: 81 ML/MIN/1.73M2
EOSINOPHIL # BLD AUTO: 0.1 10E3/UL (ref 0–0.7)
EOSINOPHIL NFR BLD AUTO: 1 %
ERYTHROCYTE [DISTWIDTH] IN BLOOD BY AUTOMATED COUNT: 12.9 % (ref 10–15)
GLUCOSE SERPL-MCNC: 155 MG/DL (ref 70–99)
HCT VFR BLD AUTO: 27.9 % (ref 40–53)
HGB BLD-MCNC: 9.9 G/DL (ref 13.3–17.7)
IMM GRANULOCYTES # BLD: 0.1 10E3/UL
IMM GRANULOCYTES NFR BLD: 1 %
LYMPHOCYTES # BLD AUTO: 0.5 10E3/UL (ref 0.8–5.3)
LYMPHOCYTES NFR BLD AUTO: 5 %
MCH RBC QN AUTO: 31.4 PG (ref 26.5–33)
MCHC RBC AUTO-ENTMCNC: 35.5 G/DL (ref 31.5–36.5)
MCV RBC AUTO: 89 FL (ref 78–100)
MONOCYTES # BLD AUTO: 1 10E3/UL (ref 0–1.3)
MONOCYTES NFR BLD AUTO: 9 %
NEUTROPHILS # BLD AUTO: 9.1 10E3/UL (ref 1.6–8.3)
NEUTROPHILS NFR BLD AUTO: 85 %
NRBC # BLD AUTO: 0 10E3/UL
NRBC BLD AUTO-RTO: 0 /100
PLATELET # BLD AUTO: 166 10E3/UL (ref 150–450)
POTASSIUM SERPL-SCNC: 3.6 MMOL/L (ref 3.4–5.3)
PROT SERPL-MCNC: 7 G/DL (ref 6.4–8.3)
PSA SERPL DL<=0.01 NG/ML-MCNC: 2.39 NG/ML (ref 0–4.5)
RBC # BLD AUTO: 3.15 10E6/UL (ref 4.4–5.9)
SODIUM SERPL-SCNC: 131 MMOL/L (ref 135–145)
WBC # BLD AUTO: 10.8 10E3/UL (ref 4–11)

## 2024-04-24 PROCEDURE — 85025 COMPLETE CBC W/AUTO DIFF WBC: CPT

## 2024-04-24 PROCEDURE — 84403 ASSAY OF TOTAL TESTOSTERONE: CPT

## 2024-04-24 PROCEDURE — 36415 COLL VENOUS BLD VENIPUNCTURE: CPT | Performed by: INTERNAL MEDICINE

## 2024-04-24 PROCEDURE — 80053 COMPREHEN METABOLIC PANEL: CPT | Performed by: INTERNAL MEDICINE

## 2024-04-24 PROCEDURE — 82310 ASSAY OF CALCIUM: CPT | Performed by: INTERNAL MEDICINE

## 2024-04-24 PROCEDURE — 84153 ASSAY OF PSA TOTAL: CPT

## 2024-04-24 PROCEDURE — 258N000003 HC RX IP 258 OP 636: Performed by: INTERNAL MEDICINE

## 2024-04-24 PROCEDURE — 82565 ASSAY OF CREATININE: CPT | Mod: 91

## 2024-04-24 PROCEDURE — 82565 ASSAY OF CREATININE: CPT | Performed by: INTERNAL MEDICINE

## 2024-04-24 PROCEDURE — 36415 COLL VENOUS BLD VENIPUNCTURE: CPT

## 2024-04-24 PROCEDURE — 96365 THER/PROPH/DIAG IV INF INIT: CPT

## 2024-04-24 PROCEDURE — 250N000011 HC RX IP 250 OP 636: Mod: JZ | Performed by: INTERNAL MEDICINE

## 2024-04-24 RX ORDER — ZOLEDRONIC ACID 0.04 MG/ML
4 INJECTION, SOLUTION INTRAVENOUS ONCE
Status: COMPLETED | OUTPATIENT
Start: 2024-04-24 | End: 2024-04-24

## 2024-04-24 RX ADMIN — SODIUM CHLORIDE 250 ML: 9 INJECTION, SOLUTION INTRAVENOUS at 11:50

## 2024-04-24 RX ADMIN — ZOLEDRONIC ACID 4 MG: 0.04 INJECTION, SOLUTION INTRAVENOUS at 11:54

## 2024-04-24 NOTE — PROGRESS NOTES
Infusion Nursing Note:  Sushil Diana presents today for Zometa.    Patient seen by provider today: No   present during visit today: Not Applicable.    Note: Patient ambulated to IVO with wife. Denies pain. VS Taken. No complaints..      Intravenous Access:  Labs drawn without difficulty.  Peripheral IV placed.  Red no gel and purple tubes drawn with PIV insertion.    Treatment Conditions:  Lab Results   Component Value Date    HGB 9.9 (L) 04/24/2024    WBC 10.8 04/24/2024    ANEU 6.3 12/15/2020    ANEUTAUTO 9.1 (H) 04/24/2024     04/24/2024        Lab Results   Component Value Date     (L) 04/24/2024    POTASSIUM 3.6 04/24/2024    CR 1.05 04/24/2024    CR 1.02 04/24/2024    ROBERT 9.3 04/24/2024    ROBERT 9.4 04/24/2024    BILITOTAL 1.1 04/24/2024    ALBUMIN 4.3 04/24/2024    ALBUMIN 4.4 04/24/2024    ALT 12 04/24/2024    AST 23 04/24/2024       Results reviewed, labs MET treatment parameters, ok to proceed with treatment.  Corrected calcium-9.06.      Post Infusion Assessment:  Patient tolerated infusion without incident.  Patient observed for 15 minutes post zometa per protocol.  Blood return noted pre and post infusion.  Site patent and intact, free from redness, edema or discomfort.  Access discontinued per protocol.       Discharge Plan:   Discharge instructions reviewed with: Patient.  Patient discharged in stable condition accompanied by: self and wife.  Departure Mode: Ambulatory.      Chery Michaels RN

## 2024-04-25 ENCOUNTER — VIRTUAL VISIT (OUTPATIENT)
Dept: ONCOLOGY | Facility: CLINIC | Age: 64
End: 2024-04-25
Payer: MEDICARE

## 2024-04-25 DIAGNOSIS — C79.51 PROSTATE CANCER METASTATIC TO BONE (H): ICD-10-CM

## 2024-04-25 DIAGNOSIS — C61 PROSTATE CANCER METASTATIC TO BONE (H): ICD-10-CM

## 2024-04-25 DIAGNOSIS — C61 PROSTATE CANCER (H): Primary | ICD-10-CM

## 2024-04-25 DIAGNOSIS — D63.0 ANEMIA IN NEOPLASTIC DISEASE: ICD-10-CM

## 2024-04-25 PROCEDURE — 99214 OFFICE O/P EST MOD 30 MIN: CPT | Mod: 95 | Performed by: INTERNAL MEDICINE

## 2024-04-25 RX ORDER — HEPARIN SODIUM (PORCINE) LOCK FLUSH IV SOLN 100 UNIT/ML 100 UNIT/ML
5 SOLUTION INTRAVENOUS
Status: CANCELLED | OUTPATIENT
Start: 2024-09-25

## 2024-04-25 RX ORDER — ZOLEDRONIC ACID 0.04 MG/ML
4 INJECTION, SOLUTION INTRAVENOUS ONCE
OUTPATIENT
Start: 2025-03-24 | End: 2024-09-22

## 2024-04-25 RX ORDER — ZOLEDRONIC ACID 0.04 MG/ML
4 INJECTION, SOLUTION INTRAVENOUS ONCE
Status: CANCELLED | OUTPATIENT
Start: 2024-09-25 | End: 2024-06-24

## 2024-04-25 RX ORDER — HEPARIN SODIUM (PORCINE) LOCK FLUSH IV SOLN 100 UNIT/ML 100 UNIT/ML
5 SOLUTION INTRAVENOUS
OUTPATIENT
Start: 2025-03-24

## 2024-04-25 RX ORDER — HEPARIN SODIUM,PORCINE 10 UNIT/ML
5-20 VIAL (ML) INTRAVENOUS DAILY PRN
OUTPATIENT
Start: 2025-03-24

## 2024-04-25 RX ORDER — HEPARIN SODIUM,PORCINE 10 UNIT/ML
5-20 VIAL (ML) INTRAVENOUS DAILY PRN
Status: CANCELLED | OUTPATIENT
Start: 2024-09-25

## 2024-04-25 ASSESSMENT — PAIN SCALES - GENERAL: PAINLEVEL: NO PAIN (0)

## 2024-04-25 NOTE — LETTER
2024         RE: Sushil Diana  [id number] [facility] [address]        Dear Colleague,    Thank you for referring your patient, Sushil Diana, to the [facility] [place]. Please see a copy of my visit note below.    [facility] Hematology / Oncology  Progress Note  Name: Sushil Diana  :  1960    MRN:  [id number]    --------------------    Assessment / Plan:  Stage IV prostate cancer:  # ADT / Zytiga / Prednisone Sep 2023 - ongoing.    Continue Zytiga 1000 mg daily / prednisone 5 mg daily.  Continue Lupron ADT 45 mg every 6 months.  Continue Zometa 4 mg; transitioning from monthly to every 3 months.  PSA continues to decline nicely.  Chemistries stable outside of some mild hyponatremia; kidney function improved.  Remains anemic likely secondary to prostate cancer and treatment; nutritional studies have been reassuring previously.  Testosterone pending.  Return to clinic 3 months with next Zometa and labs.    Eduardo Holliday MD    --------------------    Interval History:  Sushil returns for follow-up of prostate cancer accompanied by his wife.  All in all, he describes his health is okay.  Tolerating Zytiga and prednisone fairly well.  He has a little bit of orneriness from his endocrine therapy.  Some slight occasional hot flashes are tolerable.  His weight has been stable.  No breast changes.    Social History:  Social History     Tobacco Use     Smoking status: Some Days     Current packs/day: 1.00     Average packs/day: 1 pack/day for 28.0 years (28.0 ttl pk-yrs)     Types: Cigarettes     Passive exposure: Current     Smokeless tobacco: Never     Tobacco comments:     quit 2018   Substance Use Topics     Alcohol use: Yes     Comment: occasional     Family History:  Family History   Problem Relation Age of Onset     Diabetes Other      Immunizations:  Immunization History   Administered Date(s) Administered     COVID-19 Monovalent 18+ (Moderna)  03/31/2021, 04/28/2021, 01/13/2022     Influenza (H1N1) 01/11/2010     Influenza (IIV3) PF 11/28/2003, 01/11/2010, 11/05/2010, 10/20/2011, 11/02/2012     Influenza Vaccine 18-64 (Flublok) 09/25/2018, 09/27/2019, 09/05/2020     Influenza Vaccine 65+ (Fluzone HD) 10/05/2023     Influenza Vaccine >6 months,quad, PF 09/28/2013, 10/14/2014, 10/08/2015, 10/08/2016, 10/07/2017     Pneumococcal 23 valent 09/25/2018     TD,PF 7+ (Tenivac) 06/20/2006     TDAP Vaccine (Adacel) 09/25/2018     Zoster recombinant adjuvanted (SHINGRIX) 09/05/2020, 11/12/2020     Health Maintenance Due   Topic Date Due     Pneumococcal Vaccine: 65+ Years (2 of 2 - PCV) 09/25/2019     COVID-19 Vaccine (6 - 2023-24 season) 01/24/2024     --------------------    Physical Exam:  Video visit.    Labs / Imaging:  Reviewed PSA, CBC, CMP, testosterone.      Again, thank you for allowing me to participate in the care of your patient.        Sincerely,        Eduardo Holliday MD

## 2024-04-25 NOTE — PATIENT INSTRUCTIONS
1) Cycle 1 Zometa / Lupron w/ SELENE and labs (CBC, CMP, PSA).  2) Cycles 2 Zometa / Lupron w/ [facility] (in person) and labs (CBC, CMP, PSA).    Eduardo Holliday MD.

## 2024-04-25 NOTE — LETTER
2024         RE: Sushil Diana  [address] [facility] [address]        Dear Colleague,    Thank you for referring your patient, Sushil Diana, to the [facility] [place]. Please see a copy of my visit note below.    [facility] Hematology / Oncology  Progress Note  Name: Sushil Diana  :  1960    MRN:  [id number]    --------------------    Assessment / Plan:  Stage IV prostate cancer:  # ADT / Zytiga / Prednisone Sep 2023 - ongoing.    Continue Zytiga 1000 mg daily / prednisone 5 mg daily.  Continue Lupron ADT 45 mg every 6 months.  Continue Zometa 4 mg; transitioning from monthly to every 3 months.  PSA continues to decline nicely.  Chemistries stable outside of some mild hyponatremia; kidney function improved.  Remains anemic likely secondary to prostate cancer and treatment; nutritional studies have been reassuring previously.  Testosterone pending.  Return to clinic 3 months with next Zometa and labs.    Eduardo Holliday MD    --------------------    Interval History:  Sushil returns for follow-up of prostate cancer accompanied by his wife.  All in all, he describes his health is okay.  Tolerating Zytiga and prednisone fairly well.  He has a little bit of orneriness from his endocrine therapy.  Some slight occasional hot flashes are tolerable.  His weight has been stable.  No breast changes.    Social History:  Social History     Tobacco Use     Smoking status: Some Days     Current packs/day: 1.00     Average packs/day: 1 pack/day for 28.0 years (28.0 ttl pk-yrs)     Types: Cigarettes     Passive exposure: Current     Smokeless tobacco: Never     Tobacco comments:     quit 2018   Substance Use Topics     Alcohol use: Yes     Comment: occasional     Family History:  Family History   Problem Relation Age of Onset     Diabetes Other      Immunizations:  Immunization History   Administered Date(s) Administered     COVID-19 Monovalent 18+ (Moderna)  03/31/2021, 04/28/2021, 01/13/2022     Influenza (H1N1) 01/11/2010     Influenza (IIV3) PF 11/28/2003, 01/11/2010, 11/05/2010, 10/20/2011, 11/02/2012     Influenza Vaccine 18-64 (Flublok) 09/25/2018, 09/27/2019, 09/05/2020     Influenza Vaccine 65+ (Fluzone HD) 10/05/2023     Influenza Vaccine >6 months,quad, PF 09/28/2013, 10/14/2014, 10/08/2015, 10/08/2016, 10/07/2017     Pneumococcal 23 valent 09/25/2018     TD,PF 7+ (Tenivac) 06/20/2006     TDAP Vaccine (Adacel) 09/25/2018     Zoster recombinant adjuvanted (SHINGRIX) 09/05/2020, 11/12/2020     Health Maintenance Due   Topic Date Due     Pneumococcal Vaccine: 65+ Years (2 of 2 - PCV) 09/25/2019     COVID-19 Vaccine (6 - 2023-24 season) 01/24/2024     --------------------    Physical Exam:  Video visit.    Labs / Imaging:  Reviewed PSA, CBC, CMP, testosterone.      Again, thank you for allowing me to participate in the care of your patient.        Sincerely,        Eduardo Holliday MD

## 2024-04-25 NOTE — PROGRESS NOTES
[facility] Hematology / Oncology  Progress Note  Name: Sushil Diana  :  1960    MRN:  [id number]    --------------------    Assessment / Plan:  Stage IV prostate cancer:  # ADT / Zytiga / Prednisone Sep 2023 - ongoing.    Continue Zytiga 1000 mg daily / prednisone 5 mg daily.  Continue Lupron ADT 45 mg every 6 months.  Continue Zometa 4 mg; transitioning from monthly to every 3 months.  PSA continues to decline nicely.  Chemistries stable outside of some mild hyponatremia; kidney function improved.  Remains anemic likely secondary to prostate cancer and treatment; nutritional studies have been reassuring previously.  Testosterone pending.  Return to clinic 3 months with next Zometa and labs.    Eduardo Holliday MD    --------------------    Interval History:  Sushil returns for follow-up of prostate cancer accompanied by his wife.  All in all, he describes his health is okay.  Tolerating Zytiga and prednisone fairly well.  He has a little bit of orneriness from his endocrine therapy.  Some slight occasional hot flashes are tolerable.  His weight has been stable.  No breast changes.    Social History:  Social History     Tobacco Use    Smoking status: Some Days     Current packs/day: 1.00     Average packs/day: 1 pack/day for 28.0 years (28.0 ttl pk-yrs)     Types: Cigarettes     Passive exposure: Current    Smokeless tobacco: Never    Tobacco comments:     quit 2018   Substance Use Topics    Alcohol use: Yes     Comment: occasional     Family History:  Family History   Problem Relation Age of Onset    Diabetes Other      Immunizations:  Immunization History   Administered Date(s) Administered    COVID-19 Monovalent 18+ (Moderna) 2021, 2021, 2022    Influenza (H1N1) 2010    Influenza (IIV3) PF 2003, 2010, 2010, 10/20/2011, 2012    Influenza Vaccine 18-64 (Flublok) 2018, 2019, 2020    Influenza Vaccine 65+ (Fluzone HD)  10/05/2023    Influenza Vaccine >6 months,quad, PF 09/28/2013, 10/14/2014, 10/08/2015, 10/08/2016, 10/07/2017    Pneumococcal 23 valent 09/25/2018    TD,PF 7+ (Tenivac) 06/20/2006    TDAP Vaccine (Adacel) 09/25/2018    Zoster recombinant adjuvanted (SHINGRIX) 09/05/2020, 11/12/2020     Health Maintenance Due   Topic Date Due    Pneumococcal Vaccine: 65+ Years (2 of 2 - PCV) 09/25/2019    COVID-19 Vaccine (6 - 2023-24 season) 01/24/2024     --------------------    Physical Exam:  Video visit.    Labs / Imaging:  Reviewed PSA, CBC, CMP, testosterone.

## 2024-04-26 LAB — TESTOST SERPL-MCNC: <2 NG/DL (ref 240–950)

## 2024-04-30 ENCOUNTER — PATIENT OUTREACH (OUTPATIENT)
Dept: ONCOLOGY | Facility: CLINIC | Age: 64
End: 2024-04-30
Payer: MEDICARE

## 2024-04-30 NOTE — TELEPHONE ENCOUNTER
[facility]: Cancer Care Follow-Up Note                                    Discussion with Patient:                                                      Spoke with patient, doing well, no concerns at this time.      Dates of Treatment:                                                       4/24/2024  11:54 AM   Chemotherapy    leuprolide (ELIGARD) SC    leuprolide (LUPRON DEPOT) IM    zoledronic acid (ZOMETA) IV 4 mg      Assessment:                                                      [facility] Short Symptom Review:     Assessment completed with:: Patient    General/Short Assessment  Does the patient have all their medications?: Yes  Is the patient experiencing any new or worsening symptoms?: No  Discussion with patient: Answered patient's questions and addressed concerns;Reviewed how and when to contact clinic;Reviewed patient's future appointments    Pain  Patient Reported Pain?: No  Pain Score: No Pain (0)    Patient Coping  Accepting    Clinic Utilization  Patient Aware of Next Appointment?: Yes    Other Patient Concerns  Other Patient Reported Concerns: Yes, see notes    Intervention/Education provided during outreach:                                                       Last office visit note from Dr. Holliday states: Continue Zometa 4 mg; transitioning from monthly to every 3 months.    Patient to follow up as scheduled at next appt  Patient to call/NMRKTt message with updates  Route to provider to further advise  Confirmed patient has clinic and triage numbers    Signature:  Justine Pandya RN

## 2024-04-30 NOTE — TELEPHONE ENCOUNTER
[facility]/Voicemail    Clinical Data: Care Coordinator Outreach    Outreach attempted x 1.  Left message on patient's voicemail with call back information and requested return call.    Plan: Care Coordinator will try to reach patient again in 3-5 business days. This RNCC calling to check-in.    Justine Pandya RNCC

## 2024-05-01 DIAGNOSIS — E11.9 DIABETES MELLITUS WITHOUT COMPLICATION (H): ICD-10-CM

## 2024-05-01 SDOH — HEALTH STABILITY: PHYSICAL HEALTH: ON AVERAGE, HOW MANY DAYS PER WEEK DO YOU ENGAGE IN MODERATE TO STRENUOUS EXERCISE (LIKE A BRISK WALK)?: 2 DAYS

## 2024-05-01 SDOH — HEALTH STABILITY: PHYSICAL HEALTH: ON AVERAGE, HOW MANY MINUTES DO YOU ENGAGE IN EXERCISE AT THIS LEVEL?: 10 MIN

## 2024-05-01 ASSESSMENT — SOCIAL DETERMINANTS OF HEALTH (SDOH): HOW OFTEN DO YOU GET TOGETHER WITH FRIENDS OR RELATIVES?: ONCE A WEEK

## 2024-05-02 DIAGNOSIS — C79.51 PROSTATE CANCER METASTATIC TO BONE (H): Primary | ICD-10-CM

## 2024-05-02 DIAGNOSIS — C61 PROSTATE CANCER METASTATIC TO BONE (H): Primary | ICD-10-CM

## 2024-05-02 RX ORDER — GLIPIZIDE 5 MG/1
TABLET ORAL
Qty: 90 TABLET | Refills: 0 | Status: SHIPPED | OUTPATIENT
Start: 2024-05-02 | End: 2024-07-26

## 2024-05-02 RX ORDER — ABIRATERONE ACETATE 250 MG/1
1000 TABLET ORAL DAILY
Qty: 120 TABLET | Refills: 0 | Status: SHIPPED | OUTPATIENT
Start: 2024-05-02 | End: 2024-06-01

## 2024-05-02 NOTE — PROGRESS NOTES
Spoke with Dr. Holliday regarding plan for Zometa/Lupron schedule. Patient called and left detailed VM on identified message.    Cancel May appointments  Keep lab and SELENE visit in June  Cancel June Zometa infusion appointment  Patient will see Dr. Holliday and have Zometa/Lupron on September  Will plan future appointments to align Zometa/Lupron    Encouraged patient to called back to further discuss plan. Will sending updated appointment letter.    Justine Pandya RN, BSN  [facility]  Oncology/Hematology Care Coordinator RN  [facility]  [phone]  5/2/2024, 1:50 PM

## 2024-05-02 NOTE — TELEPHONE ENCOUNTER
Spoke with Dr. Holliday regarding plan for Zometa/Lupron schedule. Patient called and left detailed VM on identified message.     Cancel May appointments  Keep lab and SELENE visit in June  Cancel June Zometa infusion appointment  Patient will see Dr. Holliday and have Zometa/Lupron on September  Will plan future appointments to align Zometa/Lupron     Encouraged patient to called back to further discuss plan. Will sending updated appointment letter.     Justine Pandya RN, BSN  [facility]  Oncology/Hematology Care Coordinator RN  [facility]  [phone]  5/2/2024, 1:54 PM

## 2024-05-07 ENCOUNTER — OFFICE VISIT (OUTPATIENT)
Dept: INTERNAL MEDICINE | Facility: CLINIC | Age: 64
End: 2024-05-07
Payer: MEDICARE

## 2024-05-07 VITALS
DIASTOLIC BLOOD PRESSURE: 76 MMHG | WEIGHT: 165.8 LBS | TEMPERATURE: 97.2 F | RESPIRATION RATE: 16 BRPM | OXYGEN SATURATION: 99 % | HEART RATE: 72 BPM | SYSTOLIC BLOOD PRESSURE: 138 MMHG | HEIGHT: 68 IN | BODY MASS INDEX: 25.13 KG/M2

## 2024-05-07 DIAGNOSIS — Z12.11 SCREEN FOR COLON CANCER: ICD-10-CM

## 2024-05-07 DIAGNOSIS — I10 HYPERTENSION, UNSPECIFIED TYPE: ICD-10-CM

## 2024-05-07 DIAGNOSIS — E11.65 TYPE 2 DIABETES MELLITUS WITH HYPERGLYCEMIA, WITHOUT LONG-TERM CURRENT USE OF INSULIN (H): ICD-10-CM

## 2024-05-07 DIAGNOSIS — C61 PROSTATE CANCER METASTATIC TO BONE (H): ICD-10-CM

## 2024-05-07 DIAGNOSIS — N18.31 CHRONIC KIDNEY DISEASE, STAGE 3A (H): ICD-10-CM

## 2024-05-07 DIAGNOSIS — Z00.00 MEDICARE ANNUAL WELLNESS VISIT, SUBSEQUENT: Primary | ICD-10-CM

## 2024-05-07 DIAGNOSIS — I50.43 ACUTE ON CHRONIC COMBINED SYSTOLIC AND DIASTOLIC CONGESTIVE HEART FAILURE (H): ICD-10-CM

## 2024-05-07 DIAGNOSIS — C79.51 PROSTATE CANCER METASTATIC TO BONE (H): ICD-10-CM

## 2024-05-07 PROCEDURE — G0438 PPPS, INITIAL VISIT: HCPCS | Performed by: INTERNAL MEDICINE

## 2024-05-07 PROCEDURE — 99214 OFFICE O/P EST MOD 30 MIN: CPT | Mod: 25 | Performed by: INTERNAL MEDICINE

## 2024-05-07 RX ORDER — RESPIRATORY SYNCYTIAL VIRUS VACCINE 120MCG/0.5
0.5 KIT INTRAMUSCULAR ONCE
Qty: 1 EACH | Refills: 0 | Status: CANCELLED | OUTPATIENT
Start: 2024-05-07 | End: 2024-05-07

## 2024-05-07 ASSESSMENT — PAIN SCALES - GENERAL: PAINLEVEL: NO PAIN (0)
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
## 2024-05-30 DIAGNOSIS — C79.51 PROSTATE CANCER METASTATIC TO BONE (H): Primary | ICD-10-CM

## 2024-05-30 DIAGNOSIS — C61 PROSTATE CANCER METASTATIC TO BONE (H): Primary | ICD-10-CM

## 2024-05-30 RX ORDER — ABIRATERONE ACETATE 250 MG/1
1000 TABLET ORAL DAILY
Qty: 120 TABLET | Refills: 0 | Status: SHIPPED | OUTPATIENT
Start: 2024-06-01 | End: 2024-07-01

## 2024-06-18 ENCOUNTER — TRANSFERRED RECORDS (OUTPATIENT)
Dept: HEALTH INFORMATION MANAGEMENT | Facility: CLINIC | Age: 64
End: 2024-06-18
Payer: MEDICARE

## 2024-06-18 LAB — RETINOPATHY: POSITIVE

## 2024-06-20 DIAGNOSIS — E11.9 DIABETES MELLITUS WITHOUT COMPLICATION (H): ICD-10-CM

## 2024-06-20 DIAGNOSIS — F43.22 ADJUSTMENT DISORDER WITH ANXIOUS MOOD: ICD-10-CM

## 2024-06-21 RX ORDER — CITALOPRAM HYDROBROMIDE 20 MG/1
TABLET ORAL
Qty: 90 TABLET | Refills: 1 | Status: SHIPPED | OUTPATIENT
Start: 2024-06-21

## 2024-06-21 RX ORDER — ATORVASTATIN CALCIUM 20 MG/1
TABLET, FILM COATED ORAL
Qty: 90 TABLET | Refills: 3 | Status: SHIPPED | OUTPATIENT
Start: 2024-06-21

## 2024-06-23 DIAGNOSIS — E11.9 DIABETES MELLITUS WITHOUT COMPLICATION (H): ICD-10-CM

## 2024-06-24 ENCOUNTER — LAB (OUTPATIENT)
Dept: LAB | Facility: CLINIC | Age: 64
End: 2024-06-24
Payer: MEDICARE

## 2024-06-24 ENCOUNTER — VIRTUAL VISIT (OUTPATIENT)
Dept: ONCOLOGY | Facility: CLINIC | Age: 64
End: 2024-06-24
Attending: INTERNAL MEDICINE
Payer: MEDICARE

## 2024-06-24 DIAGNOSIS — C79.51 PROSTATE CANCER METASTATIC TO BONE (H): ICD-10-CM

## 2024-06-24 DIAGNOSIS — E11.65 TYPE 2 DIABETES MELLITUS WITH HYPERGLYCEMIA, WITHOUT LONG-TERM CURRENT USE OF INSULIN (H): ICD-10-CM

## 2024-06-24 DIAGNOSIS — N18.31 CHRONIC KIDNEY DISEASE, STAGE 3A (H): ICD-10-CM

## 2024-06-24 DIAGNOSIS — Z12.11 SCREEN FOR COLON CANCER: ICD-10-CM

## 2024-06-24 DIAGNOSIS — I10 HYPERTENSION, UNSPECIFIED TYPE: ICD-10-CM

## 2024-06-24 DIAGNOSIS — C61 PROSTATE CANCER METASTATIC TO BONE (H): ICD-10-CM

## 2024-06-24 DIAGNOSIS — D63.0 ANEMIA IN NEOPLASTIC DISEASE: ICD-10-CM

## 2024-06-24 DIAGNOSIS — C79.51 PROSTATE CANCER METASTATIC TO BONE (H): Primary | ICD-10-CM

## 2024-06-24 DIAGNOSIS — C61 PROSTATE CANCER METASTATIC TO BONE (H): Primary | ICD-10-CM

## 2024-06-24 LAB
ALBUMIN SERPL BCG-MCNC: 4.5 G/DL (ref 3.5–5.2)
ALP SERPL-CCNC: 59 U/L (ref 40–150)
ALT SERPL W P-5'-P-CCNC: 14 U/L (ref 0–70)
ANION GAP SERPL CALCULATED.3IONS-SCNC: 13 MMOL/L (ref 7–15)
AST SERPL W P-5'-P-CCNC: 20 U/L (ref 0–45)
BASOPHILS # BLD AUTO: 0 10E3/UL (ref 0–0.2)
BASOPHILS NFR BLD AUTO: 0 %
BILIRUB SERPL-MCNC: 1 MG/DL
BUN SERPL-MCNC: 17.9 MG/DL (ref 8–23)
CALCIUM SERPL-MCNC: 9 MG/DL (ref 8.8–10.2)
CHLORIDE SERPL-SCNC: 99 MMOL/L (ref 98–107)
CHOLEST SERPL-MCNC: 121 MG/DL
CREAT SERPL-MCNC: 1.01 MG/DL (ref 0.67–1.17)
CREAT UR-MCNC: 84.6 MG/DL
DEPRECATED HCO3 PLAS-SCNC: 21 MMOL/L (ref 22–29)
EGFRCR SERPLBLD CKD-EPI 2021: 82 ML/MIN/1.73M2
EOSINOPHIL # BLD AUTO: 0 10E3/UL (ref 0–0.7)
EOSINOPHIL NFR BLD AUTO: 1 %
ERYTHROCYTE [DISTWIDTH] IN BLOOD BY AUTOMATED COUNT: 13 % (ref 10–15)
FASTING STATUS PATIENT QL REPORTED: NO
FASTING STATUS PATIENT QL REPORTED: NO
GLUCOSE SERPL-MCNC: 182 MG/DL (ref 70–99)
HBA1C MFR BLD: 5.2 %
HCT VFR BLD AUTO: 29.5 % (ref 40–53)
HDLC SERPL-MCNC: 51 MG/DL
HGB BLD-MCNC: 10.2 G/DL (ref 13.3–17.7)
IMM GRANULOCYTES # BLD: 0 10E3/UL
IMM GRANULOCYTES NFR BLD: 0 %
LDLC SERPL CALC-MCNC: 40 MG/DL
LYMPHOCYTES # BLD AUTO: 0.5 10E3/UL (ref 0.8–5.3)
LYMPHOCYTES NFR BLD AUTO: 6 %
MCH RBC QN AUTO: 31.4 PG (ref 26.5–33)
MCHC RBC AUTO-ENTMCNC: 34.6 G/DL (ref 31.5–36.5)
MCV RBC AUTO: 91 FL (ref 78–100)
MICROALBUMIN UR-MCNC: 28.3 MG/L
MICROALBUMIN/CREAT UR: 33.45 MG/G CR (ref 0–17)
MONOCYTES # BLD AUTO: 0.5 10E3/UL (ref 0–1.3)
MONOCYTES NFR BLD AUTO: 7 %
NEUTROPHILS # BLD AUTO: 6.5 10E3/UL (ref 1.6–8.3)
NEUTROPHILS NFR BLD AUTO: 86 %
NONHDLC SERPL-MCNC: 70 MG/DL
NRBC # BLD AUTO: 0 10E3/UL
NRBC BLD AUTO-RTO: 0 /100
PLATELET # BLD AUTO: 181 10E3/UL (ref 150–450)
POTASSIUM SERPL-SCNC: 3.8 MMOL/L (ref 3.4–5.3)
PROT SERPL-MCNC: 6.7 G/DL (ref 6.4–8.3)
PSA SERPL DL<=0.01 NG/ML-MCNC: 1.13 NG/ML (ref 0–4.5)
RBC # BLD AUTO: 3.25 10E6/UL (ref 4.4–5.9)
SODIUM SERPL-SCNC: 133 MMOL/L (ref 135–145)
TRIGL SERPL-MCNC: 151 MG/DL
TSH SERPL DL<=0.005 MIU/L-ACNC: 2.27 UIU/ML (ref 0.3–4.2)
WBC # BLD AUTO: 7.6 10E3/UL (ref 4–11)

## 2024-06-24 PROCEDURE — 83036 HEMOGLOBIN GLYCOSYLATED A1C: CPT

## 2024-06-24 PROCEDURE — 82570 ASSAY OF URINE CREATININE: CPT

## 2024-06-24 PROCEDURE — 80053 COMPREHEN METABOLIC PANEL: CPT

## 2024-06-24 PROCEDURE — 85025 COMPLETE CBC W/AUTO DIFF WBC: CPT

## 2024-06-24 PROCEDURE — 99214 OFFICE O/P EST MOD 30 MIN: CPT | Mod: 95

## 2024-06-24 PROCEDURE — 36415 COLL VENOUS BLD VENIPUNCTURE: CPT

## 2024-06-24 PROCEDURE — 84443 ASSAY THYROID STIM HORMONE: CPT

## 2024-06-24 PROCEDURE — 82043 UR ALBUMIN QUANTITATIVE: CPT

## 2024-06-24 PROCEDURE — 84403 ASSAY OF TOTAL TESTOSTERONE: CPT

## 2024-06-24 PROCEDURE — 80061 LIPID PANEL: CPT

## 2024-06-24 PROCEDURE — 84153 ASSAY OF PSA TOTAL: CPT

## 2024-06-24 RX ORDER — LANCETS
EACH MISCELLANEOUS
Qty: 100 EACH | Refills: 0 | Status: SHIPPED | OUTPATIENT
Start: 2024-06-24

## 2024-06-24 NOTE — LETTER
6/24/2024      Sushil Diana  [address] [address] [place] [id number]      Dear Colleague,    Thank you for referring your patient, Sushil Diana, to the [facility]. Please see a copy of my visit note below.    Virtual Oncology Follow Up Visit: June 24, 2024    Oncologist: Dr. Holliday   PCP: Mark Matson    Reason for Visit: Follow-up prostate cancer     Diagnosis: Metastatic prostate cancer  Sushil Diana is a 64 year old male with metastatic prostate cancer.  He had nocturia for few years with no elevation of PSA, followed up with his PCP and PSA was elevated at 272.  He was referred to urology in August 2023, biopsy performed and noted to have prostatic adenocarcinoma, [place] 4+9.  Follow-up CT abdomen/pelvis with evidence of bony metastasis, therefore follow-up bone scan on 9/20/2023 with diffuse osseous metastasis.  He was given a dose of Eligard on 9/27/23 and referred to Dr. Holliday, who recommended abiraterone/prednisone with monthly Zometa.  NGS testing with no targetable findings. QRG3C1N loss. BELIA. TMB 0      Treatment:  9/2023 started Lupron 45 mg  10/2023 started Abiraterone + prednisone  1/2024 started monthly zometa    Interval History:  Patient and spouse are seen virtually in clinic to follow-up lab work. Sushil is tolerating Zytiga well with manageable hot flashes. No new bone pains. Has been feeling well lately with no new recent illnesses. Energy is good, staying active. No changes in bowel functioning. Having cataract surgery next week. No additional questions or concerns today.    Patient denies any of the following except if noted above: fevers, chills, difficulty with energy, vision or hearing changes, chest pain, dyspnea, abdominal pain, nausea, vomiting, diarrhea, constipation, urinary concerns, headaches, numbness, tingling, issues with sleep or mood. He also denies lumps, bumps, rashes or skin lesions, bleeding or bruising  issues.    Physical Exam:  General: No acute distress.  Resp: No respiratory distress. No cough.    Skin: No visible rash or lesions.  Neuro: A/O x 4. Appropriate mentation and speech.  Psych: Appropriate mood. Appearance is well-groomed.     Laboratory Results:   Results for orders placed or performed in visit on 06/24/24   PSA, tumor marker     Status: Normal   Result Value Ref Range    PSA Tumor Marker 1.13 0.00 - 4.50 ng/mL    Narrative    This result is obtained using the Roche Elecsys total PSA method on the janny [id number] immunoassay analyzer. Results obtained with different assay methods or kits cannot be used interchangeably.   Comprehensive metabolic panel     Status: Abnormal   Result Value Ref Range    Sodium 133 (L) 135 - 145 mmol/L    Potassium 3.8 3.4 - 5.3 mmol/L    Carbon Dioxide (CO2) 21 (L) 22 - 29 mmol/L    Anion Gap 13 7 - 15 mmol/L    Urea Nitrogen 17.9 8.0 - 23.0 mg/dL    Creatinine 1.01 0.67 - 1.17 mg/dL    GFR Estimate 82 >60 mL/min/1.73m2    Calcium 9.0 8.8 - 10.2 mg/dL    Chloride 99 98 - 107 mmol/L    Glucose 182 (H) 70 - 99 mg/dL    Alkaline Phosphatase 59 40 - 150 U/L    AST 20 0 - 45 U/L    ALT 14 0 - 70 U/L    Protein Total 6.7 6.4 - 8.3 g/dL    Albumin 4.5 3.5 - 5.2 g/dL    Bilirubin Total 1.0 <=1.2 mg/dL    Patient Fasting > 8hrs? No    Lipid panel reflex to direct LDL Fasting     Status: Abnormal   Result Value Ref Range    Cholesterol 121 <200 mg/dL    Triglycerides 151 (H) <150 mg/dL    Direct Measure HDL 51 >=40 mg/dL    LDL Cholesterol Calculated 40 <=100 mg/dL    Non HDL Cholesterol 70 <130 mg/dL    Patient Fasting > 8hrs? No     Narrative    Cholesterol  Desirable:  <200 mg/dL    Triglycerides  Normal:  Less than 150 mg/dL  Borderline High:  150-199 mg/dL  High:  200-499 mg/dL  Very High:  Greater than or equal to 500 mg/dL    Direct Measure HDL  Female:  Greater than or equal to 50 mg/dL   Male:  Greater than or equal to 40 mg/dL    LDL Cholesterol  Desirable:  <100mg/dL  Above  Desirable:  100-129 mg/dL   Borderline High:  130-159 mg/dL   High:  160-189 mg/dL   Very High:  >= 190 mg/dL    Non HDL Cholesterol  Desirable:  130 mg/dL  Above Desirable:  130-159 mg/dL  Borderline High:  160-189 mg/dL  High:  190-219 mg/dL  Very High:  Greater than or equal to 220 mg/dL   TSH with free T4 reflex     Status: Normal   Result Value Ref Range    TSH 2.27 0.30 - 4.20 uIU/mL   Hemoglobin A1c     Status: Normal   Result Value Ref Range    Hemoglobin A1C 5.2 <5.7 %   CBC with platelets and differential     Status: Abnormal   Result Value Ref Range    WBC Count 7.6 4.0 - 11.0 10e3/uL    RBC Count 3.25 (L) 4.40 - 5.90 10e6/uL    Hemoglobin 10.2 (L) 13.3 - 17.7 g/dL    Hematocrit 29.5 (L) 40.0 - 53.0 %    MCV 91 78 - 100 fL    MCH 31.4 26.5 - 33.0 pg    MCHC 34.6 31.5 - 36.5 g/dL    RDW 13.0 10.0 - 15.0 %    Platelet Count 181 150 - 450 10e3/uL    % Neutrophils 86 %    % Lymphocytes 6 %    % Monocytes 7 %    % Eosinophils 1 %    % Basophils 0 %    % Immature Granulocytes 0 %    NRBCs per 100 WBC 0 <1 /100    Absolute Neutrophils 6.5 1.6 - 8.3 10e3/uL    Absolute Lymphocytes 0.5 (L) 0.8 - 5.3 10e3/uL    Absolute Monocytes 0.5 0.0 - 1.3 10e3/uL    Absolute Eosinophils 0.0 0.0 - 0.7 10e3/uL    Absolute Basophils 0.0 0.0 - 0.2 10e3/uL    Absolute Immature Granulocytes 0.0 <=0.4 10e3/uL    Absolute NRBCs 0.0 10e3/uL   CBC with platelets differential     Status: Abnormal    Narrative    The following orders were created for panel order CBC with platelets differential.  Procedure                               Abnormality         Status                     ---------                               -----------         ------                     CBC with platelets and d...[[id number]]  Abnormal            Final result                 Please view results for these tests on the individual orders.   I reviewed the above labs today.    Imaging:  NM Bone Scan Whole Body  Narrative: EXAM: NM BONE SCAN WHOLE BODY  LOCATION: [facility]  DATE: 9/20/2023    INDICATION:  Prostate cancer (H), Benign prostatic hyperplasia with lower urinary tract symptoms, symptom details unspecified  COMPARISON: CT 09/20/2023.  TECHNIQUE: 25.0 mCi technetium-99m MDP, IV. Anterior and posterior delayed whole-body images at 3 hours.    FINDINGS: Diffuse radiotracer avid osseous lesions throughout the axial and proximal appendicular skeleton with faint if any visualization of the kidneys suggesting a super-scan, correlating with the diffuse sclerotic metastases seen on the comparative   CT.  Impression: IMPRESSION:    Diffuse radiotracer avid osseous metastases throughout the axial and proximal appendicular skeleton.  I reviewed the above imaging report today.    Assessment and Plan:   Stage IV prostate cancer with mets to the bone  - Started Lupron in Sept 2023 and continues with q6 month injections, next due Sept 2024  - Continues with Zytiga 1000 mg daily + prednisone 5 mg daily and tolerating well with manageable hot flashes  - PSA again decreased from prior  - No plan for repeat imaging as he is doing well clinically and PSA down trending  - Follow-up with Dr. Holliday scheduled for Sept 2024 with Lupron and zometa [facility]    Bone health  - Started Zometa Jan 2024 and recently switched to q3 month dosing  - Confirmed use of calcium and vitamin D BID, occasionally forgets doses but working to improve this  - No new bone pains    Anemia  - suspect s/t prostate cancer and treatment  - No s/s of bleeding  - Continue to monitor       Maylin CARDOZA, CNP      Again, thank you for allowing me to participate in the care of your patient.        Sincerely,        NANI Marmolejo CNP

## 2024-06-24 NOTE — PROGRESS NOTES
Virtual Oncology Follow Up Visit: June 24, 2024    Oncologist: Dr. Holliday   PCP: Mark Matson    Reason for Visit: Follow-up prostate cancer     Diagnosis: Metastatic prostate cancer  Sushil Diana is a 64 year old male with metastatic prostate cancer.  He had nocturia for few years with no elevation of PSA, followed up with his PCP and PSA was elevated at 272.  He was referred to urology in August 2023, biopsy performed and noted to have prostatic adenocarcinoma, Wally 4+9.  Follow-up CT abdomen/pelvis with evidence of bony metastasis, therefore follow-up bone scan on 9/20/2023 with diffuse osseous metastasis.  He was given a dose of Eligard on 9/27/23 and referred to Dr. Holliday, who recommended abiraterone/prednisone with monthly Zometa.  NGS testing with no targetable findings. [id number] loss. BELIA. TMB 0      Treatment:  9/2023 started Lupron 45 mg  10/2023 started Abiraterone + prednisone  1/2024 started monthly zometa    Interval History:  Patient and spouse are seen virtually in clinic to follow-up lab work. Sushil is tolerating Zytiga well with manageable hot flashes. No new bone pains. Has been feeling well lately with no new recent illnesses. Energy is good, staying active. No changes in bowel functioning. Having cataract surgery next week. No additional questions or concerns today.    Patient denies any of the following except if noted above: fevers, chills, difficulty with energy, vision or hearing changes, chest pain, dyspnea, abdominal pain, nausea, vomiting, diarrhea, constipation, urinary concerns, headaches, numbness, tingling, issues with sleep or mood. He also denies lumps, bumps, rashes or skin lesions, bleeding or bruising issues.    Physical Exam:  General: No acute distress.  Resp: No respiratory distress. No cough.    Skin: No visible rash or lesions.  Neuro: A/O x 4. Appropriate mentation and speech.  Psych: Appropriate mood. Appearance is well-groomed.     Laboratory  Results:   Results for orders placed or performed in visit on 06/24/24   PSA, tumor marker     Status: Normal   Result Value Ref Range    PSA Tumor Marker 1.13 0.00 - 4.50 ng/mL    Narrative    This result is obtained using the Roche Elecsys total PSA method on the janny [id number] immunoassay analyzer. Results obtained with different assay methods or kits cannot be used interchangeably.   Comprehensive metabolic panel     Status: Abnormal   Result Value Ref Range    Sodium 133 (L) 135 - 145 mmol/L    Potassium 3.8 3.4 - 5.3 mmol/L    Carbon Dioxide (CO2) 21 (L) 22 - 29 mmol/L    Anion Gap 13 7 - 15 mmol/L    Urea Nitrogen 17.9 8.0 - 23.0 mg/dL    Creatinine 1.01 0.67 - 1.17 mg/dL    GFR Estimate 82 >60 mL/min/1.73m2    Calcium 9.0 8.8 - 10.2 mg/dL    Chloride 99 98 - 107 mmol/L    Glucose 182 (H) 70 - 99 mg/dL    Alkaline Phosphatase 59 40 - 150 U/L    AST 20 0 - 45 U/L    ALT 14 0 - 70 U/L    Protein Total 6.7 6.4 - 8.3 g/dL    Albumin 4.5 3.5 - 5.2 g/dL    Bilirubin Total 1.0 <=1.2 mg/dL    Patient Fasting > 8hrs? No    Lipid panel reflex to direct LDL Fasting     Status: Abnormal   Result Value Ref Range    Cholesterol 121 <200 mg/dL    Triglycerides 151 (H) <150 mg/dL    Direct Measure HDL 51 >=40 mg/dL    LDL Cholesterol Calculated 40 <=100 mg/dL    Non HDL Cholesterol 70 <130 mg/dL    Patient Fasting > 8hrs? No     Narrative    Cholesterol  Desirable:  <200 mg/dL    Triglycerides  Normal:  Less than 150 mg/dL  Borderline High:  150-199 mg/dL  High:  200-499 mg/dL  Very High:  Greater than or equal to 500 mg/dL    Direct Measure HDL  Female:  Greater than or equal to 50 mg/dL   Male:  Greater than or equal to 40 mg/dL    LDL Cholesterol  Desirable:  <100mg/dL  Above Desirable:  100-129 mg/dL   Borderline High:  130-159 mg/dL   High:  160-189 mg/dL   Very High:  >= 190 mg/dL    Non HDL Cholesterol  Desirable:  130 mg/dL  Above Desirable:  130-159 mg/dL  Borderline High:  160-189 mg/dL  High:  190-219 mg/dL  Very  High:  Greater than or equal to 220 mg/dL   TSH with free T4 reflex     Status: Normal   Result Value Ref Range    TSH 2.27 0.30 - 4.20 uIU/mL   Hemoglobin A1c     Status: Normal   Result Value Ref Range    Hemoglobin A1C 5.2 <5.7 %   CBC with platelets and differential     Status: Abnormal   Result Value Ref Range    WBC Count 7.6 4.0 - 11.0 10e3/uL    RBC Count 3.25 (L) 4.40 - 5.90 10e6/uL    Hemoglobin 10.2 (L) 13.3 - 17.7 g/dL    Hematocrit 29.5 (L) 40.0 - 53.0 %    MCV 91 78 - 100 fL    MCH 31.4 26.5 - 33.0 pg    MCHC 34.6 31.5 - 36.5 g/dL    RDW 13.0 10.0 - 15.0 %    Platelet Count 181 150 - 450 10e3/uL    % Neutrophils 86 %    % Lymphocytes 6 %    % Monocytes 7 %    % Eosinophils 1 %    % Basophils 0 %    % Immature Granulocytes 0 %    NRBCs per 100 WBC 0 <1 /100    Absolute Neutrophils 6.5 1.6 - 8.3 10e3/uL    Absolute Lymphocytes 0.5 (L) 0.8 - 5.3 10e3/uL    Absolute Monocytes 0.5 0.0 - 1.3 10e3/uL    Absolute Eosinophils 0.0 0.0 - 0.7 10e3/uL    Absolute Basophils 0.0 0.0 - 0.2 10e3/uL    Absolute Immature Granulocytes 0.0 <=0.4 10e3/uL    Absolute NRBCs 0.0 10e3/uL   CBC with platelets differential     Status: Abnormal    Narrative    The following orders were created for panel order CBC with platelets differential.  Procedure                               Abnormality         Status                     ---------                               -----------         ------                     CBC with platelets and d...[[id number]]  Abnormal            Final result                 Please view results for these tests on the individual orders.   I reviewed the above labs today.    Imaging:  NM Bone Scan Whole Body  Narrative: EXAM: NM BONE SCAN WHOLE BODY  LOCATION: [facility]  DATE: 9/20/2023    INDICATION:  Prostate cancer (H), Benign prostatic hyperplasia with lower urinary tract symptoms, symptom details unspecified  COMPARISON: CT 09/20/2023.  TECHNIQUE: 25.0 mCi  technetium-99m MDP, IV. Anterior and posterior delayed whole-body images at 3 hours.    FINDINGS: Diffuse radiotracer avid osseous lesions throughout the axial and proximal appendicular skeleton with faint if any visualization of the kidneys suggesting a super-scan, correlating with the diffuse sclerotic metastases seen on the comparative   CT.  Impression: IMPRESSION:    Diffuse radiotracer avid osseous metastases throughout the axial and proximal appendicular skeleton.  I reviewed the above imaging report today.    Assessment and Plan:   Stage IV prostate cancer with mets to the bone  - Started Lupron in Sept 2023 and continues with q6 month injections, next due Sept 2024  - Continues with Zytiga 1000 mg daily + prednisone 5 mg daily and tolerating well with manageable hot flashes  - PSA again decreased from prior  - No plan for repeat imaging as he is doing well clinically and PSA down trending  - Follow-up with Dr. Holliday scheduled for Sept 2024 with Lupron and zometa due    Bone health  - Started Zometa Jan 2024 and recently switched to q3 month dosing  - Confirmed use of calcium and vitamin D BID, occasionally forgets doses but working to improve this  - No new bone pains    Anemia  - suspect s/t prostate cancer and treatment  - No s/s of bleeding  - Continue to monitor       Maylin CARDOZA CNP    Virtual Visit Details     Type of service:  Video Visit     Originating Location (pt. Location): [facility]  Distant Location (provider location): On-site  Platform used for Video Visit: Kristopher    Video Start Time: 158  Video Stop Time: 209

## 2024-06-26 ENCOUNTER — OFFICE VISIT (OUTPATIENT)
Dept: INTERNAL MEDICINE | Facility: CLINIC | Age: 64
End: 2024-06-26
Payer: MEDICARE

## 2024-06-26 VITALS
TEMPERATURE: 97.9 F | SYSTOLIC BLOOD PRESSURE: 118 MMHG | WEIGHT: 164.2 LBS | OXYGEN SATURATION: 98 % | RESPIRATION RATE: 18 BRPM | BODY MASS INDEX: 24.89 KG/M2 | DIASTOLIC BLOOD PRESSURE: 60 MMHG | HEIGHT: 68 IN | HEART RATE: 72 BPM

## 2024-06-26 DIAGNOSIS — C61 PROSTATE CANCER METASTATIC TO BONE (H): ICD-10-CM

## 2024-06-26 DIAGNOSIS — I10 ESSENTIAL HYPERTENSION, BENIGN: ICD-10-CM

## 2024-06-26 DIAGNOSIS — C79.51 PROSTATE CANCER METASTATIC TO BONE (H): ICD-10-CM

## 2024-06-26 DIAGNOSIS — Z01.818 PREOP GENERAL PHYSICAL EXAM: ICD-10-CM

## 2024-06-26 DIAGNOSIS — K21.9 GASTROESOPHAGEAL REFLUX DISEASE WITHOUT ESOPHAGITIS: ICD-10-CM

## 2024-06-26 DIAGNOSIS — H25.9 AGE-RELATED CATARACT OF BOTH EYES, UNSPECIFIED AGE-RELATED CATARACT TYPE: Primary | ICD-10-CM

## 2024-06-26 DIAGNOSIS — E11.65 TYPE 2 DIABETES MELLITUS WITH HYPERGLYCEMIA, WITHOUT LONG-TERM CURRENT USE OF INSULIN (H): ICD-10-CM

## 2024-06-26 PROCEDURE — 99214 OFFICE O/P EST MOD 30 MIN: CPT | Performed by: INTERNAL MEDICINE

## 2024-06-26 PROCEDURE — G2211 COMPLEX E/M VISIT ADD ON: HCPCS | Performed by: INTERNAL MEDICINE

## 2024-06-26 RX ORDER — RESPIRATORY SYNCYTIAL VIRUS VACCINE 120MCG/0.5
0.5 KIT INTRAMUSCULAR ONCE
Qty: 1 EACH | Refills: 0 | Status: CANCELLED | OUTPATIENT
Start: 2024-06-26 | End: 2024-06-26

## 2024-06-26 ASSESSMENT — PAIN SCALES - GENERAL: PAINLEVEL: NO PAIN (0)
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
## 2024-06-28 DIAGNOSIS — C79.51 PROSTATE CANCER METASTATIC TO BONE (H): Primary | ICD-10-CM

## 2024-06-28 DIAGNOSIS — C61 PROSTATE CANCER METASTATIC TO BONE (H): Primary | ICD-10-CM

## 2024-06-28 LAB — TESTOST SERPL-MCNC: <2 NG/DL (ref 240–950)

## 2024-06-28 RX ORDER — ABIRATERONE ACETATE 250 MG/1
1000 TABLET ORAL DAILY
Qty: 120 TABLET | Refills: 0 | Status: SHIPPED | OUTPATIENT
Start: 2024-07-01 | End: 2024-07-31

## 2024-07-23 ENCOUNTER — TRANSFERRED RECORDS (OUTPATIENT)
Dept: HEALTH INFORMATION MANAGEMENT | Facility: CLINIC | Age: 64
End: 2024-07-23
Payer: MEDICARE

## 2024-07-26 DIAGNOSIS — I50.41 ACUTE COMBINED SYSTOLIC AND DIASTOLIC CONGESTIVE HEART FAILURE (H): ICD-10-CM

## 2024-07-26 DIAGNOSIS — I10 HYPERTENSION, UNSPECIFIED TYPE: ICD-10-CM

## 2024-07-26 DIAGNOSIS — E11.9 DIABETES MELLITUS WITHOUT COMPLICATION (H): ICD-10-CM

## 2024-07-26 RX ORDER — LISINOPRIL 40 MG/1
40 TABLET ORAL DAILY
Qty: 90 TABLET | Refills: 0 | Status: SHIPPED | OUTPATIENT
Start: 2024-07-26

## 2024-07-26 RX ORDER — GLIPIZIDE 5 MG/1
TABLET ORAL
Qty: 90 TABLET | Refills: 0 | Status: SHIPPED | OUTPATIENT
Start: 2024-07-26

## 2024-07-29 DIAGNOSIS — C79.51 PROSTATE CANCER METASTATIC TO BONE (H): Primary | ICD-10-CM

## 2024-07-29 DIAGNOSIS — C61 PROSTATE CANCER METASTATIC TO BONE (H): Primary | ICD-10-CM

## 2024-07-29 RX ORDER — ABIRATERONE ACETATE 250 MG/1
1000 TABLET ORAL DAILY
Qty: 120 TABLET | Refills: 0 | Status: SHIPPED | OUTPATIENT
Start: 2024-07-31 | End: 2024-08-27

## 2024-08-02 PROCEDURE — 82274 ASSAY TEST FOR BLOOD FECAL: CPT

## 2024-08-06 LAB — HEMOCCULT STL QL IA: NEGATIVE

## 2024-08-13 ENCOUNTER — PATIENT OUTREACH (OUTPATIENT)
Dept: ONCOLOGY | Facility: CLINIC | Age: 64
End: 2024-08-13
Payer: MEDICARE

## 2024-08-13 NOTE — TELEPHONE ENCOUNTER
[facility]: Cancer Care Follow-Up Note                                    Discussion with Patient:                                                      Spoke with patient to check-in since last office visit.       Dates of Treatment:                                                       3/29/2024  3:02 PM 4/24/2024  11:54 AM   Chemotherapy     leuprolide (ELIGARD) SC     leuprolide (LUPRON DEPOT) IM 45 mg     zoledronic acid (ZOMETA) IV  4 mg      abiraterone (ZYTIGA) 250 MG tablet     Assessment:                                                    RNCC Short Symptom Review:     Assessment completed with:: Patient    General/Short Assessment  Does the patient have all their medications?: Yes  Is the patient experiencing any new or worsening symptoms?: No  Discussion with patient: Answered patient's questions and addressed concerns;Reviewed how and when to contact clinic;Reviewed patient's future appointments    Pain  Patient Reported Pain?: No  Pain Score: No Pain (0)    Patient Coping  Accepting    Clinic Utilization  Patient Aware of Next Appointment?: Yes    Other Patient Concerns  Other Patient Reported Concerns: Yes, see notes    Intervention/Education provided during outreach:                                                       Patient verbalized no complaints at this time. Encouraged patient to call or send [employer] message to clinic if new questions or concerns arise. Patient verbalized understanding.    Patient to follow up as scheduled at next appt  Patient to call/[employer] message with updates  Confirmed patient has clinic and triage numbers    Signature:  Justine Pandya RN

## 2024-08-19 DIAGNOSIS — I10 HYPERTENSION, UNSPECIFIED TYPE: ICD-10-CM

## 2024-08-20 RX ORDER — AMLODIPINE BESYLATE 10 MG/1
10 TABLET ORAL DAILY
Qty: 90 TABLET | Refills: 2 | Status: SHIPPED | OUTPATIENT
Start: 2024-08-20

## 2024-08-21 ENCOUNTER — TELEPHONE (OUTPATIENT)
Dept: INTERNAL MEDICINE | Facility: CLINIC | Age: 64
End: 2024-08-21

## 2024-08-21 NOTE — TELEPHONE ENCOUNTER
Patient Quality Outreach    Patient is due for the following:   Diabetes -  Foot Exam    Next Steps:       Type of outreach:    Sent [employer] message.    Next Steps:  Reach out within 90 days via [employer].    Max number of attempts reached: No. Will try again in 90 days if patient still on fail list.    Questions for provider review:    None           Tiffany Nelson LPN

## 2024-08-27 DIAGNOSIS — C79.51 PROSTATE CANCER METASTATIC TO BONE (H): Primary | ICD-10-CM

## 2024-08-27 DIAGNOSIS — C61 PROSTATE CANCER METASTATIC TO BONE (H): Primary | ICD-10-CM

## 2024-08-27 RX ORDER — ABIRATERONE ACETATE 250 MG/1
1000 TABLET ORAL DAILY
Qty: 120 TABLET | Refills: 0 | Status: SHIPPED | OUTPATIENT
Start: 2024-08-30 | End: 2024-09-29

## 2024-09-19 DIAGNOSIS — K21.9 GASTROESOPHAGEAL REFLUX DISEASE WITHOUT ESOPHAGITIS: ICD-10-CM

## 2024-09-19 RX ORDER — FAMOTIDINE 40 MG/1
TABLET, FILM COATED ORAL
Qty: 180 TABLET | Refills: 1 | Status: SHIPPED | OUTPATIENT
Start: 2024-09-19

## 2024-09-25 ENCOUNTER — VIRTUAL VISIT (OUTPATIENT)
Dept: ONCOLOGY | Facility: CLINIC | Age: 64
End: 2024-09-25
Attending: INTERNAL MEDICINE
Payer: MEDICARE

## 2024-09-25 ENCOUNTER — LAB (OUTPATIENT)
Dept: LAB | Facility: CLINIC | Age: 64
End: 2024-09-25
Payer: MEDICARE

## 2024-09-25 ENCOUNTER — INFUSION THERAPY VISIT (OUTPATIENT)
Dept: INFUSION THERAPY | Facility: CLINIC | Age: 64
End: 2024-09-25
Attending: INTERNAL MEDICINE
Payer: MEDICARE

## 2024-09-25 ENCOUNTER — DOCUMENTATION ONLY (OUTPATIENT)
Dept: ONCOLOGY | Facility: CLINIC | Age: 64
End: 2024-09-25

## 2024-09-25 VITALS
SYSTOLIC BLOOD PRESSURE: 145 MMHG | DIASTOLIC BLOOD PRESSURE: 62 MMHG | BODY MASS INDEX: 24.8 KG/M2 | TEMPERATURE: 97.8 F | OXYGEN SATURATION: 100 % | RESPIRATION RATE: 16 BRPM | HEART RATE: 63 BPM | WEIGHT: 164.3 LBS

## 2024-09-25 DIAGNOSIS — D63.0 ANEMIA IN NEOPLASTIC DISEASE: ICD-10-CM

## 2024-09-25 DIAGNOSIS — C79.51 PROSTATE CANCER METASTATIC TO BONE (H): Primary | ICD-10-CM

## 2024-09-25 DIAGNOSIS — E55.9 VITAMIN D DEFICIENCY: ICD-10-CM

## 2024-09-25 DIAGNOSIS — C61 PROSTATE CANCER METASTATIC TO BONE (H): ICD-10-CM

## 2024-09-25 DIAGNOSIS — C61 PROSTATE CANCER (H): ICD-10-CM

## 2024-09-25 DIAGNOSIS — C79.51 PROSTATE CANCER METASTATIC TO BONE (H): ICD-10-CM

## 2024-09-25 DIAGNOSIS — C61 PROSTATE CANCER METASTATIC TO BONE (H): Primary | ICD-10-CM

## 2024-09-25 DIAGNOSIS — C61 PROSTATE CANCER (H): Primary | ICD-10-CM

## 2024-09-25 DIAGNOSIS — Z23 NEED FOR PROPHYLACTIC VACCINATION AND INOCULATION AGAINST INFLUENZA: ICD-10-CM

## 2024-09-25 LAB
ALBUMIN SERPL BCG-MCNC: 4.4 G/DL (ref 3.5–5.2)
ALP SERPL-CCNC: 59 U/L (ref 40–150)
ALT SERPL W P-5'-P-CCNC: 11 U/L (ref 0–70)
ANION GAP SERPL CALCULATED.3IONS-SCNC: 13 MMOL/L (ref 7–15)
AST SERPL W P-5'-P-CCNC: 21 U/L (ref 0–45)
BASOPHILS # BLD AUTO: 0 10E3/UL (ref 0–0.2)
BASOPHILS NFR BLD AUTO: 0 %
BILIRUB SERPL-MCNC: 1 MG/DL
BUN SERPL-MCNC: 14.3 MG/DL (ref 8–23)
CALCIUM SERPL-MCNC: 9.7 MG/DL (ref 8.8–10.4)
CHLORIDE SERPL-SCNC: 94 MMOL/L (ref 98–107)
CREAT SERPL-MCNC: 0.9 MG/DL (ref 0.67–1.17)
EGFRCR SERPLBLD CKD-EPI 2021: >90 ML/MIN/1.73M2
EOSINOPHIL # BLD AUTO: 0.2 10E3/UL (ref 0–0.7)
EOSINOPHIL NFR BLD AUTO: 3 %
ERYTHROCYTE [DISTWIDTH] IN BLOOD BY AUTOMATED COUNT: 13.1 % (ref 10–15)
GLUCOSE SERPL-MCNC: 141 MG/DL (ref 70–99)
HCO3 SERPL-SCNC: 23 MMOL/L (ref 22–29)
HCT VFR BLD AUTO: 31.1 % (ref 40–53)
HGB BLD-MCNC: 10.9 G/DL (ref 13.3–17.7)
IMM GRANULOCYTES # BLD: 0 10E3/UL
IMM GRANULOCYTES NFR BLD: 0 %
LYMPHOCYTES # BLD AUTO: 1.5 10E3/UL (ref 0.8–5.3)
LYMPHOCYTES NFR BLD AUTO: 22 %
MCH RBC QN AUTO: 31.6 PG (ref 26.5–33)
MCHC RBC AUTO-ENTMCNC: 35 G/DL (ref 31.5–36.5)
MCV RBC AUTO: 90 FL (ref 78–100)
MONOCYTES # BLD AUTO: 0.7 10E3/UL (ref 0–1.3)
MONOCYTES NFR BLD AUTO: 10 %
NEUTROPHILS # BLD AUTO: 4.4 10E3/UL (ref 1.6–8.3)
NEUTROPHILS NFR BLD AUTO: 64 %
NRBC # BLD AUTO: 0 10E3/UL
NRBC BLD AUTO-RTO: 0 /100
PLATELET # BLD AUTO: 194 10E3/UL (ref 150–450)
POTASSIUM SERPL-SCNC: 4 MMOL/L (ref 3.4–5.3)
PROT SERPL-MCNC: 6.9 G/DL (ref 6.4–8.3)
PSA SERPL DL<=0.01 NG/ML-MCNC: 0.48 NG/ML (ref 0–4.5)
RBC # BLD AUTO: 3.45 10E6/UL (ref 4.4–5.9)
SODIUM SERPL-SCNC: 130 MMOL/L (ref 135–145)
WBC # BLD AUTO: 6.9 10E3/UL (ref 4–11)

## 2024-09-25 PROCEDURE — 84403 ASSAY OF TOTAL TESTOSTERONE: CPT

## 2024-09-25 PROCEDURE — 96365 THER/PROPH/DIAG IV INF INIT: CPT

## 2024-09-25 PROCEDURE — 84153 ASSAY OF PSA TOTAL: CPT

## 2024-09-25 PROCEDURE — 96402 CHEMO HORMON ANTINEOPL SQ/IM: CPT

## 2024-09-25 PROCEDURE — 80053 COMPREHEN METABOLIC PANEL: CPT

## 2024-09-25 PROCEDURE — 36415 COLL VENOUS BLD VENIPUNCTURE: CPT

## 2024-09-25 PROCEDURE — 99214 OFFICE O/P EST MOD 30 MIN: CPT | Mod: 95 | Performed by: INTERNAL MEDICINE

## 2024-09-25 PROCEDURE — G2211 COMPLEX E/M VISIT ADD ON: HCPCS | Mod: 95 | Performed by: INTERNAL MEDICINE

## 2024-09-25 PROCEDURE — 85025 COMPLETE CBC W/AUTO DIFF WBC: CPT

## 2024-09-25 PROCEDURE — 250N000011 HC RX IP 250 OP 636: Performed by: INTERNAL MEDICINE

## 2024-09-25 PROCEDURE — 258N000003 HC RX IP 258 OP 636: Performed by: INTERNAL MEDICINE

## 2024-09-25 RX ORDER — HEPARIN SODIUM,PORCINE 10 UNIT/ML
5-20 VIAL (ML) INTRAVENOUS DAILY PRN
OUTPATIENT
Start: 2025-12-18

## 2024-09-25 RX ORDER — ZOLEDRONIC ACID 0.04 MG/ML
4 INJECTION, SOLUTION INTRAVENOUS ONCE
OUTPATIENT
Start: 2024-12-23

## 2024-09-25 RX ORDER — ZOLEDRONIC ACID 0.04 MG/ML
4 INJECTION, SOLUTION INTRAVENOUS ONCE
OUTPATIENT
Start: 2026-06-16

## 2024-09-25 RX ORDER — HEPARIN SODIUM,PORCINE 10 UNIT/ML
5-20 VIAL (ML) INTRAVENOUS DAILY PRN
OUTPATIENT
Start: 2026-03-19

## 2024-09-25 RX ORDER — ZOLEDRONIC ACID 0.04 MG/ML
4 INJECTION, SOLUTION INTRAVENOUS ONCE
Status: COMPLETED | OUTPATIENT
Start: 2024-09-25 | End: 2024-09-25

## 2024-09-25 RX ORDER — HEPARIN SODIUM (PORCINE) LOCK FLUSH IV SOLN 100 UNIT/ML 100 UNIT/ML
5 SOLUTION INTRAVENOUS
OUTPATIENT
Start: 2025-06-21

## 2024-09-25 RX ORDER — HEPARIN SODIUM (PORCINE) LOCK FLUSH IV SOLN 100 UNIT/ML 100 UNIT/ML
5 SOLUTION INTRAVENOUS
OUTPATIENT
Start: 2025-12-18

## 2024-09-25 RX ORDER — HEPARIN SODIUM (PORCINE) LOCK FLUSH IV SOLN 100 UNIT/ML 100 UNIT/ML
5 SOLUTION INTRAVENOUS
OUTPATIENT
Start: 2026-03-19

## 2024-09-25 RX ORDER — HEPARIN SODIUM,PORCINE 10 UNIT/ML
5-20 VIAL (ML) INTRAVENOUS DAILY PRN
OUTPATIENT
Start: 2026-06-16

## 2024-09-25 RX ORDER — HEPARIN SODIUM,PORCINE 10 UNIT/ML
5-20 VIAL (ML) INTRAVENOUS DAILY PRN
OUTPATIENT
Start: 2024-12-23

## 2024-09-25 RX ORDER — ZOLEDRONIC ACID 0.04 MG/ML
4 INJECTION, SOLUTION INTRAVENOUS ONCE
OUTPATIENT
Start: 2026-03-19 | End: 2026-03-19

## 2024-09-25 RX ORDER — HEPARIN SODIUM (PORCINE) LOCK FLUSH IV SOLN 100 UNIT/ML 100 UNIT/ML
5 SOLUTION INTRAVENOUS
OUTPATIENT
Start: 2024-12-23

## 2024-09-25 RX ORDER — HEPARIN SODIUM,PORCINE 10 UNIT/ML
5-20 VIAL (ML) INTRAVENOUS DAILY PRN
OUTPATIENT
Start: 2025-06-21

## 2024-09-25 RX ORDER — ZOLEDRONIC ACID 0.04 MG/ML
4 INJECTION, SOLUTION INTRAVENOUS ONCE
OUTPATIENT
Start: 2025-06-21

## 2024-09-25 RX ORDER — HEPARIN SODIUM,PORCINE 10 UNIT/ML
5-20 VIAL (ML) INTRAVENOUS DAILY PRN
OUTPATIENT
Start: 2025-09-20

## 2024-09-25 RX ORDER — ZOLEDRONIC ACID 0.04 MG/ML
4 INJECTION, SOLUTION INTRAVENOUS ONCE
OUTPATIENT
Start: 2025-12-18

## 2024-09-25 RX ORDER — HEPARIN SODIUM (PORCINE) LOCK FLUSH IV SOLN 100 UNIT/ML 100 UNIT/ML
5 SOLUTION INTRAVENOUS
OUTPATIENT
Start: 2026-06-16

## 2024-09-25 RX ORDER — ZOLEDRONIC ACID 0.04 MG/ML
4 INJECTION, SOLUTION INTRAVENOUS ONCE
OUTPATIENT
Start: 2025-09-20 | End: 2025-09-20

## 2024-09-25 RX ORDER — HEPARIN SODIUM (PORCINE) LOCK FLUSH IV SOLN 100 UNIT/ML 100 UNIT/ML
5 SOLUTION INTRAVENOUS
OUTPATIENT
Start: 2025-09-20

## 2024-09-25 RX ADMIN — LEUPROLIDE ACETATE 45 MG: KIT at 10:52

## 2024-09-25 RX ADMIN — SODIUM CHLORIDE 250 ML: 9 INJECTION, SOLUTION INTRAVENOUS at 10:45

## 2024-09-25 RX ADMIN — ZOLEDRONIC ACID 4 MG: 0.04 INJECTION, SOLUTION INTRAVENOUS at 10:47

## 2024-09-25 ASSESSMENT — PAIN SCALES - GENERAL: PAINLEVEL: NO PAIN (0)

## 2024-09-25 NOTE — PROGRESS NOTES
Infusion Nursing Note:  Sushil Diana presents today for Lupron and Zometa .    Patient seen by provider today: Yes: video visit w/ Dr Holliday   present during visit today: Patient refused  services and a waiver form has been signed.     Note: Fluids encouraged post Zometa.  Pt acknowledged understanding. .      Intravenous Access:  Labs drawn without difficulty.  Peripheral IV placed.    Treatment Conditions:  Lab Results   Component Value Date     (L) 09/25/2024    POTASSIUM 4.0 09/25/2024    CR 0.90 09/25/2024    ROBERT 9.7 09/25/2024    BILITOTAL 1.0 09/25/2024    ALBUMIN 4.4 09/25/2024    ALT 11 09/25/2024    AST 21 09/25/2024       Results reviewed, labs MET treatment parameters, ok to proceed with treatment.      Post Infusion Assessment:  Patient tolerated infusion without incident.  Patient tolerated injection without incident.  Patient observed for 15 minutes post Zometa infusion.  Site patent and intact, free from redness, edema or discomfort.  No evidence of extravasations.  Access discontinued per protocol.       Discharge Plan:   Discharge instructions reviewed with: Patient.  Patient discharged in stable condition accompanied by: wife.  Departure Mode: Ambulatory.      Autumn Murray RN

## 2024-09-25 NOTE — PROGRESS NOTES
[facility] Hematology / Oncology  Progress Note  Name: Sushil Diana  :  1960    MRN:  [id number]    --------------------    Assessment / Plan:  Stage IV prostate cancer:  # ADT / Zytiga / Prednisone Sep 2023 - ongoing.    Continue Zytiga 1000 mg daily / prednisone 5 mg daily.  Continue Lupron ADT 45 mg every 6 months.  Continue Zometa 4 mg every 3 months.  PSA continues to decline nicely.  Chemistries stable outside of some mild hyponatremia; kidney function improved.  Anemia mild and improving; likely secondary to prostate cancer and treatment.  Testosterone pending.  Return to clinic 3 months with next Zometa and labs.    Eduardo Holliday MD    --------------------    Interval History:  Sushil returns for follow-up of prostate cancer accompanied by his wife.  All in all, he describes his health is okay.  Tolerating Zytiga and prednisone fairly well.  He has a little bit of orneriness from his endocrine therapy.  Some slight occasional hot flashes are tolerable; they occur mostly daily during day.  No financial toxicity.    Social History:  Social History     Tobacco Use    Smoking status: Some Days     Current packs/day: 1.00     Average packs/day: 1 pack/day for 28.0 years (28.0 ttl pk-yrs)     Types: Cigarettes     Passive exposure: Current    Smokeless tobacco: Never    Tobacco comments:     quit 2018   Substance Use Topics    Alcohol use: Yes     Comment: occasional     Family History:  Family History   Problem Relation Age of Onset    Diabetes Other     Hypertension No family hx of     Cerebrovascular Disease No family hx of     Other Cancer No family hx of      Immunizations:  Immunization History   Administered Date(s) Administered    COVID-19 12+ (Pfizer) 2023    COVID-19 Bivalent 18+ (Moderna) 2022    COVID-19 Monovalent 18+ (Moderna) 2021, 2021, 2022    Influenza (H1N1) 2010    Influenza (IIV3) PF 2003, 2010, 2010, 10/20/2011,  11/02/2012    Influenza Vaccine 18-64 (Flublok) 09/25/2018, 09/27/2019, 09/05/2020, 12/08/2022    Influenza Vaccine 65+ (Fluzone HD) 10/05/2023    Influenza Vaccine >6 months,quad, PF 09/28/2013, 10/14/2014, 10/08/2015, 10/08/2016, 10/07/2017    Influenza, Intradermal, Quad, Pf 10/28/2021    Pneumococcal 23 valent 09/25/2018    TD,PF 7+ (Tenivac) 06/20/2006    TDAP Vaccine (Adacel) 09/25/2018    Zoster recombinant adjuvanted (SHINGRIX) 09/05/2020, 11/12/2020     Health Maintenance Due   Topic Date Due    Pneumococcal Vaccine: 65+ Years (2 of 2 - PCV) 09/25/2019    COVID-19 Vaccine (6 - 2023-24 season) 01/24/2024     --------------------    Physical Exam:  Video visit.    Labs / Imaging:  Reviewed PSA, CBC, CMP, testosterone.    Video Visit:  Sushil is a 64 year old male who is being evaluated via a billable video visit.  }    Video start time:  9:49 AM  Video end time: 10:07 AM  [facility]  Patient location: [facility]  Mode of transmission: [employer] / [employer].     68

## 2024-09-25 NOTE — LETTER
2024      Sushil Diana  [address] [address] [place] [id number]      Dear Colleague,    Thank you for referring your patient, Sushil Diana, to the [facility] [place]. Please see a copy of my visit note below.    [facility] Hematology / Oncology  Progress Note  Name: Sushil Diana  :  1960    MRN:  [id number]    --------------------    Assessment / Plan:  Stage IV prostate cancer:  # ADT / Zytiga / Prednisone Sep 2023 - ongoing.    Continue Zytiga 1000 mg daily / prednisone 5 mg daily.  Continue Lupron ADT 45 mg every 6 months.  Continue Zometa 4 mg every 3 months.  PSA continues to decline nicely.  Chemistries stable outside of some mild hyponatremia; kidney function improved.  Anemia mild and improving; likely secondary to prostate cancer and treatment.  Testosterone pending.  Return to clinic 3 months with next Zometa and labs.    Eduardo Holliday MD    --------------------    Interval History:  Sushil returns for follow-up of prostate cancer accompanied by his wife.  All in all, he describes his health is okay.  Tolerating Zytiga and prednisone fairly well.  He has a little bit of orneriness from his endocrine therapy.  Some slight occasional hot flashes are tolerable; they occur mostly daily during day.  No financial toxicity.    Social History:  Social History     Tobacco Use     Smoking status: Some Days     Current packs/day: 1.00     Average packs/day: 1 pack/day for 28.0 years (28.0 ttl pk-yrs)     Types: Cigarettes     Passive exposure: Current     Smokeless tobacco: Never     Tobacco comments:     quit 2018   Substance Use Topics     Alcohol use: Yes     Comment: occasional     Family History:  Family History   Problem Relation Age of Onset     Diabetes Other      Hypertension No family hx of      Cerebrovascular Disease No family hx of      Other Cancer No family hx of      Immunizations:  Immunization History   Administered Date(s) Administered      COVID-19 12+ (Pfizer) 11/29/2023     COVID-19 Bivalent 18+ (Moderna) 12/08/2022     COVID-19 Monovalent 18+ (Moderna) 03/31/2021, 04/28/2021, 01/13/2022     Influenza (H1N1) 01/11/2010     Influenza (IIV3) PF 11/28/2003, 01/11/2010, 11/05/2010, 10/20/2011, 11/02/2012     Influenza Vaccine 18-64 (Flublok) 09/25/2018, 09/27/2019, 09/05/2020, 12/08/2022     Influenza Vaccine 65+ (Fluzone HD) 10/05/2023     Influenza Vaccine >6 months,quad, PF 09/28/2013, 10/14/2014, 10/08/2015, 10/08/2016, 10/07/2017     Influenza, Intradermal, Quad, Pf 10/28/2021     Pneumococcal 23 valent 09/25/2018     TD,PF 7+ (Tenivac) 06/20/2006     TDAP Vaccine (Adacel) 09/25/2018     Zoster recombinant adjuvanted (SHINGRIX) 09/05/2020, 11/12/2020     Health Maintenance Due   Topic Date Due     Pneumococcal Vaccine: 65+ Years (2 of 2 - PCV) 09/25/2019     COVID-19 Vaccine (6 - 2023-24 season) 01/24/2024     --------------------    Physical Exam:  Video visit.    Labs / Imaging:  Reviewed PSA, CBC, CMP, testosterone.    Video Visit:  Sushil is a 64 year old male who is being evaluated via a billable video visit.  }    Video start time:  9:49 AM  Video end time: 10:07 AM  Provider location: [facility]  Patient location: [facility]  Mode of transmission:  Portal / [employer].      Again, thank you for allowing me to participate in the care of your patient.        Sincerely,        Eduardo Holliday MD

## 2024-09-25 NOTE — PATIENT INSTRUCTIONS
1) SELENE WY C1D90 (3 months) w/ Zometa and labs (CBC, CMP, PSA).  2) BJT WY C2D1 (6 months) w/ Zometa and labs (CBC, CMP, PSA, vitamin D).    Eduardo Holliday MD.

## 2024-09-26 DIAGNOSIS — C79.51 PROSTATE CANCER METASTATIC TO BONE (H): Primary | ICD-10-CM

## 2024-09-26 DIAGNOSIS — C61 PROSTATE CANCER METASTATIC TO BONE (H): Primary | ICD-10-CM

## 2024-09-26 DIAGNOSIS — C61 PROSTATE CANCER (H): ICD-10-CM

## 2024-09-26 RX ORDER — PREDNISONE 5 MG/1
5 TABLET ORAL DAILY
Qty: 180 TABLET | Refills: 3 | Status: SHIPPED | OUTPATIENT
Start: 2024-09-26

## 2024-09-26 RX ORDER — ABIRATERONE ACETATE 250 MG/1
1000 TABLET ORAL DAILY
Qty: 120 TABLET | Refills: 0 | Status: SHIPPED | OUTPATIENT
Start: 2024-09-29 | End: 2024-10-29

## 2024-09-27 LAB — TESTOST SERPL-MCNC: <2 NG/DL (ref 240–950)

## 2024-10-02 ENCOUNTER — PATIENT OUTREACH (OUTPATIENT)
Dept: ONCOLOGY | Facility: CLINIC | Age: 64
End: 2024-10-02
Payer: MEDICARE

## 2024-10-02 NOTE — TELEPHONE ENCOUNTER
[facility]/Voicemail    Clinical Data: Care Coordinator Outreach    Outreach attempted x 1.  Left message on patient's voicemail with call back information and requested return call.    REASON: RNCC called to check-in with patient. No further intervention needed at this time. Will attempt again.    Plan: Care Coordinator will try to reach patient again in 1 month.    Justine Pandya RNCC

## 2024-10-25 DIAGNOSIS — C61 PROSTATE CANCER METASTATIC TO BONE (H): Primary | ICD-10-CM

## 2024-10-25 DIAGNOSIS — I50.41 ACUTE COMBINED SYSTOLIC AND DIASTOLIC CONGESTIVE HEART FAILURE (H): ICD-10-CM

## 2024-10-25 DIAGNOSIS — C79.51 PROSTATE CANCER METASTATIC TO BONE (H): Primary | ICD-10-CM

## 2024-10-25 DIAGNOSIS — I10 HYPERTENSION, UNSPECIFIED TYPE: ICD-10-CM

## 2024-10-25 RX ORDER — ABIRATERONE ACETATE 250 MG/1
1000 TABLET ORAL DAILY
Qty: 120 TABLET | Refills: 0 | Status: SHIPPED | OUTPATIENT
Start: 2024-10-29 | End: 2024-11-28

## 2024-10-25 RX ORDER — LISINOPRIL 40 MG/1
40 TABLET ORAL DAILY
Qty: 90 TABLET | Refills: 0 | Status: SHIPPED | OUTPATIENT
Start: 2024-10-25

## 2024-11-11 ENCOUNTER — PATIENT OUTREACH (OUTPATIENT)
Dept: ONCOLOGY | Facility: CLINIC | Age: 64
End: 2024-11-11
Payer: MEDICARE

## 2024-11-11 NOTE — TELEPHONE ENCOUNTER
[facility]: Cancer Care Follow-Up Note                                    Discussion with Patient:                                                      Chart review, enrolled into [facility] per [facility] monitoring.    Dates of Treatment:                                                       9/25/2024  10:52 AM   Chemotherapy    leuprolide (ELIGARD) SC    leuprolide (LUPRON DEPOT) IM 45 mg    zoledronic acid (ZOMETA) IV      Assessment:                                                      [facility] Short Symptom Review:     Assessment completed with:: VM-chart review    General/Short Assessment  Does the patient have all their medications?: Yes  Is the patient experiencing any new or worsening symptoms?: No  Discussion with patient: Reviewed how and when to contact clinic;Reviewed patient's future appointments    Pain  Patient Reported Pain?: No    Patient Coping  Accepting    Clinic Utilization  Patient Aware of Next Appointment?: Yes    Other Patient Concerns  Other Patient Reported Concerns: Yes, see notes    Intervention/Education provided during outreach:                                                       No intervention needed at this time. Patient scheduled appropriately, will continue to follow.    Patient to follow up as scheduled at next appt  Patient to call/[employer] message with updates    Signature:  Justine Pandya RN

## 2024-11-15 DIAGNOSIS — E11.9 DIABETES MELLITUS WITHOUT COMPLICATION (H): ICD-10-CM

## 2024-11-15 RX ORDER — METFORMIN HYDROCHLORIDE 500 MG/1
TABLET, EXTENDED RELEASE ORAL
Qty: 180 TABLET | Refills: 3 | Status: SHIPPED | OUTPATIENT
Start: 2024-11-15

## 2024-11-15 RX ORDER — GLIPIZIDE 5 MG/1
5 TABLET ORAL EVERY MORNING
Qty: 90 TABLET | Refills: 0 | Status: SHIPPED | OUTPATIENT
Start: 2024-11-15

## 2024-12-03 ENCOUNTER — TRANSFERRED RECORDS (OUTPATIENT)
Dept: HEALTH INFORMATION MANAGEMENT | Facility: CLINIC | Age: 64
End: 2024-12-03
Payer: MEDICARE

## 2024-12-03 LAB — RETINOPATHY: POSITIVE

## 2024-12-10 ENCOUNTER — TELEPHONE (OUTPATIENT)
Dept: ONCOLOGY | Facility: CLINIC | Age: 64
End: 2024-12-10
Payer: MEDICARE

## 2024-12-10 NOTE — TELEPHONE ENCOUNTER
PA Initiation    Medication: ABIRATERONE ACETATE PO  Insurance Company: [employer] Part D - Phone [phone] Fax [phone]  Pharmacy Filling the Rx:    Filling Pharmacy Phone:    Filling Pharmacy Fax:    Start Date: 12/10/2024

## 2024-12-11 NOTE — TELEPHONE ENCOUNTER
Prior Authorization Approval    Medication: ABIRATERONE ACETATE PO  Authorization Effective Date: 12/10/2024  Authorization Expiration Date: 12/31/2025  Approved Dose/Quantity: 120/30   Reference #: [id number]   Insurance Company: [employer] Part D - Phone [phone] Fax [phone]  Expected CoPay: $    CoPay Card Available:      Financial Assistance Needed:   Which Pharmacy is filling the prescription:    Pharmacy Notified: Yes  Patient Notified: Yes

## 2024-12-23 DIAGNOSIS — C61 PROSTATE CANCER METASTATIC TO BONE (H): Primary | ICD-10-CM

## 2024-12-23 DIAGNOSIS — C79.51 PROSTATE CANCER METASTATIC TO BONE (H): Primary | ICD-10-CM

## 2024-12-23 RX ORDER — ABIRATERONE ACETATE 250 MG/1
1000 TABLET ORAL DAILY
Qty: 120 TABLET | Refills: 0 | Status: SHIPPED | OUTPATIENT
Start: 2024-12-28 | End: 2025-01-27

## 2024-12-24 NOTE — PROGRESS NOTES
Virtual Visit Details    Type of service:  Video Visit   Video Start Time: 10:02 AM  Video End Time:10:20 AM    Originating Location (pt. Location): Home    Distant Location (provider location):  Off-site  Platform used for Video Visit: [facility] Hematology / Oncology  Progress Note  Name: Sushil Diana  :  1960    MRN:  [id number]  Date of Visit: 24    --------------------  Oncology HPI:  Sushil Diana is a 64 year old male with metastatic prostate cancer.  He had nocturia for few years with no elevation of PSA, followed up with his PCP and PSA was elevated at 272.  He was referred to urology in 2023, biopsy performed and noted to have prostatic adenocarcinoma, [place] 4+9.  Follow-up CT abdomen/pelvis with evidence of bony metastasis, therefore follow-up bone scan on 2023 with diffuse osseous metastasis.  He was given a dose of Eligard on 23 and referred to Dr. Holliday, who recommended abiraterone/prednisone with monthly Zometa--now q 3 mo.       First dose of abiraterone/prednisone was 10/12/23. Started Zometa 24. Video visit today for interrval follow-up.  --------------------    Interval History:  Sushil returns for follow-up of prostate cancer accompanied by his wife, Lolis.   He notes he is tolerating the curt-pred well. He denies new SE. He will have hot flashes every other day or so, not too bothersome. He reports stable blood sugars but does not monitor his blood pressure, he also admits he is not always consistent with his blood pressure medication.     He notes L hip pain that started ~2 weeks ago after he was moving his  to the trailer. He is able to walk around without limitation--go to the store, around his house, etc. He notices the discomfort when he is laying on his left side. He tried heat once and it was helpful but has not consistently done this. He has also used Aleve with relief.   He does not have any radiculopathy. No other  new pains.    Social History:  Social History     Tobacco Use    Smoking status: Some Days     Current packs/day: 1.00     Average packs/day: 1 pack/day for 28.0 years (28.0 ttl pk-yrs)     Types: Cigarettes     Passive exposure: Current    Smokeless tobacco: Never    Tobacco comments:     quit June 2018   Substance Use Topics    Alcohol use: Yes     Comment: occasional     Family History:  Family History   Problem Relation Age of Onset    Diabetes Other     Hypertension No family hx of     Cerebrovascular Disease No family hx of     Other Cancer No family hx of      Immunizations:  Immunization History   Administered Date(s) Administered    COVID-19 12+ (Pfizer) 11/29/2023    COVID-19 Bivalent 18+ (Moderna) 12/08/2022    COVID-19 Monovalent 18+ (Moderna) 03/31/2021, 04/28/2021, 01/13/2022    Influenza (H1N1) 01/11/2010    Influenza (High Dose) Trivalent,PF (Fluzone) 09/25/2024    Influenza (IIV3) PF 11/28/2003, 01/11/2010, 11/05/2010, 10/20/2011, 11/02/2012    Influenza Vaccine 18-64 (Flublok) 09/25/2018, 09/27/2019, 09/05/2020, 12/08/2022    Influenza Vaccine 65+ (Fluzone HD) 10/05/2023    Influenza Vaccine >6 months,quad, PF 09/28/2013, 10/14/2014, 10/08/2015, 10/08/2016, 10/07/2017    Influenza, Intradermal, Quad, Pf 10/28/2021    Pneumococcal 20 valent Conjugate (Prevnar 20) 07/30/2024    Pneumococcal 23 valent 09/25/2018    TD,PF 7+ (Tenivac) 06/20/2006    TDAP Vaccine (Adacel) 09/25/2018    Zoster recombinant adjuvanted (SHINGRIX) 09/05/2020, 11/12/2020     Health Maintenance Due   Topic Date Due    Pneumococcal Vaccine: 65+ Years (2 of 2 - PCV) 09/25/2019    COVID-19 Vaccine (6 - 2023-24 season) 01/24/2024     --------------------    Physical Exam:  Video physical exam  General: Patient appears well in no acute distress.   Skin: No visualized rash or lesions on visualized skin  Eyes: EOMI, no erythema, sclera icterus or discharge noted  Resp: Appears to be breathing comfortably without accessory muscle  usage, speaking in full sentences, no cough  MSK: Appears to have normal range of motion based on visualized movements  Neurologic: No apparent tremors, facial movements symmetric  Psych: affect good, alert and oriented      Labs / Imaging:  Most Recent 3 CBC's:  Recent Labs   Lab Test 09/25/24  0846 06/24/24  1255 04/24/24  1147   WBC 6.9 7.6 10.8   HGB 10.9* 10.2* 9.9*   MCV 90 91 89    181 166   ANEUTAUTO 4.4 6.5 9.1*     Most Recent 3 BMP's:  Recent Labs   Lab Test 09/25/24  0846 06/24/24  1255 04/24/24  1102   * 133* 131*   POTASSIUM 4.0 3.8 3.6   CHLORIDE 94* 99 96*   CO2 23 21* 23   BUN 14.3 17.9 15.5   CR 0.90 1.01 1.02  1.05   ANIONGAP 13 13 12   ROBERT 9.7 9.0 9.4  9.3   * 182* 155*   PROTTOTAL 6.9 6.7 7.0   ALBUMIN 4.4 4.5 4.4  4.3    Most Recent 3 LFT's:  Recent Labs   Lab Test 09/25/24  0846 06/24/24  1255 04/24/24  1102   AST 21 20 23   ALT 11 14 12   ALKPHOS 59 59 61   BILITOTAL 1.0 1.0 1.1    Most Recent 2 TSH and T4:  Recent Labs   Lab Test 06/24/24  1255 01/24/24  1351 05/05/23  0821 08/12/18  1830   TSH 2.27 2.30   < > 4.88*   T4  --   --   --  1.41    < > = values in this interval not displayed.       Last PSA 0.48; recheck scheduled for Monday 12/30    I reviewed the above labs today.      Assessment / Plan:  #Stage IV prostate cancer:  # ADT / Zytiga / Prednisone Sep 2023 - ongoing.  Doing well on zytiga and prednisone, tolerating well without new toxicity. He will be due for Lupron March 2025. We will continue zometa every 3 mo for bone support. PSA continues to decline nicely on last check, will follow up on Mondays level if there is a change. His testosterone is well within castrate range on last check. Return to clinic 3 months with Dr. Holliday in person, labs, lupron, and zometa. Will request 6 mo visit as well.    #L hip pain:   Does have history of bone mets. Discussed with stable/declining PSA, less likely related to prostate cancer but remains in differential. ?sybil  component as responds to heat and onset was with strenuous activity. I offered him a Xray on Monday but he would like to hold off and monitor. We discussed heat/ice and tylenol 1g TID. I asked he let us know if this does not resolve or worsens.         40 minutes spent on the date of the encounter doing chart review, review of test results, interpretation of tests, patient visit, and documentation     The longitudinal plan of care for the diagnosis(es)/condition(s) as documented were addressed during this visit. Due to the added complexity in care, I will continue to support Sushil in the subsequent management and with ongoing continuity of care.    Emelina Acosta, CNP

## 2024-12-26 ENCOUNTER — VIRTUAL VISIT (OUTPATIENT)
Dept: ONCOLOGY | Facility: CLINIC | Age: 64
End: 2024-12-26
Attending: INTERNAL MEDICINE
Payer: MEDICARE

## 2024-12-26 VITALS — BODY MASS INDEX: 24.25 KG/M2 | WEIGHT: 160 LBS | HEIGHT: 68 IN

## 2024-12-26 DIAGNOSIS — C79.51 PROSTATE CANCER METASTATIC TO BONE (H): Primary | ICD-10-CM

## 2024-12-26 DIAGNOSIS — C61 PROSTATE CANCER METASTATIC TO BONE (H): Primary | ICD-10-CM

## 2024-12-26 DIAGNOSIS — M25.552 HIP PAIN, LEFT: ICD-10-CM

## 2024-12-26 NOTE — Clinical Note
2024      Sushil Diana  [address] [address] [id number]      Dear Colleague,    Thank you for referring your patient, Sushil Diana, to the [facility]. Please see a copy of my visit note below.    Virtual Visit Details    Type of service:  Video Visit   Video Start Time: 10:02 AM  Video End Time:10:20 AM    Originating Location (pt. Location): Home  {PROVIDER LOCATION On-site should be selected for visits conducted from your clinic location or adjoining [facility] hospital, academic office, or other nearby [facility] building. Off-site should be selected for all other provider locations, including home:[id number]}  Distant Location (provider location):  Off-site  Platform used for Video Visit: [facility] Hematology / Oncology  Progress Note  Name: Sushil Diana  :  1960    MRN:  [id number]  Date of Visit: 24    --------------------  Oncology HPI:  Sushil Diana is a 64 year old male with metastatic prostate cancer.  He had nocturia for few years with no elevation of PSA, followed up with his PCP and PSA was elevated at 272.  He was referred to urology in 2023, biopsy performed and noted to have prostatic adenocarcinoma, [place] 4+9.  Follow-up CT abdomen/pelvis with evidence of bony metastasis, therefore follow-up bone scan on 2023 with diffuse osseous metastasis.  He was given a dose of Eligard on 23 and referred to Dr. Holliday, who recommended abiraterone/prednisone with monthly Zometa--now q 3 mo.       First dose of abiraterone/prednisone was 10/12/23. Started Zometa 24. Video visit today for interrval follow-up.  --------------------    Interval History:  Sushil returns for follow-up of prostate cancer accompanied by his wife, Lolis.   He notes he is tolerating the curt-pred well. He denies new SE. He will have hot flashes every other day or so, not too bothersome. He reports stable blood sugars but does not monitor his blood  pressure, he also admits he is not always consistent with his blood pressure medication.     He notes L hip pain that started ~2 weeks ago after he was moving his  to the trailer. He is able to walk around without limitation--go to the store, around his house, etc. He notices the discomfort when he is laying on his left side. He tried heat once and it was helpful but has not consistently done this. He has also used Aleve with relief.   He does not have any radiculopathy. No other new pains.    Social History:  Social History     Tobacco Use    Smoking status: Some Days     Current packs/day: 1.00     Average packs/day: 1 pack/day for 28.0 years (28.0 ttl pk-yrs)     Types: Cigarettes     Passive exposure: Current    Smokeless tobacco: Never    Tobacco comments:     quit June 2018   Substance Use Topics    Alcohol use: Yes     Comment: occasional     Family History:  Family History   Problem Relation Age of Onset    Diabetes Other     Hypertension No family hx of     Cerebrovascular Disease No family hx of     Other Cancer No family hx of      Immunizations:  Immunization History   Administered Date(s) Administered    COVID-19 12+ (Pfizer) 11/29/2023    COVID-19 Bivalent 18+ (Moderna) 12/08/2022    COVID-19 Monovalent 18+ (Moderna) 03/31/2021, 04/28/2021, 01/13/2022    Influenza (H1N1) 01/11/2010    Influenza (High Dose) Trivalent,PF (Fluzone) 09/25/2024    Influenza (IIV3) PF 11/28/2003, 01/11/2010, 11/05/2010, 10/20/2011, 11/02/2012    Influenza Vaccine 18-64 (Flublok) 09/25/2018, 09/27/2019, 09/05/2020, 12/08/2022    Influenza Vaccine 65+ (Fluzone HD) 10/05/2023    Influenza Vaccine >6 months,quad, PF 09/28/2013, 10/14/2014, 10/08/2015, 10/08/2016, 10/07/2017    Influenza, Intradermal, Quad, Pf 10/28/2021    Pneumococcal 20 valent Conjugate (Prevnar 20) 07/30/2024    Pneumococcal 23 valent 09/25/2018    TD,PF 7+ (Tenivac) 06/20/2006    TDAP Vaccine (Adacel) 09/25/2018    Zoster recombinant adjuvanted  (SHINGRIX) 09/05/2020, 11/12/2020     Health Maintenance Due   Topic Date Due    Pneumococcal Vaccine: 65+ Years (2 of 2 - PCV) 09/25/2019    COVID-19 Vaccine (6 - 2023-24 season) 01/24/2024     --------------------    Physical Exam:  Video physical exam  General: Patient appears well in no acute distress.   Skin: No visualized rash or lesions on visualized skin  Eyes: EOMI, no erythema, sclera icterus or discharge noted  Resp: Appears to be breathing comfortably without accessory muscle usage, speaking in full sentences, no cough  MSK: Appears to have normal range of motion based on visualized movements  Neurologic: No apparent tremors, facial movements symmetric  Psych: affect good, alert and oriented      Labs / Imaging:  Most Recent 3 CBC's:  Recent Labs   Lab Test 09/25/24  0846 06/24/24  1255 04/24/24  1147   WBC 6.9 7.6 10.8   HGB 10.9* 10.2* 9.9*   MCV 90 91 89    181 166   ANEUTAUTO 4.4 6.5 9.1*     Most Recent 3 BMP's:  Recent Labs   Lab Test 09/25/24  0846 06/24/24  1255 04/24/24  1102   * 133* 131*   POTASSIUM 4.0 3.8 3.6   CHLORIDE 94* 99 96*   CO2 23 21* 23   BUN 14.3 17.9 15.5   CR 0.90 1.01 1.02  1.05   ANIONGAP 13 13 12   ROBERT 9.7 9.0 9.4  9.3   * 182* 155*   PROTTOTAL 6.9 6.7 7.0   ALBUMIN 4.4 4.5 4.4  4.3    Most Recent 3 LFT's:  Recent Labs   Lab Test 09/25/24  0846 06/24/24  1255 04/24/24  1102   AST 21 20 23   ALT 11 14 12   ALKPHOS 59 59 61   BILITOTAL 1.0 1.0 1.1    Most Recent 2 TSH and T4:  Recent Labs   Lab Test 06/24/24  1255 01/24/24  1351 05/05/23  0821 08/12/18  1830   TSH 2.27 2.30   < > 4.88*   T4  --   --   --  1.41    < > = values in this interval not displayed.       Last PSA 0.48; recheck scheduled for Monday 12/30    I reviewed the above labs today.      Assessment / Plan:  #Stage IV prostate cancer:  # ADT / Zytiga / Prednisone Sep 2023 - ongoing.  Doing well on zytiga and prednisone, tolerating well without new toxicity. He will be due for [facility] March  2025. We will continue zometa every 3 mo for bone support. PSA continues to decline nicely on last check, will follow up on Mondays level if there is a change. His testosterone is well within castrate range on last check. Return to clinic 3 months with Dr. Holliday in person, labs, lupron, and zometa. Will request 6 mo visit as well.    #L hip pain:   Does have history of bone mets. Discussed with stable/declining PSA, less likely related to prostate cancer but remains in differential. ?msk component as responds to heat and onset was with activity. I offered him a Xray on Monday but he would like to hold off and monitor. We discussed heat/ice and tylenol 1g TID. I asked he let us know if this does not resolve or worsens.         40 minutes spent on the date of the encounter doing chart review, review of test results, interpretation of tests, patient visit, and documentation     The longitudinal plan of care for the diagnosis(es)/condition(s) as documented were addressed during this visit. Due to the added complexity in care, I will continue to support Sushil in the subsequent management and with ongoing continuity of care.    Emelina Acosta CNP            Again, thank you for allowing me to participate in the care of your patient.        Sincerely,        Emelina Acosta CNP    Electronically signed

## 2024-12-26 NOTE — NURSING NOTE
Current patient location: [address] [place] [id number]    Is the patient currently in the state of [place]? YES    Visit mode:VIDEO    If the visit is dropped, the patient can be reconnected by:TELEPHONE VISIT: Phone number: [phone]    Will anyone else be joining the visit? Yes, spouse Kaleigh will be there with him .  (If patient encounters technical issues they should call [phone]    Are changes needed to the allergy or medication list? No    Are refills needed on medications prescribed by this physician? NO    Rooming Documentation:  Questionnaire(s) completed    Reason for visit: RECHECK    Loraine PAULINO

## 2024-12-30 ENCOUNTER — TELEPHONE (OUTPATIENT)
Dept: ONCOLOGY | Facility: CLINIC | Age: 64
End: 2024-12-30

## 2024-12-30 ENCOUNTER — INFUSION THERAPY VISIT (OUTPATIENT)
Dept: INFUSION THERAPY | Facility: CLINIC | Age: 64
End: 2024-12-30
Attending: INTERNAL MEDICINE
Payer: MEDICARE

## 2024-12-30 VITALS
SYSTOLIC BLOOD PRESSURE: 134 MMHG | HEART RATE: 64 BPM | RESPIRATION RATE: 18 BRPM | WEIGHT: 165.7 LBS | DIASTOLIC BLOOD PRESSURE: 56 MMHG | OXYGEN SATURATION: 98 % | BODY MASS INDEX: 25.19 KG/M2 | TEMPERATURE: 97.8 F

## 2024-12-30 DIAGNOSIS — C61 PROSTATE CANCER METASTATIC TO BONE (H): Primary | ICD-10-CM

## 2024-12-30 DIAGNOSIS — C79.51 PROSTATE CANCER METASTATIC TO BONE (H): Primary | ICD-10-CM

## 2024-12-30 LAB
ALBUMIN SERPL BCG-MCNC: 4.5 G/DL (ref 3.5–5.2)
ALP SERPL-CCNC: 58 U/L (ref 40–150)
ALT SERPL W P-5'-P-CCNC: 12 U/L (ref 0–70)
ANION GAP SERPL CALCULATED.3IONS-SCNC: 13 MMOL/L (ref 7–15)
AST SERPL W P-5'-P-CCNC: 19 U/L (ref 0–45)
BASOPHILS # BLD AUTO: 0 10E3/UL (ref 0–0.2)
BASOPHILS NFR BLD AUTO: 0 %
BILIRUB SERPL-MCNC: 1 MG/DL
BUN SERPL-MCNC: 22 MG/DL (ref 8–23)
CALCIUM SERPL-MCNC: 9.1 MG/DL (ref 8.8–10.4)
CHLORIDE SERPL-SCNC: 93 MMOL/L (ref 98–107)
CREAT SERPL-MCNC: 1.26 MG/DL (ref 0.67–1.17)
EGFRCR SERPLBLD CKD-EPI 2021: 63 ML/MIN/1.73M2
EOSINOPHIL # BLD AUTO: 0.1 10E3/UL (ref 0–0.7)
EOSINOPHIL NFR BLD AUTO: 1 %
ERYTHROCYTE [DISTWIDTH] IN BLOOD BY AUTOMATED COUNT: 13 % (ref 10–15)
GLUCOSE SERPL-MCNC: 212 MG/DL (ref 70–99)
HCO3 SERPL-SCNC: 23 MMOL/L (ref 22–29)
HCT VFR BLD AUTO: 32.1 % (ref 40–53)
HGB BLD-MCNC: 11.3 G/DL (ref 13.3–17.7)
IMM GRANULOCYTES # BLD: 0.1 10E3/UL
IMM GRANULOCYTES NFR BLD: 1 %
LYMPHOCYTES # BLD AUTO: 0.6 10E3/UL (ref 0.8–5.3)
LYMPHOCYTES NFR BLD AUTO: 5 %
MCH RBC QN AUTO: 31.7 PG (ref 26.5–33)
MCHC RBC AUTO-ENTMCNC: 35.2 G/DL (ref 31.5–36.5)
MCV RBC AUTO: 90 FL (ref 78–100)
MONOCYTES # BLD AUTO: 0.6 10E3/UL (ref 0–1.3)
MONOCYTES NFR BLD AUTO: 5 %
NEUTROPHILS # BLD AUTO: 10.1 10E3/UL (ref 1.6–8.3)
NEUTROPHILS NFR BLD AUTO: 88 %
NRBC # BLD AUTO: 0 10E3/UL
NRBC BLD AUTO-RTO: 0 /100
PLATELET # BLD AUTO: 231 10E3/UL (ref 150–450)
POTASSIUM SERPL-SCNC: 4.2 MMOL/L (ref 3.4–5.3)
PROT SERPL-MCNC: 6.5 G/DL (ref 6.4–8.3)
PSA SERPL DL<=0.01 NG/ML-MCNC: 0.21 NG/ML (ref 0–4.5)
RBC # BLD AUTO: 3.57 10E6/UL (ref 4.4–5.9)
SODIUM SERPL-SCNC: 129 MMOL/L (ref 135–145)
WBC # BLD AUTO: 11.4 10E3/UL (ref 4–11)

## 2024-12-30 PROCEDURE — 84403 ASSAY OF TOTAL TESTOSTERONE: CPT

## 2024-12-30 PROCEDURE — 85048 AUTOMATED LEUKOCYTE COUNT: CPT

## 2024-12-30 PROCEDURE — 84153 ASSAY OF PSA TOTAL: CPT

## 2024-12-30 PROCEDURE — 36415 COLL VENOUS BLD VENIPUNCTURE: CPT

## 2024-12-30 PROCEDURE — 96365 THER/PROPH/DIAG IV INF INIT: CPT

## 2024-12-30 PROCEDURE — 80053 COMPREHEN METABOLIC PANEL: CPT

## 2024-12-30 PROCEDURE — 250N000011 HC RX IP 250 OP 636: Performed by: INTERNAL MEDICINE

## 2024-12-30 PROCEDURE — 258N000003 HC RX IP 258 OP 636: Performed by: INTERNAL MEDICINE

## 2024-12-30 PROCEDURE — 85004 AUTOMATED DIFF WBC COUNT: CPT

## 2024-12-30 RX ORDER — ZOLEDRONIC ACID 0.04 MG/ML
4 INJECTION, SOLUTION INTRAVENOUS ONCE
Status: COMPLETED | OUTPATIENT
Start: 2024-12-30 | End: 2024-12-30

## 2024-12-30 RX ADMIN — SODIUM CHLORIDE 100 ML: 9 INJECTION, SOLUTION INTRAVENOUS at 14:07

## 2024-12-30 RX ADMIN — ZOLEDRONIC ACID 4 MG: 0.04 INJECTION, SOLUTION INTRAVENOUS at 14:21

## 2024-12-30 ASSESSMENT — PAIN SCALES - GENERAL: PAINLEVEL_OUTOF10: MODERATE PAIN (4)

## 2024-12-30 NOTE — CONFIDENTIAL NOTE
"[facility]/Voicemail    Clinical Data: Care Coordinator Outreach    Outreach attempted x 1.  Left message on patient's voicemail with call back information and requested return call.    REASON: check with Sushil to see if he has any signs of infection? WBC is high. Also his creat is worse so he should be pushing fluids     Plan: Care Coordinator will try to reach patient again in 1-2 business days.    Justine Pandya RNCC      UPDATE: Spoke with patient, denies current signs and symptoms of infection. Patient states he felt \"a little under the weather\" last week but no fever, cough, or chills. This RN encouraged patient to drink fluids over the next week due to creatinine being abnormal. Patient verbalized understanding. Will check labs in 1 week.  Justine Pandya RNCC       "

## 2025-01-02 LAB — TESTOST SERPL-MCNC: <2 NG/DL (ref 240–950)

## 2025-01-09 ENCOUNTER — LAB (OUTPATIENT)
Dept: LAB | Facility: CLINIC | Age: 65
End: 2025-01-09
Payer: MEDICARE

## 2025-01-09 DIAGNOSIS — C61 PROSTATE CANCER METASTATIC TO BONE (H): ICD-10-CM

## 2025-01-09 DIAGNOSIS — C79.51 PROSTATE CANCER METASTATIC TO BONE (H): ICD-10-CM

## 2025-01-09 LAB
ALBUMIN SERPL BCG-MCNC: 4.7 G/DL (ref 3.5–5.2)
ALP SERPL-CCNC: 58 U/L (ref 40–150)
ALT SERPL W P-5'-P-CCNC: 12 U/L (ref 0–70)
ANION GAP SERPL CALCULATED.3IONS-SCNC: 12 MMOL/L (ref 7–15)
AST SERPL W P-5'-P-CCNC: 19 U/L (ref 0–45)
BASOPHILS # BLD AUTO: 0 10E3/UL (ref 0–0.2)
BASOPHILS NFR BLD AUTO: 0 %
BILIRUB SERPL-MCNC: 0.8 MG/DL
BUN SERPL-MCNC: 17.5 MG/DL (ref 8–23)
CALCIUM SERPL-MCNC: 9.9 MG/DL (ref 8.8–10.4)
CHLORIDE SERPL-SCNC: 97 MMOL/L (ref 98–107)
CREAT SERPL-MCNC: 1.13 MG/DL (ref 0.67–1.17)
EGFRCR SERPLBLD CKD-EPI 2021: 72 ML/MIN/1.73M2
EOSINOPHIL # BLD AUTO: 0.2 10E3/UL (ref 0–0.7)
EOSINOPHIL NFR BLD AUTO: 2 %
ERYTHROCYTE [DISTWIDTH] IN BLOOD BY AUTOMATED COUNT: 12.8 % (ref 10–15)
GLUCOSE SERPL-MCNC: 136 MG/DL (ref 70–99)
HCO3 SERPL-SCNC: 23 MMOL/L (ref 22–29)
HCT VFR BLD AUTO: 32 % (ref 40–53)
HGB BLD-MCNC: 11.3 G/DL (ref 13.3–17.7)
IMM GRANULOCYTES # BLD: 0.1 10E3/UL
IMM GRANULOCYTES NFR BLD: 1 %
LYMPHOCYTES # BLD AUTO: 1.2 10E3/UL (ref 0.8–5.3)
LYMPHOCYTES NFR BLD AUTO: 12 %
MCH RBC QN AUTO: 31.3 PG (ref 26.5–33)
MCHC RBC AUTO-ENTMCNC: 35.3 G/DL (ref 31.5–36.5)
MCV RBC AUTO: 89 FL (ref 78–100)
MONOCYTES # BLD AUTO: 1.1 10E3/UL (ref 0–1.3)
MONOCYTES NFR BLD AUTO: 11 %
NEUTROPHILS # BLD AUTO: 7.3 10E3/UL (ref 1.6–8.3)
NEUTROPHILS NFR BLD AUTO: 74 %
NRBC # BLD AUTO: 0 10E3/UL
NRBC BLD AUTO-RTO: 0 /100
PLATELET # BLD AUTO: 226 10E3/UL (ref 150–450)
POTASSIUM SERPL-SCNC: 3.7 MMOL/L (ref 3.4–5.3)
PROT SERPL-MCNC: 7.3 G/DL (ref 6.4–8.3)
PSA SERPL DL<=0.01 NG/ML-MCNC: 0.2 NG/ML (ref 0–4.5)
RBC # BLD AUTO: 3.61 10E6/UL (ref 4.4–5.9)
SODIUM SERPL-SCNC: 132 MMOL/L (ref 135–145)
WBC # BLD AUTO: 9.9 10E3/UL (ref 4–11)

## 2025-01-12 LAB — TESTOST SERPL-MCNC: 2 NG/DL (ref 240–950)

## 2025-01-14 NOTE — TELEPHONE ENCOUNTER
Patient Quality Outreach    Patient is due for the following:   Diabetes -  A1C    Action(s) Taken:       Type of outreach:    Sent [employer] message.    Questions for provider review:    None           Tiffany Nelson LPN

## 2025-01-22 DIAGNOSIS — C61 PROSTATE CANCER METASTATIC TO BONE (H): Primary | ICD-10-CM

## 2025-01-22 DIAGNOSIS — C79.51 PROSTATE CANCER METASTATIC TO BONE (H): Primary | ICD-10-CM

## 2025-01-22 RX ORDER — ABIRATERONE ACETATE 250 MG/1
1000 TABLET ORAL DAILY
Qty: 120 TABLET | Refills: 0 | Status: SHIPPED | OUTPATIENT
Start: 2025-01-27 | End: 2025-02-26

## 2025-01-25 ENCOUNTER — HEALTH MAINTENANCE LETTER (OUTPATIENT)
Age: 65
End: 2025-01-25

## 2025-01-26 DIAGNOSIS — I10 HYPERTENSION, UNSPECIFIED TYPE: ICD-10-CM

## 2025-01-26 DIAGNOSIS — I50.41 ACUTE COMBINED SYSTOLIC AND DIASTOLIC CONGESTIVE HEART FAILURE (H): ICD-10-CM

## 2025-01-27 RX ORDER — METOPROLOL TARTRATE 50 MG
TABLET ORAL
Qty: 180 TABLET | Refills: 1 | Status: SHIPPED | OUTPATIENT
Start: 2025-01-27

## 2025-01-27 RX ORDER — HYDROCHLOROTHIAZIDE 25 MG/1
25 TABLET ORAL DAILY
Qty: 90 TABLET | Refills: 1 | Status: SHIPPED | OUTPATIENT
Start: 2025-01-27

## 2025-01-27 RX ORDER — LISINOPRIL 40 MG/1
40 TABLET ORAL DAILY
Qty: 90 TABLET | Refills: 1 | Status: SHIPPED | OUTPATIENT
Start: 2025-01-27

## 2025-02-17 ENCOUNTER — TELEPHONE (OUTPATIENT)
Dept: INTERNAL MEDICINE | Facility: CLINIC | Age: 65
End: 2025-02-17
Payer: MEDICARE

## 2025-02-17 DIAGNOSIS — E11.9 DIABETES MELLITUS WITHOUT COMPLICATION (H): Primary | ICD-10-CM

## 2025-02-17 NOTE — TELEPHONE ENCOUNTER
Order/Referral Request    Who is requesting: patient    Orders being requested: diabletic labs as requested on 1/14 on my chart     Reason service is needed/diagnosis: diabetes    When are orders needed by:     Has this been discussed with Provider: Yes    Does patient have a preference on a Group/Provider/Facility? [facility]    Does patient have an appointment scheduled?: No    Where to send orders: Place orders within Epic    Could we send this information to you in [facility] or would you prefer to receive a phone call?:   Patient would prefer a phone call   Okay to leave a detailed message?: Yes at Home number on file [phone] (home)

## 2025-02-20 DIAGNOSIS — C61 PROSTATE CANCER METASTATIC TO BONE (H): Primary | ICD-10-CM

## 2025-02-20 DIAGNOSIS — C79.51 PROSTATE CANCER METASTATIC TO BONE (H): Primary | ICD-10-CM

## 2025-02-20 RX ORDER — ABIRATERONE ACETATE 250 MG/1
1000 TABLET ORAL DAILY
Qty: 120 TABLET | Refills: 0 | OUTPATIENT
Start: 2025-02-26 | End: 2025-03-28

## 2025-03-18 DIAGNOSIS — F43.22 ADJUSTMENT DISORDER WITH ANXIOUS MOOD: ICD-10-CM

## 2025-03-18 RX ORDER — CITALOPRAM HYDROBROMIDE 20 MG/1
20 TABLET ORAL DAILY
Qty: 90 TABLET | Refills: 0 | Status: SHIPPED | OUTPATIENT
Start: 2025-03-18

## 2025-03-20 ENCOUNTER — LAB (OUTPATIENT)
Dept: LAB | Facility: CLINIC | Age: 65
End: 2025-03-20
Payer: MEDICARE

## 2025-03-20 ENCOUNTER — ONCOLOGY VISIT (OUTPATIENT)
Dept: ONCOLOGY | Facility: CLINIC | Age: 65
End: 2025-03-20
Attending: INTERNAL MEDICINE
Payer: MEDICARE

## 2025-03-20 VITALS
OXYGEN SATURATION: 98 % | RESPIRATION RATE: 18 BRPM | TEMPERATURE: 97.7 F | DIASTOLIC BLOOD PRESSURE: 50 MMHG | WEIGHT: 159.06 LBS | SYSTOLIC BLOOD PRESSURE: 130 MMHG | HEIGHT: 68 IN | BODY MASS INDEX: 24.11 KG/M2 | HEART RATE: 85 BPM

## 2025-03-20 DIAGNOSIS — C61 PROSTATE CANCER (H): ICD-10-CM

## 2025-03-20 DIAGNOSIS — C61 PROSTATE CANCER METASTATIC TO BONE (H): ICD-10-CM

## 2025-03-20 DIAGNOSIS — C61 PROSTATE CANCER (H): Primary | ICD-10-CM

## 2025-03-20 DIAGNOSIS — E55.9 VITAMIN D DEFICIENCY: ICD-10-CM

## 2025-03-20 DIAGNOSIS — C79.51 PROSTATE CANCER METASTATIC TO BONE (H): ICD-10-CM

## 2025-03-20 DIAGNOSIS — E11.9 DIABETES MELLITUS WITHOUT COMPLICATION (H): ICD-10-CM

## 2025-03-20 LAB
ALBUMIN SERPL BCG-MCNC: 4.7 G/DL (ref 3.5–5.2)
ALP SERPL-CCNC: 59 U/L (ref 40–150)
ALT SERPL W P-5'-P-CCNC: 13 U/L (ref 0–70)
ANION GAP SERPL CALCULATED.3IONS-SCNC: 12 MMOL/L (ref 7–15)
AST SERPL W P-5'-P-CCNC: 26 U/L (ref 0–45)
BASOPHILS # BLD AUTO: 0 10E3/UL (ref 0–0.2)
BASOPHILS NFR BLD AUTO: 0 %
BILIRUB SERPL-MCNC: 0.8 MG/DL
BUN SERPL-MCNC: 18.7 MG/DL (ref 8–23)
CALCIUM SERPL-MCNC: 10 MG/DL (ref 8.8–10.4)
CHLORIDE SERPL-SCNC: 97 MMOL/L (ref 98–107)
CREAT SERPL-MCNC: 1.16 MG/DL (ref 0.67–1.17)
EGFRCR SERPLBLD CKD-EPI 2021: 69 ML/MIN/1.73M2
EOSINOPHIL # BLD AUTO: 0.3 10E3/UL (ref 0–0.7)
EOSINOPHIL NFR BLD AUTO: 3 %
ERYTHROCYTE [DISTWIDTH] IN BLOOD BY AUTOMATED COUNT: 12.6 % (ref 10–15)
EST. AVERAGE GLUCOSE BLD GHB EST-MCNC: 123 MG/DL
GLUCOSE SERPL-MCNC: 94 MG/DL (ref 70–99)
HBA1C MFR BLD: 5.9 %
HCO3 SERPL-SCNC: 24 MMOL/L (ref 22–29)
HCT VFR BLD AUTO: 31 % (ref 40–53)
HGB BLD-MCNC: 10.6 G/DL (ref 13.3–17.7)
IMM GRANULOCYTES # BLD: 0 10E3/UL
IMM GRANULOCYTES NFR BLD: 1 %
LYMPHOCYTES # BLD AUTO: 1.1 10E3/UL (ref 0.8–5.3)
LYMPHOCYTES NFR BLD AUTO: 15 %
MCH RBC QN AUTO: 30.5 PG (ref 26.5–33)
MCHC RBC AUTO-ENTMCNC: 34.2 G/DL (ref 31.5–36.5)
MCV RBC AUTO: 89 FL (ref 78–100)
MONOCYTES # BLD AUTO: 0.9 10E3/UL (ref 0–1.3)
MONOCYTES NFR BLD AUTO: 12 %
NEUTROPHILS # BLD AUTO: 5.3 10E3/UL (ref 1.6–8.3)
NEUTROPHILS NFR BLD AUTO: 70 %
NRBC # BLD AUTO: 0 10E3/UL
NRBC BLD AUTO-RTO: 0 /100
PLATELET # BLD AUTO: 233 10E3/UL (ref 150–450)
POTASSIUM SERPL-SCNC: 3.6 MMOL/L (ref 3.4–5.3)
PROT SERPL-MCNC: 7.1 G/DL (ref 6.4–8.3)
PSA SERPL DL<=0.01 NG/ML-MCNC: 0.22 NG/ML (ref 0–4.5)
RBC # BLD AUTO: 3.47 10E6/UL (ref 4.4–5.9)
SODIUM SERPL-SCNC: 133 MMOL/L (ref 135–145)
VIT D+METAB SERPL-MCNC: 34 NG/ML (ref 20–50)
WBC # BLD AUTO: 7.6 10E3/UL (ref 4–11)

## 2025-03-20 ASSESSMENT — PAIN SCALES - GENERAL: PAINLEVEL_OUTOF10: MILD PAIN (2)

## 2025-03-20 NOTE — PROGRESS NOTES
"[facility] Hematology / Oncology  Progress Note  Name: Sushil Diana  :  1960  MRN:  [id number]    --------------------    Subjective:  Sushil returns for follow-up of prostate cancer accompanied by his wife.  All in all, he has been quite well and stable from a health standpoint.  Fortunately, it has been a long time since he suffered a hot flash or vasomotor related complaints.  He has not noticed any breast enlargement or tenderness.  Libido and sexual function are absent; he and his wife is excepted this and are okay with it.  No major financial toxicity from therapy and treatments are being delivered on time.    --------------------    Objective:  VS: /50 (BP Location: Right arm, Patient Position: Sitting, Cuff Size: Adult Regular)   Pulse 85   Temp 97.7  F (36.5  C) (Temporal)   Resp 18   Ht 1.727 m (5' 8\")   Wt 72.2 kg (159 lb 1 oz)   SpO2 98%   BMI 24.19 kg/m    Gen: Well-appearing.    We reviewed CBC, CMP, PSA, vitamin D.    --------------------    Assessment / Plan:  Stage IV prostate cancer:  # ADT / Zytiga / Prednisone Sep 2023 - ongoing.    Continue Zytiga 1000 mg daily / prednisone 5 mg daily.  Continue Lupron ADT 45 mg every 6 months.  Continue Zometa 4 mg every 3 months.  Blood count shows stable, mild anemia; likely ADT-related.  Chemistries show stable and intermittent mild hyponatremia and some trace hyperglycemia but otherwise stable.  PSA pending from today, but trend has been declining over time.    There are no Patient Instructions on file for this visit.  "

## 2025-03-20 NOTE — Clinical Note
"3/20/2025      Sushil Diana  [address] [facility] [id number]      Dear Colleague,    Thank you for referring your patient, Sushil Diana, to the [facility] [place]. Please see a copy of my visit note below.    [facility] Hematology / Oncology  Progress Note  Name: Sushil Diana  :  1960  MRN:  [id number]    --------------------    Subjective:  ***.  Long time since last hot flash.    --------------------    Objective:  VS: /50 (BP Location: Right arm, Patient Position: Sitting, Cuff Size: Adult Regular)   Pulse 85   Temp 97.7  F (36.5  C) (Temporal)   Resp 18   Ht 1.727 m (5' 8\")   Wt 72.2 kg (159 lb 1 oz)   SpO2 98%   BMI 24.19 kg/m    Gen: ***-appearing.    We reviewed ***.  We reviewed ***.    --------------------    Assessment / Plan:  Stage IV prostate cancer:  # ADT / Zytiga / Prednisone Sep 2023 - ongoing.    Continue Zytiga 1000 mg daily / prednisone 5 mg daily.  Continue Lupron ADT 45 mg every 6 months.  Continue Zometa 4 mg every 3 months.  PSA continues to decline nicely.  Chemistries stable outside of some mild hyponatremia; kidney function improved.  Anemia mild and improving; likely secondary to prostate cancer and treatment.  Testosterone pending.  Return to clinic 3 months with next Zometa and labs.    There are no Patient Instructions on file for this visit.      Again, thank you for allowing me to participate in the care of your patient.        Sincerely,        Eduardo Holliday MD    Electronically signed"

## 2025-03-24 DIAGNOSIS — C79.51 PROSTATE CANCER METASTATIC TO BONE (H): Primary | ICD-10-CM

## 2025-03-24 DIAGNOSIS — C61 PROSTATE CANCER METASTATIC TO BONE (H): Primary | ICD-10-CM

## 2025-03-24 RX ORDER — ABIRATERONE ACETATE 250 MG/1
1000 TABLET ORAL DAILY
Qty: 120 TABLET | Refills: 0 | Status: SHIPPED | OUTPATIENT
Start: 2025-03-28 | End: 2025-04-27

## 2025-04-13 DIAGNOSIS — E11.9 DIABETES MELLITUS WITHOUT COMPLICATION (H): ICD-10-CM

## 2025-04-22 DIAGNOSIS — C79.51 PROSTATE CANCER METASTATIC TO BONE (H): Primary | ICD-10-CM

## 2025-04-22 DIAGNOSIS — C61 PROSTATE CANCER METASTATIC TO BONE (H): Primary | ICD-10-CM

## 2025-04-22 RX ORDER — ABIRATERONE ACETATE 250 MG/1
1000 TABLET ORAL DAILY
Qty: 120 TABLET | Refills: 0 | OUTPATIENT
Start: 2025-05-27 | End: 2025-04-22

## 2025-04-22 RX ORDER — ABIRATERONE ACETATE 250 MG/1
1000 TABLET ORAL DAILY
Qty: 120 TABLET | Refills: 0 | OUTPATIENT
Start: 2025-04-27 | End: 2025-05-27

## 2025-05-13 DIAGNOSIS — I10 HYPERTENSION, UNSPECIFIED TYPE: ICD-10-CM

## 2025-05-13 DIAGNOSIS — E11.9 DIABETES MELLITUS WITHOUT COMPLICATION (H): ICD-10-CM

## 2025-05-13 RX ORDER — AMLODIPINE BESYLATE 10 MG/1
10 TABLET ORAL DAILY
Qty: 90 TABLET | Refills: 0 | Status: SHIPPED | OUTPATIENT
Start: 2025-05-13

## 2025-05-14 RX ORDER — GLIPIZIDE 5 MG/1
5 TABLET ORAL EVERY MORNING
Qty: 90 TABLET | Refills: 0 | Status: SHIPPED | OUTPATIENT
Start: 2025-05-14

## 2025-05-19 ENCOUNTER — APPOINTMENT (OUTPATIENT)
Dept: GENERAL RADIOLOGY | Facility: CLINIC | Age: 65
End: 2025-05-19
Attending: EMERGENCY MEDICINE
Payer: MEDICARE

## 2025-05-19 ENCOUNTER — HOSPITAL ENCOUNTER (INPATIENT)
Facility: CLINIC | Age: 65
LOS: 2 days | Discharge: OTHER INSTITUTION | End: 2025-05-21
Attending: EMERGENCY MEDICINE | Admitting: INTERNAL MEDICINE
Payer: MEDICARE

## 2025-05-19 DIAGNOSIS — E11.621 DIABETIC ULCER OF RIGHT MIDFOOT ASSOCIATED WITH TYPE 2 DIABETES MELLITUS, UNSPECIFIED ULCER STAGE (H): ICD-10-CM

## 2025-05-19 DIAGNOSIS — D64.9 ANEMIA, UNSPECIFIED TYPE: ICD-10-CM

## 2025-05-19 DIAGNOSIS — E87.6 HYPOKALEMIA: ICD-10-CM

## 2025-05-19 DIAGNOSIS — L97.419 DIABETIC ULCER OF RIGHT MIDFOOT ASSOCIATED WITH TYPE 2 DIABETES MELLITUS, UNSPECIFIED ULCER STAGE (H): ICD-10-CM

## 2025-05-19 DIAGNOSIS — E87.1 HYPONATREMIA: ICD-10-CM

## 2025-05-19 LAB
ALBUMIN SERPL BCG-MCNC: 4 G/DL (ref 3.5–5.2)
ALBUMIN UR-MCNC: 10 MG/DL
ALP SERPL-CCNC: 74 U/L (ref 40–150)
ALT SERPL W P-5'-P-CCNC: 24 U/L (ref 0–70)
ANION GAP SERPL CALCULATED.3IONS-SCNC: 16 MMOL/L (ref 7–15)
APPEARANCE UR: ABNORMAL
AST SERPL W P-5'-P-CCNC: 82 U/L (ref 0–45)
BACTERIA #/AREA URNS HPF: ABNORMAL /HPF
BASOPHILS # BLD AUTO: 0 10E3/UL (ref 0–0.2)
BASOPHILS NFR BLD AUTO: 0 %
BILIRUB SERPL-MCNC: 1 MG/DL
BILIRUB UR QL STRIP: NEGATIVE
BUN SERPL-MCNC: 19.6 MG/DL (ref 8–23)
CALCIUM SERPL-MCNC: 9 MG/DL (ref 8.8–10.4)
CHLORIDE SERPL-SCNC: 84 MMOL/L (ref 98–107)
COLOR UR AUTO: ABNORMAL
CREAT SERPL-MCNC: 0.96 MG/DL (ref 0.67–1.17)
CRP SERPL-MCNC: 82.36 MG/L
EGFRCR SERPLBLD CKD-EPI 2021: 87 ML/MIN/1.73M2
EOSINOPHIL # BLD AUTO: 0.1 10E3/UL (ref 0–0.7)
EOSINOPHIL NFR BLD AUTO: 0 %
ERYTHROCYTE [DISTWIDTH] IN BLOOD BY AUTOMATED COUNT: 11.9 % (ref 10–15)
ERYTHROCYTE [SEDIMENTATION RATE] IN BLOOD BY WESTERGREN METHOD: 49 MM/HR (ref 0–20)
GLUCOSE BLDC GLUCOMTR-MCNC: 107 MG/DL (ref 70–99)
GLUCOSE BLDC GLUCOMTR-MCNC: 108 MG/DL (ref 70–99)
GLUCOSE BLDC GLUCOMTR-MCNC: 64 MG/DL (ref 70–99)
GLUCOSE SERPL-MCNC: 62 MG/DL (ref 70–99)
GLUCOSE UR STRIP-MCNC: NEGATIVE MG/DL
HCO3 SERPL-SCNC: 20 MMOL/L (ref 22–29)
HCT VFR BLD AUTO: 24.9 % (ref 40–53)
HGB BLD-MCNC: 9 G/DL (ref 13.3–17.7)
HGB UR QL STRIP: ABNORMAL
HYALINE CASTS: 2 /LPF
IMM GRANULOCYTES # BLD: 0.1 10E3/UL
IMM GRANULOCYTES NFR BLD: 1 %
KETONES UR STRIP-MCNC: ABNORMAL MG/DL
LACTATE SERPL-SCNC: 0.8 MMOL/L (ref 0.7–2)
LEUKOCYTE ESTERASE UR QL STRIP: NEGATIVE
LYMPHOCYTES # BLD AUTO: 0.6 10E3/UL (ref 0.8–5.3)
LYMPHOCYTES NFR BLD AUTO: 6 %
MAGNESIUM SERPL-MCNC: 1.7 MG/DL (ref 1.7–2.3)
MCH RBC QN AUTO: 30 PG (ref 26.5–33)
MCHC RBC AUTO-ENTMCNC: 36.1 G/DL (ref 31.5–36.5)
MCV RBC AUTO: 83 FL (ref 78–100)
MONOCYTES # BLD AUTO: 1 10E3/UL (ref 0–1.3)
MONOCYTES NFR BLD AUTO: 9 %
MUCOUS THREADS #/AREA URNS LPF: PRESENT /LPF
NEUTROPHILS # BLD AUTO: 9.4 10E3/UL (ref 1.6–8.3)
NEUTROPHILS NFR BLD AUTO: 84 %
NITRATE UR QL: NEGATIVE
NRBC # BLD AUTO: 0 10E3/UL
NRBC BLD AUTO-RTO: 0 /100
PH UR STRIP: 5.5 [PH] (ref 5–7)
PLATELET # BLD AUTO: 206 10E3/UL (ref 150–450)
POTASSIUM SERPL-SCNC: 3.1 MMOL/L (ref 3.4–5.3)
PROCALCITONIN SERPL IA-MCNC: 0.23 NG/ML
PROT SERPL-MCNC: 6.4 G/DL (ref 6.4–8.3)
RBC # BLD AUTO: 3 10E6/UL (ref 4.4–5.9)
RBC URINE: 3 /HPF
SODIUM SERPL-SCNC: 120 MMOL/L (ref 135–145)
SODIUM SERPL-SCNC: 122 MMOL/L (ref 135–145)
SODIUM UR-SCNC: 24 MMOL/L
SP GR UR STRIP: 1.01 (ref 1–1.03)
SQUAMOUS EPITHELIAL: <1 /HPF
TSH SERPL DL<=0.005 MIU/L-ACNC: 2.98 UIU/ML (ref 0.3–4.2)
UROBILINOGEN UR STRIP-MCNC: NORMAL MG/DL
WBC # BLD AUTO: 11.2 10E3/UL (ref 4–11)
WBC URINE: 0 /HPF

## 2025-05-19 PROCEDURE — 86140 C-REACTIVE PROTEIN: CPT | Performed by: EMERGENCY MEDICINE

## 2025-05-19 PROCEDURE — 84295 ASSAY OF SERUM SODIUM: CPT | Performed by: INTERNAL MEDICINE

## 2025-05-19 PROCEDURE — 83930 ASSAY OF BLOOD OSMOLALITY: CPT | Performed by: INTERNAL MEDICINE

## 2025-05-19 PROCEDURE — 96367 TX/PROPH/DG ADDL SEQ IV INF: CPT | Performed by: EMERGENCY MEDICINE

## 2025-05-19 PROCEDURE — 258N000003 HC RX IP 258 OP 636: Performed by: INTERNAL MEDICINE

## 2025-05-19 PROCEDURE — 73630 X-RAY EXAM OF FOOT: CPT | Mod: RT

## 2025-05-19 PROCEDURE — 99285 EMERGENCY DEPT VISIT HI MDM: CPT | Performed by: EMERGENCY MEDICINE

## 2025-05-19 PROCEDURE — 82962 GLUCOSE BLOOD TEST: CPT

## 2025-05-19 PROCEDURE — 36415 COLL VENOUS BLD VENIPUNCTURE: CPT | Performed by: EMERGENCY MEDICINE

## 2025-05-19 PROCEDURE — 85652 RBC SED RATE AUTOMATED: CPT | Performed by: EMERGENCY MEDICINE

## 2025-05-19 PROCEDURE — 83735 ASSAY OF MAGNESIUM: CPT | Performed by: INTERNAL MEDICINE

## 2025-05-19 PROCEDURE — 83935 ASSAY OF URINE OSMOLALITY: CPT | Performed by: INTERNAL MEDICINE

## 2025-05-19 PROCEDURE — 84443 ASSAY THYROID STIM HORMONE: CPT | Performed by: INTERNAL MEDICINE

## 2025-05-19 PROCEDURE — 85041 AUTOMATED RBC COUNT: CPT | Performed by: EMERGENCY MEDICINE

## 2025-05-19 PROCEDURE — 36415 COLL VENOUS BLD VENIPUNCTURE: CPT | Performed by: INTERNAL MEDICINE

## 2025-05-19 PROCEDURE — 84300 ASSAY OF URINE SODIUM: CPT | Performed by: INTERNAL MEDICINE

## 2025-05-19 PROCEDURE — 80053 COMPREHEN METABOLIC PANEL: CPT | Performed by: EMERGENCY MEDICINE

## 2025-05-19 PROCEDURE — 83605 ASSAY OF LACTIC ACID: CPT | Performed by: EMERGENCY MEDICINE

## 2025-05-19 PROCEDURE — 120N000001 HC R&B MED SURG/OB

## 2025-05-19 PROCEDURE — 84145 PROCALCITONIN (PCT): CPT | Performed by: EMERGENCY MEDICINE

## 2025-05-19 PROCEDURE — 87040 BLOOD CULTURE FOR BACTERIA: CPT | Performed by: EMERGENCY MEDICINE

## 2025-05-19 PROCEDURE — 96365 THER/PROPH/DIAG IV INF INIT: CPT | Performed by: EMERGENCY MEDICINE

## 2025-05-19 PROCEDURE — 81001 URINALYSIS AUTO W/SCOPE: CPT | Performed by: INTERNAL MEDICINE

## 2025-05-19 PROCEDURE — 258N000003 HC RX IP 258 OP 636: Performed by: EMERGENCY MEDICINE

## 2025-05-19 PROCEDURE — 250N000011 HC RX IP 250 OP 636: Performed by: EMERGENCY MEDICINE

## 2025-05-19 PROCEDURE — 99285 EMERGENCY DEPT VISIT HI MDM: CPT | Mod: 25 | Performed by: EMERGENCY MEDICINE

## 2025-05-19 PROCEDURE — 250N000011 HC RX IP 250 OP 636: Performed by: INTERNAL MEDICINE

## 2025-05-19 RX ORDER — ZOLEDRONIC ACID 4 MG/5ML
5 INJECTION INTRAVENOUS ONCE
Status: ON HOLD | COMMUNITY

## 2025-05-19 RX ORDER — DEXTROSE MONOHYDRATE 25 G/50ML
25-50 INJECTION, SOLUTION INTRAVENOUS
Status: DISCONTINUED | OUTPATIENT
Start: 2025-05-19 | End: 2025-05-21 | Stop reason: HOSPADM

## 2025-05-19 RX ORDER — ONDANSETRON 4 MG/1
4 TABLET, ORALLY DISINTEGRATING ORAL EVERY 6 HOURS PRN
Status: DISCONTINUED | OUTPATIENT
Start: 2025-05-19 | End: 2025-05-21 | Stop reason: HOSPADM

## 2025-05-19 RX ORDER — ACETAMINOPHEN 325 MG/1
650 TABLET ORAL EVERY 4 HOURS PRN
Status: DISCONTINUED | OUTPATIENT
Start: 2025-05-19 | End: 2025-05-21 | Stop reason: HOSPADM

## 2025-05-19 RX ORDER — ONDANSETRON 2 MG/ML
4 INJECTION INTRAMUSCULAR; INTRAVENOUS EVERY 6 HOURS PRN
Status: DISCONTINUED | OUTPATIENT
Start: 2025-05-19 | End: 2025-05-21 | Stop reason: HOSPADM

## 2025-05-19 RX ORDER — ACETAMINOPHEN 650 MG/1
650 SUPPOSITORY RECTAL EVERY 4 HOURS PRN
Status: DISCONTINUED | OUTPATIENT
Start: 2025-05-19 | End: 2025-05-21 | Stop reason: HOSPADM

## 2025-05-19 RX ORDER — AMOXICILLIN 250 MG
2 CAPSULE ORAL 2 TIMES DAILY PRN
Status: DISCONTINUED | OUTPATIENT
Start: 2025-05-19 | End: 2025-05-21 | Stop reason: HOSPADM

## 2025-05-19 RX ORDER — CALCIUM CARBONATE 500 MG/1
1000 TABLET, CHEWABLE ORAL 4 TIMES DAILY PRN
Status: DISCONTINUED | OUTPATIENT
Start: 2025-05-19 | End: 2025-05-21 | Stop reason: HOSPADM

## 2025-05-19 RX ORDER — PIPERACILLIN SODIUM, TAZOBACTAM SODIUM 4; .5 G/20ML; G/20ML
4.5 INJECTION, POWDER, LYOPHILIZED, FOR SOLUTION INTRAVENOUS ONCE
Status: COMPLETED | OUTPATIENT
Start: 2025-05-19 | End: 2025-05-19

## 2025-05-19 RX ORDER — SODIUM CHLORIDE AND POTASSIUM CHLORIDE 150; 900 MG/100ML; MG/100ML
INJECTION, SOLUTION INTRAVENOUS CONTINUOUS
Status: DISPENSED | OUTPATIENT
Start: 2025-05-19 | End: 2025-05-19

## 2025-05-19 RX ORDER — AMOXICILLIN 250 MG
1 CAPSULE ORAL 2 TIMES DAILY PRN
Status: DISCONTINUED | OUTPATIENT
Start: 2025-05-19 | End: 2025-05-21 | Stop reason: HOSPADM

## 2025-05-19 RX ORDER — PIPERACILLIN SODIUM, TAZOBACTAM SODIUM 4; .5 G/20ML; G/20ML
4.5 INJECTION, POWDER, LYOPHILIZED, FOR SOLUTION INTRAVENOUS EVERY 6 HOURS
Status: DISCONTINUED | OUTPATIENT
Start: 2025-05-20 | End: 2025-05-21

## 2025-05-19 RX ORDER — SODIUM CHLORIDE, SODIUM LACTATE, POTASSIUM CHLORIDE, CALCIUM CHLORIDE 600; 310; 30; 20 MG/100ML; MG/100ML; MG/100ML; MG/100ML
INJECTION, SOLUTION INTRAVENOUS CONTINUOUS
Status: DISCONTINUED | OUTPATIENT
Start: 2025-05-19 | End: 2025-05-20

## 2025-05-19 RX ORDER — NICOTINE POLACRILEX 4 MG
15-30 LOZENGE BUCCAL
Status: DISCONTINUED | OUTPATIENT
Start: 2025-05-19 | End: 2025-05-21 | Stop reason: HOSPADM

## 2025-05-19 RX ORDER — LIDOCAINE 40 MG/G
CREAM TOPICAL
Status: DISCONTINUED | OUTPATIENT
Start: 2025-05-19 | End: 2025-05-21 | Stop reason: HOSPADM

## 2025-05-19 RX ORDER — ENOXAPARIN SODIUM 100 MG/ML
40 INJECTION SUBCUTANEOUS EVERY 24 HOURS
Status: DISCONTINUED | OUTPATIENT
Start: 2025-05-20 | End: 2025-05-21 | Stop reason: HOSPADM

## 2025-05-19 RX ADMIN — VANCOMYCIN HYDROCHLORIDE 1500 MG: 1 INJECTION, POWDER, LYOPHILIZED, FOR SOLUTION INTRAVENOUS at 19:12

## 2025-05-19 RX ADMIN — PIPERACILLIN AND TAZOBACTAM 4.5 G: 4; .5 INJECTION, POWDER, FOR SOLUTION INTRAVENOUS at 18:19

## 2025-05-19 RX ADMIN — PIPERACILLIN AND TAZOBACTAM 4.5 G: 4; .5 INJECTION, POWDER, FOR SOLUTION INTRAVENOUS at 23:19

## 2025-05-19 RX ADMIN — SODIUM CHLORIDE AND POTASSIUM CHLORIDE: .9; .15 SOLUTION INTRAVENOUS at 20:50

## 2025-05-19 RX ADMIN — SODIUM CHLORIDE, SODIUM LACTATE, POTASSIUM CHLORIDE, AND CALCIUM CHLORIDE: .6; .31; .03; .02 INJECTION, SOLUTION INTRAVENOUS at 22:32

## 2025-05-19 ASSESSMENT — ACTIVITIES OF DAILY LIVING (ADL)
ADLS_ACUITY_SCORE: 41
ADLS_ACUITY_SCORE: 27
ADLS_ACUITY_SCORE: 41

## 2025-05-19 ASSESSMENT — COLUMBIA-SUICIDE SEVERITY RATING SCALE - C-SSRS
1. IN THE PAST MONTH, HAVE YOU WISHED YOU WERE DEAD OR WISHED YOU COULD GO TO SLEEP AND NOT WAKE UP?: NO
6. HAVE YOU EVER DONE ANYTHING, STARTED TO DO ANYTHING, OR PREPARED TO DO ANYTHING TO END YOUR LIFE?: NO
2. HAVE YOU ACTUALLY HAD ANY THOUGHTS OF KILLING YOURSELF IN THE PAST MONTH?: NO

## 2025-05-19 NOTE — ED TRIAGE NOTES
BIBA from home c/o R foot swelling and unable to bear weight. This has been happening since Friday but worse today. Scabs and sore on foot. HX: DM2, prostate cancer on oral chemo, AFIB, CHF  Wife states he is getting more confused  BS 84   On RA  20 gauge PIV

## 2025-05-20 ENCOUNTER — APPOINTMENT (OUTPATIENT)
Dept: ULTRASOUND IMAGING | Facility: CLINIC | Age: 65
End: 2025-05-20
Attending: PEDIATRICS
Payer: MEDICARE

## 2025-05-20 ENCOUNTER — APPOINTMENT (OUTPATIENT)
Dept: ULTRASOUND IMAGING | Facility: CLINIC | Age: 65
End: 2025-05-20
Attending: PODIATRIST
Payer: MEDICARE

## 2025-05-20 ENCOUNTER — APPOINTMENT (OUTPATIENT)
Dept: MRI IMAGING | Facility: CLINIC | Age: 65
End: 2025-05-20
Attending: PODIATRIST
Payer: MEDICARE

## 2025-05-20 PROBLEM — L08.9 DIABETIC INFECTION OF RIGHT FOOT (H): Status: ACTIVE | Noted: 2025-05-20

## 2025-05-20 PROBLEM — G93.41 METABOLIC ENCEPHALOPATHY: Status: ACTIVE | Noted: 2025-05-20

## 2025-05-20 PROBLEM — E16.2 HYPOGLYCEMIA: Status: ACTIVE | Noted: 2025-05-20

## 2025-05-20 PROBLEM — C79.51 PROSTATE CANCER METASTATIC TO BONE (H): Status: ACTIVE | Noted: 2023-08-30

## 2025-05-20 PROBLEM — I50.42 CHRONIC COMBINED SYSTOLIC (CONGESTIVE) AND DIASTOLIC (CONGESTIVE) HEART FAILURE (H): Status: ACTIVE | Noted: 2018-08-12

## 2025-05-20 PROBLEM — E11.65 TYPE 2 DIABETES MELLITUS WITH HYPERGLYCEMIA, WITHOUT LONG-TERM CURRENT USE OF INSULIN (H): Status: ACTIVE | Noted: 2019-11-17

## 2025-05-20 PROBLEM — E87.29 INCREASED ANION GAP METABOLIC ACIDOSIS: Status: ACTIVE | Noted: 2025-05-20

## 2025-05-20 PROBLEM — C61 PROSTATE CANCER METASTATIC TO BONE (H): Status: ACTIVE | Noted: 2023-08-30

## 2025-05-20 PROBLEM — M79.89 SWELLING OF RIGHT LOWER EXTREMITY: Status: ACTIVE | Noted: 2025-05-20

## 2025-05-20 PROBLEM — K55.1 MESENTERIC ARTERY STENOSIS: Status: ACTIVE | Noted: 2025-05-20

## 2025-05-20 PROBLEM — E83.39 HYPOPHOSPHATEMIA: Status: ACTIVE | Noted: 2025-05-20

## 2025-05-20 PROBLEM — E11.628 DIABETIC INFECTION OF RIGHT FOOT (H): Status: ACTIVE | Noted: 2025-05-20

## 2025-05-20 PROBLEM — I10 ESSENTIAL HYPERTENSION, BENIGN: Status: ACTIVE | Noted: 2018-09-25

## 2025-05-20 LAB
ANION GAP SERPL CALCULATED.3IONS-SCNC: 12 MMOL/L (ref 7–15)
ANION GAP SERPL CALCULATED.3IONS-SCNC: 13 MMOL/L (ref 7–15)
ANION GAP SERPL CALCULATED.3IONS-SCNC: 13 MMOL/L (ref 7–15)
ANION GAP SERPL CALCULATED.3IONS-SCNC: 9 MMOL/L (ref 7–15)
BASE EXCESS BLDV CALC-SCNC: -2.7 MMOL/L (ref -3–3)
BASE EXCESS BLDV CALC-SCNC: -3.4 MMOL/L (ref -3–3)
BASE EXCESS BLDV CALC-SCNC: -3.4 MMOL/L (ref -3–3)
BUN SERPL-MCNC: 12.1 MG/DL (ref 8–23)
CALCIUM SERPL-MCNC: 8.3 MG/DL (ref 8.8–10.4)
CHLORIDE SERPL-SCNC: 91 MMOL/L (ref 98–107)
CHLORIDE SERPL-SCNC: 94 MMOL/L (ref 98–107)
CHLORIDE SERPL-SCNC: 94 MMOL/L (ref 98–107)
CHLORIDE SERPL-SCNC: 96 MMOL/L (ref 98–107)
CREAT SERPL-MCNC: 0.78 MG/DL (ref 0.67–1.17)
EGFRCR SERPLBLD CKD-EPI 2021: >90 ML/MIN/1.73M2
ERYTHROCYTE [DISTWIDTH] IN BLOOD BY AUTOMATED COUNT: 12.1 % (ref 10–15)
GLUCOSE SERPL-MCNC: 85 MG/DL (ref 70–99)
HCO3 BLDV-SCNC: 20 MMOL/L (ref 21–28)
HCO3 BLDV-SCNC: 22 MMOL/L (ref 21–28)
HCO3 BLDV-SCNC: 22 MMOL/L (ref 21–28)
HCO3 SERPL-SCNC: 18 MMOL/L (ref 22–29)
HCO3 SERPL-SCNC: 19 MMOL/L (ref 22–29)
HCO3 SERPL-SCNC: 19 MMOL/L (ref 22–29)
HCO3 SERPL-SCNC: 23 MMOL/L (ref 22–29)
HCT VFR BLD AUTO: 22.9 % (ref 40–53)
HGB BLD-MCNC: 8.1 G/DL (ref 13.3–17.7)
MCH RBC QN AUTO: 29.5 PG (ref 26.5–33)
MCHC RBC AUTO-ENTMCNC: 35.4 G/DL (ref 31.5–36.5)
MCV RBC AUTO: 83 FL (ref 78–100)
O2/TOTAL GAS SETTING VFR VENT: 21 %
OSMOLALITY SERPL: 246 MMOL/KG (ref 280–301)
OSMOLALITY UR: 445 MMOL/KG (ref 100–1200)
OXYHGB MFR BLDV: 29 % (ref 70–75)
OXYHGB MFR BLDV: 52 % (ref 70–75)
OXYHGB MFR BLDV: 90 % (ref 70–75)
PCO2 BLDV: 30 MM HG (ref 40–50)
PCO2 BLDV: 38 MM HG (ref 40–50)
PCO2 BLDV: 39 MM HG (ref 40–50)
PH BLDV: 7.36 [PH] (ref 7.32–7.43)
PH BLDV: 7.38 [PH] (ref 7.32–7.43)
PH BLDV: 7.44 [PH] (ref 7.32–7.43)
PHOSPHATE SERPL-MCNC: 2.3 MG/DL (ref 2.5–4.5)
PLATELET # BLD AUTO: 169 10E3/UL (ref 150–450)
PO2 BLDV: 21 MM HG (ref 25–47)
PO2 BLDV: 31 MM HG (ref 25–47)
PO2 BLDV: 58 MM HG (ref 25–47)
POTASSIUM SERPL-SCNC: 3.3 MMOL/L (ref 3.4–5.3)
POTASSIUM SERPL-SCNC: 3.8 MMOL/L (ref 3.4–5.3)
POTASSIUM SERPL-SCNC: 3.9 MMOL/L (ref 3.4–5.3)
POTASSIUM SERPL-SCNC: 4 MMOL/L (ref 3.4–5.3)
POTASSIUM SERPL-SCNC: 4.3 MMOL/L (ref 3.4–5.3)
RBC # BLD AUTO: 2.75 10E6/UL (ref 4.4–5.9)
SAO2 % BLDV: 29.8 % (ref 70–75)
SAO2 % BLDV: 52.9 % (ref 70–75)
SAO2 % BLDV: 92.2 % (ref 70–75)
SODIUM SERPL-SCNC: 122 MMOL/L (ref 135–145)
SODIUM SERPL-SCNC: 123 MMOL/L (ref 135–145)
SODIUM SERPL-SCNC: 125 MMOL/L (ref 135–145)
SODIUM SERPL-SCNC: 126 MMOL/L (ref 135–145)
SODIUM SERPL-SCNC: 126 MMOL/L (ref 135–145)
SODIUM SERPL-SCNC: 127 MMOL/L (ref 135–145)
WBC # BLD AUTO: 6.9 10E3/UL (ref 4–11)

## 2025-05-20 PROCEDURE — 84132 ASSAY OF SERUM POTASSIUM: CPT | Performed by: INTERNAL MEDICINE

## 2025-05-20 PROCEDURE — 255N000002 HC RX 255 OP 636: Performed by: PODIATRIST

## 2025-05-20 PROCEDURE — 120N000001 HC R&B MED SURG/OB

## 2025-05-20 PROCEDURE — 82805 BLOOD GASES W/O2 SATURATION: CPT | Performed by: PEDIATRICS

## 2025-05-20 PROCEDURE — 93971 EXTREMITY STUDY: CPT | Mod: RT

## 2025-05-20 PROCEDURE — 258N000003 HC RX IP 258 OP 636: Performed by: INTERNAL MEDICINE

## 2025-05-20 PROCEDURE — A9585 GADOBUTROL INJECTION: HCPCS | Performed by: PODIATRIST

## 2025-05-20 PROCEDURE — 258N000003 HC RX IP 258 OP 636: Performed by: EMERGENCY MEDICINE

## 2025-05-20 PROCEDURE — 250N000011 HC RX IP 250 OP 636: Performed by: INTERNAL MEDICINE

## 2025-05-20 PROCEDURE — 82435 ASSAY OF BLOOD CHLORIDE: CPT | Performed by: INTERNAL MEDICINE

## 2025-05-20 PROCEDURE — 93925 LOWER EXTREMITY STUDY: CPT

## 2025-05-20 PROCEDURE — 85027 COMPLETE CBC AUTOMATED: CPT | Performed by: INTERNAL MEDICINE

## 2025-05-20 PROCEDURE — 250N000013 HC RX MED GY IP 250 OP 250 PS 637: Performed by: PEDIATRICS

## 2025-05-20 PROCEDURE — 73720 MRI LWR EXTREMITY W/O&W/DYE: CPT | Mod: RT

## 2025-05-20 PROCEDURE — 99418 PROLNG IP/OBS E/M EA 15 MIN: CPT | Performed by: PEDIATRICS

## 2025-05-20 PROCEDURE — 250N000012 HC RX MED GY IP 250 OP 636 PS 637: Performed by: INTERNAL MEDICINE

## 2025-05-20 PROCEDURE — 250N000013 HC RX MED GY IP 250 OP 250 PS 637: Performed by: INTERNAL MEDICINE

## 2025-05-20 PROCEDURE — 250N000011 HC RX IP 250 OP 636: Performed by: EMERGENCY MEDICINE

## 2025-05-20 PROCEDURE — 84100 ASSAY OF PHOSPHORUS: CPT | Performed by: STUDENT IN AN ORGANIZED HEALTH CARE EDUCATION/TRAINING PROGRAM

## 2025-05-20 PROCEDURE — 80051 ELECTROLYTE PANEL: CPT | Performed by: INTERNAL MEDICINE

## 2025-05-20 PROCEDURE — 84132 ASSAY OF SERUM POTASSIUM: CPT | Performed by: PEDIATRICS

## 2025-05-20 PROCEDURE — 250N000011 HC RX IP 250 OP 636: Performed by: PEDIATRICS

## 2025-05-20 PROCEDURE — 250N000013 HC RX MED GY IP 250 OP 250 PS 637: Performed by: STUDENT IN AN ORGANIZED HEALTH CARE EDUCATION/TRAINING PROGRAM

## 2025-05-20 PROCEDURE — 258N000003 HC RX IP 258 OP 636: Performed by: PEDIATRICS

## 2025-05-20 PROCEDURE — 36415 COLL VENOUS BLD VENIPUNCTURE: CPT | Performed by: PEDIATRICS

## 2025-05-20 PROCEDURE — 93922 UPR/L XTREMITY ART 2 LEVELS: CPT

## 2025-05-20 PROCEDURE — 99233 SBSQ HOSP IP/OBS HIGH 50: CPT | Performed by: PEDIATRICS

## 2025-05-20 PROCEDURE — 36415 COLL VENOUS BLD VENIPUNCTURE: CPT | Performed by: INTERNAL MEDICINE

## 2025-05-20 RX ORDER — HYDROMORPHONE HYDROCHLORIDE 1 MG/ML
0.3 INJECTION, SOLUTION INTRAMUSCULAR; INTRAVENOUS; SUBCUTANEOUS
Status: DISCONTINUED | OUTPATIENT
Start: 2025-05-20 | End: 2025-05-21 | Stop reason: HOSPADM

## 2025-05-20 RX ORDER — POTASSIUM CHLORIDE 1500 MG/1
40 TABLET, EXTENDED RELEASE ORAL ONCE
Status: DISCONTINUED | OUTPATIENT
Start: 2025-05-20 | End: 2025-05-20

## 2025-05-20 RX ORDER — ATORVASTATIN CALCIUM 10 MG/1
20 TABLET, FILM COATED ORAL DAILY
Status: DISCONTINUED | OUTPATIENT
Start: 2025-05-20 | End: 2025-05-21 | Stop reason: HOSPADM

## 2025-05-20 RX ORDER — METFORMIN HYDROCHLORIDE 500 MG/1
1000 TABLET, EXTENDED RELEASE ORAL
Status: ON HOLD | COMMUNITY

## 2025-05-20 RX ORDER — POTASSIUM CHLORIDE 20MEQ/15ML
40 LIQUID (ML) ORAL ONCE
Status: COMPLETED | OUTPATIENT
Start: 2025-05-20 | End: 2025-05-20

## 2025-05-20 RX ORDER — OXYCODONE HYDROCHLORIDE 5 MG/1
5 TABLET ORAL EVERY 6 HOURS PRN
Refills: 0 | Status: DISCONTINUED | OUTPATIENT
Start: 2025-05-20 | End: 2025-05-21 | Stop reason: HOSPADM

## 2025-05-20 RX ORDER — NALOXONE HYDROCHLORIDE 0.4 MG/ML
0.4 INJECTION, SOLUTION INTRAMUSCULAR; INTRAVENOUS; SUBCUTANEOUS
Status: DISCONTINUED | OUTPATIENT
Start: 2025-05-20 | End: 2025-05-21 | Stop reason: HOSPADM

## 2025-05-20 RX ORDER — FAMOTIDINE 20 MG/1
40 TABLET, FILM COATED ORAL 2 TIMES DAILY
Status: DISCONTINUED | OUTPATIENT
Start: 2025-05-20 | End: 2025-05-21

## 2025-05-20 RX ORDER — METOPROLOL TARTRATE 50 MG
50 TABLET ORAL 2 TIMES DAILY
Status: ON HOLD | COMMUNITY

## 2025-05-20 RX ORDER — GADOBUTROL 604.72 MG/ML
7.5 INJECTION INTRAVENOUS ONCE
Status: COMPLETED | OUTPATIENT
Start: 2025-05-20 | End: 2025-05-20

## 2025-05-20 RX ORDER — SODIUM CHLORIDE 9 MG/ML
INJECTION, SOLUTION INTRAVENOUS CONTINUOUS
Status: DISCONTINUED | OUTPATIENT
Start: 2025-05-20 | End: 2025-05-21 | Stop reason: HOSPADM

## 2025-05-20 RX ORDER — LISINOPRIL 40 MG/1
40 TABLET ORAL DAILY
Status: DISCONTINUED | OUTPATIENT
Start: 2025-05-20 | End: 2025-05-21 | Stop reason: HOSPADM

## 2025-05-20 RX ORDER — METOPROLOL TARTRATE 50 MG
50 TABLET ORAL 2 TIMES DAILY
Status: DISCONTINUED | OUTPATIENT
Start: 2025-05-20 | End: 2025-05-21 | Stop reason: HOSPADM

## 2025-05-20 RX ORDER — ABIRATERONE ACETATE 250 MG/1
1000 TABLET ORAL DAILY
Status: DISCONTINUED | OUTPATIENT
Start: 2025-05-20 | End: 2025-05-21 | Stop reason: HOSPADM

## 2025-05-20 RX ORDER — CITALOPRAM HYDROBROMIDE 20 MG/1
20 TABLET ORAL DAILY
Status: DISCONTINUED | OUTPATIENT
Start: 2025-05-20 | End: 2025-05-21 | Stop reason: HOSPADM

## 2025-05-20 RX ORDER — FAMOTIDINE 40 MG/1
40 TABLET, FILM COATED ORAL 2 TIMES DAILY PRN
Status: ON HOLD | COMMUNITY

## 2025-05-20 RX ORDER — HYDRALAZINE HYDROCHLORIDE 25 MG/1
100 TABLET, FILM COATED ORAL 3 TIMES DAILY
Status: DISCONTINUED | OUTPATIENT
Start: 2025-05-20 | End: 2025-05-21 | Stop reason: HOSPADM

## 2025-05-20 RX ORDER — PREDNISONE 5 MG/1
5 TABLET ORAL DAILY
Status: DISCONTINUED | OUTPATIENT
Start: 2025-05-20 | End: 2025-05-21 | Stop reason: HOSPADM

## 2025-05-20 RX ORDER — HYDRALAZINE HYDROCHLORIDE 100 MG/1
100 TABLET, FILM COATED ORAL 3 TIMES DAILY
Status: ON HOLD | COMMUNITY

## 2025-05-20 RX ORDER — AMLODIPINE BESYLATE 5 MG/1
10 TABLET ORAL DAILY
Status: DISCONTINUED | OUTPATIENT
Start: 2025-05-20 | End: 2025-05-21 | Stop reason: HOSPADM

## 2025-05-20 RX ORDER — NALOXONE HYDROCHLORIDE 0.4 MG/ML
0.2 INJECTION, SOLUTION INTRAMUSCULAR; INTRAVENOUS; SUBCUTANEOUS
Status: DISCONTINUED | OUTPATIENT
Start: 2025-05-20 | End: 2025-05-21 | Stop reason: HOSPADM

## 2025-05-20 RX ADMIN — HYDROMORPHONE HYDROCHLORIDE 0.3 MG: 1 INJECTION, SOLUTION INTRAMUSCULAR; INTRAVENOUS; SUBCUTANEOUS at 04:18

## 2025-05-20 RX ADMIN — POTASSIUM CHLORIDE 40 MEQ: 20 SOLUTION ORAL at 06:37

## 2025-05-20 RX ADMIN — POTASSIUM & SODIUM PHOSPHATES POWDER PACK 280-160-250 MG 1 PACKET: 280-160-250 PACK at 17:02

## 2025-05-20 RX ADMIN — POTASSIUM & SODIUM PHOSPHATES POWDER PACK 280-160-250 MG 1 PACKET: 280-160-250 PACK at 13:11

## 2025-05-20 RX ADMIN — VANCOMYCIN HYDROCHLORIDE 1500 MG: 1 INJECTION, POWDER, LYOPHILIZED, FOR SOLUTION INTRAVENOUS at 09:20

## 2025-05-20 RX ADMIN — CITALOPRAM HYDROBROMIDE 20 MG: 20 TABLET ORAL at 09:14

## 2025-05-20 RX ADMIN — LISINOPRIL 40 MG: 40 TABLET ORAL at 09:18

## 2025-05-20 RX ADMIN — SODIUM CHLORIDE: 0.9 INJECTION, SOLUTION INTRAVENOUS at 19:08

## 2025-05-20 RX ADMIN — PIPERACILLIN AND TAZOBACTAM 4.5 G: 4; .5 INJECTION, POWDER, FOR SOLUTION INTRAVENOUS at 23:07

## 2025-05-20 RX ADMIN — PIPERACILLIN AND TAZOBACTAM 4.5 G: 4; .5 INJECTION, POWDER, FOR SOLUTION INTRAVENOUS at 13:12

## 2025-05-20 RX ADMIN — FAMOTIDINE 40 MG: 20 TABLET, FILM COATED ORAL at 20:52

## 2025-05-20 RX ADMIN — AMLODIPINE BESYLATE 10 MG: 5 TABLET ORAL at 09:14

## 2025-05-20 RX ADMIN — METOPROLOL TARTRATE 50 MG: 50 TABLET, FILM COATED ORAL at 09:14

## 2025-05-20 RX ADMIN — PIPERACILLIN AND TAZOBACTAM 4.5 G: 4; .5 INJECTION, POWDER, FOR SOLUTION INTRAVENOUS at 18:34

## 2025-05-20 RX ADMIN — PREDNISONE 5 MG: 5 TABLET ORAL at 09:14

## 2025-05-20 RX ADMIN — HYDRALAZINE HYDROCHLORIDE 100 MG: 25 TABLET ORAL at 09:14

## 2025-05-20 RX ADMIN — METOPROLOL TARTRATE 50 MG: 50 TABLET, FILM COATED ORAL at 20:52

## 2025-05-20 RX ADMIN — ATORVASTATIN CALCIUM 20 MG: 10 TABLET, FILM COATED ORAL at 09:15

## 2025-05-20 RX ADMIN — HYDRALAZINE HYDROCHLORIDE 100 MG: 25 TABLET ORAL at 15:01

## 2025-05-20 RX ADMIN — GADOBUTROL 7.5 ML: 604.72 INJECTION INTRAVENOUS at 18:02

## 2025-05-20 RX ADMIN — HYDRALAZINE HYDROCHLORIDE 100 MG: 25 TABLET ORAL at 20:52

## 2025-05-20 RX ADMIN — CALCIUM CARBONATE (ANTACID) CHEW TAB 500 MG 1000 MG: 500 CHEW TAB at 07:22

## 2025-05-20 RX ADMIN — ACETAMINOPHEN 650 MG: 325 TABLET ORAL at 02:32

## 2025-05-20 RX ADMIN — ABIRATERONE ACETATE 1000 MG: 250 TABLET ORAL at 14:34

## 2025-05-20 RX ADMIN — PIPERACILLIN AND TAZOBACTAM 4.5 G: 4; .5 INJECTION, POWDER, FOR SOLUTION INTRAVENOUS at 05:08

## 2025-05-20 RX ADMIN — FAMOTIDINE 40 MG: 20 TABLET, FILM COATED ORAL at 09:14

## 2025-05-20 RX ADMIN — ENOXAPARIN SODIUM 40 MG: 40 INJECTION SUBCUTANEOUS at 09:20

## 2025-05-20 RX ADMIN — POTASSIUM & SODIUM PHOSPHATES POWDER PACK 280-160-250 MG 1 PACKET: 280-160-250 PACK at 09:20

## 2025-05-20 RX ADMIN — SODIUM CHLORIDE: 0.9 INJECTION, SOLUTION INTRAVENOUS at 03:19

## 2025-05-20 ASSESSMENT — ACTIVITIES OF DAILY LIVING (ADL)
ADLS_ACUITY_SCORE: 34
ADLS_ACUITY_SCORE: 29
ADLS_ACUITY_SCORE: 32
ADLS_ACUITY_SCORE: 27
ADLS_ACUITY_SCORE: 32
ADLS_ACUITY_SCORE: 27
ADLS_ACUITY_SCORE: 32
ADLS_ACUITY_SCORE: 27
ADLS_ACUITY_SCORE: 27
ADLS_ACUITY_SCORE: 32
ADLS_ACUITY_SCORE: 27

## 2025-05-20 NOTE — PROGRESS NOTES
[facility]    Medicine Progress Note - Hospitalist Service    Date of Admission:  5/19/2025    Assessment & Plan   65-year-old man with type 2 diabetes, chronic ulcer of right foot, combined chronic systolic and diastolic CHF, chronic hyponatremia baseline sodium 129-133, hypertension, prostate cancer metastatic to bone, chronic anemia, and atherosclerotic peripheral artery disease presented with several day history of worsening right foot pain, right foot and lower leg swelling, confusion, and generalized weakness.  Due to concerns for infected diabetic ulcer in right foot and hyponatremia with metabolic encephalopathy, he was admitted.    Principal Problem:    Diabetic infection of right foot (H)  Active Problems:    Diabetic ulcer of right midfoot associated with type 2 diabetes mellitus (H)    Chronic combined systolic (congestive) and diastolic (congestive) heart failure (H)    Hyponatremia    Essential hypertension, benign    Type 2 diabetes mellitus with hyperglycemia, without long-term current use of insulin (H)    Prostate cancer metastatic to bone (H)    Hypokalemia    Metabolic encephalopathy    Hypophosphatemia    Increased anion gap metabolic acidosis    Swelling of right lower extremity    Chronic anemia    Hypoglycemia    Mesenteric artery stenosis-mild-mod SMA and ASIM stenoses on CTA 1/20    Right foot diabetic ulcer with infection  At least several month history of ulcer on dorsal lateral aspect of right foot at distal and MTP joint fifth metatarsal now presenting with acute pain, redness, and swelling concerning for superimposed infection.  He has known diabetes although is not known to have either peripheral neuropathy or peripheral artery disease of the lower extremities.  Suspect diabetic foot ulcer now infected.  Clinical presentation and course have not been concerning for sepsis.  However, there is concern both for deeper infection including osteomyelitis  and/or abscess and for ischemia of the fifth digit of the right foot.  Limb threatening infected diabetic foot ulcer is suspected.  Additionally, he presents with comorbid confusion concerning for encephalopathy and metabolic acidosis.  Taken together, this is all concerning for severe diabetic foot infection.  Initial x-ray did not demonstrate overt signs of osteomyelitis.  However, ulcer appears deep with clinical findings concerning for possible infection in the bone or deeper soft tissues, and there is significant concern for fifth toe ischemia.  -Case discussed with podiatry Dr. Barrett today who agreed with MRI for evaluation for osteomyelitis and/or abscess and who also agreed with arterial vascular ultrasound of the right lower extremity to evaluate for PAD, Dr. Barrett will evaluate the patient after radiographic studies have been completed to provide recommendations regarding invasive intervention  -continue empiric IV Zosyn and Vanco, anticipate additional consultation with infectious disease depending upon clinical course and test results  -Wound care consulted but can defer until radiographic diagnostic evaluation and podiatry evaluation have been completed.      Acute on chronic hyponatremia with hypoosmolality  Metabolic encephalopathy, acute  Patient has chronic hyponatremia with baseline sodium 129-133 over the last several months.  He now presented with sodium 120 (at about 6 PM on May 19) concerning for acute on chronic hyponatremia.  Additionally, serum osmolality was low at 246, so hypoosmolar hyponatremia is suspected.  Initial urine sodium and osmolality were obtained within 24 hours of his last dose of hydrochlorothiazide and therefore may not be reliable results for assessment of SIADH.  Suspect chronic hydrochlorothiazide treatment of hypertension contributes to hyponatremia.  He is at risk for adrenal insufficiency due to chronic Zytiga and prednisone therapy, but clinical presentation and  other test results are not strongly suspicious for adrenal insufficiency.  Sodium is steadily improving after starting treatment with isotonic IV fluids and has now increased to 126 about 16 hours after presentation.  He also presented with new confusion concerning for an acute encephalopathy most likely due to metabolic encephalopathy from worsening hyponatremia.  He presented with worsening weakness also probably due to worsening hyponatremia.  -continue normal saline infusion but reduced rate from 100 mL/h to 50 mL/h and anticipate continuing to adjust rate and/or stopping saline infusion depending upon rate of rise of serum sodium as serum sodium approaches 130  - Ordered electrolyte monitoring q4h   - Supportive care for encephalopathy  - Continue clinical monitoring of encephalopathy and weakness, anticipate PT evaluation prior to hospital discharge but deferring for now until additional diagnostic evaluation of diabetic foot infection has been completed and treatment plan has been clarified    Probable increased anion gap metabolic acidosis  Presented with low serum carbon dioxide 20 and increased anion gap 16 suspicious for increased anion gap metabolic acidosis.  Lactic acid was normal.  Carbon dioxide and anion gap normalized after starting treatment with IV fluids and antibiotics.  -Ordered serial monitoring of electrolytes including carbon dioxide while treating with IV fluids   -ordered venous blood gas monitoring    Hypoglycemia  Type 2 DM  Chronic type 2 diabetes treated with metformin and glipizide with A1c 5.9 in March.  Presented to ER with blood sugar 62-64 which may have contributed to confusion and encephalopathy.  Blood sugar normalized after oral intake of food in the ER and has since remained normal.  -Continue to hold chronic oral metformin and glipizide for now   -continue blood sugar monitoring 4 times daily while taking oral intake but adjust frequency of monitoring if he becomes n.p.o.  perioperatively    Right lower extremity swelling  Presenting with new swelling of the right lower extremity and none on the left.  Risk factors for venous thrombosis include recently reduced activity level, active infection in the right foot, and active metastatic prostate cancer.  Although he has known bony metastases in the proximal appendicular skeleton, he does not have significant pain in the proximal right lower extremity arguing against acute pathologic fracture.  - Ordered venous Doppler ultrasound of right lower extremity and if DVT is identified would review anticoagulation treatment options    Hypokalemia  Hypophosphatemia  Hypochloremia  Presented with potassium 3.1, improved after supplementation.  Suspect hypokalemia was exacerbated by chronic thiazide diuretic therapy and probably Zytiga.  He also presented with mild hypophosphatemia phosphorus 2.3 which can also be due to Zytiga although recent reduction in oral intake may have contributed.  Presented with hypochloremia serum chloride 84 likely due to chronic diuretic therapy.  Chloride is improving after starting treatment with normal saline infusion.  -Continue potassium and phosphorus supplementation per protocol and monitor as indicated  - Ordered recheck of chloride while continuing normal saline infusion    Metastatic prostate CA  Known prostate cancer metastatic to bone treated chronically with daily dosing of Zytiga and prednisone, Lupron injection every 6 months, and Zometa dose every 3 months.  He is followed by Dr. Holliday of oncology.  At last oncology visit March 2025 his metastatic prostate cancer was considered to be stable.  Zytiga and chronic prednisone therapy can be associated with adrenal insufficiency although acute adrenal insufficiency is not currently strongly suspected.  - For now, continue chronic doses of Zytiga and prednisone with low threshold to administer additional doses of corticosteroids if there is increasing  concern for acute adrenal insufficiency    Chronic combined systolic and diastolic heart failure  Carries previous diagnosis of chronic combined systolic and diastolic heart failure with last echo May 2019.  Cause is not known.  He is not known to have ischemic heart disease.  Previous EF was 30-35%.  He has been managed medically including treatment with beta-blocker, ACE inhibitor, and diuretic.  Chronic heart failure appears stable upon this admission.  However, his functional capacity is limited at baseline.  -If operative intervention is recommended, would recommend preoperative echocardiogram  -Continue chronic heart failure treatments including beta-blocker and ACE inhibitor although holding chronic thiazide diuretic at this time as above  -Limit use of IV fluids as able    HTN  Chronic hypertension treated with amlodipine, hydralazine, lisinopril, metoprolol, and hydrochlorothiazide, stable so far  -continue chronic doses of amlodipine, hydralazine, lisinopril, and metoprolol for now  -holding hydrochlorothiazide for now as above    Peripheral artery disease  Incidentally noted to have mild to moderate stenoses of the inferior and superior mesenteric arteries on CTA performed in January 2020 suspicious for peripheral artery disease.  He does not appear to be symptomatic from mesenteric artery stenoses.  However, atherosclerotic vascular disease is suspected, and presence of mesenteric artery stenoses increases his risk for other peripheral artery disease including peripheral disease in the lower extremities.  - Anticipate arterial ultrasound evaluation for lower extremity PAD    Chronic anemia  Chronically anemic with baseline Hgb 10-11 and presented with hemoglobin 9.0, slightly decreased.  No obvious bleeding.  - Ordered recheck hemoglobin    GERD  Chronic GERD treated with antacid  -continue chronic Pepcid          Diet: Combination Diet Regular Diet Adult    DVT Prophylaxis: Enoxaparin (Lovenox)  SQ  Alfred Catheter: Not present  Lines: None     Cardiac Monitoring: None  Code Status: Full Code      Clinically Significant Risk Factors Present on Admission        # Hypokalemia: Lowest K = 3.1 mmol/L in last 2 days, will replace as needed  # Hyponatremia: Lowest Na = 120 mmol/L in last 2 days, will monitor as appropriate  # Hypochloremia: Lowest Cl = 84 mmol/L in last 2 days, will monitor as appropriate  # Hypocalcemia: Lowest Ca = 8.3 mg/dL in last 2 days, will monitor and replace as appropriate         # Hypertension: Noted on problem list      # Anemia: based on hgb <11                  Social Drivers of Health    Tobacco Use: High Risk (12/26/2024)    Patient History     Smoking Tobacco Use: Some Days     Smokeless Tobacco Use: Never     Passive Exposure: Current   Physical Activity: Insufficiently Active (5/1/2024)    Exercise Vital Sign     Days of Exercise per Week: 2 days     Minutes of Exercise per Session: 10 min   Social Connections: Unknown (5/1/2024)    Social Connection and Isolation Panel [NHANES]     Frequency of Social Gatherings with Friends and Family: Once a week          Disposition Plan     Medically Ready for Discharge: Anticipated in 2-4 Days             Alexis Lovelace MD  Hospitalist Service  formerly [facility]  Securely message with [employer] (more info)  Text page via [facility] Paging/Directory   ______________________________________________________________________    Interval History   There were no significant overnight events.  Patient says he is starting to feel better today.  His significant other says that he still seems confused and he continues to be so weak that he cannot stand independently.  The confusion and leg weakness were new in the last few days.  Last week he had been able to ambulate independently using his walker and did not have any signs of confusion at home.  He has been afebrile and hemodynamically stable with normal oxygenation.  He is  "voiding spontaneously with good urine output.    Physical Exam   Vital Signs: Temp: 97.7  F (36.5  C) Temp src: Oral BP: (!) 149/70 Pulse: 91   Resp: 18 SpO2: 97 % O2 Device: None (Room air)    Patient Vitals for the past 24 hrs:   BP Temp Temp src Pulse Resp SpO2 Height Weight   05/20/25 0813 (!) 149/70 97.7  F (36.5  C) Oral 91 18 97 % -- --   05/20/25 0441 (!) 147/68 98.3  F (36.8  C) Oral 86 20 97 % -- --   05/19/25 2201 135/69 98.7  F (37.1  C) -- -- 20 97 % -- 74.4 kg (164 lb 0.4 oz)   05/19/25 2147 -- 98.8  F (37.1  C) Oral -- -- -- -- --   05/19/25 2142 (!) 142/79 -- -- 96 -- 95 % -- --   05/19/25 2131 -- -- -- -- -- 97 % -- --   05/19/25 2055 114/68 -- -- -- -- -- -- --   05/19/25 2040 118/67 -- -- -- -- -- -- --   05/19/25 2030 125/76 -- -- 95 -- -- -- --   05/19/25 2010 117/61 -- -- -- -- -- -- --   05/19/25 1955 126/63 -- -- -- -- -- -- --   05/19/25 1940 135/75 -- -- -- -- -- -- --   05/19/25 1925 135/68 -- -- -- -- -- -- --   05/19/25 1910 127/68 -- -- -- -- -- -- --   05/19/25 1900 137/63 -- -- 93 -- -- -- --   05/19/25 1845 128/75 -- -- 93 -- -- -- --   05/19/25 1844 -- -- -- -- -- 96 % -- --   05/19/25 1742 137/71 -- -- -- -- 99 % -- --   05/19/25 1735 (!) 177/72 99.8  F (37.7  C) Oral 98 18 98 % 1.727 m (5' 8\") 72.6 kg (160 lb)     Weight: 164 lbs .36 oz  Wt Readings from Last 4 Encounters:   05/19/25 74.4 kg (164 lb 0.4 oz)   03/28/25 72.2 kg (159 lb 1.6 oz)   03/20/25 72.2 kg (159 lb 1 oz)   12/30/24 75.2 kg (165 lb 11.2 oz)       Intake/Output Summary (Last 24 hours) at 5/20/2025 1240  Last data filed at 5/20/2025 1041  Gross per 24 hour   Intake 480 ml   Output 800 ml   Net -320 ml       General Appearance: Chronically ill-appearing elderly man without signs of acute distress sitting in a chair  Respiratory: Normal respiratory effort, clear lungs  Cardiovascular: Regular rate and rhythm, no loud murmur, palpable radial pulse but unable to palpate pedal pulse in the right foot  Skin: Fifth toe " of right foot appears purpuric and is cool, other toes of the right foot are pink with blanching erythema which extends onto the dorsum of the right foot to the distal right lower leg but is not hot to touch, persistent dry scabbed ulceration on the lateral right midfoot overlying the distal fifth metatarsal and MTP   Musculoskeletal: Right foot and lower leg are moderately edematous with pitting edema which is asymmetric compared with the left which does not have any pitting edema, tenderness is present laterally along the right foot mid to distal fifth metatarsal, over the dorsum of the right midfoot between the 4th and 5th metatarsals and the 3rd and 4th metatarsals    Medical Decision Making       111 MINUTES SPENT BY ME on the date of service doing chart review, history, exam, documentation & further activities per the note.      Data     I have personally reviewed the following data over the past 24 hrs:    6.9  \   8.1 (L)   / 169     126 (L) 91 (L) 12.1 /  85   3.8 23 0.78 \     ALT: 24 AST: 82 (H) AP: 74 TBILI: 1.0   ALB: 4.0 TOT PROTEIN: 6.4 LIPASE: N/A     TSH: 2.98 T4: N/A A1C: N/A     Procal: 0.23 CRP: 82.36 (H) Lactic Acid: 0.8         Blood cultures are negative so far    Imaging results reviewed over the past 24 hrs:   Recent Results (from the past 24 hours)   Foot  XR, G/E 3 views, right    Narrative    EXAM: XR FOOT RIGHT G/E 3 VIEWS  LOCATION: [facility]  DATE: 5/19/2025    INDICATION: Large wound, diabetic, eval for possible osteomyelitis along right distal metatarsal  COMPARISON: None.      Impression    IMPRESSION: Diffuse osseous demineralization. Soft tissue wound over the lateral aspect of the fifth MTP joint. No acute fracture or evidence of osteomyelitis. Multifocal degenerative arthrosis of the midfoot and forefoot, advanced at the first MTP   joint. Soft tissue edema about the foot and ankle.      Recent Labs   Lab 05/20/25  1114 05/20/25  0920  05/20/25  0555 05/20/25  0219 05/19/25 2242 05/19/25 2202 05/19/25 2011 05/19/25 1945 05/19/25  1817   WBC  --   --  6.9  --   --   --   --   --  11.2*   HGB  --   --  8.1*  --   --   --   --   --  9.0*   MCV  --   --  83  --   --   --   --   --  83   PLT  --   --  169  --   --   --   --   --  206   NA  --  126* 123* 122*   < >  --   --   --  120*   POTASSIUM 3.8  --  3.3*  --   --   --   --   --  3.1*   CHLORIDE  --   --  91*  --   --   --   --   --  84*   CO2  --   --  23  --   --   --   --   --  20*   BUN  --   --  12.1  --   --   --   --   --  19.6   CR  --   --  0.78  --   --   --   --   --  0.96   ANIONGAP  --   --  9  --   --   --   --   --  16*   ROBERT  --   --  8.3*  --   --   --   --   --  9.0   GLC  --   --  85  --   --  107* 108*   < > 62*   ALBUMIN  --   --   --   --   --   --   --   --  4.0   PROTTOTAL  --   --   --   --   --   --   --   --  6.4   BILITOTAL  --   --   --   --   --   --   --   --  1.0   ALKPHOS  --   --   --   --   --   --   --   --  74   ALT  --   --   --   --   --   --   --   --  24   AST  --   --   --   --   --   --   --   --  82*    < > = values in this interval not displayed.

## 2025-05-20 NOTE — PROGRESS NOTES
Reviewed patient's presentation with hospitalist and I have ordered MRI of the foot with and without contrast and JOSÉ MIGUEL with arterial ultrasound to evaluate arterial inflow.  Will consult with the patient after these have resulted.

## 2025-05-20 NOTE — PROGRESS NOTES
Still waiting for MRI results however venous flow is adequate.      Arterial ultrasound and ABIs demonstrates he has monophasic flow in bilateral lower extremities and occlusion at left proximal SFA which would be the etiology of his chronic wound, arterial insufficiency.  I would recommend evaluation with vascular consult to better determine if he could be revascularized to improve arterial inflow and outcome associated with this wound with or without amputation.

## 2025-05-20 NOTE — MEDICATION SCRIBE - ADMISSION MEDICATION HISTORY
Medication Scribe Admission Medication History    Admission medication history is complete. The information provided in this note is only as accurate as the sources available at the time of the update.    Information Source(s): Family member and CareEverywhere/SureScripts via in-person    Pertinent Information: S/O Lolis present and helpful for medication history.     Changes made to PTA medication list:  Added: None  Deleted: None  Changed: metformin to lunch    Allergies reviewed with patient and updates made in EHR: no    Medication History Completed By: GOMEZ MC 5/20/2025 11:59 AM    PTA Med List   Medication Sig Last Dose/Taking    abiraterone (ZYTIGA) 250 MG tablet Take 4 tablets (1,000 mg) by mouth daily for 30 doses. Take on empty stomach. 5/19/2025 Morning    amLODIPine (NORVASC) 10 MG tablet TAKE ONE TABLET BY MOUTH ONCE DAILY 5/19/2025 Morning    atorvastatin (LIPITOR) 20 MG tablet TAKE ONE TABLET BY MOUTH ONCE DAILY 5/19/2025 Noon    blood glucose (CONTOUR NEXT TEST) test strip USE TO TEST BLOOD SUGARS ONE TO THREE TIMES DAILY  OR AS DIRECTED Taking    blood glucose monitoring (NO BRAND SPECIFIED) meter device kit Use to test blood sugar 1-3 times daily or as directed. -Contour Next meter Taking    citalopram (CELEXA) 20 MG tablet Take 1 tablet (20 mg) by mouth daily. 5/19/2025 Noon    famotidine (PEPCID) 40 MG tablet Take 40 mg by mouth 2 times daily as needed for heartburn. 5/18/2025 Morning    glipiZIDE (GLUCOTROL) 5 MG tablet TAKE ONE TABLET BY MOUTH EVERY MORNING 5/19/2025 Morning    hydrALAZINE (APRESOLINE) 100 MG tablet Take 100 mg by mouth 3 times daily. 5/19/2025 Morning    hydrochlorothiazide (HYDRODIURIL) 25 MG tablet TAKE ONE TABLET BY MOUTH ONCE DAILY 5/19/2025 Morning    lisinopril (ZESTRIL) 40 MG tablet TAKE ONE TABLET BY MOUTH ONCE DAILY 5/19/2025 Noon    metFORMIN (GLUCOPHAGE XR) 500 MG 24 hr tablet Take 1,000 mg by mouth daily (with lunch). 5/19/2025 at 12:00 PM    metoprolol  tartrate (LOPRESSOR) 50 MG tablet Take 50 mg by mouth 2 times daily. 5/19/2025 at 12:00 PM    Microlet Lancets MISC USE TO TEST BLOOD SUGAR 1-3 TIMES DAILY OR AS DIRECTED. Taking    predniSONE (DELTASONE) 5 MG tablet Take 1 tablet (5 mg) by mouth daily. START WITH ZYTIGA 5/19/2025 Morning    zoledronic acid (ZOMETA) 4 MG/5ML injection Inject 5 mLs into the vein once. Every 3 months 3/3/2025
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
## 2025-05-20 NOTE — PLAN OF CARE
"Goal Outcome Evaluation:      Plan of Care Reviewed With: patient    Overall Patient Progress: no changeOverall Patient Progress: no change    Outcome Evaluation: A&O to self, intermittent confusion. Last BP elevated, afebrile, on RA. Reports  moderate to severe pain when moving RLE, prn Tylenol x 1 and Dilaudid x 1. Voiding via external cath overnight.NS @ 100 ml/hr. Serial Na 122, 122, 123. Osmolality 246. Plan for WOC and Podiatry consult today. K= 3.3, replaced, recheck at 1120.    BP (!) 147/68 (BP Location: Right arm)   Pulse 86   Temp 98.3  F (36.8  C) (Oral)   Resp 20   Ht 1.727 m (5' 8\")   Wt 74.4 kg (164 lb 0.4 oz)   SpO2 97%   BMI 24.94 kg/m      "

## 2025-05-20 NOTE — ED NOTES
ED Nursing criteria listed below was addressed during verbal handoff:     Abnormal vitals: Yes  Abnormal results: Yes  Med Reconciliation completed: Yes  Meds given in ED: Yes  Any Overdue Meds: N/A  Core Measures: Yes  Isolation: N/A  Special needs: Yes  Skin assessment: Yes    Observation Patient  Education provided: N/A

## 2025-05-20 NOTE — H&P
[facility]    History and Physical - Hospitalist Service       Date of Admission:  5/19/2025    Assessment & Plan    Hyponatremia  Metabolic encephalopathy, acute  -hold hydrochlorothiazide  -continue IVF  -sodium q4h   -was 133 in March 2025  -maximum correction goal is 128 by 6pm on 5/20    Right foot DFU and cellulitis  -continue Zosyn and Vanco   -blood cultures pending  -Podiatry and Wound care consulted  -xray: soft tissue wound; no evidence of OM    Hypoglycemia  Type 2 DM  -hold oral meds  -glucose improved with food intake  -last A1C 5.9    Hypokalemia  -replacement protocol ordered    Metastatic prostate CA  -receiving Zytiga, Zometa, Lupron    HTN  -continue amlodipine, hydralazine, lisinopril, metoprolol  -hold hydrochlorothiazide    GERD  -continue Pepcid    Anemia  -baseline Hgb 10-11; 9.0 today  -no obvious bleeding  -CBC in AM         Diet: Combination Diet Regular Diet Adult    DVT Prophylaxis: Lovenox  Alfred Catheter: Not present  Lines: None     Code Status: Full Code      Clinically Significant Risk Factors Present on Admission     # Hypokalemia: Lowest K = 3.1 mmol/L in last 2 days, will replace as needed  # Hyponatremia: Lowest Na = 120 mmol/L in last 2 days, will monitor as appropriate  # Hypochloremia: Lowest Cl = 84 mmol/L in last 2 days, will monitor as appropriate          # Hypertension: Noted on problem list      # Anemia: based on hgb <11      Disposition Plan      Expected Discharge Date: 05/21/2025                The patient's care was discussed with the Patient.        RD MICHELLE MD  [facility]  Securely message with the Vocera Web Console (learn more here)  Text page via [facility] Paging/Directory      Visit/Communication Style   Virtual (Video) communication was used to evaluate Fabrizio Ling consented to the use of video communication: yes  Video START time: 0135, 5/20/2025  Video STOP time: 0142, 5/20/2025    Patient's location: [facility]   Provider's location during the visit: remote [facility] site        ______________________________________________________________________    Chief Complaint   Confusion, right foot wound    History of Present Illness   65yoM with right foot wound, Type 2DM, HTN, GERD, and metastatic prostate CA presented to the ED with increased pain and redness of the right foot.  He has had a wound on the outside of his foot for weeks he says.  He is unsure how it happened.  In the last few days it has been more painful.  He denies fever, chills, n/v/d, poor appetite, abdominal pain.  He was noted to be somewhat confused as well.       Review of Systems    General: negative for fever, chills, sweats, weakness  Eyes: negative for blurred vision, loss of vision  Ear Nose and Throat: negative for pharyngitis, speech or swallowing difficulties  Respiratory:  negative for sputum production, wheezing, MOBLEY, pleuritic pain, sob or cough  Cardiology:  negative for chest pain, palpitations, orthopnea, PND, edema, syncope   Gastrointestinal: negative for abdominal pain, nausea, vomiting, diarrhea, constipation, hematemesis, melena or hematochezia  Genitourinary: negative for frequency, urgency, dysuria, hematuria   Neurological: negative for focal weakness, paresthesia    Past Medical History    I have reviewed this patient's medical history and updated it with pertinent information if needed.   Past Medical History:   Diagnosis Date    Cancer (H)     Congestive heart failure (H)     Diabetes (H)     GERD (gastroesophageal reflux disease)     Gout     Hypertension        Past Surgical History   I have reviewed this patient's surgical history and updated it with pertinent information if needed.  Past Surgical History:   Procedure Laterality Date    NONE OF THE ABOVE CORE MEASURE DIAGNOSES         Social History   I have reviewed this patient's social history and  updated it with pertinent information if needed.  Social History     Tobacco Use    Smoking status: Some Days     Current packs/day: 1.00     Average packs/day: 1 pack/day for 28.0 years (28.0 ttl pk-yrs)     Types: Cigarettes     Passive exposure: Current    Smokeless tobacco: Never    Tobacco comments:     quit June 2018   Vaping Use    Vaping status: Never Used   Substance Use Topics    Alcohol use: Yes     Comment: occasional    Drug use: No       Family History   I have reviewed this patient's family history and updated it with pertinent information if needed.  Family History   Problem Relation Age of Onset    Diabetes Other     Hypertension No family hx of     Cerebrovascular Disease No family hx of     Other Cancer No family hx of        Prior to Admission Medications   Prior to Admission Medications   Prescriptions Last Dose Informant Patient Reported? Taking?   Microlet Lancets MISC   No No   Sig: USE TO TEST BLOOD SUGAR 1-3 TIMES DAILY OR AS DIRECTED.   abiraterone (ZYTIGA) 250 MG tablet 5/19/2025 Morning  No Yes   Sig: Take 4 tablets (1,000 mg) by mouth daily for 30 doses. Take on empty stomach.   amLODIPine (NORVASC) 10 MG tablet 5/19/2025 Morning  No Yes   Sig: TAKE ONE TABLET BY MOUTH ONCE DAILY   atorvastatin (LIPITOR) 20 MG tablet 5/19/2025 Noon  No Yes   Sig: TAKE ONE TABLET BY MOUTH ONCE DAILY   blood glucose (CONTOUR NEXT TEST) test strip   No No   Sig: USE TO TEST BLOOD SUGARS ONE TO THREE TIMES DAILY  OR AS DIRECTED   blood glucose monitoring (NO BRAND SPECIFIED) meter device kit   No No   Sig: Use to test blood sugar 1-3 times daily or as directed. -Contour Next meter   citalopram (CELEXA) 20 MG tablet 5/19/2025 Noon  No Yes   Sig: Take 1 tablet (20 mg) by mouth daily.   famotidine (PEPCID) 40 MG tablet 5/18/2025 Morning  No Yes   Sig: TAKE ONE TABLET BY MOUTH TWICE A DAY   glipiZIDE (GLUCOTROL) 5 MG tablet 5/19/2025 Morning  No Yes   Sig: TAKE ONE TABLET BY MOUTH EVERY MORNING   hydrALAZINE  (APRESOLINE) 100 MG tablet 5/19/2025 Morning  No Yes   Sig: TAKE ONE TABLET BY MOUTH THREE TIMES A DAY   hydrochlorothiazide (HYDRODIURIL) 25 MG tablet 5/19/2025 Morning  No Yes   Sig: TAKE ONE TABLET BY MOUTH ONCE DAILY   lisinopril (ZESTRIL) 40 MG tablet 5/19/2025 Noon  No Yes   Sig: TAKE ONE TABLET BY MOUTH ONCE DAILY   metFORMIN (GLUCOPHAGE XR) 500 MG 24 hr tablet 5/19/2025 Noon  No Yes   Sig: TAKE TWO TABLETS BY MOUTH ONCE DAILY WITH DINNER   metoprolol tartrate (LOPRESSOR) 50 MG tablet 5/19/2025 Noon  No Yes   Sig: TAKE ONE TABLET BY MOUTH TWICE A DAY   predniSONE (DELTASONE) 5 MG tablet 5/19/2025 Morning  No Yes   Sig: Take 1 tablet (5 mg) by mouth daily. START WITH ZYTIGA   zoledronic acid (ZOMETA) 4 MG/5ML injection   Yes No   Sig: Inject 5 mLs into the vein.      Facility-Administered Medications: None     Allergies   Allergies   Allergen Reactions    No Known Drug Allergy     Seasonal Allergies        Physical Exam   Vital Signs: Temp: 98.7  F (37.1  C) Temp src: Oral BP: 135/69 Pulse: 96   Resp: 20 SpO2: 97 % O2 Device: None (Room air)    Weight: 164 lbs .36 oz    Gen:  Well-developed, well-nourished, in no acute distress, lying semi-supine in hospital stretcher  HEENT:  Anicteric sclera, PER, hearing intact to voice  Resp:  No accessory muscle use, breath sounds clear; no wheezes no rales no rhonchi  Card:  No murmur, normal S1, S2   Abd:  Soft per RN exam, no TTP, non-distended, normoactive bowel sounds are present  Musc: right foot wound picture reviewed in the chart; erythema of the right foot noted  Psych:  not anxious, not agitated    Data     Recent Labs   Lab 05/19/25 2242 05/19/25 2202 05/19/25 2011 05/19/25 1945 05/19/25  1817   WBC  --   --   --   --  11.2*   HGB  --   --   --   --  9.0*   MCV  --   --   --   --  83   PLT  --   --   --   --  206   *  --   --   --  120*   POTASSIUM  --   --   --   --  3.1*   CHLORIDE  --   --   --   --  84*   CO2  --   --   --   --  20*   BUN  --   --    --   --  19.6   CR  --   --   --   --  0.96   ANIONGAP  --   --   --   --  16*   ROBERT  --   --   --   --  9.0   GLC  --  107* 108* 64* 62*   ALBUMIN  --   --   --   --  4.0   PROTTOTAL  --   --   --   --  6.4   BILITOTAL  --   --   --   --  1.0   ALKPHOS  --   --   --   --  74   ALT  --   --   --   --  24   AST  --   --   --   --  82*         Recent Results (from the past 24 hours)   Foot  XR, G/E 3 views, right    Narrative    EXAM: XR FOOT RIGHT G/E 3 VIEWS  LOCATION: [facility]  DATE: 5/19/2025    INDICATION: Large wound, diabetic, eval for possible osteomyelitis along right distal metatarsal  COMPARISON: None.      Impression    IMPRESSION: Diffuse osseous demineralization. Soft tissue wound over the lateral aspect of the fifth MTP joint. No acute fracture or evidence of osteomyelitis. Multifocal degenerative arthrosis of the midfoot and forefoot, advanced at the first MTP   joint. Soft tissue edema about the foot and ankle.

## 2025-05-20 NOTE — PROGRESS NOTES
S-(situation): Patient arrives to room 249 via cart from ED    B-(background): R midfoot diabetic Ulcer, Hyponatremia, Hypokalemia    A-(assessment): A&O x 1, intermittent confusion. VSS on RA. RLE pain when moving otherwise no pain. 2-3+ edema on RLE, distal pulse intact w/doppler.     R-(recommendations): Orders reviewed with patient. Will monitor patient per MD orders.     Inpatient nursing criteria listed below were met:    Health care directives status obtained and documented: Yes  VTE ordered/documented: Yes  Skin issues/needs documented:Yes  Isolation addressed and Signage used: NA  Fall Prevention: Care plan updated Yes Education given and documented Yes  Care Plan initiated and Co-Morbidities added: Yes  Education Assessment documented:Yes  Admission Education Documented: Yes  If present CAUTI/CLABI Education done: NA  New medication patient education completed and documented (Possible Side Effects of Common Medications handout): Yes  Allergies Reviewed: Yes  Admission Medication Reconciliation completed: Yes  Home medications if not able to send immediately home with family stored here: NA  Reminder note placed in discharge instructions regarding home meds: Yes  Individualized care needs/preferences addressed and charted: Yes  Provider Notified that patient has arrived to the unit: Yes

## 2025-05-20 NOTE — PLAN OF CARE
Goal Outcome Evaluation:  Pt oriented to self, sometimes situation and place intermittent confusion and weakness. Cooperative and pleasant. VSS on room air. NS @ 50 mL/hr. Vanco and Zosyn given per MAR. A1-2 with GB and walker, Using beside commode with pivot transfers.    U.S. and MRI done, pending results. Dr. Barrett consulted about foot wound. Tolerating oral intake and meds.     Temp: 97.7  F (36.5  C) Temp src: Oral BP: 125/63 Pulse: 91   Resp: 18 SpO2: 97 % O2 Device: None (Room air)

## 2025-05-20 NOTE — PHARMACY-VANCOMYCIN DOSING SERVICE
Pharmacy Vancomycin Initial Note  Date of Service May 19, 2025  Patient's  1960  65 year old, male    Indication: Sepsis and Skin and Soft Tissue Infection    Current estimated CrCl = Estimated Creatinine Clearance: 76.7 mL/min (based on SCr of 0.96 mg/dL).    Creatinine for last 3 days  2025:  6:17 PM Creatinine 0.96 mg/dL    Recent Vancomycin Level(s) for last 3 days  No results found for requested labs within last 3 days.      Vancomycin IV Administrations (past 72 hours)                     vancomycin (VANCOCIN) 1,500 mg in 0.9% NaCl 265 mL intermittent infusion (mg) 1,500 mg New Bag 25 191                    Nephrotoxins and other renal medications (From now, onward)      Start     Dose/Rate Route Frequency Ordered Stop    25 0900  vancomycin (VANCOCIN) 1,500 mg in 0.9% NaCl 265 mL intermittent infusion         1,500 mg  over 90 Minutes Intravenous EVERY 24 HOURS 25 1802      25 1830  vancomycin (VANCOCIN) 1,500 mg in 0.9% NaCl 265 mL intermittent infusion         1,500 mg  over 90 Minutes Intravenous ONCE 25 180              Contrast Orders - past 72 hours (72h ago, onward)      None            InsightRX Prediction of Planned Initial Vancomycin Regimen  Loading dose: 1500 mg IV x1  Regimen: 1500 mg IV every 24 hours.  Start time: 09:00 on 2025  Exposure target: AUC24 (range)400-600 mg/L.hr   AUC24,ss: 470 mg/L.hr  Probability of AUC24 > 400: 68 %  Ctrough,ss: 12.6 mg/L  Probability of Ctrough,ss > 20: 17 %  Probability of nephrotoxicity (Lodise JULIANNE ): 8 %        Plan:  Start vancomycin  1500 mg IV x1, followed by 1500 mg IV q24h starting 14 hours after the loading dose.   Vancomycin monitoring method: AUC  Vancomycin therapeutic monitoring goal: 400-600 mg*h/L  Pharmacy will check vancomycin levels as appropriate in 1-3 Days.    Serum creatinine levels will be ordered daily for the first week of therapy and at least twice weekly for subsequent weeks.       Lawrence Sanchez, [facility]

## 2025-05-21 ENCOUNTER — HOSPITAL ENCOUNTER (INPATIENT)
Facility: CLINIC | Age: 65
DRG: 253 | End: 2025-05-21
Attending: STUDENT IN AN ORGANIZED HEALTH CARE EDUCATION/TRAINING PROGRAM | Admitting: INTERNAL MEDICINE
Payer: MEDICARE

## 2025-05-21 VITALS
OXYGEN SATURATION: 99 % | HEIGHT: 68 IN | SYSTOLIC BLOOD PRESSURE: 115 MMHG | HEART RATE: 64 BPM | WEIGHT: 164.02 LBS | TEMPERATURE: 98 F | DIASTOLIC BLOOD PRESSURE: 60 MMHG | RESPIRATION RATE: 18 BRPM | BODY MASS INDEX: 24.86 KG/M2

## 2025-05-21 DIAGNOSIS — I73.9 PERIPHERAL VASCULAR DISEASE: ICD-10-CM

## 2025-05-21 DIAGNOSIS — Z71.89 OTHER SPECIFIED COUNSELING: Chronic | ICD-10-CM

## 2025-05-21 DIAGNOSIS — M86.171 OTHER ACUTE OSTEOMYELITIS OF RIGHT FOOT (H): Primary | ICD-10-CM

## 2025-05-21 PROBLEM — M86.9 OSTEOMYELITIS (H): Status: ACTIVE | Noted: 2025-05-21

## 2025-05-21 PROBLEM — N17.0 ACUTE KIDNEY FAILURE WITH TUBULAR NECROSIS: Status: ACTIVE | Noted: 2025-05-21

## 2025-05-21 LAB
ALBUMIN SERPL BCG-MCNC: 3.5 G/DL (ref 3.5–5.2)
ANION GAP SERPL CALCULATED.3IONS-SCNC: 10 MMOL/L (ref 7–15)
ANION GAP SERPL CALCULATED.3IONS-SCNC: 12 MMOL/L (ref 7–15)
ANION GAP SERPL CALCULATED.3IONS-SCNC: 13 MMOL/L (ref 7–15)
ANION GAP SERPL CALCULATED.3IONS-SCNC: 14 MMOL/L (ref 7–15)
BASE EXCESS BLDV CALC-SCNC: -3.4 MMOL/L (ref -3–3)
BASE EXCESS BLDV CALC-SCNC: -3.4 MMOL/L (ref -3–3)
BASE EXCESS BLDV CALC-SCNC: -3.5 MMOL/L (ref -3–3)
BASE EXCESS BLDV CALC-SCNC: -4.2 MMOL/L (ref -3–3)
BASE EXCESS BLDV CALC-SCNC: -4.3 MMOL/L (ref -3–3)
BASE EXCESS BLDV CALC-SCNC: -5.3 MMOL/L (ref -3–3)
BUN SERPL-MCNC: 15.5 MG/DL (ref 8–23)
BUN SERPL-MCNC: 16.4 MG/DL (ref 8–23)
CALCIUM SERPL-MCNC: 7.8 MG/DL (ref 8.8–10.4)
CALCIUM SERPL-MCNC: 8.2 MG/DL (ref 8.8–10.4)
CHLORIDE SERPL-SCNC: 101 MMOL/L (ref 98–107)
CHLORIDE SERPL-SCNC: 101 MMOL/L (ref 98–107)
CHLORIDE SERPL-SCNC: 94 MMOL/L (ref 98–107)
CHLORIDE SERPL-SCNC: 98 MMOL/L (ref 98–107)
CHLORIDE SERPL-SCNC: 99 MMOL/L (ref 98–107)
CHLORIDE SERPL-SCNC: 99 MMOL/L (ref 98–107)
CREAT SERPL-MCNC: 1.6 MG/DL (ref 0.67–1.17)
CREAT SERPL-MCNC: 1.76 MG/DL (ref 0.67–1.17)
EGFRCR SERPLBLD CKD-EPI 2021: 42 ML/MIN/1.73M2
EGFRCR SERPLBLD CKD-EPI 2021: 47 ML/MIN/1.73M2
GLUCOSE BLDC GLUCOMTR-MCNC: 134 MG/DL (ref 70–99)
GLUCOSE SERPL-MCNC: 115 MG/DL (ref 70–99)
GLUCOSE SERPL-MCNC: 145 MG/DL (ref 70–99)
HCO3 BLDV-SCNC: 20 MMOL/L (ref 21–28)
HCO3 BLDV-SCNC: 21 MMOL/L (ref 21–28)
HCO3 BLDV-SCNC: 22 MMOL/L (ref 21–28)
HCO3 SERPL-SCNC: 17 MMOL/L (ref 22–29)
HCO3 SERPL-SCNC: 18 MMOL/L (ref 22–29)
HCO3 SERPL-SCNC: 19 MMOL/L (ref 22–29)
HCO3 SERPL-SCNC: 20 MMOL/L (ref 22–29)
HCO3 SERPL-SCNC: 20 MMOL/L (ref 22–29)
HCO3 SERPL-SCNC: 21 MMOL/L (ref 22–29)
HGB BLD-MCNC: 8.5 G/DL (ref 13.3–17.7)
LACTATE SERPL-SCNC: 0.6 MMOL/L (ref 0.7–2)
MCV RBC AUTO: 85 FL (ref 78–100)
O2/TOTAL GAS SETTING VFR VENT: 21 %
OXYHGB MFR BLDV: 34 % (ref 70–75)
OXYHGB MFR BLDV: 35 % (ref 70–75)
OXYHGB MFR BLDV: 37 % (ref 70–75)
OXYHGB MFR BLDV: 56 % (ref 70–75)
OXYHGB MFR BLDV: 63 % (ref 70–75)
OXYHGB MFR BLDV: 64 % (ref 70–75)
PCO2 BLDV: 33 MM HG (ref 40–50)
PCO2 BLDV: 34 MM HG (ref 40–50)
PCO2 BLDV: 35 MM HG (ref 40–50)
PCO2 BLDV: 35 MM HG (ref 40–50)
PCO2 BLDV: 37 MM HG (ref 40–50)
PCO2 BLDV: 40 MM HG (ref 40–50)
PH BLDV: 7.35 [PH] (ref 7.32–7.43)
PH BLDV: 7.36 [PH] (ref 7.32–7.43)
PH BLDV: 7.36 [PH] (ref 7.32–7.43)
PH BLDV: 7.38 [PH] (ref 7.32–7.43)
PH BLDV: 7.4 [PH] (ref 7.32–7.43)
PH BLDV: 7.4 [PH] (ref 7.32–7.43)
PHOSPHATE SERPL-MCNC: 2.7 MG/DL (ref 2.5–4.5)
PHOSPHATE SERPL-MCNC: 2.7 MG/DL (ref 2.5–4.5)
PO2 BLDV: 21 MM HG (ref 25–47)
PO2 BLDV: 23 MM HG (ref 25–47)
PO2 BLDV: 23 MM HG (ref 25–47)
PO2 BLDV: 32 MM HG (ref 25–47)
PO2 BLDV: 34 MM HG (ref 25–47)
PO2 BLDV: 34 MM HG (ref 25–47)
POTASSIUM SERPL-SCNC: 3.5 MMOL/L (ref 3.4–5.3)
POTASSIUM SERPL-SCNC: 3.6 MMOL/L (ref 3.4–5.3)
POTASSIUM SERPL-SCNC: 3.8 MMOL/L (ref 3.4–5.3)
POTASSIUM SERPL-SCNC: 4 MMOL/L (ref 3.4–5.3)
SAO2 % BLDV: 34.2 % (ref 70–75)
SAO2 % BLDV: 35.3 % (ref 70–75)
SAO2 % BLDV: 38.2 % (ref 70–75)
SAO2 % BLDV: 57.2 % (ref 70–75)
SAO2 % BLDV: 64 % (ref 70–75)
SAO2 % BLDV: 64.9 % (ref 70–75)
SODIUM SERPL-SCNC: 126 MMOL/L (ref 135–145)
SODIUM SERPL-SCNC: 128 MMOL/L (ref 135–145)
SODIUM SERPL-SCNC: 128 MMOL/L (ref 135–145)
SODIUM SERPL-SCNC: 129 MMOL/L (ref 135–145)
SODIUM SERPL-SCNC: 129 MMOL/L (ref 135–145)
SODIUM SERPL-SCNC: 130 MMOL/L (ref 135–145)
SODIUM SERPL-SCNC: 131 MMOL/L (ref 135–145)
SODIUM SERPL-SCNC: 131 MMOL/L (ref 135–145)
VANCOMYCIN SERPL-MCNC: 15.2 UG/ML (ref ?–25)

## 2025-05-21 PROCEDURE — 258N000003 HC RX IP 258 OP 636: Performed by: STUDENT IN AN ORGANIZED HEALTH CARE EDUCATION/TRAINING PROGRAM

## 2025-05-21 PROCEDURE — 250N000012 HC RX MED GY IP 250 OP 636 PS 637: Performed by: INTERNAL MEDICINE

## 2025-05-21 PROCEDURE — 82805 BLOOD GASES W/O2 SATURATION: CPT | Performed by: PEDIATRICS

## 2025-05-21 PROCEDURE — 36415 COLL VENOUS BLD VENIPUNCTURE: CPT | Performed by: PEDIATRICS

## 2025-05-21 PROCEDURE — 83605 ASSAY OF LACTIC ACID: CPT | Performed by: STUDENT IN AN ORGANIZED HEALTH CARE EDUCATION/TRAINING PROGRAM

## 2025-05-21 PROCEDURE — 250N000011 HC RX IP 250 OP 636: Performed by: STUDENT IN AN ORGANIZED HEALTH CARE EDUCATION/TRAINING PROGRAM

## 2025-05-21 PROCEDURE — 85018 HEMOGLOBIN: CPT | Performed by: PEDIATRICS

## 2025-05-21 PROCEDURE — 84100 ASSAY OF PHOSPHORUS: CPT | Performed by: STUDENT IN AN ORGANIZED HEALTH CARE EDUCATION/TRAINING PROGRAM

## 2025-05-21 PROCEDURE — 82374 ASSAY BLOOD CARBON DIOXIDE: CPT | Performed by: PEDIATRICS

## 2025-05-21 PROCEDURE — 87641 MR-STAPH DNA AMP PROBE: CPT | Performed by: PHYSICIAN ASSISTANT

## 2025-05-21 PROCEDURE — 84295 ASSAY OF SERUM SODIUM: CPT | Performed by: PHYSICIAN ASSISTANT

## 2025-05-21 PROCEDURE — 84295 ASSAY OF SERUM SODIUM: CPT | Performed by: PEDIATRICS

## 2025-05-21 PROCEDURE — 82805 BLOOD GASES W/O2 SATURATION: CPT | Performed by: STUDENT IN AN ORGANIZED HEALTH CARE EDUCATION/TRAINING PROGRAM

## 2025-05-21 PROCEDURE — 120N000001 HC R&B MED SURG/OB

## 2025-05-21 PROCEDURE — 84295 ASSAY OF SERUM SODIUM: CPT | Performed by: STUDENT IN AN ORGANIZED HEALTH CARE EDUCATION/TRAINING PROGRAM

## 2025-05-21 PROCEDURE — 99239 HOSP IP/OBS DSCHRG MGMT >30: CPT | Performed by: STUDENT IN AN ORGANIZED HEALTH CARE EDUCATION/TRAINING PROGRAM

## 2025-05-21 PROCEDURE — 99223 1ST HOSP IP/OBS HIGH 75: CPT | Performed by: PHYSICIAN ASSISTANT

## 2025-05-21 PROCEDURE — 80051 ELECTROLYTE PANEL: CPT | Performed by: PEDIATRICS

## 2025-05-21 PROCEDURE — 250N000011 HC RX IP 250 OP 636: Performed by: PEDIATRICS

## 2025-05-21 PROCEDURE — 80202 ASSAY OF VANCOMYCIN: CPT | Performed by: PEDIATRICS

## 2025-05-21 PROCEDURE — 36415 COLL VENOUS BLD VENIPUNCTURE: CPT | Performed by: STUDENT IN AN ORGANIZED HEALTH CARE EDUCATION/TRAINING PROGRAM

## 2025-05-21 PROCEDURE — 250N000013 HC RX MED GY IP 250 OP 250 PS 637: Performed by: PEDIATRICS

## 2025-05-21 PROCEDURE — 250N000013 HC RX MED GY IP 250 OP 250 PS 637: Performed by: PHYSICIAN ASSISTANT

## 2025-05-21 RX ORDER — FAMOTIDINE 20 MG/1
40 TABLET, FILM COATED ORAL DAILY
Status: DISCONTINUED | OUTPATIENT
Start: 2025-05-22 | End: 2025-05-26

## 2025-05-21 RX ORDER — HYDRALAZINE HYDROCHLORIDE 50 MG/1
100 TABLET, FILM COATED ORAL 3 TIMES DAILY
Status: DISCONTINUED | OUTPATIENT
Start: 2025-05-21 | End: 2025-05-30 | Stop reason: HOSPADM

## 2025-05-21 RX ORDER — NALOXONE HYDROCHLORIDE 0.4 MG/ML
0.4 INJECTION, SOLUTION INTRAMUSCULAR; INTRAVENOUS; SUBCUTANEOUS
Status: CANCELLED | OUTPATIENT
Start: 2025-05-21

## 2025-05-21 RX ORDER — HYDROMORPHONE HCL IN WATER/PF 6 MG/30 ML
0.2 PATIENT CONTROLLED ANALGESIA SYRINGE INTRAVENOUS
Refills: 0 | Status: DISCONTINUED | OUTPATIENT
Start: 2025-05-21 | End: 2025-05-30 | Stop reason: HOSPADM

## 2025-05-21 RX ORDER — LIDOCAINE 40 MG/G
CREAM TOPICAL
Status: DISCONTINUED | OUTPATIENT
Start: 2025-05-21 | End: 2025-05-30 | Stop reason: HOSPADM

## 2025-05-21 RX ORDER — CALCIUM CARBONATE 500 MG/1
1000 TABLET, CHEWABLE ORAL 4 TIMES DAILY PRN
Status: CANCELLED | OUTPATIENT
Start: 2025-05-21

## 2025-05-21 RX ORDER — ONDANSETRON 2 MG/ML
4 INJECTION INTRAMUSCULAR; INTRAVENOUS EVERY 6 HOURS PRN
Status: CANCELLED | OUTPATIENT
Start: 2025-05-21

## 2025-05-21 RX ORDER — DEXTROSE MONOHYDRATE 25 G/50ML
25-50 INJECTION, SOLUTION INTRAVENOUS
Status: DISCONTINUED | OUTPATIENT
Start: 2025-05-21 | End: 2025-05-21

## 2025-05-21 RX ORDER — CITALOPRAM HYDROBROMIDE 20 MG/1
20 TABLET ORAL DAILY
Status: CANCELLED | OUTPATIENT
Start: 2025-05-22

## 2025-05-21 RX ORDER — AMOXICILLIN 250 MG
2 CAPSULE ORAL 2 TIMES DAILY PRN
Status: DISCONTINUED | OUTPATIENT
Start: 2025-05-21 | End: 2025-05-21

## 2025-05-21 RX ORDER — ENOXAPARIN SODIUM 100 MG/ML
40 INJECTION SUBCUTANEOUS EVERY 24 HOURS
Status: CANCELLED | OUTPATIENT
Start: 2025-05-22

## 2025-05-21 RX ORDER — PIPERACILLIN SODIUM, TAZOBACTAM SODIUM 3; .375 G/15ML; G/15ML
3.38 INJECTION, POWDER, LYOPHILIZED, FOR SOLUTION INTRAVENOUS EVERY 6 HOURS
Status: DISCONTINUED | OUTPATIENT
Start: 2025-05-21 | End: 2025-05-21

## 2025-05-21 RX ORDER — LISINOPRIL 40 MG/1
40 TABLET ORAL DAILY
Status: CANCELLED | OUTPATIENT
Start: 2025-05-22

## 2025-05-21 RX ORDER — ACETAMINOPHEN 650 MG/1
325 SUPPOSITORY RECTAL EVERY 4 HOURS PRN
Status: DISCONTINUED | OUTPATIENT
Start: 2025-05-21 | End: 2025-05-30 | Stop reason: HOSPADM

## 2025-05-21 RX ORDER — VANCOMYCIN HYDROCHLORIDE 1 G/200ML
1000 INJECTION, SOLUTION INTRAVENOUS ONCE
Status: COMPLETED | OUTPATIENT
Start: 2025-05-21 | End: 2025-05-21

## 2025-05-21 RX ORDER — ONDANSETRON 4 MG/1
4 TABLET, ORALLY DISINTEGRATING ORAL EVERY 6 HOURS PRN
Status: CANCELLED | OUTPATIENT
Start: 2025-05-21

## 2025-05-21 RX ORDER — AMLODIPINE BESYLATE 10 MG/1
10 TABLET ORAL DAILY
Status: DISCONTINUED | OUTPATIENT
Start: 2025-05-22 | End: 2025-05-30 | Stop reason: HOSPADM

## 2025-05-21 RX ORDER — PIPERACILLIN SODIUM, TAZOBACTAM SODIUM 3; .375 G/15ML; G/15ML
3.38 INJECTION, POWDER, LYOPHILIZED, FOR SOLUTION INTRAVENOUS EVERY 6 HOURS
Status: CANCELLED | OUTPATIENT
Start: 2025-05-21

## 2025-05-21 RX ORDER — ABIRATERONE ACETATE 250 MG/1
1000 TABLET ORAL DAILY
Status: DISCONTINUED | OUTPATIENT
Start: 2025-05-22 | End: 2025-05-22

## 2025-05-21 RX ORDER — HYDROMORPHONE HYDROCHLORIDE 1 MG/ML
0.3 INJECTION, SOLUTION INTRAMUSCULAR; INTRAVENOUS; SUBCUTANEOUS
Status: DISCONTINUED | OUTPATIENT
Start: 2025-05-21 | End: 2025-05-21

## 2025-05-21 RX ORDER — AMLODIPINE BESYLATE 5 MG/1
10 TABLET ORAL DAILY
Status: CANCELLED | OUTPATIENT
Start: 2025-05-22

## 2025-05-21 RX ORDER — LISINOPRIL 40 MG/1
40 TABLET ORAL DAILY
Status: DISCONTINUED | OUTPATIENT
Start: 2025-05-22 | End: 2025-05-22

## 2025-05-21 RX ORDER — METOPROLOL TARTRATE 50 MG
50 TABLET ORAL 2 TIMES DAILY
Status: CANCELLED | OUTPATIENT
Start: 2025-05-21

## 2025-05-21 RX ORDER — BISACODYL 10 MG
10 SUPPOSITORY, RECTAL RECTAL DAILY PRN
Status: DISCONTINUED | OUTPATIENT
Start: 2025-05-21 | End: 2025-05-28

## 2025-05-21 RX ORDER — ONDANSETRON 2 MG/ML
4 INJECTION INTRAMUSCULAR; INTRAVENOUS EVERY 6 HOURS PRN
Status: DISCONTINUED | OUTPATIENT
Start: 2025-05-21 | End: 2025-05-30 | Stop reason: HOSPADM

## 2025-05-21 RX ORDER — NALOXONE HYDROCHLORIDE 0.4 MG/ML
0.2 INJECTION, SOLUTION INTRAMUSCULAR; INTRAVENOUS; SUBCUTANEOUS
Status: DISCONTINUED | OUTPATIENT
Start: 2025-05-21 | End: 2025-05-30 | Stop reason: HOSPADM

## 2025-05-21 RX ORDER — AMOXICILLIN 250 MG
1 CAPSULE ORAL 2 TIMES DAILY PRN
Status: DISCONTINUED | OUTPATIENT
Start: 2025-05-21 | End: 2025-05-30 | Stop reason: HOSPADM

## 2025-05-21 RX ORDER — ACETAMINOPHEN 325 MG/1
325 TABLET ORAL EVERY 4 HOURS PRN
Status: DISCONTINUED | OUTPATIENT
Start: 2025-05-21 | End: 2025-05-30 | Stop reason: HOSPADM

## 2025-05-21 RX ORDER — NICOTINE POLACRILEX 4 MG
15-30 LOZENGE BUCCAL
Status: DISCONTINUED | OUTPATIENT
Start: 2025-05-21 | End: 2025-05-30 | Stop reason: HOSPADM

## 2025-05-21 RX ORDER — CALCIUM CARBONATE 500 MG/1
1000 TABLET, CHEWABLE ORAL 4 TIMES DAILY PRN
Status: DISCONTINUED | OUTPATIENT
Start: 2025-05-21 | End: 2025-05-30 | Stop reason: HOSPADM

## 2025-05-21 RX ORDER — OXYCODONE HYDROCHLORIDE 5 MG/1
5 TABLET ORAL EVERY 6 HOURS PRN
Refills: 0 | Status: CANCELLED | OUTPATIENT
Start: 2025-05-21

## 2025-05-21 RX ORDER — AMOXICILLIN 250 MG
1 CAPSULE ORAL 2 TIMES DAILY PRN
Status: CANCELLED | OUTPATIENT
Start: 2025-05-21

## 2025-05-21 RX ORDER — ATORVASTATIN CALCIUM 10 MG/1
20 TABLET, FILM COATED ORAL DAILY
Status: CANCELLED | OUTPATIENT
Start: 2025-05-22

## 2025-05-21 RX ORDER — NALOXONE HYDROCHLORIDE 0.4 MG/ML
0.2 INJECTION, SOLUTION INTRAMUSCULAR; INTRAVENOUS; SUBCUTANEOUS
Status: CANCELLED | OUTPATIENT
Start: 2025-05-21

## 2025-05-21 RX ORDER — ENOXAPARIN SODIUM 100 MG/ML
40 INJECTION SUBCUTANEOUS EVERY 24 HOURS
Status: DISCONTINUED | OUTPATIENT
Start: 2025-05-22 | End: 2025-05-21

## 2025-05-21 RX ORDER — LIDOCAINE 40 MG/G
CREAM TOPICAL
Status: DISCONTINUED | OUTPATIENT
Start: 2025-05-21 | End: 2025-05-21

## 2025-05-21 RX ORDER — OXYCODONE HYDROCHLORIDE 5 MG/1
5 TABLET ORAL EVERY 4 HOURS PRN
Refills: 0 | Status: DISCONTINUED | OUTPATIENT
Start: 2025-05-21 | End: 2025-05-30 | Stop reason: HOSPADM

## 2025-05-21 RX ORDER — ACETAMINOPHEN 650 MG/1
650 SUPPOSITORY RECTAL EVERY 4 HOURS PRN
Status: CANCELLED | OUTPATIENT
Start: 2025-05-21

## 2025-05-21 RX ORDER — CEFTRIAXONE 2 G/1
2 INJECTION, POWDER, FOR SOLUTION INTRAMUSCULAR; INTRAVENOUS EVERY 24 HOURS
Status: DISCONTINUED | OUTPATIENT
Start: 2025-05-21 | End: 2025-05-21 | Stop reason: HOSPADM

## 2025-05-21 RX ORDER — NICOTINE POLACRILEX 4 MG
15-30 LOZENGE BUCCAL
Status: DISCONTINUED | OUTPATIENT
Start: 2025-05-21 | End: 2025-05-21

## 2025-05-21 RX ORDER — HYDROMORPHONE HYDROCHLORIDE 1 MG/ML
0.3 INJECTION, SOLUTION INTRAMUSCULAR; INTRAVENOUS; SUBCUTANEOUS
Refills: 0 | Status: CANCELLED | OUTPATIENT
Start: 2025-05-21

## 2025-05-21 RX ORDER — POLYETHYLENE GLYCOL 3350 17 G/17G
17 POWDER, FOR SOLUTION ORAL 2 TIMES DAILY PRN
Status: DISCONTINUED | OUTPATIENT
Start: 2025-05-21 | End: 2025-05-30 | Stop reason: HOSPADM

## 2025-05-21 RX ORDER — ABIRATERONE ACETATE 250 MG/1
1000 TABLET ORAL DAILY
Status: CANCELLED | OUTPATIENT
Start: 2025-05-22

## 2025-05-21 RX ORDER — VANCOMYCIN HYDROCHLORIDE 1 G/200ML
1000 INJECTION, SOLUTION INTRAVENOUS EVERY 24 HOURS
Status: DISCONTINUED | OUTPATIENT
Start: 2025-05-21 | End: 2025-05-21

## 2025-05-21 RX ORDER — CEFTRIAXONE 2 G/1
2 INJECTION, POWDER, FOR SOLUTION INTRAMUSCULAR; INTRAVENOUS EVERY 24 HOURS
Status: CANCELLED | OUTPATIENT
Start: 2025-05-21

## 2025-05-21 RX ORDER — PREDNISONE 5 MG/1
5 TABLET ORAL DAILY
Status: CANCELLED | OUTPATIENT
Start: 2025-05-22

## 2025-05-21 RX ORDER — AMOXICILLIN 250 MG
2 CAPSULE ORAL 2 TIMES DAILY PRN
Status: DISCONTINUED | OUTPATIENT
Start: 2025-05-21 | End: 2025-05-30 | Stop reason: HOSPADM

## 2025-05-21 RX ORDER — NALOXONE HYDROCHLORIDE 0.4 MG/ML
0.4 INJECTION, SOLUTION INTRAMUSCULAR; INTRAVENOUS; SUBCUTANEOUS
Status: DISCONTINUED | OUTPATIENT
Start: 2025-05-21 | End: 2025-05-30 | Stop reason: HOSPADM

## 2025-05-21 RX ORDER — LIDOCAINE 40 MG/G
CREAM TOPICAL
Status: CANCELLED | OUTPATIENT
Start: 2025-05-21

## 2025-05-21 RX ORDER — AMOXICILLIN 250 MG
1 CAPSULE ORAL 2 TIMES DAILY PRN
Status: DISCONTINUED | OUTPATIENT
Start: 2025-05-21 | End: 2025-05-21

## 2025-05-21 RX ORDER — METOPROLOL TARTRATE 50 MG
50 TABLET ORAL 2 TIMES DAILY
Status: DISCONTINUED | OUTPATIENT
Start: 2025-05-21 | End: 2025-05-30 | Stop reason: HOSPADM

## 2025-05-21 RX ORDER — PREDNISONE 5 MG/1
5 TABLET ORAL DAILY
Status: DISCONTINUED | OUTPATIENT
Start: 2025-05-22 | End: 2025-05-30 | Stop reason: HOSPADM

## 2025-05-21 RX ORDER — ACETAMINOPHEN 325 MG/1
650 TABLET ORAL EVERY 4 HOURS PRN
Status: CANCELLED | OUTPATIENT
Start: 2025-05-21

## 2025-05-21 RX ORDER — DEXTROSE MONOHYDRATE 25 G/50ML
25-50 INJECTION, SOLUTION INTRAVENOUS
Status: DISCONTINUED | OUTPATIENT
Start: 2025-05-21 | End: 2025-05-30 | Stop reason: HOSPADM

## 2025-05-21 RX ORDER — SODIUM CHLORIDE 9 MG/ML
INJECTION, SOLUTION INTRAVENOUS CONTINUOUS
Status: DISCONTINUED | OUTPATIENT
Start: 2025-05-21 | End: 2025-05-22

## 2025-05-21 RX ORDER — OXYCODONE HYDROCHLORIDE 5 MG/1
5 TABLET ORAL EVERY 6 HOURS PRN
Status: DISCONTINUED | OUTPATIENT
Start: 2025-05-21 | End: 2025-05-21

## 2025-05-21 RX ORDER — ONDANSETRON 4 MG/1
4 TABLET, ORALLY DISINTEGRATING ORAL EVERY 6 HOURS PRN
Status: DISCONTINUED | OUTPATIENT
Start: 2025-05-21 | End: 2025-05-30 | Stop reason: HOSPADM

## 2025-05-21 RX ORDER — CEFTRIAXONE 2 G/1
2 INJECTION, POWDER, FOR SOLUTION INTRAMUSCULAR; INTRAVENOUS EVERY 24 HOURS
Status: DISCONTINUED | OUTPATIENT
Start: 2025-05-22 | End: 2025-05-22

## 2025-05-21 RX ORDER — AMOXICILLIN 250 MG
2 CAPSULE ORAL 2 TIMES DAILY PRN
Status: CANCELLED | OUTPATIENT
Start: 2025-05-21

## 2025-05-21 RX ORDER — FAMOTIDINE 20 MG/1
40 TABLET, FILM COATED ORAL DAILY
Status: CANCELLED | OUTPATIENT
Start: 2025-05-22

## 2025-05-21 RX ORDER — ACETAMINOPHEN 650 MG/1
650 SUPPOSITORY RECTAL EVERY 4 HOURS PRN
Status: DISCONTINUED | OUTPATIENT
Start: 2025-05-21 | End: 2025-05-21

## 2025-05-21 RX ORDER — SODIUM CHLORIDE 9 MG/ML
INJECTION, SOLUTION INTRAVENOUS CONTINUOUS
Status: CANCELLED | OUTPATIENT
Start: 2025-05-21

## 2025-05-21 RX ORDER — HYDROMORPHONE HCL IN WATER/PF 6 MG/30 ML
0.4 PATIENT CONTROLLED ANALGESIA SYRINGE INTRAVENOUS
Refills: 0 | Status: DISCONTINUED | OUTPATIENT
Start: 2025-05-21 | End: 2025-05-30 | Stop reason: HOSPADM

## 2025-05-21 RX ORDER — ACETAMINOPHEN 325 MG/1
975 TABLET ORAL EVERY 8 HOURS
Status: DISCONTINUED | OUTPATIENT
Start: 2025-05-21 | End: 2025-05-30 | Stop reason: HOSPADM

## 2025-05-21 RX ORDER — NICOTINE POLACRILEX 4 MG
15-30 LOZENGE BUCCAL
Status: CANCELLED | OUTPATIENT
Start: 2025-05-21

## 2025-05-21 RX ORDER — FAMOTIDINE 20 MG/1
40 TABLET, FILM COATED ORAL DAILY
Status: DISCONTINUED | OUTPATIENT
Start: 2025-05-22 | End: 2025-05-21 | Stop reason: HOSPADM

## 2025-05-21 RX ORDER — ACETAMINOPHEN 325 MG/1
650 TABLET ORAL EVERY 4 HOURS PRN
Status: DISCONTINUED | OUTPATIENT
Start: 2025-05-21 | End: 2025-05-21

## 2025-05-21 RX ORDER — ATORVASTATIN CALCIUM 20 MG/1
20 TABLET, FILM COATED ORAL DAILY
Status: DISCONTINUED | OUTPATIENT
Start: 2025-05-22 | End: 2025-05-30 | Stop reason: HOSPADM

## 2025-05-21 RX ORDER — DEXTROSE MONOHYDRATE 25 G/50ML
25-50 INJECTION, SOLUTION INTRAVENOUS
Status: CANCELLED | OUTPATIENT
Start: 2025-05-21

## 2025-05-21 RX ORDER — HYDRALAZINE HYDROCHLORIDE 25 MG/1
100 TABLET, FILM COATED ORAL 3 TIMES DAILY
Status: CANCELLED | OUTPATIENT
Start: 2025-05-21

## 2025-05-21 RX ORDER — CITALOPRAM HYDROBROMIDE 20 MG/1
20 TABLET ORAL DAILY
Status: DISCONTINUED | OUTPATIENT
Start: 2025-05-22 | End: 2025-05-30 | Stop reason: HOSPADM

## 2025-05-21 RX ADMIN — ATORVASTATIN CALCIUM 20 MG: 10 TABLET, FILM COATED ORAL at 08:08

## 2025-05-21 RX ADMIN — HYDRALAZINE HYDROCHLORIDE 100 MG: 50 TABLET ORAL at 21:29

## 2025-05-21 RX ADMIN — LISINOPRIL 40 MG: 40 TABLET ORAL at 08:11

## 2025-05-21 RX ADMIN — AMLODIPINE BESYLATE 10 MG: 5 TABLET ORAL at 08:11

## 2025-05-21 RX ADMIN — SODIUM CHLORIDE: 0.9 INJECTION, SOLUTION INTRAVENOUS at 21:26

## 2025-05-21 RX ADMIN — ENOXAPARIN SODIUM 40 MG: 40 INJECTION SUBCUTANEOUS at 08:11

## 2025-05-21 RX ADMIN — PREDNISONE 5 MG: 5 TABLET ORAL at 08:09

## 2025-05-21 RX ADMIN — PIPERACILLIN AND TAZOBACTAM 4.5 G: 4; .5 INJECTION, POWDER, FOR SOLUTION INTRAVENOUS at 05:13

## 2025-05-21 RX ADMIN — ACETAMINOPHEN 975 MG: 325 TABLET, FILM COATED ORAL at 21:28

## 2025-05-21 RX ADMIN — PIPERACILLIN AND TAZOBACTAM 3.38 G: 3; .375 INJECTION, POWDER, FOR SOLUTION INTRAVENOUS at 11:31

## 2025-05-21 RX ADMIN — CITALOPRAM HYDROBROMIDE 20 MG: 20 TABLET ORAL at 08:09

## 2025-05-21 RX ADMIN — FAMOTIDINE 40 MG: 20 TABLET, FILM COATED ORAL at 08:10

## 2025-05-21 RX ADMIN — SODIUM CHLORIDE: 0.9 INJECTION, SOLUTION INTRAVENOUS at 15:06

## 2025-05-21 RX ADMIN — METOPROLOL TARTRATE 50 MG: 50 TABLET, FILM COATED ORAL at 08:10

## 2025-05-21 RX ADMIN — HYDRALAZINE HYDROCHLORIDE 100 MG: 25 TABLET ORAL at 15:01

## 2025-05-21 RX ADMIN — HYDRALAZINE HYDROCHLORIDE 100 MG: 25 TABLET ORAL at 08:08

## 2025-05-21 RX ADMIN — CEFTRIAXONE SODIUM 2 G: 2 INJECTION, POWDER, FOR SOLUTION INTRAMUSCULAR; INTRAVENOUS at 16:20

## 2025-05-21 RX ADMIN — METOPROLOL TARTRATE 50 MG: 50 TABLET, FILM COATED ORAL at 21:33

## 2025-05-21 RX ADMIN — ABIRATERONE ACETATE 1000 MG: 250 TABLET ORAL at 10:11

## 2025-05-21 RX ADMIN — VANCOMYCIN HYDROCHLORIDE 1000 MG: 1 INJECTION, SOLUTION INTRAVENOUS at 07:51

## 2025-05-21 ASSESSMENT — ACTIVITIES OF DAILY LIVING (ADL)
ADLS_ACUITY_SCORE: 34
ADLS_ACUITY_SCORE: 50
ADLS_ACUITY_SCORE: 32
ADLS_ACUITY_SCORE: 34
ADLS_ACUITY_SCORE: 33
ADLS_ACUITY_SCORE: 33
ADLS_ACUITY_SCORE: 32
ADLS_ACUITY_SCORE: 32
ADLS_ACUITY_SCORE: 50
ADLS_ACUITY_SCORE: 50
ADLS_ACUITY_SCORE: 34
ADLS_ACUITY_SCORE: 33
ADLS_ACUITY_SCORE: 32
ADLS_ACUITY_SCORE: 32
ADLS_ACUITY_SCORE: 33
ADLS_ACUITY_SCORE: 34
ADLS_ACUITY_SCORE: 32
ADLS_ACUITY_SCORE: 33
ADLS_ACUITY_SCORE: 34
ADLS_ACUITY_SCORE: 32
ADLS_ACUITY_SCORE: 34
ADLS_ACUITY_SCORE: 32

## 2025-05-21 NOTE — PROGRESS NOTES
Antimicrobial Stewardship Team Note    Antimicrobial Stewardship Program - A joint venture between [employer] [facility] and  Physicians to optimize antibiotic management.  NOT a formal consult - Restricted Antimicrobial Review     Patient: Sushil Diana  MRN: [id number]  Allergies: No known drug allergy and Seasonal allergies    Brief Summary : Sushil Diana is a 65 year old man with a PMHx of Type 2 DM (A1c 5.9), HTN, chronic ulcer of right foot, GERD, and metastatic prostate cancer to the bone (on Zytiga and prednisone 5 mg daily), chronic combined systolic and diastolic CHF, chronic hyponatremia (baseline Na 129-133), and atherosclerotic peripheral artery disease who was admitted on 5/19/2025 with past several day history  of right foot pain and lower leg swelling, confusion and generalized weakness.     History of Present Illness: Patient has several month history of an ulcer on dorsal lateral aspect of right foot at distal and MTP joint fifth metatarsal. He endorsed cute pain, redness and swelling of R foot. He denied fever, chills, n/v/d, poor appetite, abdominal pain. On presentation, patient also had some confusion.  He was afebrile, BP of 132/58, and pain scale of 4 and on room air and vitality stable while in the ED. Initial labs included a leukocytosis (WBC 11.2), lactic acid 0.8, procalcitonin of 0.23, CRP 82.36, ESR 49, and sodium of 122. R foot Xray stated no acute fracture or evidence of osteomyelitis. However R foot MRI indicated a skin ulcer overlying the fifth MTP joint that measured 2.0 by 1.6 cm with surrounding cellulitis with no abscess, acute osteomyelitis involving the fifth proximal phalanx and fifth metatarsal head and diffuse muscle atrophy and edema suggesting diabetic neuropathy. Bilateral LE ABIs showed monophasic flow and an occlusion at the L proximal SFA. He was started on empiric Zosyn and vancomycin IV.  Peripheral blood cultures from admission with no growth to date.  "       Active Anti-infective Medications   (From admission, onward)                 Start     Stop    05/21/25 1200  piperacillin-tazobactam  3.375 g,   Intravenous,   EVERY 6 HOURS        Skin and Soft Tissue Infection       --    05/21/25 0719  Vancomycin [facility] - Receiving Intermittent Dosing  1 each,   Intravenous,   SEE ADMIN INSTRUCTIONS        Skin and Soft Tissue Infection       --                  Assessment: Chronic R diabetic foot ulcer complicated by fith proximal phalanx and fifth metatarsal head osteomyelitis w/ plans to transfer to higher level of care  Today is day 2 of broad -spectrum antimicrobial therapy. Patient has diminished urine output and worsening renal function, though remains vitally stable and leukocytosis quickly resolved. Plans are in place for patient to transfer to higher level of care. Notably, patient has no significant antimicrobial and Pseudomonas aeruginosa (and other multidrug-resistant organism) history. Per IDSA's diabetic foot infection guidelines, empiric Pseudomonal coverage is not warranted in patients with no isolation in the \"previous few weeks.\" Anticipate potential for polymicrobial organism involvement in the setting of a chronic diabetic ulcer. The most common organisms tend to be Staph aureus and Strep species. With CINDY, favor transitioning from Zosyn to ceftriaxone given potential for Zosyn-vancomycin IV-induced renal dysfunction with longer concurrent use (I.e., > 3-4 days). Ceftriaxone is not renally adjusted as it is metabolized in the liver and eliminated in bile. Vancomycin is reasonable for now while additional workup ensues. Consider obtaining a MRSA nares PCR to better evaluate patient's colonization status. Anticipate surgical intervention, including possible R fifth ray amputation per podiatry - if taken to the OR at transfer hospital, please obtain aerobic, anaerobic, and fungal tissue cultures with concern that clean margins will not be obtained. " Consider ID consult on transfer.     Recommendations:  Transition from Zosyn to ceftriaxone 2 g IV every 24 hours  Vancomycin IV is reasonable for now - consider MRSA nares PCR to determine colonization status  If taken to the OR at transfer hospital, please obtain aerobic, anaerobic, and fungal tissue cultures with concern that clean margins will not be obtained  Consider ID consult on transfer    Pharmacy took the following actions: Electronic note created.    Discussed with ID Staff Rogers Cabezas MD, M.Med.Sc., and Felisa Golden, PharmD, BCIDP    Jono Martinez, 2026 PharmD Candidate  Available via [facility] [place]        Vital Signs/Clinical Features:  Vitals         05/19 0700  05/20 0659 05/20 0700  05/21 0659 05/21 0700  05/21 1314   Most Recent      Temp ( F) 98.3 -  99.8    97.6 -  97.7      98     98 (36.7) 05/21 0700    Pulse 86 -  98    78 -  91    57 -  62     62 05/21 0810    Resp 18 -  20      18      18     18 05/21 0700    /68 -  177/72    112/56 -  149/70    108/50 -  131/58     131/58 05/21 0757    SpO2 (%) 95 -  99    96 -  97      99     99 05/21 0700            Labs  Estimated Creatinine Clearance: 42.9 mL/min (A) (based on SCr of 1.76 mg/dL (H)).  Recent Labs   Lab Test 12/30/24  1319 01/09/25  1109 03/20/25  1257 05/19/25  1817 05/20/25  0555 05/21/25  0628   CR 1.26* 1.13 1.16 0.96 0.78 1.76*       Recent Labs   Lab Test 06/24/24  1255 09/25/24  0846 12/30/24  1319 01/09/25  1109 03/20/25  1257 05/19/25  1817 05/20/25  0555 05/21/25  0628   WBC 7.6 6.9 11.4* 9.9 7.6 11.2* 6.9  --    ANEU 6.5 4.4 10.1* 7.3 5.3 9.4*  --   --    ALYM 0.5* 1.5 0.6* 1.2 1.1 0.6*  --   --    CARLOS 0.5 0.7 0.6 1.1 0.9 1.0  --   --    AEOS 0.0 0.2 0.1 0.2 0.3 0.1  --   --    HGB 10.2* 10.9* 11.3* 11.3* 10.6* 9.0* 8.1* 8.5*   HCT 29.5* 31.1* 32.1* 32.0* 31.0* 24.9* 22.9*  --    MCV 91 90 90 89 89 83 83 85    194 231 226 233 206 169  --        Recent Labs   Lab Test 06/24/24  1255 09/25/24  0846  12/30/24  1319 01/09/25  1109 03/20/25  1257 05/19/25  1817   BILITOTAL 1.0 1.0 1.0 0.8 0.8 1.0   ALKPHOS 59 59 58 58 59 74   ALBUMIN 4.5 4.4 4.5 4.7 4.7 4.0   AST 20 21 19 19 26 82*   ALT 14 11 12 12 13 24       Recent Labs   Lab Test 08/12/18  1830 05/19/25  1817   PCAL  --  0.23   LACT  --  0.8   CRP 14.1*  --    CRPI  --  82.36*   SED  --  49*       Recent Labs   Lab Test 05/21/25  0628   VANCOMYCIN 15.2       Culture Results:  7-Day Micro Results       Procedure Component Value Units Date/Time    Blood Culture Peripheral blood (BC) Arm, Right [[id number]]  (Normal) Collected: 05/19/25 1834    Order Status: Completed Lab Status: Preliminary result Updated: 05/20/25 2231    Specimen: Peripheral blood (BC) from Arm, Right      Culture No growth after 1 day    Blood Culture Peripheral blood (BC) Arm, Right [[id number]]  (Normal) Collected: 05/19/25 1817    Order Status: Completed Lab Status: Preliminary result Updated: 05/20/25 2231    Specimen: Peripheral blood (BC) from Arm, Right      Culture No growth after 1 day    Blood Culture Peripheral blood (BC)     Order Status: Canceled Lab Status: No result     Specimen: Peripheral blood (BC)             Recent Labs   Lab Test 05/07/23  1240 05/19/25  2248   URINEPH 7.0 5.5   NITRITE Negative Negative   LEUKEST Negative Negative   WBCU  --  0                         Imaging: MR Foot Right w/o & w Contrast  Result Date: 5/20/2025  EXAM: MR FOOT RIGHT W/O and W CONTRAST LOCATION: [facility] DATE: 5/20/2025 INDICATION: chronic stable ulcer mostly dry lateral right 5th met head COMPARISON: Radiographs from 5/19/2025 TECHNIQUE: Routine. Additional postgadolinium T1 sequences were obtained. IV CONTRAST: 7.5 mL Gadavist FINDINGS: BONES AND JOINTS: -There is decreased T1 and increased T2 marrow signal involving the majority of the fifth proximal phalanx (sparing the head), and the fifth metatarsal head. There is increased T2 marrow signal  extending to the fifth metatarsal proximal metaphysis with preserved T1 marrow signal. No acute fracture or malalignment. Severe first MTP joint degenerative changes. Otherwise mild to moderate degenerative changes throughout the foot. No significant joint effusion. SOFT TISSUES: -There is a skin ulcer overlying the fifth MTP joint which measures 2.0 x 1.6 cm and extends deep to the bony surface. Diffuse soft tissue swelling throughout the foot. Mild enhancement at the fifth toe is concerning for synovitis. No discrete fluid collection. No definite soft tissue gas. Diffuse muscle atrophy and edema. Intact Lisfranc ligament.     IMPRESSION: 1.  Deep skin ulcer overlying the fifth MTP joint with surrounding cellulitis. No abscess. 2.  Acute osteomyelitis involving the fifth proximal phalanx and fifth metatarsal head. There is reactive osteitis versus early developing osteomyelitis throughout the fifth metatarsal diaphysis. 3.  Diffuse muscle atrophy and edema suggest diabetic neuropathy.     US Lower Extremity Arterial Duplex Bilateral  Result Date: 5/20/2025  EXAM: US LOWER EXTREMITY ARTERIAL DUPLEX BILATERAL LOCATION: [facility] DATE: 5/20/2025 INDICATION: Concerning for arterial insufficiency with ulceration lateral right fifth metatarsal head. Patient with noncompressible arteries, though distal monophasic waveforms, worse on the right compared to the left. COMPARISON: None. TECHNIQUE: Duplex utilizing 2D gray-scale imaging, Doppler interrogation with color-flow and spectral waveform analysis. FINDINGS: Right lower extremity: The external iliac artery is 119 cm/second and triphasic. The common femoral artery is 126 cm/second and biphasic. Profunda femoral artery is 125 cm/second and triphasic. The SFA is 30 cm/second proximally with triphasic waveform. However, mid segment is 27 cm/second and monophasic. There is a segment without demonstrable blood flow in the mid SFA. Distal SFA  is 32 cm/second and monophasic. Popliteal artery is 42 cm/second, monophasic. Posterior tibial, anterior tibial, and dorsalis pedis arteries are 27-51 cm/second and monophasic. Left lower extremity: The external iliac artery is 144 cm/second and triphasic. Common femoral artery is 112 cm/second and triphasic. Profunda femoral artery is 157 cm/second and triphasic. The SFA is  cm/second and triphasic in the proximal to mid  segments. Biphasic in the distal segment. Popliteal artery is 66-88 cm/second and biphasic/monophasic. Posterior tibial, anterior tibial, and dorsalis pedis arteries range from 52-79 cm/second and monophasic.     IMPRESSION: 1.  Occlusion in the mid right SFA with monophasic waveforms beyond this level. 2.  Suspect stenosis in the left distal SFA given transition of waveforms from normal triphasic and proximal SFA to monophasic in the runoff arteries. There may be multifocal areas, though suspect femoral popliteal distribution greatest amount of disease.    US JOSÉ MIGUEL Doppler No Exercise  Result Date: 5/20/2025  EXAM: RESTING ANKLE-BRACHIAL INDICES (ABIs) LOCATION: [facility] DATE: 5/20/2025 INDICATION: ulcer right foot concerning for osteo and arterial insufficiency. COMPARISON: None. JOSÉ MIGUEL FINDINGS: RIGHT Brachial: 94 Ankle (PT): NC Ankle (DP): 69Index: 0.64 LEFT Brachial: 107 Ankle (PT): 168 Index: 1.57 Ankle (DP): 142 Index: 1.33 WAVEFORMS: The dorsalis pedis and posterior tibial arteries are monophasic bilaterally, with the right lower extremity waveforms are dampened relative to the left.     IMPRESSION: 1.  RIGHT LOWER EXTREMITY: Noncompressible tibial arteries suggestive of calcific atherosclerosis. Monophasic tibial waveforms. 2.  LEFT LOWER EXTREMITY: Noncompressible tibial arteries suggestive of calcific stenosis. Monophasic tibial waveforms.    US Lower Extremity Venous Duplex Right  Result Date: 5/20/2025  EXAM: US LOWER EXTREMITY VENOUS DUPLEX RIGHT LOCATION:   [facility] DATE: 5/20/2025 INDICATION: new right lower leg and foot swelling, recently reduced mobility, active prostate cancer, ?DVT COMPARISON: None. TECHNIQUE: Venous Duplex ultrasound of the right lower extremity with and without compression, augmentation and duplex. Color flow and spectral Doppler with waveform analysis performed. FINDINGS: Exam includes the common femoral, femoral, popliteal, and contralateral common femoral veins as well as segmentally visualized deep calf veins and greater saphenous vein. RIGHT: No deep vein thrombosis. No superficial thrombophlebitis. No popliteal cyst. Subcutaneous edema noted within the right lower extremity.     IMPRESSION: 1.  No deep venous thrombosis in the right lower extremity.    Foot  XR, G/E 3 views, right  Result Date: 5/19/2025  EXAM: XR FOOT RIGHT G/E 3 VIEWS LOCATION: [facility] DATE: 5/19/2025 INDICATION: Large wound, diabetic, eval for possible osteomyelitis along right distal metatarsal COMPARISON: None.     IMPRESSION: Diffuse osseous demineralization. Soft tissue wound over the lateral aspect of the fifth MTP joint. No acute fracture or evidence of osteomyelitis. Multifocal degenerative arthrosis of the midfoot and forefoot, advanced at the first MTP joint. Soft tissue edema about the foot and ankle.

## 2025-05-21 NOTE — CONSULTS
Care Management consult received for Elevated Risk score.    Per morning rounds, patient is needing to be transferred to a higher level or care.     CM to assess patient at the receiving facility once plan of care and discharge needs are known.    PATRICIA Walsh  [facility] [phone]/ [facility] [phone]  Care Management

## 2025-05-21 NOTE — PHARMACY-VANCOMYCIN DOSING SERVICE
Pharmacy Vancomycin Note  Date of Service May 21, 2025  Patient's  1960   65 year old, male    Indication: Skin and Soft Tissue Infection  Day of Therapy: 3  Current vancomycin regimen:  1500 mg IV q24h  Current vancomycin monitoring method: AUC  Current vancomycin therapeutic monitoring goal: 400-600 mg*h/L    InsightRX Prediction of Current Vancomycin Regimen  Loading dose: N/A  Regimen: 1500 mg IV every 24 hours.  Start time: 09:20 on 2025  Exposure target: AUC24 (range) 400-600 mg/L.hr   AUC24,ss: 723 mg/L.hr  Probability of AUC24 > 400: 100 %  Ctrough,ss: 23 mg/L  Probability of Ctrough,ss > 20: 72 %  Probability of nephrotoxicity (Lodise JULIANNE ): 23 %    Current estimated CrCl = Estimated Creatinine Clearance: 42.9 mL/min (A) (based on SCr of 1.76 mg/dL (H)).    Creatinine for last 3 days  2025:  6:17 PM Creatinine 0.96 mg/dL  2025:  5:55 AM Creatinine 0.78 mg/dL  2025:  6:28 AM Creatinine 1.76 mg/dL    Recent Vancomycin Levels (past 3 days)  2025:  6:28 AM Vancomycin 15.2 ug/mL    Vancomycin IV Administrations (past 72 hours)                     vancomycin (VANCOCIN) 1,500 mg in 0.9% NaCl 265 mL intermittent infusion (mg) 1,500 mg New Bag 25 0920    vancomycin (VANCOCIN) 1,500 mg in 0.9% NaCl 265 mL intermittent infusion (mg) 1,500 mg New Bag 25 1912                    Nephrotoxins and other renal medications (From now, onward)      Start     Dose/Rate Route Frequency Ordered Stop    25 0730  vancomycin (VANCOCIN) 1,000 mg in 200 mL dextrose intermittent infusion         1,000 mg  200 mL/hr over 1 Hours Intravenous ONCE 25 0717      25 0900  lisinopril (ZESTRIL) tablet 40 mg        Note to Pharmacy: PTA Sig:TAKE ONE TABLET BY MOUTH ONCE DAILY      40 mg Oral DAILY 25 0207      25 0000  piperacillin-tazobactam (ZOSYN) 4.5 g vial to attach to  mL bag         4.5 g  over 30 Minutes Intravenous EVERY 6 HOURS 25 5776                  Contrast Orders - past 72 hours (72h ago, onward)      Start     Dose/Rate Route Frequency Stop    05/20/25 1730  gadobutrol (GADAVIST) injection 7.5 mL         7.5 mL Intravenous ONCE 05/20/25 1802            Interpretation of levels and current regimen:  Vancomycin level is reflective of AUC greater than 600    Has serum creatinine changed greater than 50% in last 72 hours: Yes    Urine output:  diminished urine output    Renal Function: Worsening    InsightRX Prediction of Planned New Vancomycin Regimen  N/A - updating to intermittent dosing due to CINDY.    Plan:  Give 1x 1000 mg dose now and obtain vancomycin level tomorrow morning. Further dosing per level.  Vancomycin monitoring method: Trough (Method 1 = dosing nomogram)  Vancomycin therapeutic monitoring goal: 15-20 mg/L  Pharmacy will check vancomycin levels as appropriate in 1-3 Days.  Serum creatinine levels will be ordered daily for the first week of therapy and at least twice weekly for subsequent weeks.    Zhao Chau [facility]

## 2025-05-21 NOTE — DISCHARGE SUMMARY
[facility]  Hospitalist Discharge Summary      Date of Admission:  5/19/2025  Date of Discharge:  5/21/2025  Discharging Provider: Naz Adams MD  Discharge Service: Hospitalist Service    Discharge Diagnoses   Principal Problem:    Diabetic infection of right foot (H)    Chronic right diabetic foot ulcer     Arterial insufficiency    Fith proximal phalanx and fifth metatarsal head osteomyelitis   Active Problems:    Diabetic ulcer of right midfoot associated with type 2 diabetes mellitus (H)    Chronic combined systolic (congestive) and diastolic (congestive) heart failure (H)    Hyponatremia    Essential hypertension, benign    Type 2 diabetes mellitus with hyperglycemia, without long-term current use of insulin (H)    Prostate cancer metastatic to bone (H)    Hypokalemia    Metabolic encephalopathy    Hypophosphatemia    Increased anion gap metabolic acidosis    Swelling of right lower extremity    Chronic anemia    Hypoglycemia    Mesenteric artery stenosis-mild-mod SMA and ASIM stenoses on CTA 1/20    Clinically Significant Risk Factors          Follow-ups Needed After Discharge     Unresulted Labs Ordered in the Past 30 Days of this Admission       Date and Time Order Name Status Description    5/19/2025  6:18 PM Blood Culture Peripheral blood (BC) Arm, Right Preliminary     5/19/2025  5:49 PM Blood Culture Peripheral blood (BC) Arm, Right Preliminary             Discharge Disposition   Transferred to [facility]   Condition at discharge: [facility] Course   65-year-old man with type 2 diabetes, chronic ulcer of right foot, combined chronic systolic and diastolic CHF, chronic hyponatremia baseline sodium 129-133, hypertension, prostate cancer metastatic to bone, chronic anemia, and atherosclerotic peripheral artery disease presented with several day history of worsening right foot pain, right foot and lower leg swelling, confusion, and generalized  weakness and admitted  for infected diabetic ulcer in right foot, hyponatremia with metabolic encephalopathy.      Right foot diabetic ulcer with infection  Confusion concerning for encephalopathy   Metabolic acidosis  At least several month history of ulcer on dorsal lateral aspect of right foot at distal and MTP joint fifth metatarsal now presenting with acute pain, redness, and swelling concerning for superimposed infection.  Clinical presentation and course have not been concerning for sepsis.  Additionally, he presents with comorbid confusion concerning for encephalopathy and metabolic acidosis.  Taken together, this is all concerning for severe diabetic foot infection.  Initial x-ray did not demonstrate overt signs of osteomyelitis.  Podiatry consulted.  MRI demonstrating osteomyelitis of the fifth proximal phalanx and a portion of the metatarsal, this will require surgical debridement with amputation of the fifth ray.  Vascular workup done showing arterial insufficiency.  Patient is being transferred to higher level of care for multidisciplinary approach including vascular surgery, podiatry, infectious disease.  Patient was initially started on IV Zosyn and IV vancomycin.  Antimicrobial team rounded, recommending transition from Zosyn to ceftriaxone 2 g IV every 24 hours, continue vancomycin for now.  Consider ID consult on transfer.  If taken to the OR at transfer hospital, please obtain aerobic, anaerobic, and fungal tissue cultures.      Acute on chronic hyponatremia with hypoosmolality  Metabolic encephalopathy, acute  Patient has chronic hyponatremia with baseline sodium 129-133 over the last several months.  He now presented with sodium 120 (at about 6 PM on May 19) concerning for acute on chronic hyponatremia.  Additionally, serum osmolality was low at 246, so hypoosmolar hyponatremia is suspected.  Initial urine sodium and osmolality were obtained within 24 hours of his last dose of  hydrochlorothiazide and therefore may not be reliable results for assessment of SIADH.  Suspect chronic hydrochlorothiazide treatment of hypertension contributes to hyponatremia.  He is at risk for adrenal insufficiency due to chronic Zytiga and prednisone therapy, but clinical presentation and other test results are not strongly suspicious for adrenal insufficiency.  Sodium is steadily improving after starting treatment with isotonic IV fluids and has now increased to 131 since admission.   - Continue clinical monitoring of encephalopathy and weakness, anticipating improvement with treating infection, improving of hyponatremia, anticipate PT evaluation prior to hospital discharge      Hypoglycemia  Type 2 DM  Chronic type 2 diabetes treated with metformin and glipizide with A1c 5.9 in March.  Presented to ER with blood sugar 62-64 which may have contributed to confusion and encephalopathy.  Blood sugar normalized after oral intake of food in the ER and has since remained normal.  -Continue to hold chronic oral metformin and glipizide for now   -continue blood sugar monitoring 4 times daily while taking oral intake but adjust frequency of monitoring if he becomes n.p.o. perioperatively    Right lower extremity swelling  Presenting with new swelling of the right lower extremity and none on the left.  Risk factors for venous thrombosis include recently reduced activity level, active infection in the right foot, and active metastatic prostate cancer.  Although he has known bony metastases in the proximal appendicular skeleton, he does not have significant pain in the proximal right lower extremity arguing against acute pathologic fracture. Venous Doppler ultrasound of right lower extremity is negative for DVT      Hypokalemia, resolved  Hypophosphatemia, resolved  Hypochloremia, resolved  Presented with potassium 3.1, improved after supplementation.  Suspect hypokalemia was exacerbated by chronic thiazide diuretic  therapy and probably Zytiga.  He also presented with mild hypophosphatemia phosphorus 2.3 which can also be due to Zytiga although recent reduction in oral intake may have contributed.  Presented with hypochloremia serum chloride 84 likely due to chronic diuretic therapy.      Metastatic prostate CA  Known prostate cancer metastatic to bone treated chronically with daily dosing of Zytiga and prednisone, Lupron injection every 6 months, and Zometa dose every 3 months.  He is followed by Dr. Holliday of oncology.  At last oncology visit March 2025 his metastatic prostate cancer was considered to be stable.  Zytiga and chronic prednisone therapy can be associated with adrenal insufficiency although acute adrenal insufficiency is not currently strongly suspected.  - For now, continue chronic doses of Zytiga and prednisone with low threshold to administer additional doses of corticosteroids if there is increasing concern for acute adrenal insufficiency    Chronic combined systolic and diastolic heart failure  Carries previous diagnosis of chronic combined systolic and diastolic heart failure with last echo May 2019.  Cause is not known.  He is not known to have ischemic heart disease.  Previous EF was 30-35%.  He has been managed medically including treatment with beta-blocker, ACE inhibitor, and diuretic.  Chronic heart failure appears stable upon this admission.  However, his functional capacity is limited at baseline.  -If operative intervention is recommended, would recommend preoperative echocardiogram  -Continue chronic heart failure treatments including beta-blocker and ACE inhibitor although holding chronic thiazide diuretic at this time as above  -Limit use of IV fluids as able    HTN  Chronic hypertension treated with amlodipine, hydralazine, lisinopril, metoprolol, and hydrochlorothiazide, stable so far  -continue chronic doses of amlodipine, hydralazine, lisinopril, and metoprolol for now  -holding  hydrochlorothiazide for now as above    Peripheral artery disease  Incidentally noted to have mild to moderate stenoses of the inferior and superior mesenteric arteries on CTA performed in January 2020 suspicious for peripheral artery disease.  He does not appear to be symptomatic from mesenteric artery stenoses.  However, atherosclerotic vascular disease is suspected, and presence of mesenteric artery stenoses increases his risk for other peripheral artery disease including peripheral disease in the lower extremities.    Chronic anemia  Chronically anemic with baseline Hgb 10-11 and presented with hemoglobin 9.0, slightly decreased.  No obvious bleeding.      GERD  Chronic GERD treated with antacid  -continue chronic Pepcid    Consultations This Hospital Stay   PHARMACY TO DOSE VANCO  PODIATRY IP CONSULT  WOUND OSTOMY CONTINENCE NURSE  IP CONSULT  CARE MANAGEMENT / SOCIAL WORK IP CONSULT  PHARMACY TO DOSE VANCO  PODIATRY IP CONSULT  WOUND OSTOMY CONTINENCE NURSE  IP CONSULT  CARE MANAGEMENT / SOCIAL WORK IP CONSULT    Code Status   Full Code    Time Spent on this Encounter   I, Naz Adams MD, personally saw the patient today and spent greater than 30 minutes discharging this patient.       Naz Adams MD  [address] SURGICAL  [address] [place] [address]  Phone: [phone]  ______________________________________________________________________    Physical Exam   Vital Signs: Temp: 98  F (36.7  C) Temp src: Oral BP: 115/60 Pulse: 64   Resp: 18 SpO2: 99 % O2 Device: None (Room air)    Weight: 164 lbs .36 oz    Physical Exam  Constitutional:       Appearance: He is ill-appearing.   Cardiovascular:      Rate and Rhythm: Normal rate and regular rhythm.   Pulmonary:      Effort: No respiratory distress.      Breath sounds: No wheezing.   Abdominal:      General: There is no distension.   Skin:     Comments: Fifth toe of right foot appears purpuric and is cool,  other toes of the right foot are pink with blanching erythema which extends onto the dorsum of the right foot to the distal right lower leg but is not hot to touch, persistent dry scabbed ulceration on the lateral right midfoot overlying the distal fifth metatarsal and MTP    Neurological:      Mental Status: He is alert. He is disoriented and confused.      Cranial Nerves: No cranial nerve deficit.      Comments: Agitated             Primary Care Physician   Mark Matson    Discharge Orders   No discharge procedures on file.    Significant Results and Procedures   Results for orders placed or performed during the hospital encounter of 05/19/25   Foot  XR, G/E 3 views, right    Narrative    EXAM: XR FOOT RIGHT G/E 3 VIEWS  LOCATION: [facility]  DATE: 5/19/2025    INDICATION: Large wound, diabetic, eval for possible osteomyelitis along right distal metatarsal  COMPARISON: None.      Impression    IMPRESSION: Diffuse osseous demineralization. Soft tissue wound over the lateral aspect of the fifth MTP joint. No acute fracture or evidence of osteomyelitis. Multifocal degenerative arthrosis of the midfoot and forefoot, advanced at the first MTP   joint. Soft tissue edema about the foot and ankle.    US Lower Extremity Venous Duplex Right    Narrative    EXAM: US LOWER EXTREMITY VENOUS DUPLEX RIGHT  LOCATION: [facility]  DATE: 5/20/2025    INDICATION: new right lower leg and foot swelling, recently reduced mobility, active prostate cancer, ?DVT  COMPARISON: None.  TECHNIQUE: Venous Duplex ultrasound of the right lower extremity with and without compression, augmentation and duplex. Color flow and spectral Doppler with waveform analysis performed.    FINDINGS: Exam includes the common femoral, femoral, popliteal, and contralateral common femoral veins as well as segmentally visualized deep calf veins and greater saphenous vein.     RIGHT: No deep  vein thrombosis. No superficial thrombophlebitis. No popliteal cyst. Subcutaneous edema noted within the right lower extremity.      Impression    IMPRESSION:  1.  No deep venous thrombosis in the right lower extremity.   MR Foot Right w/o & w Contrast    Narrative    EXAM: MR FOOT RIGHT W/O and W CONTRAST  LOCATION: [facility]  DATE: 5/20/2025    INDICATION: chronic stable ulcer mostly dry lateral right 5th met head  COMPARISON: Radiographs from 5/19/2025  TECHNIQUE: Routine. Additional postgadolinium T1 sequences were obtained.  IV CONTRAST: 7.5 mL Gadavist    FINDINGS:     BONES AND JOINTS:   -There is decreased T1 and increased T2 marrow signal involving the majority of the fifth proximal phalanx (sparing the head), and the fifth metatarsal head. There is increased T2 marrow signal extending to the fifth metatarsal proximal metaphysis with   preserved T1 marrow signal. No acute fracture or malalignment. Severe first MTP joint degenerative changes. Otherwise mild to moderate degenerative changes throughout the foot. No significant joint effusion.    SOFT TISSUES:   -There is a skin ulcer overlying the fifth MTP joint which measures 2.0 x 1.6 cm and extends deep to the bony surface. Diffuse soft tissue swelling throughout the foot. Mild enhancement at the fifth toe is concerning for synovitis. No discrete fluid   collection. No definite soft tissue gas. Diffuse muscle atrophy and edema. Intact Lisfranc ligament.      Impression    IMPRESSION:  1.  Deep skin ulcer overlying the fifth MTP joint with surrounding cellulitis. No abscess.  2.  Acute osteomyelitis involving the fifth proximal phalanx and fifth metatarsal head. There is reactive osteitis versus early developing osteomyelitis throughout the fifth metatarsal diaphysis.  3.  Diffuse muscle atrophy and edema suggest diabetic neuropathy.     US JOSÉ MIGUEL Doppler No Exercise    Narrative    EXAM: RESTING ANKLE-BRACHIAL INDICES  (ABIs)  LOCATION: [facility]  DATE: 5/20/2025    INDICATION: ulcer right foot concerning for osteo and arterial insufficiency.  COMPARISON: None.    JOSÉ MIGUEL FINDINGS:  RIGHT  Brachial: 94  Ankle (PT): NC  Ankle (DP): 69Index: 0.64    LEFT  Brachial: 107  Ankle (PT): 168 Index: 1.57  Ankle (DP): 142 Index: 1.33    WAVEFORMS: The dorsalis pedis and posterior tibial arteries are monophasic bilaterally, with the right lower extremity waveforms are dampened relative to the left.      Impression    IMPRESSION:  1.  RIGHT LOWER EXTREMITY: Noncompressible tibial arteries suggestive of calcific atherosclerosis. Monophasic tibial waveforms.  2.  LEFT LOWER EXTREMITY: Noncompressible tibial arteries suggestive of calcific stenosis. Monophasic tibial waveforms.   US Lower Extremity Arterial Duplex Bilateral    Narrative    EXAM: US LOWER EXTREMITY ARTERIAL DUPLEX BILATERAL  LOCATION: [facility]  DATE: 5/20/2025    INDICATION: Concerning for arterial insufficiency with ulceration lateral right fifth metatarsal head. Patient with noncompressible arteries, though distal monophasic waveforms, worse on the right compared to the left.  COMPARISON: None.  TECHNIQUE: Duplex utilizing 2D gray-scale imaging, Doppler interrogation with color-flow and spectral waveform analysis.    FINDINGS:    Right lower extremity: The external iliac artery is 119 cm/second and triphasic. The common femoral artery is 126 cm/second and biphasic. Profunda femoral artery is 125 cm/second and triphasic. The SFA is 30 cm/second proximally with triphasic waveform.   However, mid segment is 27 cm/second and monophasic. There is a segment without demonstrable blood flow in the mid SFA. Distal SFA is 32 cm/second and monophasic. Popliteal artery is 42 cm/second, monophasic. Posterior tibial, anterior tibial, and   dorsalis pedis arteries are 27-51 cm/second and monophasic.    Left lower extremity: The external iliac  artery is 144 cm/second and triphasic. Common femoral artery is 112 cm/second and triphasic. Profunda femoral artery is 157 cm/second and triphasic. The SFA is  cm/second and triphasic in the proximal to mid   segments. Biphasic in the distal segment. Popliteal artery is 66-88 cm/second and biphasic/monophasic. Posterior tibial, anterior tibial, and dorsalis pedis arteries range from 52-79 cm/second and monophasic.        Impression    IMPRESSION:  1.  Occlusion in the mid right SFA with monophasic waveforms beyond this level.  2.  Suspect stenosis in the left distal SFA given transition of waveforms from normal triphasic and proximal SFA to monophasic in the runoff arteries. There may be multifocal areas, though suspect femoral popliteal distribution greatest amount of   disease.       Discharge Medications   Current Discharge Medication List        CONTINUE these medications which have NOT CHANGED    Details   abiraterone (ZYTIGA) 250 MG tablet Take 4 tablets (1,000 mg) by mouth daily for 30 doses. Take on empty stomach.  Qty: 120 tablet, Refills: 0    Associated Diagnoses: Prostate cancer metastatic to bone (H)      amLODIPine (NORVASC) 10 MG tablet TAKE ONE TABLET BY MOUTH ONCE DAILY  Qty: 90 tablet, Refills: 0    Comments: Please advise pt they are due for an appointment with their provider for further refills.  Associated Diagnoses: Hypertension, unspecified type      atorvastatin (LIPITOR) 20 MG tablet TAKE ONE TABLET BY MOUTH ONCE DAILY  Qty: 90 tablet, Refills: 3    Associated Diagnoses: Diabetes mellitus without complication (H)      blood glucose (CONTOUR NEXT TEST) test strip USE TO TEST BLOOD SUGARS ONE TO THREE TIMES DAILY  OR AS DIRECTED  Qty: 100 strip, Refills: 1    Associated Diagnoses: Diabetes mellitus without complication (H)      blood glucose monitoring (NO BRAND SPECIFIED) meter device kit Use to test blood sugar 1-3 times daily or as directed. -Contour Next meter  Qty: 1 kit, Refills:  0    Associated Diagnoses: Diabetes mellitus without complication (H)      citalopram (CELEXA) 20 MG tablet Take 1 tablet (20 mg) by mouth daily.  Qty: 90 tablet, Refills: 0    Comments: Please advise pt they are due for an appointment with their provider for further refills.  Associated Diagnoses: Adjustment disorder with anxious mood      famotidine (PEPCID) 40 MG tablet Take 40 mg by mouth 2 times daily as needed for heartburn.      glipiZIDE (GLUCOTROL) 5 MG tablet TAKE ONE TABLET BY MOUTH EVERY MORNING  Qty: 90 tablet, Refills: 0    Associated Diagnoses: Diabetes mellitus without complication (H)      hydrALAZINE (APRESOLINE) 100 MG tablet Take 100 mg by mouth 3 times daily.      hydrochlorothiazide (HYDRODIURIL) 25 MG tablet TAKE ONE TABLET BY MOUTH ONCE DAILY  Qty: 90 tablet, Refills: 1    Associated Diagnoses: Hypertension, unspecified type      lisinopril (ZESTRIL) 40 MG tablet TAKE ONE TABLET BY MOUTH ONCE DAILY  Qty: 90 tablet, Refills: 1    Associated Diagnoses: Hypertension, unspecified type; Acute combined systolic and diastolic congestive heart failure (H)      metFORMIN (GLUCOPHAGE XR) 500 MG 24 hr tablet Take 1,000 mg by mouth daily (with lunch).      metoprolol tartrate (LOPRESSOR) 50 MG tablet Take 50 mg by mouth 2 times daily.      Microlet Lancets MISC USE TO TEST BLOOD SUGAR 1-3 TIMES DAILY OR AS DIRECTED.  Qty: 100 each, Refills: 0    Associated Diagnoses: Diabetes mellitus without complication (H)      predniSONE (DELTASONE) 5 MG tablet Take 1 tablet (5 mg) by mouth daily. START WITH ZYTIGA  Qty: 180 tablet, Refills: 3    Associated Diagnoses: Prostate cancer (H)      zoledronic acid (ZOMETA) 4 MG/5ML injection Inject 5 mLs into the vein once. Every 3 months           Allergies   Allergies   Allergen Reactions    No Known Drug Allergy     Seasonal Allergies

## 2025-05-21 NOTE — PLAN OF CARE
Goal Outcome Evaluation:                      Awaiting open bed for transfer of care to [facility].

## 2025-05-21 NOTE — PROGRESS NOTES
Transfer Type: [facility]  Transfer Triage Note    Date of call: 05/21/25  Time of call: 10:24 AM    Current Patient Location:  [facility]  Current Level of Care: Med Surg    Vitals: BP:130/60 HR: 60s O2 Sats: 100% on RA  Diagnosis: Osteomyelitis of foot/toe  Reason for requested transfer: Procedure can be done here and not at referring hospital  Further diagnostic work up, management, and consultation for specialized care   Isolation Needs: None    Care everywhere has been updated and reviewed: N/A  Necessary images have been sent through PACS: N/A    If patient is transferring for specialty care or specific procedure, the specialist required has participated in the transfer call and agreed with need for transfer and anticipated timeline: Yes, Provider name: Jocelyn specialty with: Vascular surgery --- per [facility] provider     Transfer accepted: Yes  Stability of Patient: Patient is vitally stable, with no critical labs, and will likely remain stable throughout the transfer process  Does the patient require placement on the [facility]? No.  Level of Care Needed: Med Surg  Telemetry Needed:  None  Expected Time of Arrival for Transfer: 0-8 hours  Arrival Location:  [facility]     Recommendations for Management and Stabilization: Not needed    Additional Comments:     64 yo M with PMH of DM 2, CHF, prostate cancer c/b mets to bone, PAD. Currently admitted to [facility] with R foot diabetic ulcer c/b osteomyelitis c/b PAD. Admitted for R foot pain and concern for sepsis 2/2 wound infection. MRI showing acute ostomyelitis. Podiatry following while inpatient     MRI 5/20 IMPRESSION:  1.  Deep skin ulcer overlying the fifth MTP joint with surrounding cellulitis. No abscess.  2.  Acute osteomyelitis involving the fifth proximal phalanx and fifth metatarsal head. There is reactive osteitis versus early developing osteomyelitis throughout the fifth metatarsal diaphysis.  3.   Diffuse muscle atrophy and edema suggest diabetic neuropathy.    US showing: IMPRESSION:  1.  Occlusion in the mid right SFA with monophasic waveforms beyond this level.  2.  Suspect stenosis in the left distal SFA given transition of waveforms from normal triphasic and proximal SFA to monophasic in the runoff arteries. There may be multifocal areas, though suspect femoral popliteal distribution greatest amount of   disease.    JOSÉ MIGUEL FINDINGS:  RIGHT  Brachial: 94  Ankle (PT): NC  Ankle (DP): 69Index: 0.64     LEFT  Brachial: 107  Ankle (PT): 168 Index: 1.57  Ankle (DP): 142 Index: 1.33    Needs transfer for vascular surgery per podiatry, evaluated by [facility] vascular surgeon who recommended transfer for more invovled vascular involvement, debridement, and possible amputation.     On IV Vanc and zosyn currently. Will likely need ongoing ID involvement.     Cr did decrease a bit on 5/21, concerning for vanc/zosyn ATN vs AIN.     Accepted to [facility] for medsurg bed for vascular involvement, possible ID involvement.     To notify the admitting provider that the patient has arrived at their destination:    For [facility]: Identify the correct provider on [employer]: [facility] / [facility] and then find the provider who has the title CAPTAIN: DIRECT ADMITS or CAPTAIN: ER / DIRECT ADMIT / PT PLACEMENT next to their name. Use [email or web address] to contact that person.      Rachelle Richards MD

## 2025-05-21 NOTE — PROGRESS NOTES
Reviewed MRI demonstrating osteomyelitis of the fifth proximal phalanx and a portion of the metatarsal.  This will likely require surgical debridement with amputation of the fifth ray.  Due to the patient's dysvascular status arterial insufficiency would recommend multidisciplinary evaluation allowing vascular consult and contribution in the outcome of this patient.    This patient's vascular status contributes to the etiology of this chronic wound that has led to osteomyelitis and admission.  The vascular component should be thoroughly evaluated and managed by the appropriate specialty for best outcome for this patient.

## 2025-05-21 NOTE — PLAN OF CARE
"Goal Outcome Evaluation:    Aox3 with intermittent confusion. Up 1-2 assist with walker and belt to bathroom/ambulation. Last BM 5/21-medium. Regular diet. R foot wound open to air. IV ABX administered. Sodium levels 129, 131, 129. Patient found to have occlusions in R leg from imaging. Transfer planned to [facility] for vascular surgery evaluation. Continue with plan of care.     /60 (BP Location: Left arm)   Pulse 64   Temp 98  F (36.7  C) (Oral)   Resp 18   Ht 1.727 m (5' 8\")   Wt 74.4 kg (164 lb 0.4 oz)   SpO2 99%   BMI 24.94 kg/m              "

## 2025-05-21 NOTE — PLAN OF CARE
"Goal Outcome Evaluation:      Plan of Care Reviewed With: patient    Overall Patient Progress: no changeOverall Patient Progress: no change    Outcome Evaluation: A&O to self, continues to be confused.Normotensive, afebrile, on RA. Denies pain. R foot wound LILIAN, no drainage. PIV pulled out, replaced, needs constant reminder not to mess with the IV. NS @ 50ml/hr. Serial , 127,126. Assist x 1-2 GBW. Using BSC/Urinal. On K/Phos protocol    /58 (BP Location: Right arm)   Pulse 78   Temp 97.6  F (36.4  C) (Oral)   Resp 18   Ht 1.727 m (5' 8\")   Wt 74.4 kg (164 lb 0.4 oz)   SpO2 96%   BMI 24.94 kg/m              "

## 2025-05-22 ENCOUNTER — RESULTS FOLLOW-UP (OUTPATIENT)
Dept: FAMILY MEDICINE | Facility: CLINIC | Age: 65
End: 2025-05-22

## 2025-05-22 VITALS
TEMPERATURE: 97.4 F | SYSTOLIC BLOOD PRESSURE: 133 MMHG | HEART RATE: 70 BPM | DIASTOLIC BLOOD PRESSURE: 63 MMHG | OXYGEN SATURATION: 97 % | RESPIRATION RATE: 20 BRPM

## 2025-05-22 LAB
ANION GAP SERPL CALCULATED.3IONS-SCNC: 9 MMOL/L (ref 7–15)
BACTERIA SPEC CULT: NORMAL
BACTERIA SPEC CULT: NORMAL
BASE EXCESS BLDV CALC-SCNC: -1.6 MMOL/L (ref -3–3)
BASE EXCESS BLDV CALC-SCNC: -4.1 MMOL/L (ref -3–3)
BASE EXCESS BLDV CALC-SCNC: -4.9 MMOL/L (ref -3–3)
BUN SERPL-MCNC: 9.4 MG/DL (ref 8–23)
CALCIUM SERPL-MCNC: 7.8 MG/DL (ref 8.8–10.4)
CHLORIDE SERPL-SCNC: 100 MMOL/L (ref 98–107)
CREAT SERPL-MCNC: 1.03 MG/DL (ref 0.67–1.17)
EGFRCR SERPLBLD CKD-EPI 2021: 80 ML/MIN/1.73M2
ERYTHROCYTE [DISTWIDTH] IN BLOOD BY AUTOMATED COUNT: 12.4 % (ref 10–15)
GLUCOSE BLDC GLUCOMTR-MCNC: 136 MG/DL (ref 70–99)
GLUCOSE BLDC GLUCOMTR-MCNC: 164 MG/DL (ref 70–99)
GLUCOSE SERPL-MCNC: 112 MG/DL (ref 70–99)
HCO3 BLDV-SCNC: 20 MMOL/L (ref 21–28)
HCO3 BLDV-SCNC: 21 MMOL/L (ref 21–28)
HCO3 BLDV-SCNC: 23 MMOL/L (ref 21–28)
HCO3 SERPL-SCNC: 20 MMOL/L (ref 22–29)
HCT VFR BLD AUTO: 25.7 % (ref 40–53)
HGB BLD-MCNC: 8.9 G/DL (ref 13.3–17.7)
MCH RBC QN AUTO: 29.7 PG (ref 26.5–33)
MCHC RBC AUTO-ENTMCNC: 34.6 G/DL (ref 31.5–36.5)
MCV RBC AUTO: 86 FL (ref 78–100)
MRSA DNA SPEC QL NAA+PROBE: NEGATIVE
O2/TOTAL GAS SETTING VFR VENT: 0 %
OSMOLALITY SERPL: 272 MMOL/KG (ref 280–301)
OXYHGB MFR BLDV: 23 % (ref 70–75)
OXYHGB MFR BLDV: 81 % (ref 70–75)
OXYHGB MFR BLDV: 84 % (ref 70–75)
PCO2 BLDV: 35 MM HG (ref 40–50)
PCO2 BLDV: 38 MM HG (ref 40–50)
PCO2 BLDV: 39 MM HG (ref 40–50)
PH BLDV: 7.34 [PH] (ref 7.32–7.43)
PH BLDV: 7.36 [PH] (ref 7.32–7.43)
PH BLDV: 7.39 [PH] (ref 7.32–7.43)
PHOSPHATE SERPL-MCNC: 1.9 MG/DL (ref 2.5–4.5)
PLATELET # BLD AUTO: 191 10E3/UL (ref 150–450)
PO2 BLDV: 17 MM HG (ref 25–47)
PO2 BLDV: 48 MM HG (ref 25–47)
PO2 BLDV: 52 MM HG (ref 25–47)
POTASSIUM SERPL-SCNC: 3.2 MMOL/L (ref 3.4–5.3)
POTASSIUM SERPL-SCNC: 3.8 MMOL/L (ref 3.4–5.3)
RBC # BLD AUTO: 3 10E6/UL (ref 4.4–5.9)
SA TARGET DNA: NEGATIVE
SAO2 % BLDV: 23.1 % (ref 70–75)
SAO2 % BLDV: 82.3 % (ref 70–75)
SAO2 % BLDV: 85.2 % (ref 70–75)
SODIUM SERPL-SCNC: 129 MMOL/L (ref 135–145)
SODIUM UR-SCNC: 107 MMOL/L
VANCOMYCIN SERPL-MCNC: 11 UG/ML (ref ?–25)
WBC # BLD AUTO: 9.3 10E3/UL (ref 4–11)

## 2025-05-22 PROCEDURE — 83935 ASSAY OF URINE OSMOLALITY: CPT | Performed by: HOSPITALIST

## 2025-05-22 PROCEDURE — 82805 BLOOD GASES W/O2 SATURATION: CPT | Performed by: STUDENT IN AN ORGANIZED HEALTH CARE EDUCATION/TRAINING PROGRAM

## 2025-05-22 PROCEDURE — 36415 COLL VENOUS BLD VENIPUNCTURE: CPT | Performed by: STUDENT IN AN ORGANIZED HEALTH CARE EDUCATION/TRAINING PROGRAM

## 2025-05-22 PROCEDURE — 250N000011 HC RX IP 250 OP 636: Performed by: HOSPITALIST

## 2025-05-22 PROCEDURE — 250N000012 HC RX MED GY IP 250 OP 636 PS 637: Performed by: STUDENT IN AN ORGANIZED HEALTH CARE EDUCATION/TRAINING PROGRAM

## 2025-05-22 PROCEDURE — 84100 ASSAY OF PHOSPHORUS: CPT | Performed by: STUDENT IN AN ORGANIZED HEALTH CARE EDUCATION/TRAINING PROGRAM

## 2025-05-22 PROCEDURE — 250N000013 HC RX MED GY IP 250 OP 250 PS 637: Performed by: PHYSICIAN ASSISTANT

## 2025-05-22 PROCEDURE — 36415 COLL VENOUS BLD VENIPUNCTURE: CPT | Performed by: HOSPITALIST

## 2025-05-22 PROCEDURE — 84132 ASSAY OF SERUM POTASSIUM: CPT | Performed by: HOSPITALIST

## 2025-05-22 PROCEDURE — 99222 1ST HOSP IP/OBS MODERATE 55: CPT | Performed by: PODIATRIST

## 2025-05-22 PROCEDURE — 80202 ASSAY OF VANCOMYCIN: CPT | Performed by: PHYSICIAN ASSISTANT

## 2025-05-22 PROCEDURE — 250N000013 HC RX MED GY IP 250 OP 250 PS 637: Performed by: STUDENT IN AN ORGANIZED HEALTH CARE EDUCATION/TRAINING PROGRAM

## 2025-05-22 PROCEDURE — 250N000011 HC RX IP 250 OP 636: Performed by: PHYSICIAN ASSISTANT

## 2025-05-22 PROCEDURE — 120N000001 HC R&B MED SURG/OB

## 2025-05-22 PROCEDURE — G0545 PR INHRENT VISIT TO INPT/OBS W CNFRM/SUSPCT INFCT DIS BY INFCT DIS SPCIALST: HCPCS | Performed by: PHYSICIAN ASSISTANT

## 2025-05-22 PROCEDURE — 82374 ASSAY BLOOD CARBON DIOXIDE: CPT | Performed by: PHYSICIAN ASSISTANT

## 2025-05-22 PROCEDURE — 250N000011 HC RX IP 250 OP 636: Performed by: STUDENT IN AN ORGANIZED HEALTH CARE EDUCATION/TRAINING PROGRAM

## 2025-05-22 PROCEDURE — 99222 1ST HOSP IP/OBS MODERATE 55: CPT | Performed by: PHYSICIAN ASSISTANT

## 2025-05-22 PROCEDURE — 250N000013 HC RX MED GY IP 250 OP 250 PS 637: Performed by: HOSPITALIST

## 2025-05-22 PROCEDURE — 84300 ASSAY OF URINE SODIUM: CPT | Performed by: HOSPITALIST

## 2025-05-22 PROCEDURE — 83930 ASSAY OF BLOOD OSMOLALITY: CPT | Performed by: HOSPITALIST

## 2025-05-22 PROCEDURE — 85027 COMPLETE CBC AUTOMATED: CPT | Performed by: PHYSICIAN ASSISTANT

## 2025-05-22 PROCEDURE — 99233 SBSQ HOSP IP/OBS HIGH 50: CPT | Performed by: HOSPITALIST

## 2025-05-22 PROCEDURE — 999N000198 HC STATISTIC WOC PT EDUCATION, 16-30 MIN

## 2025-05-22 RX ORDER — POTASSIUM CHLORIDE 1500 MG/1
40 TABLET, EXTENDED RELEASE ORAL ONCE
Status: COMPLETED | OUTPATIENT
Start: 2025-05-22 | End: 2025-05-22

## 2025-05-22 RX ORDER — VANCOMYCIN HYDROCHLORIDE 1 G/200ML
1000 INJECTION, SOLUTION INTRAVENOUS EVERY 12 HOURS
Status: DISCONTINUED | OUTPATIENT
Start: 2025-05-22 | End: 2025-05-22

## 2025-05-22 RX ORDER — ABIRATERONE ACETATE 250 MG/1
1000 TABLET ORAL DAILY
Status: DISCONTINUED | OUTPATIENT
Start: 2025-05-22 | End: 2025-05-30 | Stop reason: HOSPADM

## 2025-05-22 RX ORDER — SODIUM CHLORIDE 9 MG/ML
INJECTION, SOLUTION INTRAVENOUS CONTINUOUS
Status: DISCONTINUED | OUTPATIENT
Start: 2025-05-23 | End: 2025-05-24

## 2025-05-22 RX ORDER — LISINOPRIL 10 MG/1
10 TABLET ORAL DAILY
Status: DISCONTINUED | OUTPATIENT
Start: 2025-05-22 | End: 2025-05-24

## 2025-05-22 RX ORDER — PIPERACILLIN SODIUM, TAZOBACTAM SODIUM 3; .375 G/15ML; G/15ML
3.38 INJECTION, POWDER, LYOPHILIZED, FOR SOLUTION INTRAVENOUS EVERY 6 HOURS
Status: DISCONTINUED | OUTPATIENT
Start: 2025-05-22 | End: 2025-05-22

## 2025-05-22 RX ORDER — AMPICILLIN AND SULBACTAM 2; 1 G/1; G/1
3 INJECTION, POWDER, FOR SOLUTION INTRAMUSCULAR; INTRAVENOUS EVERY 6 HOURS
Status: DISCONTINUED | OUTPATIENT
Start: 2025-05-22 | End: 2025-05-30 | Stop reason: HOSPADM

## 2025-05-22 RX ADMIN — AMLODIPINE BESYLATE 10 MG: 10 TABLET ORAL at 09:36

## 2025-05-22 RX ADMIN — FAMOTIDINE 40 MG: 20 TABLET, FILM COATED ORAL at 09:36

## 2025-05-22 RX ADMIN — ABIRATERONE ACETATE 1000 MG: 250 TABLET ORAL at 17:49

## 2025-05-22 RX ADMIN — VANCOMYCIN HYDROCHLORIDE 1000 MG: 1 INJECTION, SOLUTION INTRAVENOUS at 10:14

## 2025-05-22 RX ADMIN — PIPERACILLIN AND TAZOBACTAM 3.38 G: 3; .375 INJECTION, POWDER, FOR SOLUTION INTRAVENOUS at 12:32

## 2025-05-22 RX ADMIN — OXYCODONE HYDROCHLORIDE 5 MG: 5 TABLET ORAL at 16:12

## 2025-05-22 RX ADMIN — POTASSIUM & SODIUM PHOSPHATES POWDER PACK 280-160-250 MG 2 PACKET: 280-160-250 PACK at 20:07

## 2025-05-22 RX ADMIN — ACETAMINOPHEN 975 MG: 325 TABLET, FILM COATED ORAL at 14:41

## 2025-05-22 RX ADMIN — AMPICILLIN SODIUM AND SULBACTAM SODIUM 3 G: 2; 1 INJECTION, POWDER, FOR SOLUTION INTRAMUSCULAR; INTRAVENOUS at 17:38

## 2025-05-22 RX ADMIN — POTASSIUM & SODIUM PHOSPHATES POWDER PACK 280-160-250 MG 2 PACKET: 280-160-250 PACK at 16:11

## 2025-05-22 RX ADMIN — OXYCODONE HYDROCHLORIDE 5 MG: 5 TABLET ORAL at 12:31

## 2025-05-22 RX ADMIN — ATORVASTATIN CALCIUM 20 MG: 20 TABLET, FILM COATED ORAL at 09:36

## 2025-05-22 RX ADMIN — CITALOPRAM HYDROBROMIDE 20 MG: 20 TABLET ORAL at 09:36

## 2025-05-22 RX ADMIN — AMPICILLIN SODIUM AND SULBACTAM SODIUM 3 G: 2; 1 INJECTION, POWDER, FOR SOLUTION INTRAMUSCULAR; INTRAVENOUS at 22:15

## 2025-05-22 RX ADMIN — ACETAMINOPHEN 975 MG: 325 TABLET, FILM COATED ORAL at 05:21

## 2025-05-22 RX ADMIN — POTASSIUM CHLORIDE 40 MEQ: 1500 TABLET, EXTENDED RELEASE ORAL at 12:30

## 2025-05-22 RX ADMIN — HYDRALAZINE HYDROCHLORIDE 100 MG: 50 TABLET ORAL at 09:36

## 2025-05-22 RX ADMIN — PREDNISONE 5 MG: 5 TABLET ORAL at 09:36

## 2025-05-22 RX ADMIN — METOPROLOL TARTRATE 50 MG: 50 TABLET, FILM COATED ORAL at 09:36

## 2025-05-22 RX ADMIN — HYDRALAZINE HYDROCHLORIDE 100 MG: 50 TABLET ORAL at 22:14

## 2025-05-22 RX ADMIN — METOPROLOL TARTRATE 50 MG: 50 TABLET, FILM COATED ORAL at 20:07

## 2025-05-22 RX ADMIN — OXYCODONE HYDROCHLORIDE 5 MG: 5 TABLET ORAL at 20:07

## 2025-05-22 RX ADMIN — LISINOPRIL 10 MG: 10 TABLET ORAL at 12:31

## 2025-05-22 RX ADMIN — ACETAMINOPHEN 975 MG: 325 TABLET, FILM COATED ORAL at 22:15

## 2025-05-22 RX ADMIN — POTASSIUM & SODIUM PHOSPHATES POWDER PACK 280-160-250 MG 2 PACKET: 280-160-250 PACK at 12:30

## 2025-05-22 RX ADMIN — CALCIUM CARBONATE (ANTACID) CHEW TAB 500 MG 1000 MG: 500 CHEW TAB at 16:32

## 2025-05-22 ASSESSMENT — ACTIVITIES OF DAILY LIVING (ADL)
ADLS_ACUITY_SCORE: 59
ADLS_ACUITY_SCORE: 75
ADLS_ACUITY_SCORE: 75
ADLS_ACUITY_SCORE: 72
ADLS_ACUITY_SCORE: 75
ADLS_ACUITY_SCORE: 59
ADLS_ACUITY_SCORE: 75
ADLS_ACUITY_SCORE: 59
ADLS_ACUITY_SCORE: 75
ADLS_ACUITY_SCORE: 66
ADLS_ACUITY_SCORE: 75
ADLS_ACUITY_SCORE: 59
ADLS_ACUITY_SCORE: 75
ADLS_ACUITY_SCORE: 59
ADLS_ACUITY_SCORE: 75

## 2025-05-22 NOTE — PHARMACY-VANCOMYCIN DOSING SERVICE
Pharmacy Vancomycin Note  Date of Service May 22, 2025  Patient's  1960   65 year old, male    Indication: Osteomyelitis  Day of Therapy: 4  Current vancomycin regimen:  Intermittent  Current vancomycin monitoring method: intermittent  Current vancomycin therapeutic monitoring goal: 400-600 mg*h/L    Current estimated CrCl = Estimated Creatinine Clearance: 73.2 mL/min (based on SCr of 1.03 mg/dL).    Creatinine for last 3 days  2025:  6:17 PM Creatinine 0.96 mg/dL  2025:  5:55 AM Creatinine 0.78 mg/dL  2025:  6:28 AM Creatinine 1.76 mg/dL;  2:00 PM Creatinine 1.60 mg/dL  2025:  5:58 AM Creatinine 1.03 mg/dL    Recent Vancomycin Levels (past 3 days)  2025:  6:28 AM Vancomycin 15.2 ug/mL  2025:  5:58 AM Vancomycin 11.0 ug/mL    Vancomycin IV Administrations (past 72 hours)                     vancomycin (VANCOCIN) 1,000 mg in 200 mL dextrose intermittent infusion (mg) 1,000 mg New Bag 25 0751    vancomycin (VANCOCIN) 1,500 mg in 0.9% NaCl 265 mL intermittent infusion (mg) 1,500 mg New Bag 25 0920    vancomycin (VANCOCIN) 1,500 mg in 0.9% NaCl 265 mL intermittent infusion (mg) 1,500 mg New Bag 25 1912                    Nephrotoxins and other renal medications (From now, onward)      Start     Dose/Rate Route Frequency Ordered Stop    25 09  [Held by provider]  lisinopril (ZESTRIL) tablet 40 mg        (On hold since yesterday at  until manually unheld; held by Jignesh Mcclure PA-Butch Reason: Acute Kidney Injury)   Note to Pharmacy: PTA Sig:TAKE ONE TABLET BY MOUTH ONCE DAILY      40 mg Oral DAILY 25 09  vancomycin (VANCOCIN) 1,000 mg in 200 mL dextrose intermittent infusion         1,000 mg  200 mL/hr over 1 Hours Intravenous EVERY 12 HOURS 25 0836                 Contrast Orders - past 72 hours (72h ago, onward)      None            Interpretation of levels and current regimen:  Vancomycin level is  reflective of AUC less than 400    Has serum creatinine changed greater than 50% in last 72 hours: No    Urine output:  good urine output    Renal Function: Improving    InsightRX Prediction of Planned New Vancomycin Regimen  Regimen: 1000 mg IV every 12 hours.  Start time: 08:34 on 05/22/2025  Exposure target: AUC24 (range) 400-600 mg/L.hr   AUC24,ss: 587 mg/L.hr  Probability of AUC24 > 400: 100 %  Ctrough,ss: 18.3 mg/L  Probability of Ctrough,ss > 20: 27 %  Probability of nephrotoxicity (Lodise JULIANNE 2009): 15 %      Plan:  Change to 1 gram q12h  Vancomycin monitoring method: AUC  Vancomycin therapeutic monitoring goal: 400-600 mg*h/L  Pharmacy will check vancomycin levels as appropriate in 1-3 Days.  Serum creatinine levels will be ordered daily for the first week of therapy and at least twice weekly for subsequent weeks.    Louisa Bangura RP

## 2025-05-22 NOTE — PLAN OF CARE
Goal Outcome Evaluation:      Plan of Care Reviewed With: patient, spouse    Overall Patient Progress: improvingOverall Patient Progress: improving         Date & Time: 5/22/2025 [id number]  Surgery/POD#: 5/21 Transfer for osteomyelitis of the L foot  Behavior & Aggression:green  Fall Risk: Yes  Orientation:A&O X 4   ABNL VS/O2: VSS RA  ABNL Labs: K 3.2 replaced recheck at 1700 , phos 1.9 replacing recheck in am  Pain Management:Scheduled Tylenol, PRN oxycodone   Bowel/Bladder: external cath in place  Drains: R forearm PIV infusing ns @100 mL/hr, R hand PIV SL  Wounds/incisions: Large bruising on R hip, bruising arm bruising, R foot swelling and redness, R pinky toe and R lateral upper wound, R heel scab. Scattered scabs and bruising.  Diet:mod cHO  Number of times OUT OF BED this shift 0  Tests/Procedures: WOC and Surgical, ID consults  Anticipated  DC Date: Pending

## 2025-05-22 NOTE — PROGRESS NOTES
Summary: admitted on 5/21/25 due to acute osteomyelitis of right 5th proximal phalanx and metatarsal head.    Orientation: A/Ox4 ex confused    Vitals/Tele: VSS on RA    IV Access/drains: R PIV infusing NS @ 100 ml/hr    Diet: Regular ex NPO at Midnight for surgical consult    Mobility: Not OOB yet, non weight bearing on R foot.    GI/: Continent of B/B, urinal and bed pan at bedside.    Wound/Skin: Large bruising on R hip, bruising arm bruising, R foot swelling and redness, R pinky toe and R lateral upper wound, R heel scab. Scattered scabs and bruising.    Consults; WOC, Surgery    Discharge Plan: TBD    See Flow sheets for assessment

## 2025-05-22 NOTE — PHARMACY-ADMISSION MEDICATION HISTORY
Admission medication history completed at [facility]. Please see Medication Scribe Admission Medication History note from 5/20/2025.

## 2025-05-22 NOTE — CONSULTS
VASCULAR SURGERY INPATIENT CONSULTATION / Initial In-Patient visit    VASCULAR SURGEON: Dr. Bertrand    LOCATION: [facility]    Sushil Diana  Medical Record #:  [id number]  YOB: 1960  Age:  65 year old     Date of Service: 5/21/2025    PRIMARY CARE PROVIDER: Mark Matson    Reason for consultation:  JASON Diana is a 65 year old male with a history of PAD, hypertension, HLP, diastolic and systolic heart failure, diabetes type 2(5.9 A1c), GERD, MDD, alcohol use, metastatic prostate cancer with mets to the bone who was admitted to outside hospital for right foot pain concerning for ulceration for osteomyelitis and sepsis, which is MRI confirmed.     On examination Sushil is confused, palpable femoral pulses, monophasic pedal pulses in the right.   Unable to perform full assessment given Sushil's confusion.     EXAM: RESTING ANKLE-BRACHIAL INDICES (ABIs)  LOCATION: [facility]  DATE: 5/20/2025     INDICATION: ulcer right foot concerning for osteo and arterial insufficiency.  COMPARISON: None.     JOSÉ MIGUEL FINDINGS:  RIGHT  Brachial: 94  Ankle (PT): NC  Ankle (DP): 69Index: 0.64     LEFT  Brachial: 107  Ankle (PT): 168 Index: 1.57  Ankle (DP): 142 Index: 1.33     WAVEFORMS: The dorsalis pedis and posterior tibial arteries are monophasic bilaterally, with the right lower extremity waveforms are dampened relative to the left.                                                                      IMPRESSION:  1.  RIGHT LOWER EXTREMITY: Noncompressible tibial arteries suggestive of calcific atherosclerosis. Monophasic tibial waveforms.  2.  LEFT LOWER EXTREMITY: Noncompressible tibial arteries suggestive of calcific stenosis. Monophasic tibial waveforms.    Narrative & Impression   EXAM: US LOWER EXTREMITY ARTERIAL DUPLEX BILATERAL  LOCATION: [facility]  DATE: 5/20/2025     INDICATION: Concerning for arterial insufficiency with  ulceration lateral right fifth metatarsal head. Patient with noncompressible arteries, though distal monophasic waveforms, worse on the right compared to the left.  COMPARISON: None.  TECHNIQUE: Duplex utilizing 2D gray-scale imaging, Doppler interrogation with color-flow and spectral waveform analysis.     FINDINGS:     Right lower extremity: The external iliac artery is 119 cm/second and triphasic. The common femoral artery is 126 cm/second and biphasic. Profunda femoral artery is 125 cm/second and triphasic. The SFA is 30 cm/second proximally with triphasic waveform.   However, mid segment is 27 cm/second and monophasic. There is a segment without demonstrable blood flow in the mid SFA. Distal SFA is 32 cm/second and monophasic. Popliteal artery is 42 cm/second, monophasic. Posterior tibial, anterior tibial, and   dorsalis pedis arteries are 27-51 cm/second and monophasic.     Left lower extremity: The external iliac artery is 144 cm/second and triphasic. Common femoral artery is 112 cm/second and triphasic. Profunda femoral artery is 157 cm/second and triphasic. The SFA is  cm/second and triphasic in the proximal to mid   segments. Biphasic in the distal segment. Popliteal artery is 66-88 cm/second and biphasic/monophasic. Posterior tibial, anterior tibial, and dorsalis pedis arteries range from 52-79 cm/second and monophasic.                                                                         IMPRESSION:  1.  Occlusion in the mid right SFA with monophasic waveforms beyond this level.  2.  Suspect stenosis in the left distal SFA given transition of waveforms from normal triphasic and proximal SFA to monophasic in the runoff arteries. There may be multifocal areas, though suspect femoral popliteal distribution greatest amount of   disease.       RECOMMENDATION:  Recommend medical management with aspirin 81 mg, given confusion, will readdress possible intervention with family and patient.     PHH:     Past Medical History:   Diagnosis Date    Cancer (H)     Congestive heart failure (H)     Diabetes (H)     GERD (gastroesophageal reflux disease)     Gout     Hypertension          Past Surgical History:   Procedure Laterality Date    NONE OF THE ABOVE CORE MEASURE DIAGNOSES          ALLERGIES:     Allergies   Allergen Reactions    No Known Drug Allergy     Seasonal Allergies         MEDS:    Current Facility-Administered Medications:     abiraterone (ZYTIGA) tablet 1,000 mg, 1,000 mg, Oral, Daily, Naz Adams MD    acetaminophen (TYLENOL) tablet 325 mg, 325 mg, Oral, Q4H PRN **OR** acetaminophen (TYLENOL) Suppository 325 mg, 325 mg, Rectal, Q4H PRN, Jignesh Mcclure PA-C    acetaminophen (TYLENOL) tablet 975 mg, 975 mg, Oral, Q8H, Jignesh Mcclure PA-C, 975 mg at 05/22/25 0521    amLODIPine (NORVASC) tablet 10 mg, 10 mg, Oral, Daily, Jignesh Mcclure PA-C    atorvastatin (LIPITOR) tablet 20 mg, 20 mg, Oral, Daily, Naz Adams MD    bisacodyl (DULCOLAX) suppository 10 mg, 10 mg, Rectal, Daily PRN, Jignesh Mcclure PA-C    calcium carbonate (TUMS) chewable tablet 1,000 mg, 1,000 mg, Oral, 4x Daily PRN, Naz Adams MD    cefTRIAXone (ROCEPHIN) 2 g vial to attach to  ml bag for ADULTS or NS 50 ml bag for PEDS, 2 g, Intravenous, Q24H, Naz Adams MD    citalopram (celeXA) tablet 20 mg, 20 mg, Oral, Daily, Naz Adams MD    glucose gel 15-30 g, 15-30 g, Oral, Q15 Min PRN **OR** dextrose 50 % injection 25-50 mL, 25-50 mL, Intravenous, Q15 Min PRN **OR** glucagon injection 1 mg, 1 mg, Subcutaneous, Q15 Min PRN, Jignesh Mcclure PA-C    famotidine (PEPCID) tablet 40 mg, 40 mg, Oral, Daily, Naz Adams MD    hydrALAZINE (APRESOLINE) tablet 100 mg, 100 mg, Oral, TID, Jignesh Mcclure PA-C, 100 mg at 05/21/25 2129    HYDROmorphone (DILAUDID) injection 0.2 mg, 0.2 mg, Intravenous, Q2H PRN, Jignesh Mcclure  JIGAR Blount    HYDROmorphone (DILAUDID) injection 0.4 mg, 0.4 mg, Intravenous, Q2H PRN, Jignesh Mcclure PA-C    insulin aspart (NovoLOG) injection (RAPID ACTING), 1-7 Units, Subcutaneous, TID AC, Jignesh Mcclure PA-C    insulin aspart (NovoLOG) injection (RAPID ACTING), 1-5 Units, Subcutaneous, At Bedtime, Jignesh Mcclure PA-C    lidocaine (LMX4) cream, , Topical, Q1H PRN, Naz Adams MD    lidocaine 1 % 0.1-1 mL, 0.1-1 mL, Other, Q1H PRN, Naz Adams MD    [Held by provider] lisinopril (ZESTRIL) tablet 40 mg, 40 mg, Oral, Daily, Naz Adams MD    metoprolol tartrate (LOPRESSOR) tablet 50 mg, 50 mg, Oral, BID, Jignesh Mcclure PA-C, 50 mg at 05/21/25 2133    naloxone (NARCAN) injection 0.2 mg, 0.2 mg, Intravenous, Q2 Min PRN **OR** naloxone (NARCAN) injection 0.4 mg, 0.4 mg, Intravenous, Q2 Min PRN **OR** naloxone (NARCAN) injection 0.2 mg, 0.2 mg, Intramuscular, Q2 Min PRN **OR** naloxone (NARCAN) injection 0.4 mg, 0.4 mg, Intramuscular, Q2 Min PRN, Naz Adams MD    ondansetron (ZOFRAN ODT) ODT tab 4 mg, 4 mg, Oral, Q6H PRN **OR** ondansetron (ZOFRAN) injection 4 mg, 4 mg, Intravenous, Q6H PRN, Naz Adams MD    oxyCODONE (ROXICODONE) tablet 5 mg, 5 mg, Oral, Q4H PRN, Jignesh Mcclure PA-C    oxyCODONE IR (ROXICODONE) half-tab 2.5 mg, 2.5 mg, Oral, Q4H PRN, Jignesh Mcclure PA-C    polyethylene glycol (MIRALAX) Packet 17 g, 17 g, Oral, BID PRN, Jignesh Mcclure PA-C    predniSONE (DELTASONE) tablet 5 mg, 5 mg, Oral, Daily, Naz Adams MD    senna-docusate (SENOKOT-S/PERICOLACE) 8.6-50 MG per tablet 1 tablet, 1 tablet, Oral, BID PRN **OR** senna-docusate (SENOKOT-S/PERICOLACE) 8.6-50 MG per tablet 2 tablet, 2 tablet, Oral, BID PRN, Jignesh Mcclure PA-C    sodium chloride (PF) 0.9% PF flush 3 mL, 3 mL, Intracatheter, Q8H [facility], Naz Adams MD    sodium chloride (PF) 0.9% PF flush  "3 mL, 3 mL, Intracatheter, q1 min prn, Naz Adams MD    sodium chloride (PF) 0.9% PF flush 3 mL, 3 mL, Intracatheter, Q8H, Jignesh Mcclure PA-C    sodium chloride (PF) 0.9% PF flush 3 mL, 3 mL, Intracatheter, q1 min prn, Jignesh Mcclure PA-C    sodium chloride 0.9 % infusion, , Intravenous, Continuous, Naz Adams MD, Last Rate: 100 mL/hr at 05/22/25 0529, Rate Verify at 05/22/25 0529    vancomycin place rubi - receiving intermittent dosing, 1 each, Intravenous, See Admin Instructions, Jignesh Mcclure PA-C     SOCIAL HABITS:    Tobacco Use      Smoking status: Some Days        Packs/day: 1.00        Years: 1 pack/day for 28.0 years (28.0 ttl pk-yrs)        Types: Cigarettes        Passive exposure: Current      Smokeless tobacco: Never      Tobacco comments: quit June 2018     Social History    Substance and Sexual Activity      Alcohol use: Yes        Comment: occasional       History   Drug Use No        FAMILY HISTORY:    Family History   Problem Relation Age of Onset    Diabetes Other     Hypertension No family hx of     Cerebrovascular Disease No family hx of     Other Cancer No family hx of        REVIEW OF SYSTEMS:    A 12 point ROS was reviewed and except for what is listed in the HPI above, all others are negative    PE:    Vital signs:  Temp: 97.6  F (36.4  C) Temp src: Axillary BP: (!) 145/62 Pulse: 78   Resp: 17 SpO2: 97 % O2 Device: None (Room air)        Estimated body mass index is 24.94 kg/m  as calculated from the following:    Height as of 5/19/25: 5' 8\" (1.727 m).    Weight as of 5/19/25: 164 lb 0.4 oz (74.4 kg).       Wt Readings from Last 1 Encounters:   05/19/25 164 lb 0.4 oz (74.4 kg)     There is no height or weight on file to calculate BMI.      DIAGNOSTIC STUDIES:     Images:  MR Foot Right w/o & w Contrast  Result Date: 5/20/2025  EXAM: MR FOOT RIGHT W/O and W CONTRAST LOCATION: [facility] DATE: 5/20/2025 " INDICATION: chronic stable ulcer mostly dry lateral right 5th met head COMPARISON: Radiographs from 5/19/2025 TECHNIQUE: Routine. Additional postgadolinium T1 sequences were obtained. IV CONTRAST: 7.5 mL Gadavist FINDINGS: BONES AND JOINTS: -There is decreased T1 and increased T2 marrow signal involving the majority of the fifth proximal phalanx (sparing the head), and the fifth metatarsal head. There is increased T2 marrow signal extending to the fifth metatarsal proximal metaphysis with preserved T1 marrow signal. No acute fracture or malalignment. Severe first MTP joint degenerative changes. Otherwise mild to moderate degenerative changes throughout the foot. No significant joint effusion. SOFT TISSUES: -There is a skin ulcer overlying the fifth MTP joint which measures 2.0 x 1.6 cm and extends deep to the bony surface. Diffuse soft tissue swelling throughout the foot. Mild enhancement at the fifth toe is concerning for synovitis. No discrete fluid collection. No definite soft tissue gas. Diffuse muscle atrophy and edema. Intact Lisfranc ligament.     IMPRESSION: 1.  Deep skin ulcer overlying the fifth MTP joint with surrounding cellulitis. No abscess. 2.  Acute osteomyelitis involving the fifth proximal phalanx and fifth metatarsal head. There is reactive osteitis versus early developing osteomyelitis throughout the fifth metatarsal diaphysis. 3.  Diffuse muscle atrophy and edema suggest diabetic neuropathy.     US Lower Extremity Arterial Duplex Bilateral  Result Date: 5/20/2025  EXAM: US LOWER EXTREMITY ARTERIAL DUPLEX BILATERAL LOCATION: [facility] DATE: 5/20/2025 INDICATION: Concerning for arterial insufficiency with ulceration lateral right fifth metatarsal head. Patient with noncompressible arteries, though distal monophasic waveforms, worse on the right compared to the left. COMPARISON: None. TECHNIQUE: Duplex utilizing 2D gray-scale imaging, Doppler interrogation with  color-flow and spectral waveform analysis. FINDINGS: Right lower extremity: The external iliac artery is 119 cm/second and triphasic. The common femoral artery is 126 cm/second and biphasic. Profunda femoral artery is 125 cm/second and triphasic. The SFA is 30 cm/second proximally with triphasic waveform. However, mid segment is 27 cm/second and monophasic. There is a segment without demonstrable blood flow in the mid SFA. Distal SFA is 32 cm/second and monophasic. Popliteal artery is 42 cm/second, monophasic. Posterior tibial, anterior tibial, and dorsalis pedis arteries are 27-51 cm/second and monophasic. Left lower extremity: The external iliac artery is 144 cm/second and triphasic. Common femoral artery is 112 cm/second and triphasic. Profunda femoral artery is 157 cm/second and triphasic. The SFA is  cm/second and triphasic in the proximal to mid  segments. Biphasic in the distal segment. Popliteal artery is 66-88 cm/second and biphasic/monophasic. Posterior tibial, anterior tibial, and dorsalis pedis arteries range from 52-79 cm/second and monophasic.     IMPRESSION: 1.  Occlusion in the mid right SFA with monophasic waveforms beyond this level. 2.  Suspect stenosis in the left distal SFA given transition of waveforms from normal triphasic and proximal SFA to monophasic in the runoff arteries. There may be multifocal areas, though suspect femoral popliteal distribution greatest amount of disease.    US JOSÉ MIGUEL Doppler No Exercise  Result Date: 5/20/2025  EXAM: RESTING ANKLE-BRACHIAL INDICES (ABIs) LOCATION: [facility] DATE: 5/20/2025 INDICATION: ulcer right foot concerning for osteo and arterial insufficiency. COMPARISON: None. JOSÉ MIGUEL FINDINGS: RIGHT Brachial: 94 Ankle (PT): NC Ankle (DP): 69Index: 0.64 LEFT Brachial: 107 Ankle (PT): 168 Index: 1.57 Ankle (DP): 142 Index: 1.33 WAVEFORMS: The dorsalis pedis and posterior tibial arteries are monophasic bilaterally, with the right lower  extremity waveforms are dampened relative to the left.     IMPRESSION: 1.  RIGHT LOWER EXTREMITY: Noncompressible tibial arteries suggestive of calcific atherosclerosis. Monophasic tibial waveforms. 2.  LEFT LOWER EXTREMITY: Noncompressible tibial arteries suggestive of calcific stenosis. Monophasic tibial waveforms.    US Lower Extremity Venous Duplex Right  Result Date: 5/20/2025  EXAM: US LOWER EXTREMITY VENOUS DUPLEX RIGHT LOCATION: [facility] DATE: 5/20/2025 INDICATION: new right lower leg and foot swelling, recently reduced mobility, active prostate cancer, ?DVT COMPARISON: None. TECHNIQUE: Venous Duplex ultrasound of the right lower extremity with and without compression, augmentation and duplex. Color flow and spectral Doppler with waveform analysis performed. FINDINGS: Exam includes the common femoral, femoral, popliteal, and contralateral common femoral veins as well as segmentally visualized deep calf veins and greater saphenous vein. RIGHT: No deep vein thrombosis. No superficial thrombophlebitis. No popliteal cyst. Subcutaneous edema noted within the right lower extremity.     IMPRESSION: 1.  No deep venous thrombosis in the right lower extremity.    Foot  XR, G/E 3 views, right  Result Date: 5/19/2025  EXAM: XR FOOT RIGHT G/E 3 VIEWS LOCATION: [facility] DATE: 5/19/2025 INDICATION: Large wound, diabetic, eval for possible osteomyelitis along right distal metatarsal COMPARISON: None.     IMPRESSION: Diffuse osseous demineralization. Soft tissue wound over the lateral aspect of the fifth MTP joint. No acute fracture or evidence of osteomyelitis. Multifocal degenerative arthrosis of the midfoot and forefoot, advanced at the first MTP joint. Soft tissue edema about the foot and ankle.     LABS:      Sodium   Date Value Ref Range Status   05/22/2025 129 (L) 135 - 145 mmol/L Final   05/21/2025 130 (L) 135 - 145 mmol/L Final   05/21/2025 129 (L)  135 - 145 mmol/L Final   12/15/2020 130 (L) 133 - 144 mmol/L Final   01/15/2020 135 133 - 144 mmol/L Final   09/27/2019 139 133 - 144 mmol/L Final     Urea Nitrogen   Date Value Ref Range Status   05/22/2025 9.4 8.0 - 23.0 mg/dL Final   05/21/2025 16.4 8.0 - 23.0 mg/dL Final   05/21/2025 15.5 8.0 - 23.0 mg/dL Final   02/16/2022 19 7 - 30 mg/dL Final   12/15/2020 19 7 - 30 mg/dL Final   01/15/2020 18 7 - 30 mg/dL Final   09/27/2019 20 7 - 30 mg/dL Final     Hemoglobin   Date Value Ref Range Status   05/22/2025 8.9 (L) 13.3 - 17.7 g/dL Final   05/21/2025 8.5 (L) 13.3 - 17.7 g/dL Final   05/20/2025 8.1 (L) 13.3 - 17.7 g/dL Final   12/15/2020 11.8 (L) 13.3 - 17.7 g/dL Final   09/27/2019 14.1 13.3 - 17.7 g/dL Final   08/12/2018 16.2 13.3 - 17.7 g/dL Final     Platelet Count   Date Value Ref Range Status   05/22/2025 191 150 - 450 10e3/uL Final   05/20/2025 169 150 - 450 10e3/uL Final   05/19/2025 206 150 - 450 10e3/uL Final   12/15/2020 189 150 - 450 10e9/L Final   09/27/2019 190 150 - 450 10e9/L Final   08/12/2018 215 150 - 450 10e9/L Final     INR   Date Value Ref Range Status   08/12/2018 1.12 0.86 - 1.14 Final     35 min spent on the date of the encounter in chart review, patient visit, review of tests, documentation and/or discussion with other providers about the issues documented above     Harper Hammond NP  [facility] Vascular Surgery

## 2025-05-22 NOTE — PLAN OF CARE
Goal Outcome Evaluation:       Summary: [id number]  Admitting Diagnosis: Osteomyelitis (H) [M86.9]     Orientation: A&O x3, disoriented to time  Activity Level: AO2 GBW  Behavior & Aggression:Green  Pain : C/O 7/10 Pain Managing with oxy  VS: Stable  Lab: K+ and phos replaced this shift  Bowel/Bladder: WNL ex incontinence, ex cath in place  CMS: intact  Neuros: intact, CIWA of 2  Plan: Vascular and podiatry following. Discharge pending medical stability.    Vitals:  Temp: 98.3  F (36.8  C) Temp src: Axillary BP: (!) 141/71 Pulse: 69   Resp: 18 SpO2: 96 % O2 Device: None (Room air)       Diet: Moderate Consistent Carb (60 g CHO per Meal) Diet          Other Important Info:

## 2025-05-22 NOTE — CONSULTS
"S-(situation): WOC consulted for right foot wound    B-(background): Per History and Physical on 5/21 - \"Sushil Diana is a 65 year old male admitted on 5/21/25 due to acute osteomyelitis of right 5th proximal phalanx and metatarsal head.       Past medical history significant for PAD, HTN, HLP, Chronic combined (diastolic and systolic) heart failure, DM2, GERD, MDD with anxiety, Alcohol use D/O, Metastatic prostate cancer with mets to the bone.     Patient had been admitted at [facility] due to right foot pain secondary to chronic diabetic ulceration with concern for osteomyelitis and sepsis.  Workup completed at [facility] included a right lower extremity/foot MRI that confirmed acute osteomyelitis.  Podiatry had been following the patient.  Patient was transferred to [facility] to undergo vascular surgery assessment, continued podiatry assessment and likely proceeding for surgical intervention.  JOSÉ MIGUEL and bilateral arterial US completed and patient was receiving IV Vancomycin and IV Zosyn.       Right 5th proximal phalanx and metatarsal head osteomyelitis  Chronic right foot diabetic wound with cellulitis  PAD  *Right lower extremity arterial ultrasound revealed occlusion in the mid right SFA with monophasic waveforms beyond that level and suspected stenosis in the left distal SFA given transition of waveforms from normal triphasic and proximal SFA to monophasic in the runoff arteries.  JOSÉ MIGUEL completed at [facility].    - Podiatry consult requested.    - Vascular surgery consult requested.\"    A-(assessment): Podiatry and Vascular services have already seen patient and are following. Per chart review, patient is agreeable to a right 5th ray resection, but will need to follow up with vascular plan prior to surgery to optimize healing potential.    R-(recommendations): WO team will sign off at this point. Please re-consult if needed.    "

## 2025-05-22 NOTE — CONSULTS
[facility]    Infectious Disease Consultation     Date of Admission:  5/21/2025  Date of Consult (When I saw the patient): 05/22/25    Assessment & Plan   Sushil Diana is a 65 year old male who was admitted on 5/21/2025.     Impression:   65 year old male with DM (A1C 5.9), CHF, HTN, metastatic prostate cancer to bone, PAD and GERD with a several month history of ulceration on right foot, presenting with acute cellulitis and acute osteomyelitis right 5th proximal phalanx and metatarsal head.     -PAD with noted right SFA occlusion on vascular studies.   -Acute osteomyelitis right fifth proximal phalanx and 5th metatarsal head on MRI.   -Blood cultures no growth to date.   -On Zosyn/Vancomycin.      Recommendations:   Stop Zosyn/Vancomycin and start Unasyn.   Await plan by Podiatry and Vascular.   Obtain cultures in OR.   Once he undergoes right 5th ray amputation, will likely be able to switch him over to oral antibiotics.   ID will follow.       Andra Hall PA-C    Reason for Consult   Reason for consult: Asked to evaluate this patient for diabetic foot infection, osteo 5th MTP head and toe .    Primary Care Physician   Mark Matson    Chief Complaint   Right foot wound    History is obtained from the patient and medical records    History of Present Illness   Sushil Diana is a 65 year old male with DM (A1C 5.9), CHF, HTN, metastatic prostate cancer to bone, PAD and GERD with a several month history of ulceration on right foot. He now developed acute pain, erythema and swelling and presented to [facility]. He was noted to be somewhat confused with leukocytosis, hyponatremia, and elevated CRP. MRI of the foot revealed acute osteomyelitis of the fifth proximal phalanx and 5th metatarsal head. JOSÉ MIGUEL's and arterial duplex were concerning for SFA occlusion. He was transferred to [facility] for further care.     He has been started on Zosyn and Vancomycin. He complains  of pain in his foot. Vascular Surgery is still determining a plan to optimize blood flow to his foot, after which Podiatry will plan to do a right 5th ray resection.     Past Medical History   I have reviewed this patient's medical history and updated it with pertinent information if needed.   Past Medical History:   Diagnosis Date    Cancer (H)     Congestive heart failure (H)     Diabetes (H)     GERD (gastroesophageal reflux disease)     Gout     Hypertension        Past Surgical History   I have reviewed this patient's surgical history and updated it with pertinent information if needed.  Past Surgical History:   Procedure Laterality Date    NONE OF THE ABOVE CORE MEASURE DIAGNOSES         Prior to Admission Medications   Prior to Admission Medications   Prescriptions Last Dose Informant Patient Reported? Taking?   Microlet Lancets MISC   No No   Sig: USE TO TEST BLOOD SUGAR 1-3 TIMES DAILY OR AS DIRECTED.   abiraterone (ZYTIGA) 250 MG tablet 5/21/2025 Morning  No Yes   Sig: Take 4 tablets (1,000 mg) by mouth daily for 30 doses. Take on empty stomach.   amLODIPine (NORVASC) 10 MG tablet 5/21/2025 Morning  No Yes   Sig: TAKE ONE TABLET BY MOUTH ONCE DAILY   atorvastatin (LIPITOR) 20 MG tablet 5/21/2025 Morning  No Yes   Sig: TAKE ONE TABLET BY MOUTH ONCE DAILY   blood glucose (CONTOUR NEXT TEST) test strip   No No   Sig: USE TO TEST BLOOD SUGARS ONE TO THREE TIMES DAILY  OR AS DIRECTED   blood glucose monitoring (NO BRAND SPECIFIED) meter device kit   No No   Sig: Use to test blood sugar 1-3 times daily or as directed. -Contour Next meter   citalopram (CELEXA) 20 MG tablet 5/21/2025 Morning  No Yes   Sig: Take 1 tablet (20 mg) by mouth daily.   famotidine (PEPCID) 40 MG tablet   Yes Yes   Sig: Take 40 mg by mouth 2 times daily as needed for heartburn.   glipiZIDE (GLUCOTROL) 5 MG tablet 5/19/2025  No Yes   Sig: TAKE ONE TABLET BY MOUTH EVERY MORNING   hydrALAZINE (APRESOLINE) 100 MG tablet 5/21/2025 Noon  Yes Yes    Sig: Take 100 mg by mouth 3 times daily.   hydrochlorothiazide (HYDRODIURIL) 25 MG tablet 5/19/2025  No Yes   Sig: TAKE ONE TABLET BY MOUTH ONCE DAILY   lisinopril (ZESTRIL) 40 MG tablet 5/21/2025 Morning  No Yes   Sig: TAKE ONE TABLET BY MOUTH ONCE DAILY   metFORMIN (GLUCOPHAGE XR) 500 MG 24 hr tablet 5/19/2025  Yes Yes   Sig: Take 1,000 mg by mouth daily (with lunch).   metoprolol tartrate (LOPRESSOR) 50 MG tablet 5/21/2025 Morning  Yes Yes   Sig: Take 50 mg by mouth 2 times daily.   predniSONE (DELTASONE) 5 MG tablet 5/21/2025 Morning  No Yes   Sig: Take 1 tablet (5 mg) by mouth daily. START WITH ZYTIGA   zoledronic acid (ZOMETA) 4 MG/5ML injection 3/3/2025  Yes Yes   Sig: Inject 5 mLs into the vein once. Every 3 months      Facility-Administered Medications: None     Allergies   Allergies   Allergen Reactions    No Known Drug Allergy     Seasonal Allergies        Immunization History   Immunization History   Administered Date(s) Administered    COVID-19 12+ (Pfizer) 11/29/2023, 01/09/2025    COVID-19 Bivalent 18+ (Moderna) 12/08/2022    COVID-19 Monovalent 18+ (Moderna) 03/31/2021, 04/28/2021, 01/13/2022    Influenza (H1N1) 01/11/2010    Influenza (High Dose) Trivalent,PF (Fluzone) 09/25/2024    Influenza (IIV3) PF 11/28/2003, 01/11/2010, 11/05/2010, 10/20/2011, 11/02/2012    Influenza Vaccine 18-64 (Flublok) 09/25/2018, 09/27/2019, 09/05/2020, 12/08/2022    Influenza Vaccine 65+ (Fluzone HD) 10/05/2023    Influenza Vaccine >6 months,quad, PF 09/28/2013, 10/14/2014, 10/08/2015, 10/08/2016, 10/07/2017    Influenza, Intradermal, Quad, Pf 10/28/2021    Pneumococcal 20 valent Conjugate (Prevnar 20) 07/30/2024    Pneumococcal 23 valent 09/25/2018    TD,PF 7+ (Tenivac) 06/20/2006    TDAP Vaccine (Adacel) 09/25/2018    Zoster recombinant adjuvanted (Shingrix) 09/05/2020, 11/12/2020       Social History   I have reviewed this patient's social history and updated it with pertinent information if needed. Sushil Diana   reports that he has been smoking cigarettes. He has a 28 pack-year smoking history. He has been exposed to tobacco smoke. He has never used smokeless tobacco. He reports current alcohol use. He reports that he does not use drugs.    Family History   I have reviewed this patient's family history and updated it with pertinent information if needed.   Family History   Problem Relation Age of Onset    Diabetes Other     Hypertension No family hx of     Cerebrovascular Disease No family hx of     Other Cancer No family hx of        Review of Systems   The 10 point Review of Systems is negative    Physical Exam   Temp: 98.3  F (36.8  C) Temp src: Axillary BP: (!) 141/71 Pulse: 69   Resp: 18 SpO2: 96 % O2 Device: None (Room air)    Vital Signs with Ranges  Temp:  [97.2  F (36.2  C)-98.3  F (36.8  C)] 98.3  F (36.8  C)  Pulse:  [69-80] 69  Resp:  [17-18] 18  BP: (132-145)/(62-71) 141/71  SpO2:  [96 %-98 %] 96 %  0 lbs 0 oz  There is no height or weight on file to calculate BMI.    GENERAL APPEARANCE:  awake, confused.   EYES: Eyes grossly normal to inspection  NECK: no adenopathy  RESP: lungs clear   CV: regular rates and rhythm  ABDOMEN: soft, nontender  MS: extremities normal  SKIN: Right foot with some erythema - mild cellulitis in addition to PAD. Wounds dressed.         Data   All laboratory data reviewed       Component      Latest Ref Rng 5/19/2025  6:17 PM 5/20/2025  5:55 AM 5/22/2025  5:58 AM   WBC      4.0 - 11.0 10e3/uL 11.2 (H)  6.9  9.3    RBC Count      4.40 - 5.90 10e6/uL 3.00 (L)  2.75 (L)  3.00 (L)    Hemoglobin      13.3 - 17.7 g/dL 9.0 (L)  8.1 (L)  8.9 (L)    Hematocrit      40.0 - 53.0 % 24.9 (L)  22.9 (L)  25.7 (L)    MCV      78 - 100 fL 83  83  86    MCH      26.5 - 33.0 pg 30.0  29.5  29.7    MCHC      31.5 - 36.5 g/dL 36.1  35.4  34.6    RDW      10.0 - 15.0 % 11.9  12.1  12.4    Platelet Count      150 - 450 10e3/uL 206  169  191    % Neutrophils      % 84      % Lymphocytes      % 6      %  Monocytes      % 9      % Eosinophils      % 0      % Basophils      % 0      % Immature Granulocytes      % 1      NRBC/W      <1 /100 0      Absolute Neutrophil      1.6 - 8.3 10e3/uL 9.4 (H)      Absolute Lymphocytes      0.8 - 5.3 10e3/uL 0.6 (L)      Absolute Monocytes      0.0 - 1.3 10e3/uL 1.0      Absolute Eosinophils      0.0 - 0.7 10e3/uL 0.1      Absolute Basophils      0.0 - 0.2 10e3/uL 0.0      Absolute Immature Granulocytes      <=0.4 10e3/uL 0.1      Absolute NRBCs      10e3/uL 0.0         Component      Latest Ref Rng 5/19/2025  6:17 PM   Sodium      135 - 145 mmol/L 120 (L)    Potassium      3.4 - 5.3 mmol/L 3.1 (L)    Carbon Dioxide (CO2)      22 - 29 mmol/L 20 (L)    Anion Gap      7 - 15 mmol/L 16 (H)    Urea Nitrogen      8.0 - 23.0 mg/dL 19.6    Creatinine      0.67 - 1.17 mg/dL 0.96    GFR Estimate      >60 mL/min/1.73m2 87    Calcium      8.8 - 10.4 mg/dL 9.0    Chloride      98 - 107 mmol/L 84 (L)    Glucose      70 - 99 mg/dL 62 (L)    Alkaline Phosphatase      40 - 150 U/L 74    AST      0 - 45 U/L 82 (H)    ALT      0 - 70 U/L 24    Protein Total      6.4 - 8.3 g/dL 6.4    Albumin      3.5 - 5.2 g/dL 4.0    Bilirubin Total      <=1.2 mg/dL 1.0    Lactic Acid      0.7 - 2.0 mmol/L 0.8    Procalcitonin      <0.50 ng/mL 0.23    Sed Rate      0 - 20 mm/hr 49 (H)    CRP Inflammation      <5.00 mg/L 82.36 (H)       05/21/2025 2346 05/22/2025 0325 MRSA MSSA PCR, Nasal Swab [[id number]]    Swab from Nares, Bilateral    Final result Component Value   MRSA Target DNA Negative   SA Target DNA Negative          05/19/2025 1834 05/21/2025 2231 Blood Culture Peripheral blood (BC) Arm, Right [[id number]]   Peripheral blood (BC) from Arm, Right    Preliminary result Component Value   Culture No growth after 2 days P             05/19/2025 1817 05/21/2025 2231 Blood Culture Peripheral blood (BC) Arm, Right [[id number]]   Peripheral blood (BC) from Arm, Right    Preliminary result Component Value    Culture No growth after 2 days P            EXAM: MR FOOT RIGHT W/O and W CONTRAST  LOCATION: [facility]  DATE: 5/20/2025     INDICATION: chronic stable ulcer mostly dry lateral right 5th met head  COMPARISON: Radiographs from 5/19/2025  TECHNIQUE: Routine. Additional postgadolinium T1 sequences were obtained.  IV CONTRAST: 7.5 mL Gadavist     FINDINGS:      BONES AND JOINTS:   -There is decreased T1 and increased T2 marrow signal involving the majority of the fifth proximal phalanx (sparing the head), and the fifth metatarsal head. There is increased T2 marrow signal extending to the fifth metatarsal proximal metaphysis with   preserved T1 marrow signal. No acute fracture or malalignment. Severe first MTP joint degenerative changes. Otherwise mild to moderate degenerative changes throughout the foot. No significant joint effusion.     SOFT TISSUES:   -There is a skin ulcer overlying the fifth MTP joint which measures 2.0 x 1.6 cm and extends deep to the bony surface. Diffuse soft tissue swelling throughout the foot. Mild enhancement at the fifth toe is concerning for synovitis. No discrete fluid   collection. No definite soft tissue gas. Diffuse muscle atrophy and edema. Intact Lisfranc ligament.                                                                      IMPRESSION:  1.  Deep skin ulcer overlying the fifth MTP joint with surrounding cellulitis. No abscess.  2.  Acute osteomyelitis involving the fifth proximal phalanx and fifth metatarsal head. There is reactive osteitis versus early developing osteomyelitis throughout the fifth metatarsal diaphysis.  3.  Diffuse muscle atrophy and edema suggest diabetic neuropathy.      EXAM: US LOWER EXTREMITY ARTERIAL DUPLEX BILATERAL  LOCATION: [facility]  DATE: 5/20/2025     INDICATION: Concerning for arterial insufficiency with ulceration lateral right fifth metatarsal head. Patient with  noncompressible arteries, though distal monophasic waveforms, worse on the right compared to the left.  COMPARISON: None.  TECHNIQUE: Duplex utilizing 2D gray-scale imaging, Doppler interrogation with color-flow and spectral waveform analysis.     FINDINGS:     Right lower extremity: The external iliac artery is 119 cm/second and triphasic. The common femoral artery is 126 cm/second and biphasic. Profunda femoral artery is 125 cm/second and triphasic. The SFA is 30 cm/second proximally with triphasic waveform.   However, mid segment is 27 cm/second and monophasic. There is a segment without demonstrable blood flow in the mid SFA. Distal SFA is 32 cm/second and monophasic. Popliteal artery is 42 cm/second, monophasic. Posterior tibial, anterior tibial, and   dorsalis pedis arteries are 27-51 cm/second and monophasic.     Left lower extremity: The external iliac artery is 144 cm/second and triphasic. Common femoral artery is 112 cm/second and triphasic. Profunda femoral artery is 157 cm/second and triphasic. The SFA is  cm/second and triphasic in the proximal to mid   segments. Biphasic in the distal segment. Popliteal artery is 66-88 cm/second and biphasic/monophasic. Posterior tibial, anterior tibial, and dorsalis pedis arteries range from 52-79 cm/second and monophasic.                                                                         IMPRESSION:  1.  Occlusion in the mid right SFA with monophasic waveforms beyond this level.  2.  Suspect stenosis in the left distal SFA given transition of waveforms from normal triphasic and proximal SFA to monophasic in the runoff arteries. There may be multifocal areas, though suspect femoral popliteal distribution greatest amount of   disease.

## 2025-05-22 NOTE — H&P
[facility]  History and Physical - Hospitalist Service       Date of Admission:  5/21/25  PRIMARY CARE PROVIDER:    Mark Matson    Assessment & Plan   Sushil Diana is a 65 year old male admitted on 5/21/25 due to acute osteomyelitis of right 5th proximal phalanx and metatarsal head.      Past medical history significant for PAD, HTN, HLP, Chronic combined (diastolic and systolic) heart failure, DM2, GERD, MDD with anxiety, Alcohol use D/O, Metastatic prostate cancer with mets to the bone.    Patient had been admitted at [facility] due to right foot pain secondary to chronic diabetic ulceration with concern for osteomyelitis and sepsis.  Workup completed at [facility] included a right lower extremity/foot MRI that confirmed acute osteomyelitis.  Podiatry had been following the patient.  Patient was transferred to [facility] to undergo vascular surgery assessment, continued podiatry assessment and likely proceeding for surgical intervention.  JOSÉ MIGUEL and bilateral arterial US completed and patient was receiving IV Vancomycin and IV Zosyn.      Right 5th proximal phalanx and metatarsal head osteomyelitis  Chronic right foot diabetic wound with cellulitis  PAD  *Right lower extremity arterial ultrasound revealed occlusion in the mid right SFA with monophasic waveforms beyond that level and suspected stenosis in the left distal SFA given transition of waveforms from normal triphasic and proximal SFA to monophasic in the runoff arteries.  JOSÉ MIGUEL completed at [facility].    - Podiatry consult requested.    - Vascular surgery consult requested.    - MRSA nasal PCR swabs ordered.    - IV Vancomycin (pharmacy to dose) and IV Ceftriaxone 2 g/d.    - NPO at midnight.    - Pain management:               --Schedule APAP 975 mg every 8 hours.                 --PRN oxycodone 2.5-5 mg every 4 hours based of pain severity.               --PRN IV dilaudid 0.2-0.4 mg every 2 hours based off severity.       Hyponatremia, acute on chronic  *Sodium ranges between 120-135.    Recent Labs   Lab 05/21/25  1840 05/21/25  1400 05/21/25  1007 05/21/25  0628 05/21/25  0240 05/20/25  2159   * 131*  131* 129* 128*  128* 126* 127*   - Hold PTA hydrochlorothiazide 25 mg/d.    - IV fluids at 100 ml/hr with NS.    - Electrolyte panel ordered for this evening.      CINDY  - IV fluids at 100 ml/hr with NS.    - BMP ordered for 5/22.      Anemia, acute on chronic normocytic  *Baseline HGB prior to admission 9-11.3.    - Monitor.      HTN  - Resumed on PTA amlodipine 10 mg/d, lopressor 50 mg BID and hydralazine 100 mg TID.  Hold parameters in place.    - Hold PTA hydrochlorothiazide 25 mg/d and lisinopril 40 mg/d and plan to resume at discharge.    - PRN PO/IV hydralazine 10 mg every 4 hours for SBP GREATER THAN 180.      HLP  - Resumed on PTA atorvastatin 20 mg/d.      Chronic combined (diastolic and systolic) heart failure  - Hold PTA hydrochlorothiazide 25 mg/d and lisinopril 40 mg/d.  Resume at discharge.    - Resumed on PTA lopressor 50 mg BID.    - Monitor daily weights.    - Combination cardiac diet.      Type 2 DM  Recent Labs   Lab Test 03/20/25  1257   A1C 5.9*   *PTA regimen includes: Metformin 1000 mg daily with lunch and glipizide 5 mg every morning.    - Hold PTA metformin or glipizide.  Resume at discharge.    - Medium intensity sliding scale insulin ordered.  - Glucose checks ordered.    - Hypoglycemic protocol in place.      GERD  - Resumed on PTA Pepcid 40 mg BID.      Major depressive disorder with anxiety  - Resumed on PTA Celexa 20 mg/d.    Alcohol use D/O  *3-4 beers per day reported as typical consumption.  Had been at [facility] since 5/19.    - CIWA monitoring ordered.      Metastatic prostate cancer with mets to the bone  - Resumed on PTA Zytiga 1000 mg/d.    - Resumed on PTA prednisone 5 mg/d.      Clinically Significant Risk Factors Present on Admission        # Hypokalemia: Lowest K = 3.3  mmol/L in last 2 days, will replace as needed  # Hyponatremia: Lowest Na = 122 mmol/L in last 2 days, will monitor as appropriate  # Hypochloremia: Lowest Cl = 91 mmol/L in last 2 days, will monitor as appropriate  # Hypocalcemia: Lowest Ca = 7.8 mg/dL in last 2 days, will monitor and replace as appropriate          # Acute Kidney Injury, unspecified: based on a >150% or 0.3 mg/dL increase in last creatinine compared to past 90 day average, will monitor renal function  # Hypertension: Noted on problem list      # Anemia: based on hgb <11    Diet: Combination Diet Regular Diet Adult  NPO for Procedure/Surgery per Anesthesia Guidelines Except for: Meds; Clear liquids before procedure/surgery: ADULT (Age GREATER than or Equal to 18 years) - Clear liquids 2 hours before procedure/surgery  DVT Prophylaxis: Pneumatic Compression Devices  Alfred Catheter: Not present  Lines: None     Cardiac Monitoring: None  Code Status: Full Code  Ambulation: Has been utilizing a cane recently       Disposition Plan   Inpatient status.  Anticipate greater than 2 evening hospitalization while undergoing continued work-up/management of right lower extremity osteomyelitis.      Medically Ready for Discharge: Anticipated in 2-4 Days    The patient's care was discussed with the Bedside Nurse, Patient, and Patient's Family.  Reviewed [facility] discharge summary    The patient has been discussed with Dr. Gibbs, who agrees with the assessment and plan at this time.    Jignesh Mcclure PA-C  [facility]  Securely message with the [employer] Web Console (learn more here)    ______________________________________________________________________    Chief Complaint   Direct admit due to osteomyelitis    History is obtained from the patient and EMR.      History of Present Illness   Sushil Diana is a 65 year old male admitted on 5/21/25 due to acute osteomyelitis of right 5th proximal phalanx and metatarsal head.       Past medical history significant for PAD, HTN, HLP, Chronic combined (diastolic and systolic) heart failure, DM2, GERD, MDD with anxiety, Metastatic prostate cancer with mets to the bone.    Patient had been admitted at [facility] due to right foot pain secondary to chronic diabetic ulceration with concern for osteomyelitis and sepsis.  Workup completed at [facility] included a right lower extremity/foot MRI that confirmed acute osteomyelitis.  Podiatry had been following the patient.  Patient was transferred to [facility] to undergo vascular surgery assessment, continued podiatry assessment and likely proceeding for surgical intervention.  JOSÉ MIGUEL and bilateral arterial US completed and patient was receiving IV Vancomycin and IV Zosyn.      Patient was seen in his hospital room where he was resting comfortably in bed upon arrival.  Family was present in the room.  Reviewed events that occurred in [facility] that led to subsequent transfer to [facility].  Patient indicated that he has had right foot swelling and an active wound.  At times he feels short of breath.  Otherwise he offers no other complaints or concerns except for that he had to urinate.    Discussed and reviewed CODE STATUS and patient elected to be full code.      Past Medical History    I have reviewed this patient's medical history and updated it with pertinent information if needed.   Past Medical History:   Diagnosis Date    Cancer (H)     Congestive heart failure (H)     Diabetes (H)     GERD (gastroesophageal reflux disease)     Gout     Hypertension    PAD, HTN, HLP, Chronic combined (diastolic and systolic) heart failure, DM2, GERD, MDD with anxiety, Metastatic prostate cancer with mets to the bone.    Prior to Admission Medications   Prior to Admission Medications   Prescriptions Last Dose Informant Patient Reported? Taking?   Microlet Lancets MISC   No No   Sig: USE TO TEST BLOOD SUGAR 1-3 TIMES DAILY OR AS DIRECTED.   abiraterone  (ZYTIGA) 250 MG tablet 5/21/2025 Morning  No Yes   Sig: Take 4 tablets (1,000 mg) by mouth daily for 30 doses. Take on empty stomach.   amLODIPine (NORVASC) 10 MG tablet 5/21/2025 Morning  No Yes   Sig: TAKE ONE TABLET BY MOUTH ONCE DAILY   atorvastatin (LIPITOR) 20 MG tablet 5/21/2025 Morning  No Yes   Sig: TAKE ONE TABLET BY MOUTH ONCE DAILY   blood glucose (CONTOUR NEXT TEST) test strip   No No   Sig: USE TO TEST BLOOD SUGARS ONE TO THREE TIMES DAILY  OR AS DIRECTED   blood glucose monitoring (NO BRAND SPECIFIED) meter device kit   No No   Sig: Use to test blood sugar 1-3 times daily or as directed. -Contour Next meter   citalopram (CELEXA) 20 MG tablet 5/21/2025 Morning  No Yes   Sig: Take 1 tablet (20 mg) by mouth daily.   famotidine (PEPCID) 40 MG tablet   Yes Yes   Sig: Take 40 mg by mouth 2 times daily as needed for heartburn.   glipiZIDE (GLUCOTROL) 5 MG tablet 5/19/2025  No Yes   Sig: TAKE ONE TABLET BY MOUTH EVERY MORNING   hydrALAZINE (APRESOLINE) 100 MG tablet 5/21/2025 Noon  Yes Yes   Sig: Take 100 mg by mouth 3 times daily.   hydrochlorothiazide (HYDRODIURIL) 25 MG tablet 5/19/2025  No Yes   Sig: TAKE ONE TABLET BY MOUTH ONCE DAILY   lisinopril (ZESTRIL) 40 MG tablet 5/21/2025 Morning  No Yes   Sig: TAKE ONE TABLET BY MOUTH ONCE DAILY   metFORMIN (GLUCOPHAGE XR) 500 MG 24 hr tablet 5/19/2025  Yes Yes   Sig: Take 1,000 mg by mouth daily (with lunch).   metoprolol tartrate (LOPRESSOR) 50 MG tablet 5/21/2025 Morning  Yes Yes   Sig: Take 50 mg by mouth 2 times daily.   predniSONE (DELTASONE) 5 MG tablet 5/21/2025 Morning  No Yes   Sig: Take 1 tablet (5 mg) by mouth daily. START WITH ZYTIGA   zoledronic acid (ZOMETA) 4 MG/5ML injection 3/3/2025  Yes Yes   Sig: Inject 5 mLs into the vein once. Every 3 months      Facility-Administered Medications: None     Allergies   Allergies   Allergen Reactions    No Known Drug Allergy     Seasonal Allergies        SOCIAL HISTORY:  Patient resides in a house in [place]  [place] with his wife.  He is a former smoker.  He consumes alcohol daily with 3-4 beers per day which he indicated is a minimal amount.  He smokes marijuana occasionally.  He has been ambulating with the use of a cane.    Physical Exam   Temp: 97.2  F (36.2  C) Temp src: Oral BP: (!) 140/66 Pulse: 80   Resp: 17 SpO2: 98 % O2 Device: None (Room air)      Constitutional: Awake, alert, cooperative, no apparent distress.    ENT: Normocephalic, without obvious abnormality, atraumatic, oral pharynx with moist mucus membranes.  Eyes extra occular movements intact.  Normal sclera.    Neck: Supple, symmetrical, trachea midline, no adenopathy.  Pulmonary: No increased work of breathing, fair air exchange, clear to auscultation bilaterally, no crackles or wheezing.  Cardiovascular: Regular rate and rhythm, normal S1 and S2, no S3 or S4, and no murmur noted.  GI: Normal bowel sounds, soft, non-distended, non-tender.    Skin/Integumen: Please see picture below.    Neuro: CN II-XII grossly intact.    Psych:  Alert and oriented x 3. Normal affect.  Extremities: Right foot edema noted, and calves are non-tender to palpation bilaterally.       Medical Decision Making       Please see A&P for additional details of medical decision making.  75 MINUTES SPENT BY ME on the date of service doing chart review, history, exam, documentation & further activities per the note.       Data   Data reviewed today: I reviewed all medications, new labs and imaging results over the last 24 hours. I personally reviewed no images or EKG's today.      I have personally reviewed the following data over the past 24 hrs:    N/A  \   8.5 (L)   / N/A     129 (L) 99 16.4 /  145 (H)   3.6 17 (L) 1.60 (H) \     ALT: N/A AST: N/A AP: N/A TBILI: N/A   ALB: 3.5 TOT PROTEIN: N/A LIPASE: N/A     Procal: N/A CRP: N/A Lactic Acid: 0.6 (L)         Imaging results reviewed over the past 24 hrs:   No results found for this or any previous visit (from the past 24  hours).

## 2025-05-22 NOTE — PROGRESS NOTES
[facility]    Medicine Progress Note - Hospitalist Service    Date of Admission:  5/21/2025    Assessment & Plan     Sushil Diana is a 65 year old male admitted on 5/21/25 due to acute osteomyelitis of right 5th proximal phalanx and metatarsal head.       Past medical history significant for PAD, HTN, HLP, Chronic combined (diastolic and systolic) heart failure, DM2, GERD, MDD with anxiety, Alcohol use D/O, Metastatic prostate cancer with mets to the bone.     Patient had been admitted at [facility] due to right foot pain secondary to chronic diabetic ulceration with concern for osteomyelitis and sepsis.  Workup completed at [facility] included a right lower extremity/foot MRI that confirmed acute osteomyelitis.  Podiatry had been following the patient.  Patient was transferred to [facility] to undergo vascular surgery assessment, continued podiatry assessment and likely proceeding for surgical intervention. Patient was receiving IV Vancomycin and IV Zosyn.       Right 5th proximal phalanx and metatarsal head osteomyelitis  Chronic right foot diabetic wound with cellulitis  PAD  *Right lower extremity arterial ultrasound revealed occlusion in the mid right SFA with monophasic waveforms beyond that level and suspected stenosis in the left distal SFA given transition of waveforms from normal triphasic and proximal SFA to monophasic in the runoff arteries.  JOSÉ MIGUEL completed at [facility].    - Will continue with IV zosyn and vancomycin, ID consult.  - MRSA nasal PCR  negative but will de escalate abx once have operative culture data/deep tissue, bone culture or as per ID  - Podiatry consulted and evaluated patient, awaiting definitive plan from vascular surgery prior to proceeding with surgical intervention.   - Vascular surgery consulted, for now recommending continuing with aspirin, will revisit plan   - Pain management:               --Schedule APAP 975 mg every 8 hours.                  --PRN oxycodone 2.5-5 mg every 4 hours based of pain severity.               --PRN IV dilaudid 0.2-0.4 mg every 2 hours based off severity.       Hyponatremia, acute on chronic  *Sodium ranges between 120-135.     - Hold PTA hydrochlorothiazide 25 mg/d.    - IV fluids at 100 ml/hr with NS.    - sodium is 129, monitor  - check urine sodium and osm, serum osm    Hypokalemia  Hypophosphatemia  -Replace per     CINDY  Baseline creatinine around 1, noted creatinine was up to 1.76.   - With IV fluids at 100 ml/hr with NS, Improved to baseline     Anemia, acute on chronic normocytic  *Baseline HGB prior to admission 9-11.3.    - Hemoglobin has trended down but fairly stable at 8 presents now.  No sign of bleeding.     HTN  - Resumed on PTA amlodipine 10 mg/d, lopressor 50 mg BID and hydralazine 100 mg TID.  Hold parameters in place.    - Continue to hold PTA hydrochlorothiazide 25 mg/d, not resuming given hyponatremia.    - SBP's in 140s, since creatinine has improved, resume lisinopril at lower dose and monitor.     - PRN PO/IV hydralazine 10 mg every 4 hours for SBP GREATER THAN 180.       HLP  - Resumed on PTA atorvastatin 20 mg/d.       Chronic combined (diastolic and systolic) heart failure  - Managing PTA HCTZ and lisinopril as above  - Resumed on PTA lopressor 50 mg BID.    - Monitor daily weights, intake and output as able.    - Combination cardiac diet.       Type 2 DM   *PTA regimen includes: Metformin 1000 mg daily with lunch and glipizide 5 mg every morning.    - Hold PTA metformin or glipizide.  Resume at discharge.    - Medium intensity sliding scale insulin ordered.  - Glucose checks ordered.    - Hypoglycemic protocol in place.    -Blood sugars in 100s, resuming diet as no plan for OR today, add NovoLog per carb count     GERD  - Resumed on PTA Pepcid 40 mg BID.       Major depressive disorder with anxiety  - Resumed on PTA Celexa 20 mg/d.     Alcohol use D/O  *3-4 beers per day reported as typical  consumption.  Had been at [facility] since 5/19.    - CIWA monitoring ordered.       Metastatic prostate cancer with mets to the bone  - Resumed on PTA Zytiga 1000 mg/d.    - Resumed on PTA prednisone 5 mg/d.          Diet: NPO for Procedure/Surgery per Anesthesia Guidelines Except for: Meds; Clear liquids before procedure/surgery: ADULT (Age GREATER than or Equal to 18 years) - Clear liquids 2 hours before procedure/surgery    DVT Prophylaxis: Pneumatic Compression Devices  Alfred Catheter: Not present  Lines: None     Cardiac Monitoring: None  Code Status: Full Code      Clinically Significant Risk Factors Present on Admission                                      Disposition Plan     Medically Ready for Discharge: Anticipated in 5+ Days pending surgery and postop course, final plan on antibiotic.             Ernesto Scruggs MD  Hospitalist Service  [facility]  Securely message with [employer] (more info)  Text page via [facility] Paging/Directory   ______________________________________________________________________    Interval History     Chart reviewed and patient was seen this morning.  Denies right foot pain.   Denied other symptoms including chest pain, shortness of breath, headache, nausea, abdominal pain or diarrhea.  No hematochezia or melena reported.    Physical Exam   Vital Signs: Temp: 98.3  F (36.8  C) Temp src: Axillary BP: (!) 145/66 Pulse: 75   Resp: 18 SpO2: 96 % O2 Device: None (Room air)    Weight: 0 lbs 0 oz    General: Alert awake, oriented to self, place, circumstances needing hospitalization, current year.  Appears comfortable.  HEENT: PERRLA EOMI. Mucosa moist.   Lungs: Bilateral equal air entry. Clear to auscultation, normal work of breathing.   CVS: S1S2 regular, no tachycardia or murmur.   Abdomen: Soft, NT, ND. BS heard.  MSK: Right foot edema with swollen toes and erythema involving the toes and dorsum of the feet.  Compared to the picture on admission,  erythema seems slightly improved.  Warm to touch and tender.  Black eschar over the fifth metatarsal head, fifth toe with change in color.  Neuro: AAOX self and place and year. CN 2-12 normal. Strength symmetrical.  Skin: No rash.       Medical Decision Making       55 MINUTES SPENT BY ME on the date of service doing chart review, history, exam, documentation & further activities per the note.      Data     I have personally reviewed the following data over the past 24 hrs:    9.3  \   8.9 (L)   / 191     129 (L) 100 9.4 /  112 (H)   3.2 (L) 20 (L) 1.03 \     ALT: N/A AST: N/A AP: N/A TBILI: N/A   ALB: 3.5 TOT PROTEIN: N/A LIPASE: N/A     Procal: N/A CRP: N/A Lactic Acid: 0.6 (L)         Imaging results reviewed over the past 24 hrs:   No results found for this or any previous visit (from the past 24 hours).

## 2025-05-22 NOTE — PLAN OF CARE
Date & Time: 5/21/2025 5389-2302  Surgery/POD#: 5/21 Transfer for osteomyelitis of the L foot  Behavior & Aggression: yellow (restless and confused. Sitter in room)  Fall Risk: Yes  Orientation: A&O X1 (Pt arrived to the unit disoriented to time, place & situation)  ABNL VS/O2: VSS RA  ABNL Labs: See results  Pain Management:Scheduled Tylenol, PRNs available  Bowel/Bladder: Using urinal at bedside. Continent of B&B  Drains: PIV infusing ns @100 mL/hr  Wounds/incisions: Large bruising on R hip, bruising arm bruising, R foot swelling and redness, R pinky toe and R lateral upper wound, R heel scab. Scattered scabs and bruising.  Diet:NPO at 0000 for surgical consult in AM  Number of times OUT OF BED this shift   Tests/Procedures: WOC and Surgical consults  Anticipated  DC Date: Pending

## 2025-05-22 NOTE — CONSULTS
[place] PODIATRY/FOOT & ANKLE SURGERY  CONSULTATION NOTE    CHIEF COMPLAINT:      I was asked by Naz Adams MD  to evaluate this patient for right foot.    PATIENT HISTORY:  Sushil Diana is a 65 year old male with a past medical history significant for what's listed below, who was transferred from [facility] for further evaluation of his right foot osteomyelitis and PAD. Denies significant pain to his right foot. States hadn't been following with podiatry or anyone prior to this admission. States blood sugar runs in the 100 range typically. Does admit to current smoking. Denies N/F/V/C/D.        Review of Systems:  A 10 point review of systems was performed and is positive for that noted above in the patient history.  All other areas are negative.     PAST MEDICAL HISTORY:   Past Medical History:   Diagnosis Date    Cancer (H)     Congestive heart failure (H)     Diabetes (H)     GERD (gastroesophageal reflux disease)     Gout     Hypertension         PAST SURGICAL HISTORY:   Past Surgical History:   Procedure Laterality Date    NONE OF THE ABOVE CORE MEASURE DIAGNOSES          MEDICATIONS:  Reviewed in Epic. Current.     ALLERGIES:    Allergies   Allergen Reactions    No Known Drug Allergy     Seasonal Allergies         SOCIAL HISTORY:   Social History     Socioeconomic History    Marital status: Single     Spouse name: Not on file    Number of children: Not on file    Years of education: Not on file    Highest education level: Not on file   Occupational History    Not on file   Tobacco Use    Smoking status: Some Days     Current packs/day: 1.00     Average packs/day: 1 pack/day for 28.0 years (28.0 ttl pk-yrs)     Types: Cigarettes     Passive exposure: Current    Smokeless tobacco: Never    Tobacco comments:     quit June 2018   Vaping Use    Vaping status: Never Used   Substance and Sexual Activity    Alcohol use: Yes     Comment: occasional    Drug use: No    Sexual activity: Yes     Partners:  Female   Other Topics Concern    Parent/sibling w/ CABG, MI or angioplasty before 65F 55M? No   Social History Narrative    Not on file     Social Drivers of Health     Financial Resource Strain: Low Risk  (5/19/2025)    Financial Resource Strain     Within the past 12 months, have you or your family members you live with been unable to get utilities (heat, electricity) when it was really needed?: No   Food Insecurity: Low Risk  (5/19/2025)    Food Insecurity     Within the past 12 months, did you worry that your food would run out before you got money to buy more?: No     Within the past 12 months, did the food you bought just not last and you didn t have money to get more?: No   Transportation Needs: Low Risk  (5/19/2025)    Transportation Needs     Within the past 12 months, has lack of transportation kept you from medical appointments, getting your medicines, non-medical meetings or appointments, work, or from getting things that you need?: No   Physical Activity: Insufficiently Active (5/1/2024)    Exercise Vital Sign     Days of Exercise per Week: 2 days     Minutes of Exercise per Session: 10 min   Stress: No Stress Concern Present (5/1/2024)    [employer] - Occupational Stress Questionnaire     Feeling of Stress : Only a little   Social Connections: Unknown (5/1/2024)    Social Connection and Isolation Panel [NHANES]     Frequency of Communication with Friends and Family: Not on file     Frequency of Social Gatherings with Friends and Family: Once a week     Attends Worship Services: Not on file     Active Member of Clubs or Organizations: Not on file     Attends Club or Organization Meetings: Not on file     Marital Status: Not on file   Interpersonal Safety: Low Risk  (5/19/2025)    Interpersonal Safety     Do you feel physically and emotionally safe where you currently live?: Yes     Within the past 12 months, have you been hit, slapped, kicked or otherwise physically hurt  by someone?: No     Within the past 12 months, have you been humiliated or emotionally abused in other ways by your partner or ex-partner?: No   Housing Stability: Low Risk  (5/19/2025)    Housing Stability     Do you have housing? : Yes     Are you worried about losing your housing?: No        FAMILY HISTORY:   Family History   Problem Relation Age of Onset    Diabetes Other     Hypertension No family hx of     Cerebrovascular Disease No family hx of     Other Cancer No family hx of         EXAM:Vitals: BP (!) 145/66 (BP Location: Right arm)   Pulse 75   Temp 98.3  F (36.8  C) (Axillary)   Resp 18   SpO2 96%   BMI= There is no height or weight on file to calculate BMI.    LABS:   .a1c  Last Comprehensive Metabolic Panel:  Sodium   Date Value Ref Range Status   05/22/2025 129 (L) 135 - 145 mmol/L Final   12/15/2020 130 (L) 133 - 144 mmol/L Final     Potassium   Date Value Ref Range Status   05/22/2025 3.2 (L) 3.4 - 5.3 mmol/L Final   02/16/2022 4.3 3.4 - 5.3 mmol/L Final   12/15/2020 4.5 3.4 - 5.3 mmol/L Final     Chloride   Date Value Ref Range Status   05/22/2025 100 98 - 107 mmol/L Final   02/16/2022 101 94 - 109 mmol/L Final   12/15/2020 97 94 - 109 mmol/L Final     Carbon Dioxide   Date Value Ref Range Status   12/15/2020 28 20 - 32 mmol/L Final     Carbon Dioxide (CO2)   Date Value Ref Range Status   05/22/2025 20 (L) 22 - 29 mmol/L Final   02/16/2022 27 20 - 32 mmol/L Final     Anion Gap   Date Value Ref Range Status   05/22/2025 9 7 - 15 mmol/L Final   02/16/2022 5 3 - 14 mmol/L Final   12/15/2020 5 3 - 14 mmol/L Final     Glucose   Date Value Ref Range Status   05/22/2025 112 (H) 70 - 99 mg/dL Final   02/16/2022 115 (H) 70 - 99 mg/dL Final   12/15/2020 152 (H) 70 - 99 mg/dL Final     Comment:     Non Fasting     GLUCOSE BY METER POCT   Date Value Ref Range Status   05/21/2025 134 (H) 70 - 99 mg/dL Final     Urea Nitrogen   Date Value Ref Range Status   05/22/2025 9.4 8.0 - 23.0 mg/dL Final   02/16/2022  19 7 - 30 mg/dL Final   12/15/2020 19 7 - 30 mg/dL Final     Creatinine   Date Value Ref Range Status   05/22/2025 1.03 0.67 - 1.17 mg/dL Final   12/15/2020 0.90 0.66 - 1.25 mg/dL Final     GFR Estimate   Date Value Ref Range Status   05/22/2025 80 >60 mL/min/1.73m2 Final     Comment:     eGFR calculated using 2021 CKD-EPI equation.   12/15/2020 >90 >60 mL/min/[1.73_m2] Final     Comment:     Non  GFR Calc  Starting 12/18/2018, serum creatinine based estimated GFR (eGFR) will be   calculated using the Chronic Kidney Disease Epidemiology Collaboration   (CKD-EPI) equation.       Calcium   Date Value Ref Range Status   05/22/2025 7.8 (L) 8.8 - 10.4 mg/dL Final   12/15/2020 9.2 8.5 - 10.1 mg/dL Final     Lab Results   Component Value Date    WBC 9.3 05/22/2025    WBC 8.1 12/15/2020     Lab Results   Component Value Date    RBC 3.00 05/22/2025    RBC 3.82 12/15/2020     Lab Results   Component Value Date    HGB 8.9 05/22/2025    HGB 11.8 12/15/2020     Lab Results   Component Value Date    HCT 25.7 05/22/2025    HCT 34.3 12/15/2020     Lab Results   Component Value Date     05/22/2025     12/15/2020      Lab Results   Component Value Date    INR 1.12 08/12/2018        General appearance: Patient is alert and fully cooperative with history & exam.  No sign of distress is noted during the visit.      Respiratory: Breathing is regular & unlabored while sitting.      HEENT: Hearing is intact to spoken word.  Speech is clear.  No gross evidence of visual impairment that would impact ambulation.      Dermatologic: Right foot: fifth digit dusky in color, lateral fifth metatarsal eschar with no drainage. Foot with cellulitis and hyperemia. Mild tenderness to site.      Vascular: Dorsalis pedis and posterior tibial pulses are difficult to palpate to right foot.      Neurologic: Lower extremity sensation is diminished, bilateral foot, to light touch.  No evidence of neurological-based weakness or  contracture in the lower extremities.       Musculoskeletal: Patient is ambulatory without an assistive device or brace.  No gross foot or ankle deformity noted.       Psychiatric: Affect is pleasant & appropriate.      All cultures:  Recent Labs   Lab 05/19/25  1834 05/19/25  1817   CULTURE No growth after 2 days No growth after 2 days        IMAGING:     Right foot XR:   IMPRESSION: Diffuse osseous demineralization. Soft tissue wound over the lateral aspect of the fifth MTP joint. No acute fracture or evidence of osteomyelitis. Multifocal degenerative arthrosis of the midfoot and forefoot, advanced at the first MTP joint. Soft tissue edema about the foot and ankle.     WAVEFORMS: The dorsalis pedis and posterior tibial arteries are monophasic bilaterally, with the right lower extremity waveforms are dampened relative to the left.     Vascular Studies:                                                                  IMPRESSION:  1.  RIGHT LOWER EXTREMITY: Noncompressible tibial arteries suggestive of calcific atherosclerosis. Monophasic tibial waveforms.  2.  LEFT LOWER EXTREMITY: Noncompressible tibial arteries suggestive of calcific stenosis. Monophasic tibial waveforms.    IMPRESSION:  1.  Occlusion in the mid right SFA with monophasic waveforms beyond this level.  2.  Suspect stenosis in the left distal SFA given transition of waveforms from normal triphasic and proximal SFA to monophasic in the runoff arteries. There may be multifocal areas, though suspect femoral popliteal distribution greatest amount of disease.    Right foot MRI:   IMPRESSION:  1.  Deep skin ulcer overlying the fifth MTP joint with surrounding cellulitis. No abscess.  2.  Acute osteomyelitis involving the fifth proximal phalanx and fifth metatarsal head. There is reactive osteitis versus early developing osteomyelitis throughout the fifth metatarsal diaphysis.  3.  Diffuse muscle atrophy and edema suggest diabetic  neuropathy.    ASSESSMENT:  Right foot fifth digit, metatarsal head osteomyelitis  Peripheral Arterial Disease     Hba1c: 5.9     MEDICAL DECISION MAKING:   -Discussed all findings with patient. Chart and imaging reviewed.     -Right foot evaluated. Reviewed MRI findings with patient, noting osteomyelitis. Recommendation made for a fifth ray resection. Patient agrees to this.     -Discussed with him need to follow up on vascular plan prior to this, in order to optimize healing potential. Noted SFA occlusion. Discussed smoking cessation.     -No plans for podiatric surgery today. Currently NPO. Okay to eat per podiatry.     -Can be full WB at this time.     -Will follow.       Thank you for the consultation request and the opportunity to participate in the care of Sushil Lomax DPM   [place] Department of Podiatry/Foot & Ankle Surgery  Available via [employer] Text

## 2025-05-22 NOTE — PROGRESS NOTES
"S- Transfer to [facility] from [facility].    B- Diabetic foot infection    A- Brief systems assessment: Aox3 with intermittent confusion. Up 1-2 assist with walker and belt to bathroom/ambulation. Last BM 5/21-medium. Regular diet. R foot wound open to air. IV ABX administered. Sodium levels 129, 131, 129. Patient found to have occlusions in R leg from imaging. Transfer planned to [facility] for vascular surgery     R- Transfer to [facility] per physician orders. Continue to monitor pt and update physician as needed.     /60 (BP Location: Left arm)   Pulse 64   Temp 98  F (36.7  C) (Oral)   Resp 18   Ht 1.727 m (5' 8\")   Wt 74.4 kg (164 lb 0.4 oz)   SpO2 99%   BMI 24.94 kg/m        Code status: Full Code  Skin: R foot wound.  Fall Risk: Yes- Department fall risk interventions implemented.  Isolation and Signage: None  Medication drips upon transfer: NS 100ml/hr  Blue Bin checked and medications transfer out with patient Yes, home meds-Zytiga sent with EMS    "

## 2025-05-23 LAB
ANION GAP SERPL CALCULATED.3IONS-SCNC: 11 MMOL/L (ref 7–15)
BASOPHILS # BLD AUTO: 0 10E3/UL (ref 0–0.2)
BASOPHILS NFR BLD AUTO: 0 %
BUN SERPL-MCNC: 5.9 MG/DL (ref 8–23)
CALCIUM SERPL-MCNC: 7.9 MG/DL (ref 8.8–10.4)
CHLORIDE SERPL-SCNC: 101 MMOL/L (ref 98–107)
CREAT SERPL-MCNC: 0.8 MG/DL (ref 0.67–1.17)
EGFRCR SERPLBLD CKD-EPI 2021: >90 ML/MIN/1.73M2
EOSINOPHIL # BLD AUTO: 0.4 10E3/UL (ref 0–0.7)
EOSINOPHIL NFR BLD AUTO: 4 %
ERYTHROCYTE [DISTWIDTH] IN BLOOD BY AUTOMATED COUNT: 12.5 % (ref 10–15)
GLUCOSE BLDC GLUCOMTR-MCNC: 110 MG/DL (ref 70–99)
GLUCOSE BLDC GLUCOMTR-MCNC: 112 MG/DL (ref 70–99)
GLUCOSE BLDC GLUCOMTR-MCNC: 151 MG/DL (ref 70–99)
GLUCOSE BLDC GLUCOMTR-MCNC: 181 MG/DL (ref 70–99)
GLUCOSE BLDC GLUCOMTR-MCNC: 199 MG/DL (ref 70–99)
GLUCOSE SERPL-MCNC: 124 MG/DL (ref 70–99)
HCO3 SERPL-SCNC: 20 MMOL/L (ref 22–29)
HCT VFR BLD AUTO: 24.7 % (ref 40–53)
HGB BLD-MCNC: 8.5 G/DL (ref 13.3–17.7)
IMM GRANULOCYTES # BLD: 0.1 10E3/UL
IMM GRANULOCYTES NFR BLD: 1 %
LYMPHOCYTES # BLD AUTO: 1.3 10E3/UL (ref 0.8–5.3)
LYMPHOCYTES NFR BLD AUTO: 13 %
MCH RBC QN AUTO: 29.8 PG (ref 26.5–33)
MCHC RBC AUTO-ENTMCNC: 34.4 G/DL (ref 31.5–36.5)
MCV RBC AUTO: 87 FL (ref 78–100)
MONOCYTES # BLD AUTO: 0.7 10E3/UL (ref 0–1.3)
MONOCYTES NFR BLD AUTO: 8 %
NEUTROPHILS # BLD AUTO: 7.3 10E3/UL (ref 1.6–8.3)
NEUTROPHILS NFR BLD AUTO: 75 %
NRBC # BLD AUTO: 0 10E3/UL
NRBC BLD AUTO-RTO: 0 /100
OSMOLALITY UR: 343 MMOL/KG (ref 100–1200)
PHOSPHATE SERPL-MCNC: 2.5 MG/DL (ref 2.5–4.5)
PLATELET # BLD AUTO: 287 10E3/UL (ref 150–450)
POTASSIUM SERPL-SCNC: 3.5 MMOL/L (ref 3.4–5.3)
RBC # BLD AUTO: 2.85 10E6/UL (ref 4.4–5.9)
SODIUM SERPL-SCNC: 132 MMOL/L (ref 135–145)
WBC # BLD AUTO: 9.8 10E3/UL (ref 4–11)

## 2025-05-23 PROCEDURE — 250N000012 HC RX MED GY IP 250 OP 636 PS 637: Performed by: STUDENT IN AN ORGANIZED HEALTH CARE EDUCATION/TRAINING PROGRAM

## 2025-05-23 PROCEDURE — 84100 ASSAY OF PHOSPHORUS: CPT | Performed by: HOSPITALIST

## 2025-05-23 PROCEDURE — 250N000011 HC RX IP 250 OP 636: Performed by: PHYSICIAN ASSISTANT

## 2025-05-23 PROCEDURE — 82565 ASSAY OF CREATININE: CPT | Performed by: HOSPITALIST

## 2025-05-23 PROCEDURE — 250N000013 HC RX MED GY IP 250 OP 250 PS 637: Performed by: STUDENT IN AN ORGANIZED HEALTH CARE EDUCATION/TRAINING PROGRAM

## 2025-05-23 PROCEDURE — 120N000001 HC R&B MED SURG/OB

## 2025-05-23 PROCEDURE — 250N000013 HC RX MED GY IP 250 OP 250 PS 637: Performed by: PHYSICIAN ASSISTANT

## 2025-05-23 PROCEDURE — 99232 SBSQ HOSP IP/OBS MODERATE 35: CPT | Performed by: PODIATRIST

## 2025-05-23 PROCEDURE — 258N000003 HC RX IP 258 OP 636: Performed by: HOSPITALIST

## 2025-05-23 PROCEDURE — 99231 SBSQ HOSP IP/OBS SF/LOW 25: CPT

## 2025-05-23 PROCEDURE — 36415 COLL VENOUS BLD VENIPUNCTURE: CPT | Performed by: HOSPITALIST

## 2025-05-23 PROCEDURE — 99232 SBSQ HOSP IP/OBS MODERATE 35: CPT | Performed by: INTERNAL MEDICINE

## 2025-05-23 PROCEDURE — 250N000012 HC RX MED GY IP 250 OP 636 PS 637: Performed by: PHYSICIAN ASSISTANT

## 2025-05-23 PROCEDURE — G0545 PR INHRENT VISIT TO INPT/OBS W CNFRM/SUSPCT INFCT DIS BY INFCT DIS SPCIALST: HCPCS | Performed by: PHYSICIAN ASSISTANT

## 2025-05-23 PROCEDURE — 250N000013 HC RX MED GY IP 250 OP 250 PS 637: Performed by: HOSPITALIST

## 2025-05-23 PROCEDURE — 85049 AUTOMATED PLATELET COUNT: CPT | Performed by: HOSPITALIST

## 2025-05-23 PROCEDURE — 99232 SBSQ HOSP IP/OBS MODERATE 35: CPT | Performed by: PHYSICIAN ASSISTANT

## 2025-05-23 PROCEDURE — 250N000011 HC RX IP 250 OP 636: Performed by: INTERNAL MEDICINE

## 2025-05-23 RX ORDER — ENOXAPARIN SODIUM 100 MG/ML
40 INJECTION SUBCUTANEOUS EVERY 24 HOURS
Status: DISCONTINUED | OUTPATIENT
Start: 2025-05-23 | End: 2025-05-30 | Stop reason: HOSPADM

## 2025-05-23 RX ADMIN — ABIRATERONE ACETATE 1000 MG: 250 TABLET ORAL at 08:33

## 2025-05-23 RX ADMIN — OXYCODONE HYDROCHLORIDE 5 MG: 5 TABLET ORAL at 08:32

## 2025-05-23 RX ADMIN — CITALOPRAM HYDROBROMIDE 20 MG: 20 TABLET ORAL at 08:22

## 2025-05-23 RX ADMIN — ACETAMINOPHEN 975 MG: 325 TABLET, FILM COATED ORAL at 15:00

## 2025-05-23 RX ADMIN — SODIUM CHLORIDE: 900 INJECTION INTRAVENOUS at 00:54

## 2025-05-23 RX ADMIN — PREDNISONE 5 MG: 5 TABLET ORAL at 08:20

## 2025-05-23 RX ADMIN — AMPICILLIN SODIUM AND SULBACTAM SODIUM 3 G: 2; 1 INJECTION, POWDER, FOR SOLUTION INTRAMUSCULAR; INTRAVENOUS at 10:34

## 2025-05-23 RX ADMIN — FAMOTIDINE 40 MG: 20 TABLET, FILM COATED ORAL at 08:20

## 2025-05-23 RX ADMIN — METOPROLOL TARTRATE 50 MG: 50 TABLET, FILM COATED ORAL at 20:37

## 2025-05-23 RX ADMIN — METOPROLOL TARTRATE 50 MG: 50 TABLET, FILM COATED ORAL at 08:20

## 2025-05-23 RX ADMIN — AMPICILLIN SODIUM AND SULBACTAM SODIUM 3 G: 2; 1 INJECTION, POWDER, FOR SOLUTION INTRAMUSCULAR; INTRAVENOUS at 04:38

## 2025-05-23 RX ADMIN — LISINOPRIL 10 MG: 10 TABLET ORAL at 08:21

## 2025-05-23 RX ADMIN — AMPICILLIN SODIUM AND SULBACTAM SODIUM 3 G: 2; 1 INJECTION, POWDER, FOR SOLUTION INTRAMUSCULAR; INTRAVENOUS at 16:12

## 2025-05-23 RX ADMIN — ACETAMINOPHEN 975 MG: 325 TABLET, FILM COATED ORAL at 22:15

## 2025-05-23 RX ADMIN — ENOXAPARIN SODIUM 40 MG: 40 INJECTION SUBCUTANEOUS at 20:38

## 2025-05-23 RX ADMIN — HYDRALAZINE HYDROCHLORIDE 100 MG: 50 TABLET ORAL at 08:20

## 2025-05-23 RX ADMIN — AMPICILLIN SODIUM AND SULBACTAM SODIUM 3 G: 2; 1 INJECTION, POWDER, FOR SOLUTION INTRAMUSCULAR; INTRAVENOUS at 22:16

## 2025-05-23 RX ADMIN — INSULIN ASPART 2 UNITS: 100 INJECTION, SOLUTION INTRAVENOUS; SUBCUTANEOUS at 17:22

## 2025-05-23 RX ADMIN — OXYCODONE HYDROCHLORIDE 5 MG: 5 TABLET ORAL at 04:38

## 2025-05-23 RX ADMIN — AMLODIPINE BESYLATE 10 MG: 10 TABLET ORAL at 08:20

## 2025-05-23 RX ADMIN — ATORVASTATIN CALCIUM 20 MG: 20 TABLET, FILM COATED ORAL at 08:20

## 2025-05-23 RX ADMIN — SODIUM CHLORIDE: 900 INJECTION INTRAVENOUS at 20:37

## 2025-05-23 RX ADMIN — HYDRALAZINE HYDROCHLORIDE 100 MG: 50 TABLET ORAL at 15:00

## 2025-05-23 RX ADMIN — OXYCODONE HYDROCHLORIDE 5 MG: 5 TABLET ORAL at 20:37

## 2025-05-23 RX ADMIN — HYDRALAZINE HYDROCHLORIDE 100 MG: 50 TABLET ORAL at 22:15

## 2025-05-23 ASSESSMENT — ACTIVITIES OF DAILY LIVING (ADL)
ADLS_ACUITY_SCORE: 69
ADLS_ACUITY_SCORE: 66
ADLS_ACUITY_SCORE: 66
ADLS_ACUITY_SCORE: 69
DEPENDENT_IADLS:: INDEPENDENT
ADLS_ACUITY_SCORE: 68
ADLS_ACUITY_SCORE: 66
ADLS_ACUITY_SCORE: 68
ADLS_ACUITY_SCORE: 66
ADLS_ACUITY_SCORE: 66
ADLS_ACUITY_SCORE: 68
ADLS_ACUITY_SCORE: 66
ADLS_ACUITY_SCORE: 68
ADLS_ACUITY_SCORE: 66
ADLS_ACUITY_SCORE: 68
ADLS_ACUITY_SCORE: 69
ADLS_ACUITY_SCORE: 66
ADLS_ACUITY_SCORE: 66

## 2025-05-23 NOTE — PLAN OF CARE
Goal Outcome Evaluation:    Pt. A&Ox4, forgetful. Up x 1-2 GBW. External cath in place. Bedside commode for BM, no BM this shift. Mod carb diet. Pain managed with oxy. IV infusing at 100ml/hr. Bruising on R upper hip, +2 edema in R foot. Wife at bedside. Vascular, podiatry, and ID following.

## 2025-05-23 NOTE — PROGRESS NOTES
[place] PODIATRY/FOOT & ANKLE SURGERY      Subjective:   Sushil Diana is a 65 year old male seen at bedside for right foot fifth metatarsal osteomyelitis and PAD. Denies significant pain to his right foot.  Does admit to current smoking. Denies N/F/V/C/D. No overnight events.         EXAM:Vitals: BP (!) 159/73   Pulse 74   Temp 97.2  F (36.2  C) (Oral)   Resp 16   Wt 77.8 kg (171 lb 8.3 oz)   SpO2 98%   BMI 26.08 kg/m        LABS:     Lab Results   Component Value Date    WBC 9.3 05/22/2025    WBC 8.1 12/15/2020     Lab Results   Component Value Date    RBC 3.00 05/22/2025    RBC 3.82 12/15/2020     Lab Results   Component Value Date    HGB 8.9 05/22/2025    HGB 11.8 12/15/2020     Lab Results   Component Value Date    HCT 25.7 05/22/2025    HCT 34.3 12/15/2020     Lab Results   Component Value Date     05/22/2025     12/15/2020      Lab Results   Component Value Date    INR 1.12 08/12/2018        General appearance: Patient is alert and fully cooperative with history & exam.  No sign of distress is noted during the visit.      Respiratory: Breathing is regular & unlabored while sitting.      HEENT: Hearing is intact to spoken word.  Speech is clear.  No gross evidence of visual impairment that would impact ambulation.      Dermatologic: Right foot: fifth digit dusky in color, firm, lateral fifth metatarsal eschar with no drainage,necrotic base, slight fluctuance, distal lateral right forefoot with cellulitis and hyperemia. Mild tenderness to site.      Vascular: Dorsalis pedis and posterior tibial pulses are difficult to palpate to right foot. CFT right 5th toe negative     Neurologic: Lower extremity sensation is diminished, bilateral foot, to light touch.  No evidence of neurological-based weakness or contracture in the lower extremities.       Musculoskeletal: Patient is ambulatory without an assistive device or brace.  No gross foot or ankle deformity noted.   Righ 5th toe no range of  motion,  firm, hard.    Psychiatric: Affect is pleasant & appropriate.        All cultures:  Recent Labs   Lab 05/19/25  1834 05/19/25  1817   CULTURE No growth after 3 days No growth after 3 days        IMAGING:     Right foot XR:   IMPRESSION: Diffuse osseous demineralization. Soft tissue wound over the lateral aspect of the fifth MTP joint. No acute fracture or evidence of osteomyelitis. Multifocal degenerative arthrosis of the midfoot and forefoot, advanced at the first MTP joint. Soft tissue edema about the foot and ankle.     WAVEFORMS: The dorsalis pedis and posterior tibial arteries are monophasic bilaterally, with the right lower extremity waveforms are dampened relative to the left.     Vascular Studies:                                                                  IMPRESSION:  1.  RIGHT LOWER EXTREMITY: Noncompressible tibial arteries suggestive of calcific atherosclerosis. Monophasic tibial waveforms.  2.  LEFT LOWER EXTREMITY: Noncompressible tibial arteries suggestive of calcific stenosis. Monophasic tibial waveforms.    IMPRESSION:  1.  Occlusion in the mid right SFA with monophasic waveforms beyond this level.  2.  Suspect stenosis in the left distal SFA given transition of waveforms from normal triphasic and proximal SFA to monophasic in the runoff arteries. There may be multifocal areas, though suspect femoral popliteal distribution greatest amount of disease.    Right foot MRI:   IMPRESSION:  1.  Deep skin ulcer overlying the fifth MTP joint with surrounding cellulitis. No abscess.  2.  Acute osteomyelitis involving the fifth proximal phalanx and fifth metatarsal head. There is reactive osteitis versus early developing osteomyelitis throughout the fifth metatarsal diaphysis.  3.  Diffuse muscle atrophy and edema suggest diabetic neuropathy.    ASSESSMENT:  Right foot fifth digit, metatarsal head osteomyelitis  Peripheral Arterial Disease     Hba1c: 5.9     MEDICAL DECISION MAKING:   -Discussed  all findings with patient. Chart and imaging reviewed.     -Right foot evaluated. Reviewed MRI findings with patient, noting osteomyelitis. Recommendation made for a fifth ray resection. Patient agrees to this.     -Discussed with him we will most likely perform surgery on Wednesday 5/28/25 after his right lower extremity angiogram with Dr. Bertrand in order to optimize healing potential. Noted SFA occlusion. Discussed smoking cessation.     -Can be full WB at this time.     -continue IV antibiotics. Nursing to change bandage daily at this time, order placed for iodine, gauze, loosely wrapped kerlix.     Kat العراقي DPM

## 2025-05-23 NOTE — PROGRESS NOTES
VASCULAR SURGERY PROGRESS NOTE    Subjective:  Resting comfortably in bed, no acute events overnight. Continues to have confusion, wife at bedside. Pain adequately controlled.    Objective:  Intake/Output Summary (Last 24 hours) at 5/23/2025 0822  Last data filed at 5/23/2025 0808  Gross per 24 hour   Intake 150 ml   Output 2400 ml   Net -2250 ml     PHYSICAL EXAM:  BP (!) 159/73   Pulse 74   Temp 97.2  F (36.2  C) (Oral)   Resp 16   Wt 77.8 kg (171 lb 8.3 oz)   SpO2 98%   BMI 26.08 kg/m    Alert and oriented x1-2  VSS  Groins clean, palpable femoral pulses  Gangrenous changes with nonhealing wound on 5th metatarsal      ASSESSMENT:  Sushil Diana is a 65 year old male with CLTI and found to have occlusion of the right mid SFA      PLAN:  -continue IV antibiotics  -plan for RLE angiogram on Tuesday with Dr. Bertrnad  -NPO on Tuesday for above mentioned procedure  -aspirin 81 mg, atorvastatin     Discussed pt history, exam, assessment and plan with Dr. Bertrand of the vascular surgery service, who is in agreement with the above.    Harper Hammond NP  VASCULAR SURGERY

## 2025-05-23 NOTE — CONSULTS
Care Management Initial Consult    General Information  Assessment completed with: Patient,  (Angelina)  Type of CM/SW Visit: Initial Assessment    Primary Care Provider verified and updated as needed: Yes   Readmission within the last 30 days: no previous admission in last 30 days      Reason for Consult: discharge planning  Advance Care Planning:            Communication Assessment  Patient's communication style: spoken language (English or Bilingual)             Cognitive  Cognitive/Neuro/Behavioral: WDL  Level of Consciousness: alert  Arousal Level: opens eyes spontaneously  Orientation: oriented x 4  Mood/Behavior: calm, cooperative  Best Language: 0 - No aphasia  Speech: spontaneous, logical    Living Environment:   People in home: significant other     Current living Arrangements: house      Able to return to prior arrangements: yes  Living Arrangement Comments:  ([place] no stairs)    Family/Social Support:  Care provided by: self, spouse/significant other  Provides care for: no one  Marital Status: Lives with Significant Other  Support system: Children, Significant Other          Description of Support System: Supportive, Involved    Support Assessment: Adequate family and caregiver support    Current Resources:   Patient receiving home care services: No        Community Resources: None  Equipment currently used at home: none  Supplies currently used at home: None    Employment/Financial:  Employment Status: retired        Financial Concerns:     Referral to Financial Worker: No       Does the patient's insurance plan have a 3 day qualifying hospital stay waiver?  No    Lifestyle & Psychosocial Needs:  Social Drivers of Health     Food Insecurity: Low Risk  (5/19/2025)    Food Insecurity     Within the past 12 months, did you worry that your food would run out before you got money to buy more?: No     Within the past 12 months, did the food you bought just not last and you didn t have money to get more?: No  Results from last 7 days   Lab Units 03/31/22  0532 03/29/22  1430 03/28/22  2330   AST U/L 50* 78* 80*   ALT U/L 96* 130* 128*   ALK PHOS U/L 338* 311* 314*     Rarely use alcohol, no drug use, acute hepatitis panel negative  Likely due to metastatic cancer  CT abdomen reviewed unremarkable liver  · Trend as needed outpatient  · Follow-up CMP in 1 week  · Avoid hepatotoxic agents   Depression: Not at risk (3/20/2025)    PHQ-2     PHQ-2 Score: 0   Housing Stability: Low Risk  (5/19/2025)    Housing Stability     Do you have housing? : Yes     Are you worried about losing your housing?: No   Tobacco Use: High Risk (12/26/2024)    Patient History     Smoking Tobacco Use: Some Days     Smokeless Tobacco Use: Never     Passive Exposure: Current   Financial Resource Strain: Low Risk  (5/19/2025)    Financial Resource Strain     Within the past 12 months, have you or your family members you live with been unable to get utilities (heat, electricity) when it was really needed?: No   Alcohol Use: Not on file   Transportation Needs: Low Risk  (5/19/2025)    Transportation Needs     Within the past 12 months, has lack of transportation kept you from medical appointments, getting your medicines, non-medical meetings or appointments, work, or from getting things that you need?: No   Physical Activity: Insufficiently Active (5/1/2024)    Exercise Vital Sign     Days of Exercise per Week: 2 days     Minutes of Exercise per Session: 10 min   Interpersonal Safety: Low Risk  (5/19/2025)    Interpersonal Safety     Do you feel physically and emotionally safe where you currently live?: Yes     Within the past 12 months, have you been hit, slapped, kicked or otherwise physically hurt by someone?: No     Within the past 12 months, have you been humiliated or emotionally abused in other ways by your partner or ex-partner?: No   Stress: No Stress Concern Present (5/1/2024)    [employer] - Occupational Stress Questionnaire     Feeling of Stress : Only a little   Social Connections: Unknown (5/1/2024)    Social Connection and Isolation Panel [NHANES]     Frequency of Communication with Friends and Family: Not on file     Frequency of Social Gatherings with Friends and Family: Once a week     Attends Hindu Services: Not on file     Active Member of Clubs or Organizations: Not on file      Attends Club or Organization Meetings: Not on file     Marital Status: Not on file   Health Literacy: Not on file       Functional Status:  Prior to admission patient needed assistance:   Dependent ADLs:: Independent  Dependent IADLs:: Independent       Mental Health Status:  Mental Health Status: No Current Concerns       Chemical Dependency Status:  Chemical Dependency Status: No Current Concerns             Values/Beliefs:  Spiritual, Cultural Beliefs, Yarsani Practices, Values that affect care:                 Discussed  Partnership in Safe Discharge Planning  document with patient/family: No    Additional Information:  Consult for discharge planning.      65 year old male admitted on 5/21/25 due to acute osteomyelitis of right 5th proximal phalanx and metatarsal head. Past medical history significant for PAD, HTN, HLP, Chronic combined (diastolic and systolic) heart failure, DM2, GERD, MDD with anxiety, Alcohol use D/O, Metastatic prostate cancer with mets to the bone.    Writer reviewed chart and recommendations at discharge. Writer met with patient at bedside and introduced self and role. Writer confirmed patient's primary doctor and home address as accurate. Patient has been with his significant other for 30 years. [facility] [place] [facility]. Patient was independent prior to admission and they are hoping to discharge home after surgery of foot May 28th     Next Steps: Follow for any discharge planning needs     [facility] care coordinator is Sharlene Aj     PATRICIA Cornejo

## 2025-05-23 NOTE — PROGRESS NOTES
[facility]    Infectious Disease Progress Note    Date of Service (when I saw the patient): 05/23/2025     Assessment & Plan   Sushil Diana is a 65 year old male who was admitted on 5/21/2025.     Impression:   65 year old male with DM (A1C 5.9), CHF, HTN, metastatic prostate cancer to bone, PAD and GERD with a several month history of ulceration on right foot, presenting with acute cellulitis and acute osteomyelitis right 5th proximal phalanx and metatarsal head.      -PAD with noted right SFA occlusion on vascular studies.   -Acute osteomyelitis right fifth proximal phalanx and 5th metatarsal head on MRI.   -Blood cultures no growth to date.   -On Zosyn/Vancomycin.        Recommendations:   Continue Unasyn.   Await angio by Vasc Surg Tuesday and surgery by Podiatry on Wednesday.   Obtain cultures in OR.   Once he undergoes right 5th ray amputation, will likely be able to switch him over to oral antibiotics.   ID will follow peripherally over weekend. Please call with questions.     Patient and plan discussed with Dr. Bridges.         nAdra Hall PA-C    Interval History   Tolerating antibiotics ok   No new rashes or issues with antibiotics   Labs reviewed   No changes to past medical, social or family history   Doing well. Comfortable. Awaiting interventions next week.       Physical Exam   Temp: 97.2  F (36.2  C) Temp src: Oral BP: (!) 159/73 Pulse: 74   Resp: 16 SpO2: 98 % O2 Device: None (Room air)    Vitals:    05/23/25 0600   Weight: 77.8 kg (171 lb 8.3 oz)     Vital Signs with Ranges  Temp:  [97.2  F (36.2  C)-97.8  F (36.6  C)] 97.2  F (36.2  C)  Pulse:  [63-74] 74  Resp:  [16-20] 16  BP: (108-159)/(52-73) 159/73  SpO2:  [96 %-98 %] 98 %    Constitutional: Awake, alert, cooperative, no apparent distress  Lungs: Clear to auscultation bilaterally, no crackles or wheezing  Cardiovascular: Regular rate and rhythm, normal S1 and S2, and no murmur noted  Abdomen: Normal bowel sounds,  soft, non-distended, non-tender  Skin: No rashes, no cyanosis, no edema. Wound on foot dressed.   Other:    Medications   Current Facility-Administered Medications   Medication Dose Route Frequency Provider Last Rate Last Admin    sodium chloride 0.9 % infusion   Intravenous Continuous Ernesto Scruggs  mL/hr at 05/23/25 0054 New Bag at 05/23/25 0054     Current Facility-Administered Medications   Medication Dose Route Frequency Provider Last Rate Last Admin    abiraterone (ZYTIGA) tablet 1,000 mg  1,000 mg Oral Daily Rachelle Richards MD   1,000 mg at 05/23/25 0833    acetaminophen (TYLENOL) tablet 975 mg  975 mg Oral Q8H Jignesh Mcclure PA-C   975 mg at 05/22/25 2215    amLODIPine (NORVASC) tablet 10 mg  10 mg Oral Daily Jignesh Mcclure PA-C   10 mg at 05/23/25 0820    ampicillin-sulbactam (UNASYN) 3 g vial to attach to  mL bag  3 g Intravenous Q6H Andra Hall PA-C   3 g at 05/23/25 1034    atorvastatin (LIPITOR) tablet 20 mg  20 mg Oral Daily Naz Adams MD   20 mg at 05/23/25 0820    citalopram (celeXA) tablet 20 mg  20 mg Oral Daily Naz Adams MD   20 mg at 05/23/25 0822    famotidine (PEPCID) tablet 40 mg  40 mg Oral Daily Naz Adams MD   40 mg at 05/23/25 0820    hydrALAZINE (APRESOLINE) tablet 100 mg  100 mg Oral TID Jignesh Mcclure PA-C   100 mg at 05/23/25 0820    insulin aspart (NovoLOG) injection (RAPID ACTING)   Subcutaneous TID w/meals Ernesto Scruggs MD   2 Units at 05/23/25 1013    insulin aspart (NovoLOG) injection (RAPID ACTING)  1-7 Units Subcutaneous TID AC Jignesh Mcclure PA-C        insulin aspart (NovoLOG) injection (RAPID ACTING)  1-5 Units Subcutaneous At Bedtime Jignesh Mcclure PA-C        lisinopril (ZESTRIL) tablet 10 mg  10 mg Oral Daily Ernesto Scruggs MD   10 mg at 05/23/25 0821    metoprolol tartrate (LOPRESSOR) tablet 50 mg  50 mg Oral BID Jignesh Mcclure PA-C   50 mg at  05/23/25 0820    predniSONE (DELTASONE) tablet 5 mg  5 mg Oral Daily Naz Adams MD   5 mg at 05/23/25 0820    sodium chloride (PF) 0.9% PF flush 3 mL  3 mL Intracatheter Q8H [facility] Naz Adams MD   3 mL at 05/22/25 1610       Data   All microbiology laboratory data reviewed.  Recent Labs   Lab Test 05/23/25  0927 05/22/25  0558 05/21/25  0628 05/20/25  0555   WBC 9.8 9.3  --  6.9   HGB 8.5* 8.9* 8.5* 8.1*   HCT 24.7* 25.7*  --  22.9*   MCV 87 86 85 83    191  --  169     Recent Labs   Lab Test 05/23/25  0927 05/22/25  0558 05/21/25  1400   CR 0.80 1.03 1.60*     Recent Labs   Lab Test 05/19/25  1817   SED 49*     05/21/2025 2346 05/22/2025 0325 MRSA MSSA PCR, Nasal Swab [[id number]]    Swab from Nares, Bilateral    Final result Component Value   MRSA Target DNA Negative   SA Target DNA Negative          05/19/2025 1834 05/22/2025 2231 Blood Culture Peripheral blood (BC) Arm, Right [[id number]]   Peripheral blood (BC) from Arm, Right    Preliminary result Component Value   Culture No growth after 3 days P             05/19/2025 1817 05/22/2025 2231 Blood Culture Peripheral blood (BC) Arm, Right [[id number]]   Peripheral blood (BC) from Arm, Right    Preliminary result Component Value   Culture No growth after 3 days P

## 2025-05-23 NOTE — PROGRESS NOTES
[facility]    Medicine Progress Note - Hospitalist Service    Date of Admission:  5/21/2025  Interval history   Patient is comfortable.  He is sitting in the chair with his wife present.  Went over plan and discussion of blood supply, antibiotics and surgical treatment  He will not have his vascular study done until Tuesday.  This will be followed by a surgical procedure.  Remains on antibiotics.  Patient what came from [facility] and transferred to [facility]  His wife notes that the foot concerns started after he had a pressure area from a shoe    Assessment & Plan   Sushil Diana is a 65 year old male  Past medical history significant for PAD, HTN, HLP, Chronic combined (diastolic and systolic) heart failure, DM2, GERD, MDD with anxiety, Alcohol use D/O, Metastatic prostate cancer with mets to the bone admitted on 5/21/25 due to acute osteomyelitis of right 5th proximal phalanx and metatarsal head.       Patient had been admitted at [facility] due to right foot pain secondary to chronic diabetic ulceration with concern for osteomyelitis and sepsis.  Workup completed at [facility] included a right lower extremity/foot MRI that confirmed acute osteomyelitis.  Podiatry had been following the patient.  Patient was transferred to [facility] to undergo vascular surgery assessment, continued podiatry assessment and likely proceeding for surgical intervention. Patient was receiving IV Vancomycin and IV Zosyn.       Right 5th proximal phalanx and metatarsal head osteomyelitis  Chronic right foot diabetic wound with cellulitis  PAD  Patient transferred from [facility] with diagnosis of acute osteomyelitis of the right fifth proximal phalanx and metatarsal head.  Initial presentation with right foot pain and noted diabetic ulceration with concern for osteomyelitis and sepsis.  Right lower extremity foot MRI confirmed acute osteomyelitis.  Started on Vanco and Zosyn (MRSA swab negative)    5/22 podiatry consult: Right foot fifth digit metatarsal head with osteomyelitis and peripheral artery disease.  MRI noting osteomyelitis.  Recommendations fifth ray resection after vascular supply established. (Noted SFA occlusion)   Continue antibiotics with vascular assessment as below  ID consult now on unasyn (culture in OR)      Peripheral vascular disease  Plans for lower extremity angiogram 5/27 on Tuesday 5/20 lower extremity duplex showing occlusion in the mid right SFA with monophasic waveforms.  Suspected stenosis in the left distal SFA given transition of waveforms may be multifocal areas but suspect femoral-popliteal distribution greatest amount of disease  5/20 ABIs right monophasic noncompressible 0.64 and left noncompressible tibial and monophasic DP 1.33 and PT 1.57  5/22 vascular consult with review of imaging.  Plans for right lower extremity angiogram on Tuesday with Dr. Bertrand    Hyponatremia, acute on chronic  Sodium ranges between 120-135.     Hold PTA hydrochlorothiazide 25 mg/d.    IV fluids at 100 ml/hr with NS.    Urine sodium 107 with urine osmolality 343  5/23 stopped hydrochlorothiazide (would not resume on discharge)   Also reported history of alcohol consumption.??  Uncertain amount or history    Hypokalemia  Hypophosphatemia  -Replace per protocol      CINDY  Baseline creatinine around 1, noted creatinine was up to 1.76.   With IV fluids at 100 ml/hr with NS, Improved to baseline  5/23 remains on NS at 100     Anemia, acute on chronic normocytic  *Baseline HGB prior to admission 9-11.3.    - Hemoglobin has trended down but fairly stable at 8 presents now.  No sign of bleeding.     HTN  -Resumed on PTA amlodipine 10 mg/d, lopressor 50 mg BID and hydralazine 100 mg TID.  Hold parameters in place.    Continue to hold PTA hydrochlorothiazide 25 mg/d, not resuming given hyponatremia.    SBP's in 140s, since creatinine has improved, resume lisinopril at lower dose and monitor.     PRN  PO/IV hydralazine 10 mg every 4 hours for SBP GREATER THAN 180.    5/23 Zestril started at 10 mg p.o. daily (reported to be on 40 mg prior to admission)      HLP  - Resumed on PTA atorvastatin 20 mg/d.       Chronic combined (diastolic and systolic) heart failure  Managing PTA HCTZ and lisinopril as above  Resumed on PTA lopressor 50 mg BID.    Monitor daily weights, intake and output as able.    Combination cardiac diet.    8/2019 noted in epic to have mild to moderate concentric LVH with grade 1 early diastolic dysfunction.  Moderately reduced LV function 30 to 35% with global hypokinesia.  1+ MR  Review epic to see if there is a further follow-up  5/23 will order a repeat ECHO with assessment of function given surgery, PAD        Type 2 DM   PTA regimen includes: Metformin 1000 mg daily with lunch and glipizide 5 mg every morning.    Hold PTA metformin or glipizide.  Resume at discharge.    Medium intensity sliding scale insulin ordered.  Glucose checks ordered.    Hypoglycemic protocol in place.    Blood sugars in 100s, resuming diet as no plan for OR today, add NovoLog per carb count     GERD  - Resumed on PTA Pepcid 40 mg BID.       Major depressive disorder with anxiety  - Resumed on PTA Celexa 20 mg/d.     Alcohol use D/O  *3-4 beers per day reported as typical consumption.  Had been at [facility] 5/19.    - CIWA monitoring ordered.       Metastatic prostate cancer with mets to the bone  - Resumed on PTA Zytiga 1000 mg/d.    - Resumed on PTA prednisone 5 mg/d.           Diet: Moderate Consistent Carb (60 g CHO per Meal) Diet    DVT Prophylaxis: Pneumatic Compression Devices NEED to discuss with vascular/podiatry  if ok to start on lovenox   Alfred Catheter: Not present  Lines: None     Cardiac Monitoring: None  Code Status: Full Code      Clinically Significant Risk Factors        # Hypokalemia: Lowest K = 3.2 mmol/L in last 2 days, will replace as needed  # Hyponatremia: Lowest Na = 129 mmol/L  "in last 2 days, will monitor as appropriate           # Hypertension: Noted on problem list            # Overweight: Estimated body mass index is 26.08 kg/m  as calculated from the following:    Height as of 5/19/25: 1.727 m (5' 8\").    Weight as of this encounter: 77.8 kg (171 lb 8.3 oz)., PRESENT ON ADMISSION            Social Drivers of Health    Tobacco Use: High Risk (12/26/2024)    Patient History     Smoking Tobacco Use: Some Days     Smokeless Tobacco Use: Never     Passive Exposure: Current   Physical Activity: Insufficiently Active (5/1/2024)    Exercise Vital Sign     Days of Exercise per Week: 2 days     Minutes of Exercise per Session: 10 min   Social Connections: Unknown (5/1/2024)    Social Connection and Isolation Panel [NHANES]     Frequency of Social Gatherings with Friends and Family: Once a week          Disposition Plan     Medically Ready for Discharge: Anticipated in 2-4 Days             HIRO ESPITIA MD  Hospitalist Service  [facility]  Securely message with [employer] (more info)  Text page via [employer] Paging/Directory   ______________________________________________________________________    Physical Exam   Vital Signs: Temp: 97.2  F (36.2  C) Temp src: Oral BP: (!) 150/67 Pulse: 74   Resp: 16 SpO2: 98 % O2 Device: None (Room air)    Weight: 171 lbs 8.29 oz    General Appearance: Patient is awake and alert   Respiratory: clear to auscultation bilaterally without wheezes or rhonchi  Cardiovascular: Regular rate and rhythm without gallop rub normal S1-S2  GI: Positive bowel sounds soft no rebound guarding or tenderness  Skin: Lower extremity with dark toe on the right not fully visualized as his foot was wrapped  Media reviewed in [employer]    Medical Decision Making       45 MINUTES SPENT BY ME on the date of service doing chart review, history, exam, documentation & further activities per the note.      Data     I have personally reviewed the following data over the past 24 " hrs:    9.8  \   8.5 (L)   / 287     132 (L) 101 5.9 (L) /  199 (H)   3.5 20 (L) 0.80 \       Imaging results reviewed over the past 24 hrs:   No results found for this or any previous visit (from the past 24 hours).

## 2025-05-23 NOTE — PLAN OF CARE
Date & Time: 5/23/2025 7707-2149    Surgery/POD#: Admit 5/21 for osteomyelitis of the right foot fifth metatarsal osteomyelitis and PAD  Behavior & Aggression: Green  Fall Risk: Yes  Orientation: A&Ox4   ABNL VS/O2: VSS on RA ex HTN  ABNL Labs: See chart  Pain Management: PRN oxycodone x1 ,scheduled tylenol  Bowel/Bladder: External cath in place, x1 bm this shift  Drains: R PIV infusing NS @100 mL/hr, R hand PIV SL'd  Wounds/incisions: Large bruising on R hip, bruising arm bruising, R foot swelling and redness, R pinky toe and R lateral upper wound, R heel scab. Scattered scabs and bruising. Dressing changed per podiatry today  Diet: CHO, carb count. BG checks  Number of times OUT OF BED this shift: up to BSC x2 and chair x2  Tests/Procedures: Angiogram scheduled 5/27  Anticipated  DC Date: Pending angiogram next week

## 2025-05-23 NOTE — PLAN OF CARE
Date & Time: 5/22/2025 19:00-23:30    Surgery/POD#: 5/21 Transfer for osteomyelitis of the L foot    Behavior & Aggression: Green  Fall Risk: Yes  Orientation: A&Ox4   ABNL VS/O2: VSS RA  ABNL Labs: See chart  Pain Management: PRN oxycodone x1  Bowel/Bladder: External cath in place, x1 bm this shift  Drains: R PIV infusing NS @100 mL/hr, R hand PIV SL'd  Wounds/incisions: Large bruising on R hip, bruising arm bruising, R foot swelling and redness, R pinky toe and R lateral upper wound, R heel scab. Scattered scabs and bruising.  Diet: CHO, carb count. BG checks  Number of times OUT OF BED this shift: up to BSC and chair x1  Tests/Procedures: WOC and Surgical, ID consults  Anticipated  DC Date: Pending

## 2025-05-24 ENCOUNTER — APPOINTMENT (OUTPATIENT)
Dept: CARDIOLOGY | Facility: CLINIC | Age: 65
DRG: 253 | End: 2025-05-24
Attending: INTERNAL MEDICINE
Payer: MEDICARE

## 2025-05-24 ENCOUNTER — APPOINTMENT (OUTPATIENT)
Dept: ULTRASOUND IMAGING | Facility: CLINIC | Age: 65
DRG: 253 | End: 2025-05-24
Attending: SURGERY
Payer: MEDICARE

## 2025-05-24 LAB
ANION GAP SERPL CALCULATED.3IONS-SCNC: 12 MMOL/L (ref 7–15)
BACTERIA SPEC CULT: NO GROWTH
BACTERIA SPEC CULT: NO GROWTH
BUN SERPL-MCNC: 5.6 MG/DL (ref 8–23)
CALCIUM SERPL-MCNC: 8.2 MG/DL (ref 8.8–10.4)
CHLORIDE SERPL-SCNC: 101 MMOL/L (ref 98–107)
CREAT SERPL-MCNC: 0.74 MG/DL (ref 0.67–1.17)
EGFRCR SERPLBLD CKD-EPI 2021: >90 ML/MIN/1.73M2
ERYTHROCYTE [DISTWIDTH] IN BLOOD BY AUTOMATED COUNT: 12.5 % (ref 10–15)
GLUCOSE BLDC GLUCOMTR-MCNC: 107 MG/DL (ref 70–99)
GLUCOSE BLDC GLUCOMTR-MCNC: 148 MG/DL (ref 70–99)
GLUCOSE BLDC GLUCOMTR-MCNC: 151 MG/DL (ref 70–99)
GLUCOSE BLDC GLUCOMTR-MCNC: 88 MG/DL (ref 70–99)
GLUCOSE BLDC GLUCOMTR-MCNC: 98 MG/DL (ref 70–99)
GLUCOSE SERPL-MCNC: 111 MG/DL (ref 70–99)
HCO3 SERPL-SCNC: 21 MMOL/L (ref 22–29)
HCT VFR BLD AUTO: 24.8 % (ref 40–53)
HGB BLD-MCNC: 8.3 G/DL (ref 13.3–17.7)
IRON BINDING CAPACITY (ROCHE): 198 UG/DL (ref 240–430)
IRON SATN MFR SERPL: 18 % (ref 15–46)
IRON SERPL-MCNC: 36 UG/DL (ref 61–157)
LVEF ECHO: NORMAL
MAGNESIUM SERPL-MCNC: 1.3 MG/DL (ref 1.7–2.3)
MCH RBC QN AUTO: 29.3 PG (ref 26.5–33)
MCHC RBC AUTO-ENTMCNC: 33.5 G/DL (ref 31.5–36.5)
MCV RBC AUTO: 88 FL (ref 78–100)
PHOSPHATE SERPL-MCNC: 2.3 MG/DL (ref 2.5–4.5)
PLATELET # BLD AUTO: 220 10E3/UL (ref 150–450)
POTASSIUM SERPL-SCNC: 3.3 MMOL/L (ref 3.4–5.3)
POTASSIUM SERPL-SCNC: 3.7 MMOL/L (ref 3.4–5.3)
RBC # BLD AUTO: 2.83 10E6/UL (ref 4.4–5.9)
SODIUM SERPL-SCNC: 134 MMOL/L (ref 135–145)
WBC # BLD AUTO: 8.8 10E3/UL (ref 4–11)

## 2025-05-24 PROCEDURE — 93970 EXTREMITY STUDY: CPT

## 2025-05-24 PROCEDURE — 258N000003 HC RX IP 258 OP 636: Performed by: HOSPITALIST

## 2025-05-24 PROCEDURE — 250N000013 HC RX MED GY IP 250 OP 250 PS 637: Performed by: STUDENT IN AN ORGANIZED HEALTH CARE EDUCATION/TRAINING PROGRAM

## 2025-05-24 PROCEDURE — 84100 ASSAY OF PHOSPHORUS: CPT | Performed by: SURGERY

## 2025-05-24 PROCEDURE — 84295 ASSAY OF SERUM SODIUM: CPT | Performed by: INTERNAL MEDICINE

## 2025-05-24 PROCEDURE — 85027 COMPLETE CBC AUTOMATED: CPT | Performed by: INTERNAL MEDICINE

## 2025-05-24 PROCEDURE — 250N000012 HC RX MED GY IP 250 OP 636 PS 637: Performed by: STUDENT IN AN ORGANIZED HEALTH CARE EDUCATION/TRAINING PROGRAM

## 2025-05-24 PROCEDURE — 93306 TTE W/DOPPLER COMPLETE: CPT

## 2025-05-24 PROCEDURE — 83735 ASSAY OF MAGNESIUM: CPT | Performed by: INTERNAL MEDICINE

## 2025-05-24 PROCEDURE — 120N000001 HC R&B MED SURG/OB

## 2025-05-24 PROCEDURE — 250N000013 HC RX MED GY IP 250 OP 250 PS 637: Performed by: PHYSICIAN ASSISTANT

## 2025-05-24 PROCEDURE — 250N000013 HC RX MED GY IP 250 OP 250 PS 637: Performed by: INTERNAL MEDICINE

## 2025-05-24 PROCEDURE — 84132 ASSAY OF SERUM POTASSIUM: CPT | Performed by: INTERNAL MEDICINE

## 2025-05-24 PROCEDURE — 36415 COLL VENOUS BLD VENIPUNCTURE: CPT | Performed by: INTERNAL MEDICINE

## 2025-05-24 PROCEDURE — 250N000011 HC RX IP 250 OP 636: Performed by: PHYSICIAN ASSISTANT

## 2025-05-24 PROCEDURE — 99233 SBSQ HOSP IP/OBS HIGH 50: CPT | Performed by: INTERNAL MEDICINE

## 2025-05-24 PROCEDURE — 93306 TTE W/DOPPLER COMPLETE: CPT | Mod: 26 | Performed by: INTERNAL MEDICINE

## 2025-05-24 PROCEDURE — 250N000011 HC RX IP 250 OP 636: Performed by: INTERNAL MEDICINE

## 2025-05-24 PROCEDURE — 83540 ASSAY OF IRON: CPT | Performed by: INTERNAL MEDICINE

## 2025-05-24 PROCEDURE — 250N000013 HC RX MED GY IP 250 OP 250 PS 637: Performed by: HOSPITALIST

## 2025-05-24 RX ORDER — LISINOPRIL 10 MG/1
10 TABLET ORAL 2 TIMES DAILY
Status: DISCONTINUED | OUTPATIENT
Start: 2025-05-24 | End: 2025-05-30 | Stop reason: HOSPADM

## 2025-05-24 RX ORDER — POTASSIUM CHLORIDE 1500 MG/1
40 TABLET, EXTENDED RELEASE ORAL ONCE
Status: COMPLETED | OUTPATIENT
Start: 2025-05-24 | End: 2025-05-24

## 2025-05-24 RX ADMIN — ATORVASTATIN CALCIUM 20 MG: 20 TABLET, FILM COATED ORAL at 08:04

## 2025-05-24 RX ADMIN — HYDRALAZINE HYDROCHLORIDE 100 MG: 50 TABLET ORAL at 21:37

## 2025-05-24 RX ADMIN — OXYCODONE HYDROCHLORIDE 5 MG: 5 TABLET ORAL at 02:16

## 2025-05-24 RX ADMIN — LISINOPRIL 10 MG: 10 TABLET ORAL at 08:02

## 2025-05-24 RX ADMIN — METOPROLOL TARTRATE 50 MG: 50 TABLET, FILM COATED ORAL at 21:37

## 2025-05-24 RX ADMIN — SODIUM CHLORIDE: 900 INJECTION INTRAVENOUS at 07:22

## 2025-05-24 RX ADMIN — LISINOPRIL 10 MG: 10 TABLET ORAL at 21:37

## 2025-05-24 RX ADMIN — OXYCODONE HYDROCHLORIDE 5 MG: 5 TABLET ORAL at 20:08

## 2025-05-24 RX ADMIN — METOPROLOL TARTRATE 50 MG: 50 TABLET, FILM COATED ORAL at 08:03

## 2025-05-24 RX ADMIN — OXYCODONE HYDROCHLORIDE 5 MG: 5 TABLET ORAL at 08:04

## 2025-05-24 RX ADMIN — ACETAMINOPHEN 975 MG: 325 TABLET, FILM COATED ORAL at 05:47

## 2025-05-24 RX ADMIN — POTASSIUM & SODIUM PHOSPHATES POWDER PACK 280-160-250 MG 1 PACKET: 280-160-250 PACK at 15:31

## 2025-05-24 RX ADMIN — POTASSIUM CHLORIDE 40 MEQ: 1500 TABLET, EXTENDED RELEASE ORAL at 12:57

## 2025-05-24 RX ADMIN — POTASSIUM & SODIUM PHOSPHATES POWDER PACK 280-160-250 MG 1 PACKET: 280-160-250 PACK at 20:09

## 2025-05-24 RX ADMIN — INSULIN ASPART 1 UNITS: 100 INJECTION, SOLUTION INTRAVENOUS; SUBCUTANEOUS at 17:42

## 2025-05-24 RX ADMIN — ACETAMINOPHEN 975 MG: 325 TABLET, FILM COATED ORAL at 21:38

## 2025-05-24 RX ADMIN — AMPICILLIN SODIUM AND SULBACTAM SODIUM 3 G: 2; 1 INJECTION, POWDER, FOR SOLUTION INTRAMUSCULAR; INTRAVENOUS at 21:47

## 2025-05-24 RX ADMIN — CALCIUM CARBONATE (ANTACID) CHEW TAB 500 MG 1000 MG: 500 CHEW TAB at 15:31

## 2025-05-24 RX ADMIN — AMLODIPINE BESYLATE 10 MG: 10 TABLET ORAL at 08:03

## 2025-05-24 RX ADMIN — CITALOPRAM HYDROBROMIDE 20 MG: 20 TABLET ORAL at 08:02

## 2025-05-24 RX ADMIN — ENOXAPARIN SODIUM 40 MG: 40 INJECTION SUBCUTANEOUS at 21:37

## 2025-05-24 RX ADMIN — ABIRATERONE ACETATE 1000 MG: 250 TABLET ORAL at 08:03

## 2025-05-24 RX ADMIN — AMPICILLIN SODIUM AND SULBACTAM SODIUM 3 G: 2; 1 INJECTION, POWDER, FOR SOLUTION INTRAMUSCULAR; INTRAVENOUS at 16:22

## 2025-05-24 RX ADMIN — POTASSIUM & SODIUM PHOSPHATES POWDER PACK 280-160-250 MG 1 PACKET: 280-160-250 PACK at 12:57

## 2025-05-24 RX ADMIN — SODIUM CHLORIDE: 900 INJECTION INTRAVENOUS at 19:11

## 2025-05-24 RX ADMIN — AMPICILLIN SODIUM AND SULBACTAM SODIUM 3 G: 2; 1 INJECTION, POWDER, FOR SOLUTION INTRAMUSCULAR; INTRAVENOUS at 11:33

## 2025-05-24 RX ADMIN — PREDNISONE 5 MG: 5 TABLET ORAL at 08:03

## 2025-05-24 RX ADMIN — HYDRALAZINE HYDROCHLORIDE 100 MG: 50 TABLET ORAL at 15:41

## 2025-05-24 RX ADMIN — AMPICILLIN SODIUM AND SULBACTAM SODIUM 3 G: 2; 1 INJECTION, POWDER, FOR SOLUTION INTRAMUSCULAR; INTRAVENOUS at 05:48

## 2025-05-24 RX ADMIN — HYDRALAZINE HYDROCHLORIDE 100 MG: 50 TABLET ORAL at 08:02

## 2025-05-24 RX ADMIN — OXYCODONE HYDROCHLORIDE 5 MG: 5 TABLET ORAL at 15:29

## 2025-05-24 RX ADMIN — FAMOTIDINE 40 MG: 20 TABLET, FILM COATED ORAL at 08:04

## 2025-05-24 ASSESSMENT — ACTIVITIES OF DAILY LIVING (ADL)
ADLS_ACUITY_SCORE: 69
ADLS_ACUITY_SCORE: 68
ADLS_ACUITY_SCORE: 71
ADLS_ACUITY_SCORE: 69
ADLS_ACUITY_SCORE: 69
ADLS_ACUITY_SCORE: 68
ADLS_ACUITY_SCORE: 69
ADLS_ACUITY_SCORE: 68
ADLS_ACUITY_SCORE: 69
ADLS_ACUITY_SCORE: 68
ADLS_ACUITY_SCORE: 71
ADLS_ACUITY_SCORE: 68
ADLS_ACUITY_SCORE: 71
ADLS_ACUITY_SCORE: 69
ADLS_ACUITY_SCORE: 69
ADLS_ACUITY_SCORE: 68
ADLS_ACUITY_SCORE: 69

## 2025-05-24 NOTE — PROGRESS NOTES
[facility]    Medicine Progress Note - Hospitalist Service    Date of Admission:  5/21/2025  Interval history   Patient sitting on edge of the bed. His wife is present. Asked about his prior cardiomyopathy and his wife is aware that he had reduced EF but no recollection of ischemic evaluation (by description) no stress test or angiogram based on dialogue but records limited.   Based on discussion did not go back for follow up as didn't know he of need  He is waiting for angiogram.   Updated that lovenox shot to prevent blood clots.     Assessment & Plan   Sushil Diana is a 65 year old male  Past medical history significant for PAD, HTN, HLP, Chronic combined (diastolic and systolic) heart failure, DM2, GERD, MDD with anxiety, Alcohol use D/O, Metastatic prostate cancer with mets to the bone admitted on 5/21/25 due to acute osteomyelitis of right 5th proximal phalanx and metatarsal head.       Patient had been admitted at [facility] due to right foot pain secondary to chronic diabetic ulceration with concern for osteomyelitis and sepsis.  Workup completed at [facility] included a right lower extremity/foot MRI that confirmed acute osteomyelitis.  Podiatry had been following the patient.  Patient was transferred to [facility] to undergo vascular surgery assessment, continued podiatry assessment and likely proceeding for surgical intervention. Patient was receiving IV Vancomycin and IV Zosyn.       Right 5th proximal phalanx and metatarsal head osteomyelitis  Chronic right foot diabetic wound with cellulitis  PAD  5/20 MRI right foot Acute osteomyelitis 5th prox phalanx and 5th metatarsal head and potentil fifth metatarsal diaphysis   Patient transferred from [facility] with diagnosis of acute osteomyelitis of the right fifth proximal phalanx and metatarsal head.  Initial presentation with right foot pain and noted diabetic ulceration with concern for osteomyelitis and  sepsis.  Right lower extremity foot MRI confirmed acute osteomyelitis.  Started on Vanco and Zosyn (MRSA swab negative)   5/22 podiatry consult: Right foot fifth digit metatarsal head with osteomyelitis and peripheral artery disease.  MRI noting osteomyelitis.  Recommendations fifth ray resection after vascular supply established. (Noted SFA occlusion)   Continue antibiotics with vascular assessment as below  ID consult on unasyn (culture in OR) >> stopped vanco and zosyn     Peripheral vascular disease  Plans for lower extremity angiogram 5/27 on Tuesday 5/20 lower extremity duplex showing occlusion in the mid right SFA with monophasic waveforms.  Suspected stenosis in the left distal SFA given transition of waveforms may be multifocal areas but suspect femoral-popliteal distribution greatest amount of disease  5/20 ABIs right monophasic noncompressible 0.64 and left noncompressible tibial and monophasic DP 1.33 and PT 1.57  5/22 vascular consult  Plans for right lower extremity angiogram on Tuesday with Dr. Berrtand    Hyponatremia, acute on chronic  Sodium ranges between 120-135.     Hold PTA hydrochlorothiazide 25 mg/d.    IV fluids at 100 ml/hr with NS.    Urine sodium 107 with urine osmolality 343  5/23 stopped hydrochlorothiazide (would not resume on discharge)   Also reported history of alcohol consumption.??  Uncertain amount or history  5/24 sodium 134    Hypokalemia  Hypophosphatemia  -Replace per protocol   Check MAG as well  Protocols      CINDY  Baseline creatinine around 1, noted creatinine was up to 1.76.   With IV fluids at 100 ml/hr with NS, Improved to baseline  5/24 creatinine normal now stop IVF (NS)      Anemia, acute on chronic normocytic  *Baseline HGB prior to admission 9-11.3.    - Hemoglobin has trended down but fairly stable at 8 presents now.  No sign of bleeding.  Hgb 8.3 with MCV 88   Check iron stores, B12 and folate  5/24 some degree anemia for awhile 2024.   Followed by oncology can  followup      HTN  -Resumed on PTA amlodipine 10 mg/d, lopressor 50 mg BID and hydralazine 100 mg TID.  Hold parameters in place.    Continue to hold PTA hydrochlorothiazide 25 mg/d, not resuming given hyponatremia.    SBP's in 140s, since creatinine has improved, resume lisinopril at lower dose and monitor.     PRN PO/IV hydralazine 10 mg every 4 hours for SBP GREATER THAN 180.    5/23 Zestril started at 10 mg p.o. daily (reported to be on 40 mg prior to admission) >> increase to 10 mg po BID 5/24     HLP  - Resumed on PTA atorvastatin 20 mg/d.       Chronic combined (diastolic and systolic) heart failure  Managing PTA HCTZ and lisinopril as above  Resumed on PTA lopressor 50 mg BID.    Monitor daily weights, intake and output as able.    Combination cardiac diet.    8/2019 noted in epic to have mild to moderate concentric LVH with grade 1 early diastolic dysfunction.  Moderately reduced LV function 30 to 35% with global hypokinesia.  1+ MR  Discussion with patient/wife?? No overt follow up   5/24 ECHO now with EF 65-70% RV normal. 1+ mitral regurg       Type 2 DM   PTA regimen includes: Metformin 1000 mg daily with lunch and glipizide 5 mg every morning.    Hold PTA metformin or glipizide.  Resume at discharge.    Medium intensity sliding scale insulin ordered.  Hypoglycemic protocol in place.    add NovoLog per carb count + sliding scale      GERD  - Resumed on PTA Pepcid 40 mg BID.       Major depressive disorder with anxiety  - Resumed on PTA Celexa 20 mg/d.     Alcohol use D/O  *3-4 beers per day reported as typical consumption.    Had been at [facility] since 5/19.    - CIWA monitoring ordered.         Metastatic prostate cancer with mets to the bone  - Resumed on PTA Zytiga 1000 mg/d.    - Resumed on PTA prednisone 5 mg/d.           Diet: Moderate Consistent Carb (60 g CHO per Meal) Diet    DVT Prophylaxis: Pneumatic Compression Devices 5/23 start of lovenox   Alfred Catheter: Not present  Lines: None  "    Cardiac Monitoring: None  Code Status: Full Code      Clinically Significant Risk Factors        # Hypokalemia: Lowest K = 3.3 mmol/L in last 2 days, will replace as needed  # Hyponatremia: Lowest Na = 132 mmol/L in last 2 days, will monitor as appropriate           # Hypertension: Noted on problem list  # Chronic heart failure with preserved ejection fraction: heart failure noted on problem list and last echo with EF >50%           # Overweight: Estimated body mass index is 25.41 kg/m  as calculated from the following:    Height as of 5/19/25: 1.727 m (5' 8\").    Weight as of this encounter: 75.8 kg (167 lb 1.7 oz)., PRESENT ON ADMISSION            Social Drivers of Health    Tobacco Use: High Risk (12/26/2024)    Patient History     Smoking Tobacco Use: Some Days     Smokeless Tobacco Use: Never     Passive Exposure: Current   Physical Activity: Insufficiently Active (5/1/2024)    Exercise Vital Sign     Days of Exercise per Week: 2 days     Minutes of Exercise per Session: 10 min   Social Connections: Unknown (5/1/2024)    Social Connection and Isolation Panel [NHANES]     Frequency of Social Gatherings with Friends and Family: Once a week          Disposition Plan     Medically Ready for Discharge: Anticipated in 2-4 Days             HIRO ESPITIA MD  Hospitalist Service  [facility]  Securely message with [employer] (more info)  Text page via [employer] Paging/Directory   ______________________________________________________________________    Physical Exam   Vital Signs: Temp: 97.7  F (36.5  C) Temp src: Oral BP: (!) 150/73 Pulse: 72   Resp: 16 SpO2: 97 % O2 Device: None (Room air)    Weight: 167 lbs 1.74 oz    General Appearance: Patient is awake and alert   Respiratory: clear to auscultation bilaterally without wheezes or rhonchi  Cardiovascular: Regular rate and rhythm without gallop rub normal S1-S2  GI: Positive bowel sounds soft no rebound guarding or tenderness  Skin: RLE wrapped "   Media reviewed in [employer]    Medical Decision Making       50 MINUTES SPENT BY ME on the date of service doing chart review, history, exam, documentation & further activities per the note.      Data     I have personally reviewed the following data over the past 24 hrs:    8.8  \   8.3 (L)   / 220     134 (L) 101 5.6 (L) /  148 (H)   3.7 21 (L) 0.74 \       Imaging results reviewed over the past 24 hrs:   Recent Results (from the past 24 hours)   US Lower Extremity Venous Mapping Bilateral    Narrative    EXAM: US LOWER EXTREMITY VENOUS MAPPING BILATERAL  LOCATION: [facility]  DATE: 2025    INDICATION: critical limb ischemia of right lower extremity. Vein mapping for graft conduit.  COMPARISON: None.  TECHNIQUE: Ultrasound examination of the lower extremity veins was performed, including gray-scale and compression imaging.     LOWER  EXTREMITY FINDINGS:       VENOUS DIAMETERS  LEFT GREAT SAPHENOUS VEIN  Upper Thigh: 2 mm  Mid Thigh: 2 mm  Lower Thigh: 2 mm  Knee: 3 mm  Upper Calf: 3 mm  Mid Calf: 3 mm  Lower Calf: 3 mm  Ankle: 3 mm    RIGHT GREAT SAPHENOUS VEIN  Upper Thigh: 4 mm  Mid Thigh: 4 mm  Lower Thigh: 5 mm  Knee: 5 mm  Upper Calf: 3 mm  Mid Calf: 3 mm  Lower Calf: 3 mm  Ankle: 3 mm      Impression    IMPRESSION:  1. Bilateral GSV measurements noted above.   Echocardiogram Complete   Result Value    LVEF  65-70%    Narrative    [id number]  [id number]  [id number]  [id number]^HIRO^[id number]     [facility]  Echocardiography Laboratory  [address] [place] [address]     Name: CORY TAVERAS  MRN: [id number]  : 1960  Study Date: 2025 02:33 PM  Age: 65 yrs  Gender: Male  Patient Location: [facility]  Reason For Study: Cardiomyopathy  Ordering Physician: HIRO ESPITIA  Referring Physician: ABUNDIO JEAN  Performed By: SANDEE Galvez     BSA: 1.9 m2  Height: 68 in  Weight: 167 lb  HR: 72  BP: 129/57  mmHg  ______________________________________________________________________________  Procedure  Echocardiogram with two-dimensional, color and spectral Doppler. Adequate  quality two-dimensional was performed and interpreted.  ______________________________________________________________________________  Interpretation Summary     Left ventricular systolic function is borderline hyperdynamic. The visual  ejection fraction is 65-70%.  Right ventricular global function is normal.  There is mild (1+) mitral regurgitation.  IVC is less than 1cm in diameter with near complete collapse with inspiration,  suggesting relative hypovolemia.     ______________________________________________________________________________  Left Ventricle  The left ventricle is normal in size. There is mild concentric left  ventricular hypertrophy. Left ventricular systolic function is borderline  hyperdynamic. The visual ejection fraction is 65-70%. Left ventricular  diastolic function is normal. No regional wall motion abnormalities noted.     Right Ventricle  The right ventricle is normal in size and function.     Atria  The left atrium is moderately dilated. Right atrial size is normal. There is  no color Doppler evidence of an atrial shunt.     Mitral Valve  There is mild (1+) mitral regurgitation.     Tricuspid Valve  There is trace tricuspid regurgitation. The right ventricular systolic  pressure is approximated at 25.0 mmHg plus the right atrial pressure.     Aortic Valve  The aortic valve is normal in structure and function.     Pulmonic Valve  The pulmonic valve is not well seen, but is grossly normal.     Vessels  The aortic root is normal size. Normal size ascending aorta. IVC is less than  1cm in diameter with near complete collapse with inspiration, suggesting  relative hypovolemia.     Pericardium  There is no pericardial effusion.     ______________________________________________________________________________  MMode/2D  Measurements & Calculations  IVSd: 1.1 cm  LVIDd: 4.5 cm  LVIDs: 2.9 cm  LVPWd: 1.5 cm  FS: 35.6 %  LV mass(C)d: 222.6 grams  LV mass(C)dI: 117.6 grams/m2     Ao root diam: 3.6 cm  asc Aorta Diam: 3.6 cm  LVOT diam: 2.1 cm  LVOT area: 3.5 cm2  Ao root diam index Ht(cm/m): 2.1  Ao root diam index BSA (cm/m2): 1.9  Asc Ao diam index BSA (cm/m2): 1.9  Asc Ao diam index Ht(cm/m): 2.1  LA Volume (BP): 88.5 ml  LA Volume Index (BP): 46.8 ml/m2  RWT: 0.67     TAPSE: 3.3 cm     Doppler Measurements & Calculations  MV E max wu: 119.0 cm/sec  MV A max wu: 78.4 cm/sec  MV E/A: 1.5  MV dec slope: 510.0 cm/sec2  MV dec time: 0.23 sec  PA acc time: 0.14 sec  TR max wu: 250.0 cm/sec  TR max P.0 mmHg  E/E' av.8  Lateral E/e': 10.2  Medial E/e': 9.5  RV S Wu: 15.1 cm/sec     ______________________________________________________________________________  Report approved by: Leonard Cruz MD on 2025 04:27 PM

## 2025-05-24 NOTE — PLAN OF CARE
Date & Time: 5/23-5/24 7332-0214    Surgery/POD#: Admit 5/21 for osteomyelitis of the right foot fifth metatarsal osteomyelitis and PAD    Behavior & Aggression: Green  Fall Risk: Yes  Orientation: A&Ox4   ABNL VS/O2: VSS on RA except hypertensive  ABNL Labs: See chart  Pain Management: PRN oxycodone x1   Bowel/Bladder: Urinal at bedside  Drains: R PIV infusing NS @100 mL/hr  Wounds/incisions: Large bruising on R hip, bruising arm bruising, R foot swelling and redness, R pinky toe and R lateral upper wound, R heel scab. Scattered scabs and bruising. Dressing changed per podiatry today  Diet: CHO, carb count. BG checks  Number of times OUT OF BED this shift: Ambulated to BM x1  Tests/Procedures: Angiogram scheduled 5/27  Anticipated DC Date: Pending

## 2025-05-24 NOTE — PLAN OF CARE
Date & Time: 05/24/25 0798-7788    Surgery/POD#: Admit 5/21 for osteomyelitis of the right foot fifth metatarsal osteomyelitis and PAD  Behavior & Aggression: Green  Fall Risk: Yes  Orientation: A&Ox4   ABNL VS/O2: VSS on RA ex intermittent HTN  ABNL Labs: See chart, K+ and phos protocol replacing  Pain Management: PRN oxycodone x1, scheduled tylenol  Bowel/Bladder: Urinal at bedside  Drains: R PIV infusing NS @100 mL/hr  Wounds/incisions: Large bruising on R hip, arm bruising, R foot swelling and redness, R pinky toe and R lateral upper wound, R heel scab. Scattered scabs and bruising. Dressing CDI to RLE.   Diet: CHO, carb count. BG checks  Number of times OUT OF BED this shift: Ambulated to BR x1. Dr. Bertrand ok'd patient to go outside in a wheelchair with family.  Tests/Procedures: Angiogram scheduled 5/27, echo and ultrasound done today  Anticipated  DC Date: Pending angiogram next week

## 2025-05-25 LAB
ANION GAP SERPL CALCULATED.3IONS-SCNC: 11 MMOL/L (ref 7–15)
BUN SERPL-MCNC: 6.7 MG/DL (ref 8–23)
CALCIUM SERPL-MCNC: 8.7 MG/DL (ref 8.8–10.4)
CHLORIDE SERPL-SCNC: 100 MMOL/L (ref 98–107)
CREAT SERPL-MCNC: 0.77 MG/DL (ref 0.67–1.17)
EGFRCR SERPLBLD CKD-EPI 2021: >90 ML/MIN/1.73M2
ERYTHROCYTE [DISTWIDTH] IN BLOOD BY AUTOMATED COUNT: 12.4 % (ref 10–15)
FOLATE SERPL-MCNC: 9.3 NG/ML (ref 4.6–34.8)
GLUCOSE BLDC GLUCOMTR-MCNC: 123 MG/DL (ref 70–99)
GLUCOSE BLDC GLUCOMTR-MCNC: 139 MG/DL (ref 70–99)
GLUCOSE BLDC GLUCOMTR-MCNC: 200 MG/DL (ref 70–99)
GLUCOSE BLDC GLUCOMTR-MCNC: 88 MG/DL (ref 70–99)
GLUCOSE BLDC GLUCOMTR-MCNC: 92 MG/DL (ref 70–99)
GLUCOSE SERPL-MCNC: 98 MG/DL (ref 70–99)
HCO3 SERPL-SCNC: 21 MMOL/L (ref 22–29)
HCT VFR BLD AUTO: 25.9 % (ref 40–53)
HGB BLD-MCNC: 8.7 G/DL (ref 13.3–17.7)
MAGNESIUM SERPL-MCNC: 1.3 MG/DL (ref 1.7–2.3)
MAGNESIUM SERPL-MCNC: 2.3 MG/DL (ref 1.7–2.3)
MCH RBC QN AUTO: 29.5 PG (ref 26.5–33)
MCHC RBC AUTO-ENTMCNC: 33.6 G/DL (ref 31.5–36.5)
MCV RBC AUTO: 88 FL (ref 78–100)
PHOSPHATE SERPL-MCNC: 2.6 MG/DL (ref 2.5–4.5)
PLATELET # BLD AUTO: 227 10E3/UL (ref 150–450)
POTASSIUM SERPL-SCNC: 3.2 MMOL/L (ref 3.4–5.3)
POTASSIUM SERPL-SCNC: 3.8 MMOL/L (ref 3.4–5.3)
RBC # BLD AUTO: 2.95 10E6/UL (ref 4.4–5.9)
SODIUM SERPL-SCNC: 132 MMOL/L (ref 135–145)
VIT B12 SERPL-MCNC: 385 PG/ML (ref 232–1245)
WBC # BLD AUTO: 8.8 10E3/UL (ref 4–11)

## 2025-05-25 PROCEDURE — 84132 ASSAY OF SERUM POTASSIUM: CPT | Performed by: INTERNAL MEDICINE

## 2025-05-25 PROCEDURE — 99232 SBSQ HOSP IP/OBS MODERATE 35: CPT | Performed by: INTERNAL MEDICINE

## 2025-05-25 PROCEDURE — 250N000013 HC RX MED GY IP 250 OP 250 PS 637: Performed by: INTERNAL MEDICINE

## 2025-05-25 PROCEDURE — 83735 ASSAY OF MAGNESIUM: CPT | Performed by: SPECIALIST

## 2025-05-25 PROCEDURE — 250N000011 HC RX IP 250 OP 636: Performed by: PHYSICIAN ASSISTANT

## 2025-05-25 PROCEDURE — 120N000001 HC R&B MED SURG/OB

## 2025-05-25 PROCEDURE — 82310 ASSAY OF CALCIUM: CPT | Performed by: INTERNAL MEDICINE

## 2025-05-25 PROCEDURE — 250N000011 HC RX IP 250 OP 636: Performed by: INTERNAL MEDICINE

## 2025-05-25 PROCEDURE — 82607 VITAMIN B-12: CPT | Performed by: INTERNAL MEDICINE

## 2025-05-25 PROCEDURE — 84100 ASSAY OF PHOSPHORUS: CPT | Performed by: INTERNAL MEDICINE

## 2025-05-25 PROCEDURE — 250N000013 HC RX MED GY IP 250 OP 250 PS 637: Performed by: STUDENT IN AN ORGANIZED HEALTH CARE EDUCATION/TRAINING PROGRAM

## 2025-05-25 PROCEDURE — 250N000012 HC RX MED GY IP 250 OP 636 PS 637: Performed by: STUDENT IN AN ORGANIZED HEALTH CARE EDUCATION/TRAINING PROGRAM

## 2025-05-25 PROCEDURE — 83735 ASSAY OF MAGNESIUM: CPT | Performed by: INTERNAL MEDICINE

## 2025-05-25 PROCEDURE — 82746 ASSAY OF FOLIC ACID SERUM: CPT | Performed by: INTERNAL MEDICINE

## 2025-05-25 PROCEDURE — 85014 HEMATOCRIT: CPT | Performed by: INTERNAL MEDICINE

## 2025-05-25 PROCEDURE — 36415 COLL VENOUS BLD VENIPUNCTURE: CPT | Performed by: INTERNAL MEDICINE

## 2025-05-25 PROCEDURE — 250N000013 HC RX MED GY IP 250 OP 250 PS 637: Performed by: PHYSICIAN ASSISTANT

## 2025-05-25 RX ORDER — POTASSIUM CHLORIDE 1500 MG/1
40 TABLET, EXTENDED RELEASE ORAL ONCE
Status: COMPLETED | OUTPATIENT
Start: 2025-05-25 | End: 2025-05-25

## 2025-05-25 RX ORDER — MAGNESIUM SULFATE HEPTAHYDRATE 40 MG/ML
4 INJECTION, SOLUTION INTRAVENOUS ONCE
Status: COMPLETED | OUTPATIENT
Start: 2025-05-25 | End: 2025-05-25

## 2025-05-25 RX ADMIN — ATORVASTATIN CALCIUM 20 MG: 20 TABLET, FILM COATED ORAL at 08:35

## 2025-05-25 RX ADMIN — OXYCODONE HYDROCHLORIDE 5 MG: 5 TABLET ORAL at 05:17

## 2025-05-25 RX ADMIN — FAMOTIDINE 40 MG: 20 TABLET, FILM COATED ORAL at 08:34

## 2025-05-25 RX ADMIN — METOPROLOL TARTRATE 50 MG: 50 TABLET, FILM COATED ORAL at 20:34

## 2025-05-25 RX ADMIN — HYDRALAZINE HYDROCHLORIDE 100 MG: 50 TABLET ORAL at 16:46

## 2025-05-25 RX ADMIN — OXYCODONE HYDROCHLORIDE 5 MG: 5 TABLET ORAL at 16:57

## 2025-05-25 RX ADMIN — AMPICILLIN SODIUM AND SULBACTAM SODIUM 3 G: 2; 1 INJECTION, POWDER, FOR SOLUTION INTRAMUSCULAR; INTRAVENOUS at 21:28

## 2025-05-25 RX ADMIN — ACETAMINOPHEN 975 MG: 325 TABLET, FILM COATED ORAL at 05:15

## 2025-05-25 RX ADMIN — AMPICILLIN SODIUM AND SULBACTAM SODIUM 3 G: 2; 1 INJECTION, POWDER, FOR SOLUTION INTRAMUSCULAR; INTRAVENOUS at 05:15

## 2025-05-25 RX ADMIN — CITALOPRAM HYDROBROMIDE 20 MG: 20 TABLET ORAL at 08:35

## 2025-05-25 RX ADMIN — CALCIUM CARBONATE (ANTACID) CHEW TAB 500 MG 1000 MG: 500 CHEW TAB at 10:58

## 2025-05-25 RX ADMIN — OXYCODONE HYDROCHLORIDE 5 MG: 5 TABLET ORAL at 10:39

## 2025-05-25 RX ADMIN — LISINOPRIL 10 MG: 10 TABLET ORAL at 20:34

## 2025-05-25 RX ADMIN — INSULIN ASPART 2 UNITS: 100 INJECTION, SOLUTION INTRAVENOUS; SUBCUTANEOUS at 17:47

## 2025-05-25 RX ADMIN — METOPROLOL TARTRATE 50 MG: 50 TABLET, FILM COATED ORAL at 08:35

## 2025-05-25 RX ADMIN — MAGNESIUM SULFATE HEPTAHYDRATE 4 G: 40 INJECTION, SOLUTION INTRAVENOUS at 12:46

## 2025-05-25 RX ADMIN — ACETAMINOPHEN 975 MG: 325 TABLET, FILM COATED ORAL at 14:22

## 2025-05-25 RX ADMIN — OXYCODONE HYDROCHLORIDE 5 MG: 5 TABLET ORAL at 21:26

## 2025-05-25 RX ADMIN — ACETAMINOPHEN 975 MG: 325 TABLET, FILM COATED ORAL at 21:26

## 2025-05-25 RX ADMIN — HYDRALAZINE HYDROCHLORIDE 100 MG: 50 TABLET ORAL at 21:26

## 2025-05-25 RX ADMIN — ENOXAPARIN SODIUM 40 MG: 40 INJECTION SUBCUTANEOUS at 20:33

## 2025-05-25 RX ADMIN — ABIRATERONE ACETATE 1000 MG: 250 TABLET ORAL at 08:34

## 2025-05-25 RX ADMIN — LISINOPRIL 10 MG: 10 TABLET ORAL at 08:34

## 2025-05-25 RX ADMIN — AMLODIPINE BESYLATE 10 MG: 10 TABLET ORAL at 08:35

## 2025-05-25 RX ADMIN — PREDNISONE 5 MG: 5 TABLET ORAL at 08:34

## 2025-05-25 RX ADMIN — POTASSIUM CHLORIDE 40 MEQ: 1500 TABLET, EXTENDED RELEASE ORAL at 10:31

## 2025-05-25 RX ADMIN — OXYCODONE HYDROCHLORIDE 5 MG: 5 TABLET ORAL at 00:39

## 2025-05-25 RX ADMIN — AMPICILLIN SODIUM AND SULBACTAM SODIUM 3 G: 2; 1 INJECTION, POWDER, FOR SOLUTION INTRAMUSCULAR; INTRAVENOUS at 10:31

## 2025-05-25 RX ADMIN — HYDRALAZINE HYDROCHLORIDE 100 MG: 50 TABLET ORAL at 08:34

## 2025-05-25 RX ADMIN — AMPICILLIN SODIUM AND SULBACTAM SODIUM 3 G: 2; 1 INJECTION, POWDER, FOR SOLUTION INTRAMUSCULAR; INTRAVENOUS at 16:46

## 2025-05-25 ASSESSMENT — ACTIVITIES OF DAILY LIVING (ADL)
ADLS_ACUITY_SCORE: 61
ADLS_ACUITY_SCORE: 62
ADLS_ACUITY_SCORE: 61
ADLS_ACUITY_SCORE: 69
ADLS_ACUITY_SCORE: 62
ADLS_ACUITY_SCORE: 61
ADLS_ACUITY_SCORE: 69
ADLS_ACUITY_SCORE: 62
ADLS_ACUITY_SCORE: 61
ADLS_ACUITY_SCORE: 61
ADLS_ACUITY_SCORE: 62
ADLS_ACUITY_SCORE: 69
ADLS_ACUITY_SCORE: 69
ADLS_ACUITY_SCORE: 62
ADLS_ACUITY_SCORE: 61
ADLS_ACUITY_SCORE: 62
ADLS_ACUITY_SCORE: 62
ADLS_ACUITY_SCORE: 69
ADLS_ACUITY_SCORE: 61
ADLS_ACUITY_SCORE: 62
ADLS_ACUITY_SCORE: 62

## 2025-05-25 NOTE — PLAN OF CARE
Goal Outcome Evaluation:      Summary:  Admit 5/21 for osteomyelitis of the right foot fifth metatarsal osteomyelitis and PAD    Date & Time: 5/24/25 1844-0622   Orientation: A&O x4  Activity Level: A1 GBW   Fall Risk: Yes   Behavior & Aggression: Green   Pain Management: Scheduled tylenol, PRN oxycodone    ABNL VS/O2:  VSS on RA ex HTN  Tele: N/A   ABNL Lab/BG: Hgb 8.3, K ,Mag protocols. , 88   Diet: Mod carb, calories count    Bowel/Bladder: Continent, urinal at bedside   Skin: Large bruising on R hip, arm bruising, R foot swelling and redness, R pinky toe and R lateral upper wound, R heel scab. Scattered scabs and bruising. Dressing to RLE changed this evening   Drains/Devices:  R PIV infusing NS @100 mL/hr  Tests/Procedures: None   Anticipated DC Date: Pending   Other Important Info:

## 2025-05-25 NOTE — PLAN OF CARE
Orientation: A&Ox4    Vitals/Tele: VSS RA, R toe/foot pain managed with prn oxycodone and scheduled tylenol.  CIWA: 0,0.    IV Access/drains: PIV SL, intermittent abx.    Diet: MCHO  B, 88  Mg: recheck this morning.    Mobility: A1 GB&W    GI/: Continent B&B, AUO. Frequency. No BM this shift. +gas.     Wound/Skin: R hip, buttock bruising. RLE foot edema +1-2. Wound to R heel and toe, dressing CDI.     Consults: Podiatry, ID, WOC    Discharge Plan: Angio       See Flow sheets for assessment

## 2025-05-25 NOTE — PROGRESS NOTES
[facility]    Medicine Progress Note - Hospitalist Service    Date of Admission:  5/21/2025  Interval history   Patient sitting up in the chair.  His family is here today.  His right leg is wrapped.  He is comfortable.  Plans for undergoing angiography of his leg Tuesday with further surgical intervention to follow and Abx changes.     Assessment & Plan   Sushil Diana is a 65 year old male  Past medical history significant for PAD, HTN, HLP, Chronic combined (diastolic and systolic) heart failure, DM2, GERD, MDD with anxiety, Alcohol use D/O, Metastatic prostate cancer with mets to the bone admitted on 5/21/25 due to acute osteomyelitis of right 5th proximal phalanx and metatarsal head.       Patient had been admitted at [facility] due to right foot pain secondary to chronic diabetic ulceration with concern for osteomyelitis and sepsis.  Workup completed at [facility] included a right lower extremity/foot MRI that confirmed acute osteomyelitis.  Podiatry had been following the patient.  Patient was transferred to [facility] to undergo vascular surgery assessment, continued podiatry assessment and likely proceeding for surgical intervention. Patient was receiving IV Vancomycin and IV Zosyn.       Right 5th proximal phalanx and metatarsal head osteomyelitis  Chronic right foot diabetic wound with cellulitis  PAD  5/20 MRI right foot Acute osteomyelitis 5th prox phalanx and 5th metatarsal head and potentil fifth metatarsal diaphysis (outside hospital)   Patient transferred from [facility] with diagnosis of acute osteomyelitis of the right fifth proximal phalanx and metatarsal head.  Initial presentation with right foot pain and noted diabetic ulceration with concern for osteomyelitis and sepsis.  Right lower extremity foot MRI confirmed acute osteomyelitis.  Started on Vanco and Zosyn (MRSA swab negative)   5/22 podiatry consult: Right foot fifth digit metatarsal head with  osteomyelitis and peripheral artery disease.  MRI noting osteomyelitis.  Recommendations fifth ray resection after vascular supply established. (Noted SFA occlusion)   Continue antibiotics with vascular assessment as below  ID consult on unasyn (culture in OR) >> stopped vanco and zosyn     Peripheral vascular disease  Plans for lower extremity angiogram 5/27 on Tuesday 5/20 lower extremity duplex showing occlusion in the mid right SFA with monophasic waveforms.  Suspected stenosis in the left distal SFA given transition of waveforms may be multifocal areas but suspect femoral-popliteal distribution greatest amount of disease  5/20 ABIs right monophasic noncompressible 0.64 and left noncompressible tibial and monophasic DP 1.33 and PT 1.57  5/22 vascular consult  Plans for right lower extremity angiogram on Tuesday with Dr. Bertrand    Hyponatremia, acute on chronic  Hydrochlorothiazide and +/- SIADH + hx ETOH   Sodium ranges between 120-135.     Hold PTA hydrochlorothiazide 25 mg/d.    IV fluids at 100 ml/hr with NS.    Urine sodium 107 with urine osmolality 343 on 5/22 5/23 stopped hydrochlorothiazide (would not resume on discharge)   Also reported history of alcohol consumption.??  Uncertain amount or history  5/25 sodium 132 >> off IVF   5/25 place on fluid restriction . 1500     Hypokalemia  Hypophosphatemia  Hypomagnesium   -Replace per protocol   Protocols      CINDY  Baseline creatinine around 1, noted creatinine was up to 1.76.   With IV fluids at 100 ml/hr with NS, Improved to baseline  5/24 creatinine normal now stop IVF (NS)   5/25 now with sodium lower. >> fluid restriction and follow      Anemia, acute on chronic normocytic  *Baseline HGB prior to admission 9-11.3.    - Hemoglobin has trended down but fairly stable at 8 presents now.  No sign of bleeding.  Hgb 8.3 with MCV 88   Checked iron stores, B12 and folate  5/24 some degree anemia for awhile 2024.   Followed by oncology can followup       HTN  -Resumed on PTA amlodipine 10 mg/d, lopressor 50 mg BID and hydralazine 100 mg TID.  Hold parameters in place.    Continue to hold PTA hydrochlorothiazide 25 mg/d, not resuming given hyponatremia.    SBP's in 140s, since creatinine has improved, resume lisinopril at lower dose and monitor.     PRN PO/IV hydralazine 10 mg every 4 hours for SBP GREATER THAN 180.    5/23 Zestril started at 10 mg p.o. daily (reported to be on 40 mg prior to admission) >> increase to 10 mg po BID 5/24 5/25 BP better at 130 SBP      HLP  - Resumed on PTA atorvastatin 20 mg/d.       Chronic combined (diastolic and systolic) heart failure  History of cardiomyopathy 8/2019   ECHO 5/24 with normal EF and 1+ MR   Managing PTA HCTZ and lisinopril as above  Resumed on PTA lopressor 50 mg BID.    Monitor daily weights, intake and output as able.    Combination cardiac diet.    8/2019 noted in epic to have mild to moderate concentric LVH with grade 1 early diastolic dysfunction.  Moderately reduced LV function 30 to 35% with global hypokinesia.  1+ MR  Discussion with patient/wife?? No overt follow up   5/24 ECHO now with EF 65-70% RV normal. 1+ mitral regurg  Patient and hiw wife updated        Type 2 DM   PTA regimen includes: Metformin 1000 mg daily with lunch and glipizide 5 mg every morning.    Hold PTA metformin or glipizide.  Resume at discharge.    Medium intensity sliding scale insulin ordered.  Hypoglycemic protocol in place.    add NovoLog per carb count + sliding scale >> stable      GERD  - Resumed on PTA Pepcid 40 mg BID.       Major depressive disorder with anxiety  - Resumed on PTA Celexa 20 mg/d.     Alcohol use D/O  *3-4 beers per day reported as typical consumption.    Had been at [facility] since 5/19.    - CIWA monitoring ordered.         Metastatic prostate cancer with mets to the bone  - Resumed on PTA Zytiga 1000 mg/d.    - Resumed on PTA prednisone 5 mg/d.   Curb side oncology and ok to continue with  "current stay           Diet: Moderate Consistent Carb (60 g CHO per Meal) Diet    DVT Prophylaxis: Pneumatic Compression Devices 5/23 start of lovenox   Alfred Catheter: Not present  Lines: None     Cardiac Monitoring: None  Code Status: Full Code      Clinically Significant Risk Factors        # Hypokalemia: Lowest K = 3.2 mmol/L in last 2 days, will replace as needed  # Hyponatremia: Lowest Na = 132 mmol/L in last 2 days, will monitor as appropriate     # Hypomagnesemia: Lowest Mg = 1.3 mg/dL in last 2 days, will replace as needed       # Hypertension: Noted on problem list  # Chronic heart failure with preserved ejection fraction: heart failure noted on problem list and last echo with EF >50%           # Overweight: Estimated body mass index is 25.41 kg/m  as calculated from the following:    Height as of 5/19/25: 1.727 m (5' 8\").    Weight as of this encounter: 75.8 kg (167 lb 1.7 oz)., PRESENT ON ADMISSION            Social Drivers of Health    Tobacco Use: High Risk (12/26/2024)    Patient History     Smoking Tobacco Use: Some Days     Smokeless Tobacco Use: Never     Passive Exposure: Current   Physical Activity: Insufficiently Active (5/1/2024)    Exercise Vital Sign     Days of Exercise per Week: 2 days     Minutes of Exercise per Session: 10 min   Social Connections: Unknown (5/1/2024)    Social Connection and Isolation Panel [NHANES]     Frequency of Social Gatherings with Friends and Family: Once a week          Disposition Plan     Medically Ready for Discharge: Anticipated in 2-4 Days             HIRO ESPITIA MD  Hospitalist Service  [facility]  Securely message with [employer] (more info)  Text page via [employer] Paging/Directory   ______________________________________________________________________    Physical Exam   Vital Signs: Temp: 98  F (36.7  C) Temp src: Oral BP: 136/65 Pulse: 72   Resp: 16 SpO2: 98 % O2 Device: None (Room air)    Weight: 167 lbs 1.74 oz    General " Appearance: Patient is awake and alert   Respiratory: clear to auscultation bilaterally without wheezes or rhonchi  Cardiovascular: Regular rate and rhythm without gallop rub normal S1-S2  GI: Positive bowel sounds soft no rebound guarding or tenderness  Skin: RLE wrapped   Media reviewed in [facility]    Medical Decision Making       45 MINUTES SPENT BY ME on the date of service doing chart review, history, exam, documentation & further activities per the note.      Data     I have personally reviewed the following data over the past 24 hrs:    8.8  \   8.7 (L)   / 227     132 (L) 100 6.7 (L) /  123 (H)   3.2 (L) 21 (L) 0.77 \       Imaging results reviewed over the past 24 hrs:   No results found for this or any previous visit (from the past 24 hours).

## 2025-05-26 LAB
ANION GAP SERPL CALCULATED.3IONS-SCNC: 9 MMOL/L (ref 7–15)
BUN SERPL-MCNC: 9.2 MG/DL (ref 8–23)
CALCIUM SERPL-MCNC: 8.1 MG/DL (ref 8.8–10.4)
CHLORIDE SERPL-SCNC: 103 MMOL/L (ref 98–107)
CREAT SERPL-MCNC: 0.8 MG/DL (ref 0.67–1.17)
EGFRCR SERPLBLD CKD-EPI 2021: >90 ML/MIN/1.73M2
ERYTHROCYTE [DISTWIDTH] IN BLOOD BY AUTOMATED COUNT: 12.7 % (ref 10–15)
GLUCOSE BLDC GLUCOMTR-MCNC: 111 MG/DL (ref 70–99)
GLUCOSE BLDC GLUCOMTR-MCNC: 117 MG/DL (ref 70–99)
GLUCOSE BLDC GLUCOMTR-MCNC: 120 MG/DL (ref 70–99)
GLUCOSE BLDC GLUCOMTR-MCNC: 123 MG/DL (ref 70–99)
GLUCOSE BLDC GLUCOMTR-MCNC: 139 MG/DL (ref 70–99)
GLUCOSE SERPL-MCNC: 118 MG/DL (ref 70–99)
HCO3 SERPL-SCNC: 21 MMOL/L (ref 22–29)
HCT VFR BLD AUTO: 23.4 % (ref 40–53)
HGB BLD-MCNC: 7.9 G/DL (ref 13.3–17.7)
MAGNESIUM SERPL-MCNC: 1.9 MG/DL (ref 1.7–2.3)
MCH RBC QN AUTO: 29.5 PG (ref 26.5–33)
MCHC RBC AUTO-ENTMCNC: 33.8 G/DL (ref 31.5–36.5)
MCV RBC AUTO: 87 FL (ref 78–100)
PHOSPHATE SERPL-MCNC: 2.9 MG/DL (ref 2.5–4.5)
PLATELET # BLD AUTO: 199 10E3/UL (ref 150–450)
POTASSIUM SERPL-SCNC: 3.2 MMOL/L (ref 3.4–5.3)
POTASSIUM SERPL-SCNC: 4 MMOL/L (ref 3.4–5.3)
RBC # BLD AUTO: 2.68 10E6/UL (ref 4.4–5.9)
SODIUM SERPL-SCNC: 133 MMOL/L (ref 135–145)
WBC # BLD AUTO: 8 10E3/UL (ref 4–11)

## 2025-05-26 PROCEDURE — 84100 ASSAY OF PHOSPHORUS: CPT | Performed by: INTERNAL MEDICINE

## 2025-05-26 PROCEDURE — 99233 SBSQ HOSP IP/OBS HIGH 50: CPT

## 2025-05-26 PROCEDURE — 250N000013 HC RX MED GY IP 250 OP 250 PS 637: Performed by: INTERNAL MEDICINE

## 2025-05-26 PROCEDURE — 36415 COLL VENOUS BLD VENIPUNCTURE: CPT | Performed by: INTERNAL MEDICINE

## 2025-05-26 PROCEDURE — 250N000013 HC RX MED GY IP 250 OP 250 PS 637: Performed by: STUDENT IN AN ORGANIZED HEALTH CARE EDUCATION/TRAINING PROGRAM

## 2025-05-26 PROCEDURE — 83735 ASSAY OF MAGNESIUM: CPT | Performed by: INTERNAL MEDICINE

## 2025-05-26 PROCEDURE — 85014 HEMATOCRIT: CPT | Performed by: INTERNAL MEDICINE

## 2025-05-26 PROCEDURE — 250N000012 HC RX MED GY IP 250 OP 636 PS 637: Performed by: STUDENT IN AN ORGANIZED HEALTH CARE EDUCATION/TRAINING PROGRAM

## 2025-05-26 PROCEDURE — 250N000011 HC RX IP 250 OP 636: Mod: JZ | Performed by: PHYSICIAN ASSISTANT

## 2025-05-26 PROCEDURE — 84132 ASSAY OF SERUM POTASSIUM: CPT

## 2025-05-26 PROCEDURE — 250N000013 HC RX MED GY IP 250 OP 250 PS 637

## 2025-05-26 PROCEDURE — 250N000011 HC RX IP 250 OP 636: Performed by: INTERNAL MEDICINE

## 2025-05-26 PROCEDURE — 250N000013 HC RX MED GY IP 250 OP 250 PS 637: Performed by: PHYSICIAN ASSISTANT

## 2025-05-26 PROCEDURE — 36415 COLL VENOUS BLD VENIPUNCTURE: CPT

## 2025-05-26 PROCEDURE — 250N000011 HC RX IP 250 OP 636: Performed by: PHYSICIAN ASSISTANT

## 2025-05-26 PROCEDURE — 120N000001 HC R&B MED SURG/OB

## 2025-05-26 PROCEDURE — 80048 BASIC METABOLIC PNL TOTAL CA: CPT | Performed by: INTERNAL MEDICINE

## 2025-05-26 RX ORDER — POTASSIUM CHLORIDE 1500 MG/1
40 TABLET, EXTENDED RELEASE ORAL ONCE
Status: COMPLETED | OUTPATIENT
Start: 2025-05-26 | End: 2025-05-26

## 2025-05-26 RX ORDER — FAMOTIDINE 20 MG/1
40 TABLET, FILM COATED ORAL 2 TIMES DAILY
Status: DISCONTINUED | OUTPATIENT
Start: 2025-05-26 | End: 2025-05-30 | Stop reason: HOSPADM

## 2025-05-26 RX ORDER — POTASSIUM CHLORIDE 20MEQ/15ML
40 LIQUID (ML) ORAL ONCE
Status: COMPLETED | OUTPATIENT
Start: 2025-05-26 | End: 2025-05-26

## 2025-05-26 RX ADMIN — AMLODIPINE BESYLATE 10 MG: 10 TABLET ORAL at 08:48

## 2025-05-26 RX ADMIN — FAMOTIDINE 40 MG: 20 TABLET, FILM COATED ORAL at 08:48

## 2025-05-26 RX ADMIN — OXYCODONE HYDROCHLORIDE 5 MG: 5 TABLET ORAL at 17:32

## 2025-05-26 RX ADMIN — ACETAMINOPHEN 975 MG: 325 TABLET, FILM COATED ORAL at 15:35

## 2025-05-26 RX ADMIN — LISINOPRIL 10 MG: 10 TABLET ORAL at 08:48

## 2025-05-26 RX ADMIN — ENOXAPARIN SODIUM 40 MG: 40 INJECTION SUBCUTANEOUS at 21:45

## 2025-05-26 RX ADMIN — AMPICILLIN SODIUM AND SULBACTAM SODIUM 3 G: 2; 1 INJECTION, POWDER, FOR SOLUTION INTRAMUSCULAR; INTRAVENOUS at 23:18

## 2025-05-26 RX ADMIN — ACETAMINOPHEN 325 MG: 325 TABLET, FILM COATED ORAL at 11:07

## 2025-05-26 RX ADMIN — OXYCODONE HYDROCHLORIDE 5 MG: 5 TABLET ORAL at 03:52

## 2025-05-26 RX ADMIN — HYDROMORPHONE HYDROCHLORIDE 0.2 MG: 0.2 INJECTION, SOLUTION INTRAMUSCULAR; INTRAVENOUS; SUBCUTANEOUS at 20:00

## 2025-05-26 RX ADMIN — CITALOPRAM HYDROBROMIDE 20 MG: 20 TABLET ORAL at 08:49

## 2025-05-26 RX ADMIN — OXYCODONE HYDROCHLORIDE 5 MG: 5 TABLET ORAL at 08:49

## 2025-05-26 RX ADMIN — PREDNISONE 5 MG: 5 TABLET ORAL at 08:48

## 2025-05-26 RX ADMIN — ACETAMINOPHEN 975 MG: 325 TABLET, FILM COATED ORAL at 06:00

## 2025-05-26 RX ADMIN — ATORVASTATIN CALCIUM 20 MG: 20 TABLET, FILM COATED ORAL at 08:48

## 2025-05-26 RX ADMIN — FAMOTIDINE 40 MG: 20 TABLET, FILM COATED ORAL at 21:42

## 2025-05-26 RX ADMIN — HYDRALAZINE HYDROCHLORIDE 100 MG: 50 TABLET ORAL at 15:35

## 2025-05-26 RX ADMIN — METOPROLOL TARTRATE 50 MG: 50 TABLET, FILM COATED ORAL at 08:48

## 2025-05-26 RX ADMIN — CALCIUM CARBONATE (ANTACID) CHEW TAB 500 MG 1000 MG: 500 CHEW TAB at 15:55

## 2025-05-26 RX ADMIN — ABIRATERONE ACETATE 1000 MG: 250 TABLET ORAL at 08:47

## 2025-05-26 RX ADMIN — HYDRALAZINE HYDROCHLORIDE 100 MG: 50 TABLET ORAL at 23:18

## 2025-05-26 RX ADMIN — AMPICILLIN SODIUM AND SULBACTAM SODIUM 3 G: 2; 1 INJECTION, POWDER, FOR SOLUTION INTRAMUSCULAR; INTRAVENOUS at 03:52

## 2025-05-26 RX ADMIN — AMPICILLIN SODIUM AND SULBACTAM SODIUM 3 G: 2; 1 INJECTION, POWDER, FOR SOLUTION INTRAMUSCULAR; INTRAVENOUS at 10:30

## 2025-05-26 RX ADMIN — LISINOPRIL 10 MG: 10 TABLET ORAL at 21:42

## 2025-05-26 RX ADMIN — POTASSIUM CHLORIDE 40 MEQ: 20 SOLUTION ORAL at 11:45

## 2025-05-26 RX ADMIN — METOPROLOL TARTRATE 50 MG: 50 TABLET, FILM COATED ORAL at 21:42

## 2025-05-26 RX ADMIN — AMPICILLIN SODIUM AND SULBACTAM SODIUM 3 G: 2; 1 INJECTION, POWDER, FOR SOLUTION INTRAMUSCULAR; INTRAVENOUS at 15:36

## 2025-05-26 RX ADMIN — OXYCODONE HYDROCHLORIDE 5 MG: 5 TABLET ORAL at 21:42

## 2025-05-26 RX ADMIN — OXYCODONE HYDROCHLORIDE 2.5 MG: 5 TABLET ORAL at 13:21

## 2025-05-26 RX ADMIN — ACETAMINOPHEN 975 MG: 325 TABLET, FILM COATED ORAL at 23:18

## 2025-05-26 RX ADMIN — HYDRALAZINE HYDROCHLORIDE 100 MG: 50 TABLET ORAL at 08:48

## 2025-05-26 ASSESSMENT — ACTIVITIES OF DAILY LIVING (ADL)
ADLS_ACUITY_SCORE: 61

## 2025-05-26 NOTE — PROGRESS NOTES
[facility]    Medicine Progress Note - Hospitalist Service    Date of Admission:  5/21/2025    Assessment & Plan   Sushil Diana is a 65 year old male  Past medical history significant for PAD, HTN, HLP, Chronic combined (diastolic and systolic) heart failure, DM2, GERD, MDD with anxiety, Alcohol use D/O, Metastatic prostate cancer with mets to the bone admitted on 5/21/25 due to acute osteomyelitis of right 5th proximal phalanx and metatarsal head.       Patient had been admitted at [facility] due to right foot pain secondary to chronic diabetic ulceration with concern for osteomyelitis and sepsis.  Workup completed at [facility] included a right lower extremity/foot MRI that confirmed acute osteomyelitis.  Podiatry had been following the patient.  Patient was transferred to [facility] to undergo vascular surgery assessment, continued podiatry assessment and likely proceeding for surgical intervention. Patient was receiving IV Vancomycin and IV Zosyn.       Right 5th proximal phalanx and metatarsal head osteomyelitis  Chronic right foot diabetic wound with cellulitis  PAD  5/20 MRI right foot acute osteomyelitis 5th prox phalanx and 5th metatarsal head and potentil fifth metatarsal diaphysis (outside hospital)   Patient transferred from [facility] with diagnosis of acute osteomyelitis of the right fifth proximal phalanx and metatarsal head.  Initial presentation with right foot pain and noted diabetic ulceration with concern for osteomyelitis and sepsis.  Right lower extremity foot MRI confirmed acute osteomyelitis.  Started on Vanco and Zosyn (MRSA swab negative)   5/22 podiatry consult: Right foot fifth digit metatarsal head with osteomyelitis and peripheral artery disease.  MRI noting osteomyelitis.  Recommendations fifth ray resection after vascular supply established. (Noted SFA occlusion)   Continue antibiotics with vascular assessment as below  ID consult on unasyn  (culture in OR) >> stopped vanco and zosyn     Peripheral vascular disease  PAD  Mid right SFA occlusion   Plans for lower extremity angiogram 5/27 on Tuesday with Dr. Bertrand  5/20 lower extremity duplex showing occlusion in the mid right SFA with monophasic waveforms.  Suspected stenosis in the left distal SFA given transition of waveforms may be multifocal areas but suspect femoral-popliteal distribution greatest amount of disease  5/20 ABIs right monophasic noncompressible 0.64 and left noncompressible tibial and monophasic DP 1.33 and PT 1.57  5/22 vascular consult    Hyponatremia, acute on chronic  Hydrochlorothiazide and +/- SIADH + hx ETOH   Sodium ranges between 120-135.     Hold PTA hydrochlorothiazide 25 mg/d.    IV fluids at 100 ml/hr with NS discontinued and now on 1500 mL FR    Urine sodium 107 with urine osmolality 343 on 5/22 5/23 stopped hydrochlorothiazide (would not resume on discharge)     Hypokalemia  Hypophosphatemia  Hypomagnesium   -Replace per protocol      CINDY; resolved   Baseline creatinine around 1, noted creatinine was up to 1.76.   Improved to baseline with IVF now on FR 2/2 to hyponatremia      Anemia, acute on chronic normocytic  *Baseline HGB prior to admission 9-11.3.    - Hemoglobin has trended down but fairly stable at 8 presents now.  No sign of bleeding.  < 1g drop to 7.9 from 8.7. Asymptomatic. Will repeat CBC in am prior to angiogram   Checked iron stores, B12 and folate  5/24 some degree anemia for awhile 2024.   Followed by oncology can followup      HTN  Resumed on PTA amlodipine 10 mg/d, lopressor 50 mg BID and hydralazine 100 mg TID.  Hold parameters in place.    Continue to hold PTA hydrochlorothiazide 25 mg/d, not resuming given hyponatremia.    SBP's in 140s, since creatinine has improved, resume lisinopril at lower dose and monitor.     PRN PO/IV hydralazine 10 mg every 4 hours for SBP GREATER THAN 180.    5/23 Zestril started at 10 mg p.o. daily (reported to be on  40 mg prior to admission) >> increase to 10 mg po BID 5/24     HLP  Resumed on PTA atorvastatin 20 mg/d.       Chronic combined (diastolic and systolic) heart failure  History of cardiomyopathy 8/2019   ECHO 5/24 with normal EF and 1+ MR   Managing PTA HCTZ and lisinopril as above  Resumed on PTA lopressor 50 mg BID.    Monitor daily weights, intake and output as able.    Combination cardiac diet.    8/2019 noted in epic to have mild to moderate concentric LVH with grade 1 early diastolic dysfunction.  Moderately reduced LV function 30 to 35% with global hypokinesia.  1+ MR  5/24 ECHO now with EF 65-70% RV normal. 1+ mitral regurg  Patient and wife updated per hospitalist partner          Type 2 DM   PTA regimen includes: Metformin 1000 mg daily with lunch and glipizide 5 mg every morning.    Hold PTA metformin or glipizide.  Resume at discharge.    Medium intensity sliding scale insulin ordered.  Hypoglycemic protocol in place.    add NovoLog per carb count + sliding scale >> stable      GERD  - Resumed on PTA Pepcid 40 mg BID.       Major depressive disorder with anxiety  - Resumed on PTA Celexa 20 mg/d.     Alcohol use D/O  *3-4 beers per day reported as typical consumption.    Had been at [facility] since 5/19.    - CIWA monitoring ordered.         Metastatic prostate cancer with mets to the bone  - Resumed on PTA Zytiga 1000 mg/d.    - Resumed on PTA prednisone 5 mg/d.   - Per prior hospitalist partner oncology was curb sided and ok to continue with current regimen              Diet: Moderate Consistent Carb (60 g CHO per Meal) Diet  Fluid restriction 1500 ML FLUID  NPO for Procedure/Surgery per Anesthesia Guidelines Except for: Meds; Clear liquids before procedure/surgery: ADULT (Age GREATER than or Equal to 18 years) - Clear liquids 2 hours before procedure/surgery    DVT Prophylaxis: Enoxaparin (Lovenox) SQ  Alfred Catheter: Not present  Lines: None     Cardiac Monitoring: None  Code Status: Full Code  "     Clinically Significant Risk Factors        # Hypokalemia: Lowest K = 3.2 mmol/L in last 2 days, will replace as needed  # Hyponatremia: Lowest Na = 132 mmol/L in last 2 days, will monitor as appropriate     # Hypomagnesemia: Lowest Mg = 1.3 mg/dL in last 2 days, will replace as needed       # Hypertension: Noted on problem list  # Chronic heart failure with preserved ejection fraction: heart failure noted on problem list and last echo with EF >50%           # Overweight: Estimated body mass index is 25.41 kg/m  as calculated from the following:    Height as of 5/19/25: 1.727 m (5' 8\").    Weight as of this encounter: 75.8 kg (167 lb 1.7 oz).             Social Drivers of Health    Tobacco Use: High Risk (12/26/2024)    Patient History     Smoking Tobacco Use: Some Days     Smokeless Tobacco Use: Never     Passive Exposure: Current   Physical Activity: Insufficiently Active (5/1/2024)    Exercise Vital Sign     Days of Exercise per Week: 2 days     Minutes of Exercise per Session: 10 min   Social Connections: Unknown (5/1/2024)    Social Connection and Isolation Panel [NHANES]     Frequency of Social Gatherings with Friends and Family: Once a week          Disposition Plan     Medically Ready for Discharge: Anticipated in 2-4 Days           The patient's care was discussed with the Attending Physician, Dr. Vera.    Katharine Clark NP  Hospitalist Service  [employer] [facility]  Securely message with [employer] (more info)  Text page via [employer] Paging/Directory   ______________________________________________________________________    Interval History   No acute events overnight. Seen and examined mid morning. Eating breakfast at edge of bed with wife at bedside. Sore right lower extremity when weightbearing. Ambulating short distance in the room. No chest pain or shortness of breath. No fevers or chills. Anxious to get agio of leg tomorrow. .     Physical Exam   Vital Signs: Temp: 98.7  F (37.1 "  C) Temp src: Oral BP: (!) 144/67 Pulse: 76   Resp: 18 SpO2: 96 % O2 Device: None (Room air)    Weight: 167 lbs 1.74 oz    Physical Exam  Constitutional:       Appearance: He is not ill-appearing or diaphoretic.   HENT:      Mouth/Throat:      Mouth: Mucous membranes are moist.   Eyes:      Pupils: Pupils are equal, round, and reactive to light.   Cardiovascular:      Rate and Rhythm: Normal rate and regular rhythm.      Pulses: Normal pulses.      Heart sounds: Normal heart sounds.   Pulmonary:      Effort: Pulmonary effort is normal.      Breath sounds: Normal breath sounds.   Abdominal:      General: Bowel sounds are normal. There is no distension.      Palpations: Abdomen is soft.      Tenderness: There is no abdominal tenderness.   Musculoskeletal:         General: Normal range of motion.   Skin:     General: Skin is warm and dry.      Capillary Refill: Capillary refill takes less than 2 seconds.          Neurological:      Mental Status: He is alert and oriented to person, place, and time.   Psychiatric:         Mood and Affect: Mood normal.         Behavior: Behavior normal.         Thought Content: Thought content normal.           Medical Decision Making       60 MINUTES SPENT BY ME on the date of service doing chart review, history, exam, documentation & further activities per the note.      Data     I have personally reviewed the following data over the past 24 hrs:    8.0  \   7.9 (L)   / 199     133 (L) 103 9.2 /  139 (H)   3.2 (L) 21 (L) 0.80 \       Imaging results reviewed over the past 24 hrs:   No results found for this or any previous visit (from the past 24 hours).

## 2025-05-26 NOTE — PROGRESS NOTES
Vascular Surgery Update:    Angiogram of RLE tomorrow (exact time TBD). Please keep NPO at midnight. Okay to continue to receive DVT ppx as scheduled at 2100 daily.       Bridget Madsen MD  PGY-6  Vascular Surgery  Pager: [phone]    Please page me directly with any questions/concerns during regular weekday hours before 5PM. If there is no response, if it is a weekend, or if it is during evening hours, then please use the [employer] system to page the first-call resident on call for vascular surgery.

## 2025-05-26 NOTE — PLAN OF CARE
Goal Outcome Evaluation:    Date & Time: 05/26/25 6589-8213    Surgery/POD#: Admit 5/21 for osteomyelitis of the right foot fifth metatarsal osteomyelitis and PAD  Behavior & Aggression: Green  Fall Risk: Yes  Orientation: A&Ox3, disoriented to time, forgetful  ABNL VS/O2: VSS on RA  ABNL Labs: K 4.0, recheck tm  Pain Management: scheduled tylenol. PRN oxy. PRN IV dilaudid available for severe pain  Bowel/Bladder: Cont; Urinal at bedside  Drains: R PIV SL  Wounds/incisions: Scattered scabs and bruising. RLE wound, Dressing changed-CDI. Abrasion R elbow- cleansed and new dressing  Diet: CHO, carb count. BG checks- 123. New 1500ml FR started this evening. NPO at midnight  Number of times OUT OF BED this shift: A1-GB/W  Dr. Bertrand ok'd patient to go outside in a wheelchair with family.   Tests/Procedures: Angiogram scheduled 5/27  Anticipated  DC Date: pending  Other: podiatry/vascular following. Tums x1 for heartburn

## 2025-05-26 NOTE — PLAN OF CARE
Orientation: A&Ox4, forgetful.     Vitals/Tele: VSS RA, RLE foot pain managed with prn oxycodone and scheduled tylenol.    IV Access/drains: PIV SL    Diet: MCHO, FR 1500ml.  B, 111  K: 3.8; M.3 recheck this morning. Pt prefers potassium powder for replacement.     Mobility: A1 GB&W    GI/: Continent B&B. AUO. Small BM x2 this shift.    Wound/Skin: Bruising to R hip. RLE edema +1.     Consults: Podiatry, vascular, ID.    Discharge Plan: RLE angio Tuesday.       See Flow sheets for assessment

## 2025-05-26 NOTE — PLAN OF CARE
Goal Outcome Evaluation: Progressing    5/21/25 admit, Right 5th proximal phalanx and metatarsal head osteomyelitis   5/56/25, 7 a to 3 p    Orientation: Slow to respond, Intermittent confusion    Vitals/Tele: VSS on RA,no pain    IV Access/drains: PIV SL    Diet: Mod carb, 1500 FR, NPO at MN    Mobility: A! GB and W    GI/: Continent, no BM    Wound/Skin: R 5th toe dressing dressing CDI, wound care not done    Consults: Podiatry, Vascular    Discharge Plan: Plan for angiography tomorrow      See Flow sheets for assessment

## 2025-05-26 NOTE — PLAN OF CARE
Date & Time: 05/54/25 1557-0852    Surgery/POD#: Admit 5/21 for osteomyelitis of the right foot fifth metatarsal osteomyelitis and PAD  Behavior & Aggression: Green  Fall Risk: Yes  Orientation: A&Ox4   ABNL VS/O2: VSS on RA ex intermittent HTN  ABNL Labs: See chart, K+, Mag and Phos protocol. Mag and K+ replaced, redraw WNL. Recheck in AM  Pain Management: PRN oxycodone x2, scheduled tylenol. PRN IV dilaudid available for severe pain  Bowel/Bladder: Urinal at bedside  Drains: R PIV infusing NS @100 mL/hr  Wounds/incisions: Large bruising on R hip, arm bruising, R foot swelling and redness, R pinky toe and R lateral upper wound, R heel scab. Scattered scabs and bruising. Dressing changed to RLE  Diet: CHO, carb count. BG checks. New 1500ml FR started this evening  Number of times OUT OF BED this shift: 3. Ambulated to BR x1. UP to in afternoon. Dr. Jerzy staton'd patient to go outside in a wheelchair with family. Went outside x1  Tests/Procedures: Angiogram scheduled 5/27  Anticipated  DC Date: Pending angiogram next week

## 2025-05-27 ENCOUNTER — APPOINTMENT (OUTPATIENT)
Dept: INTERVENTIONAL RADIOLOGY/VASCULAR | Facility: CLINIC | Age: 65
DRG: 253 | End: 2025-05-27
Attending: SURGERY
Payer: MEDICARE

## 2025-05-27 ENCOUNTER — PATIENT OUTREACH (OUTPATIENT)
Dept: ONCOLOGY | Facility: CLINIC | Age: 65
End: 2025-05-27

## 2025-05-27 LAB
ANION GAP SERPL CALCULATED.3IONS-SCNC: 11 MMOL/L (ref 7–15)
BUN SERPL-MCNC: 9.4 MG/DL (ref 8–23)
CALCIUM SERPL-MCNC: 8.9 MG/DL (ref 8.8–10.4)
CHLORIDE SERPL-SCNC: 102 MMOL/L (ref 98–107)
CREAT SERPL-MCNC: 0.79 MG/DL (ref 0.67–1.17)
EGFRCR SERPLBLD CKD-EPI 2021: >90 ML/MIN/1.73M2
ERYTHROCYTE [DISTWIDTH] IN BLOOD BY AUTOMATED COUNT: 13 % (ref 10–15)
GLUCOSE BLDC GLUCOMTR-MCNC: 105 MG/DL (ref 70–99)
GLUCOSE BLDC GLUCOMTR-MCNC: 107 MG/DL (ref 70–99)
GLUCOSE BLDC GLUCOMTR-MCNC: 126 MG/DL (ref 70–99)
GLUCOSE BLDC GLUCOMTR-MCNC: 92 MG/DL (ref 70–99)
GLUCOSE BLDC GLUCOMTR-MCNC: 97 MG/DL (ref 70–99)
GLUCOSE BLDC GLUCOMTR-MCNC: 99 MG/DL (ref 70–99)
GLUCOSE SERPL-MCNC: 104 MG/DL (ref 70–99)
HCO3 SERPL-SCNC: 22 MMOL/L (ref 22–29)
HCT VFR BLD AUTO: 25 % (ref 40–53)
HGB BLD-MCNC: 8.6 G/DL (ref 13.3–17.7)
MAGNESIUM SERPL-MCNC: 2 MG/DL (ref 1.7–2.3)
MCH RBC QN AUTO: 29.7 PG (ref 26.5–33)
MCHC RBC AUTO-ENTMCNC: 34.4 G/DL (ref 31.5–36.5)
MCV RBC AUTO: 86 FL (ref 78–100)
PHOSPHATE SERPL-MCNC: 3 MG/DL (ref 2.5–4.5)
PLATELET # BLD AUTO: 215 10E3/UL (ref 150–450)
POTASSIUM SERPL-SCNC: 3.6 MMOL/L (ref 3.4–5.3)
RBC # BLD AUTO: 2.9 10E6/UL (ref 4.4–5.9)
SODIUM SERPL-SCNC: 135 MMOL/L (ref 135–145)
WBC # BLD AUTO: 9.5 10E3/UL (ref 4–11)

## 2025-05-27 PROCEDURE — C1769 GUIDE WIRE: HCPCS

## 2025-05-27 PROCEDURE — 250N000012 HC RX MED GY IP 250 OP 636 PS 637: Performed by: STUDENT IN AN ORGANIZED HEALTH CARE EDUCATION/TRAINING PROGRAM

## 2025-05-27 PROCEDURE — 047M3ZZ DILATION OF RIGHT POPLITEAL ARTERY, PERCUTANEOUS APPROACH: ICD-10-PCS | Performed by: SURGERY

## 2025-05-27 PROCEDURE — 272N000570 HC SHEATH CR7

## 2025-05-27 PROCEDURE — 84100 ASSAY OF PHOSPHORUS: CPT

## 2025-05-27 PROCEDURE — 250N000011 HC RX IP 250 OP 636: Performed by: SURGERY

## 2025-05-27 PROCEDURE — 82374 ASSAY BLOOD CARBON DIOXIDE: CPT | Performed by: INTERNAL MEDICINE

## 2025-05-27 PROCEDURE — 99152 MOD SED SAME PHYS/QHP 5/>YRS: CPT | Performed by: SURGERY

## 2025-05-27 PROCEDURE — 99232 SBSQ HOSP IP/OBS MODERATE 35: CPT | Performed by: PHYSICIAN ASSISTANT

## 2025-05-27 PROCEDURE — 37224 PR REVASCULARIZE FEM/POP ARTERY, ANGIOPLASTY: CPT | Mod: RT | Performed by: SURGERY

## 2025-05-27 PROCEDURE — 250N000009 HC RX 250: Performed by: STUDENT IN AN ORGANIZED HEALTH CARE EDUCATION/TRAINING PROGRAM

## 2025-05-27 PROCEDURE — 99153 MOD SED SAME PHYS/QHP EA: CPT

## 2025-05-27 PROCEDURE — 047K3ZZ DILATION OF RIGHT FEMORAL ARTERY, PERCUTANEOUS APPROACH: ICD-10-PCS | Performed by: SURGERY

## 2025-05-27 PROCEDURE — 99232 SBSQ HOSP IP/OBS MODERATE 35: CPT | Performed by: STUDENT IN AN ORGANIZED HEALTH CARE EDUCATION/TRAINING PROGRAM

## 2025-05-27 PROCEDURE — 250N000013 HC RX MED GY IP 250 OP 250 PS 637

## 2025-05-27 PROCEDURE — B41D1ZZ FLUOROSCOPY OF AORTA AND BILATERAL LOWER EXTREMITY ARTERIES USING LOW OSMOLAR CONTRAST: ICD-10-PCS | Performed by: SURGERY

## 2025-05-27 PROCEDURE — 250N000013 HC RX MED GY IP 250 OP 250 PS 637: Performed by: STUDENT IN AN ORGANIZED HEALTH CARE EDUCATION/TRAINING PROGRAM

## 2025-05-27 PROCEDURE — 120N000001 HC R&B MED SURG/OB

## 2025-05-27 PROCEDURE — 250N000013 HC RX MED GY IP 250 OP 250 PS 637: Performed by: PHYSICIAN ASSISTANT

## 2025-05-27 PROCEDURE — 272N000196 HC ACCESSORY CR5

## 2025-05-27 PROCEDURE — 250N000011 HC RX IP 250 OP 636: Performed by: PHYSICIAN ASSISTANT

## 2025-05-27 PROCEDURE — 272N000116 HC CATH CR1

## 2025-05-27 PROCEDURE — 83735 ASSAY OF MAGNESIUM: CPT

## 2025-05-27 PROCEDURE — 272N000567 HC SHEATH CR4

## 2025-05-27 PROCEDURE — 272N000124 HC CATH CR11

## 2025-05-27 PROCEDURE — 250N000013 HC RX MED GY IP 250 OP 250 PS 637: Performed by: INTERNAL MEDICINE

## 2025-05-27 PROCEDURE — 75710 ARTERY X-RAYS ARM/LEG: CPT | Mod: 26 | Performed by: SURGERY

## 2025-05-27 PROCEDURE — 250N000011 HC RX IP 250 OP 636: Mod: JZ | Performed by: PHYSICIAN ASSISTANT

## 2025-05-27 PROCEDURE — 272N000123 HC CATH CR9

## 2025-05-27 PROCEDURE — 250N000011 HC RX IP 250 OP 636: Performed by: STUDENT IN AN ORGANIZED HEALTH CARE EDUCATION/TRAINING PROGRAM

## 2025-05-27 PROCEDURE — 76937 US GUIDE VASCULAR ACCESS: CPT | Mod: 26 | Performed by: SURGERY

## 2025-05-27 PROCEDURE — 85027 COMPLETE CBC AUTOMATED: CPT | Performed by: INTERNAL MEDICINE

## 2025-05-27 PROCEDURE — 36415 COLL VENOUS BLD VENIPUNCTURE: CPT | Performed by: INTERNAL MEDICINE

## 2025-05-27 PROCEDURE — 255N000002 HC RX 255 OP 636: Performed by: SURGERY

## 2025-05-27 RX ORDER — CLOPIDOGREL BISULFATE 75 MG/1
300 TABLET ORAL ONCE
Status: COMPLETED | OUTPATIENT
Start: 2025-05-27 | End: 2025-05-27

## 2025-05-27 RX ORDER — NALOXONE HYDROCHLORIDE 0.4 MG/ML
0.2 INJECTION, SOLUTION INTRAMUSCULAR; INTRAVENOUS; SUBCUTANEOUS
Status: DISCONTINUED | OUTPATIENT
Start: 2025-05-27 | End: 2025-05-27 | Stop reason: HOSPADM

## 2025-05-27 RX ORDER — IODIXANOL 320 MG/ML
100 INJECTION, SOLUTION INTRAVASCULAR ONCE
Status: DISCONTINUED | OUTPATIENT
Start: 2025-05-27 | End: 2025-05-27 | Stop reason: HOSPADM

## 2025-05-27 RX ORDER — NALOXONE HYDROCHLORIDE 0.4 MG/ML
0.4 INJECTION, SOLUTION INTRAMUSCULAR; INTRAVENOUS; SUBCUTANEOUS
Status: DISCONTINUED | OUTPATIENT
Start: 2025-05-27 | End: 2025-05-27 | Stop reason: HOSPADM

## 2025-05-27 RX ORDER — CLOPIDOGREL BISULFATE 75 MG/1
300 TABLET ORAL DAILY
Status: DISCONTINUED | OUTPATIENT
Start: 2025-05-27 | End: 2025-05-27

## 2025-05-27 RX ORDER — PROTAMINE SULFATE 10 MG/ML
25 INJECTION, SOLUTION INTRAVENOUS ONCE
Status: COMPLETED | OUTPATIENT
Start: 2025-05-27 | End: 2025-05-27

## 2025-05-27 RX ORDER — IODIXANOL 320 MG/ML
100 INJECTION, SOLUTION INTRAVASCULAR ONCE
Status: COMPLETED | OUTPATIENT
Start: 2025-05-27 | End: 2025-05-27

## 2025-05-27 RX ORDER — HEPARIN SODIUM 200 [USP'U]/100ML
1 INJECTION, SOLUTION INTRAVENOUS EVERY 5 MIN PRN
Status: DISCONTINUED | OUTPATIENT
Start: 2025-05-27 | End: 2025-05-27 | Stop reason: HOSPADM

## 2025-05-27 RX ORDER — FLUMAZENIL 0.1 MG/ML
0.2 INJECTION, SOLUTION INTRAVENOUS
Status: DISCONTINUED | OUTPATIENT
Start: 2025-05-27 | End: 2025-05-27 | Stop reason: HOSPADM

## 2025-05-27 RX ORDER — SODIUM CHLORIDE 9 MG/ML
INJECTION, SOLUTION INTRAVENOUS CONTINUOUS
Status: DISCONTINUED | OUTPATIENT
Start: 2025-05-27 | End: 2025-05-27 | Stop reason: HOSPADM

## 2025-05-27 RX ORDER — CLOPIDOGREL BISULFATE 75 MG/1
75 TABLET ORAL DAILY
Status: DISCONTINUED | OUTPATIENT
Start: 2025-05-28 | End: 2025-05-30 | Stop reason: HOSPADM

## 2025-05-27 RX ORDER — HEPARIN SODIUM 1000 [USP'U]/ML
8000 INJECTION, SOLUTION INTRAVENOUS; SUBCUTANEOUS
Status: COMPLETED | OUTPATIENT
Start: 2025-05-27 | End: 2025-05-27

## 2025-05-27 RX ORDER — DEXTROSE MONOHYDRATE 25 G/50ML
25-50 INJECTION, SOLUTION INTRAVENOUS
Status: DISCONTINUED | OUTPATIENT
Start: 2025-05-27 | End: 2025-05-27 | Stop reason: HOSPADM

## 2025-05-27 RX ORDER — NICOTINE POLACRILEX 4 MG
15-30 LOZENGE BUCCAL
Status: DISCONTINUED | OUTPATIENT
Start: 2025-05-27 | End: 2025-05-27 | Stop reason: HOSPADM

## 2025-05-27 RX ORDER — LIDOCAINE 40 MG/G
CREAM TOPICAL
Status: DISCONTINUED | OUTPATIENT
Start: 2025-05-27 | End: 2025-05-28 | Stop reason: HOSPADM

## 2025-05-27 RX ORDER — FENTANYL CITRATE 50 UG/ML
25-50 INJECTION, SOLUTION INTRAMUSCULAR; INTRAVENOUS EVERY 5 MIN PRN
Status: DISCONTINUED | OUTPATIENT
Start: 2025-05-27 | End: 2025-05-27 | Stop reason: HOSPADM

## 2025-05-27 RX ADMIN — ABIRATERONE ACETATE 1000 MG: 250 TABLET ORAL at 09:36

## 2025-05-27 RX ADMIN — METOPROLOL TARTRATE 50 MG: 50 TABLET, FILM COATED ORAL at 09:36

## 2025-05-27 RX ADMIN — ATORVASTATIN CALCIUM 20 MG: 20 TABLET, FILM COATED ORAL at 09:36

## 2025-05-27 RX ADMIN — MIDAZOLAM 1 MG: 1 INJECTION INTRAMUSCULAR; INTRAVENOUS at 12:57

## 2025-05-27 RX ADMIN — METOPROLOL TARTRATE 50 MG: 50 TABLET, FILM COATED ORAL at 20:53

## 2025-05-27 RX ADMIN — ACETAMINOPHEN 975 MG: 325 TABLET, FILM COATED ORAL at 07:16

## 2025-05-27 RX ADMIN — HEPARIN SODIUM 8000 UNITS: 1000 INJECTION, SOLUTION INTRAVENOUS; SUBCUTANEOUS at 12:49

## 2025-05-27 RX ADMIN — OXYCODONE HYDROCHLORIDE 5 MG: 5 TABLET ORAL at 03:56

## 2025-05-27 RX ADMIN — FAMOTIDINE 40 MG: 20 TABLET, FILM COATED ORAL at 09:36

## 2025-05-27 RX ADMIN — HEPARIN SODIUM IN SODIUM CHLORIDE 4 BAG: 200 INJECTION INTRAVENOUS at 12:22

## 2025-05-27 RX ADMIN — FENTANYL CITRATE 50 MCG: 50 INJECTION, SOLUTION INTRAMUSCULAR; INTRAVENOUS at 12:39

## 2025-05-27 RX ADMIN — FAMOTIDINE 40 MG: 20 TABLET, FILM COATED ORAL at 20:54

## 2025-05-27 RX ADMIN — OXYCODONE HYDROCHLORIDE 5 MG: 5 TABLET ORAL at 20:53

## 2025-05-27 RX ADMIN — CITALOPRAM HYDROBROMIDE 20 MG: 20 TABLET ORAL at 09:35

## 2025-05-27 RX ADMIN — CLOPIDOGREL BISULFATE 300 MG: 75 TABLET, FILM COATED ORAL at 16:29

## 2025-05-27 RX ADMIN — HYDROMORPHONE HYDROCHLORIDE 0.4 MG: 0.2 INJECTION, SOLUTION INTRAMUSCULAR; INTRAVENOUS; SUBCUTANEOUS at 10:43

## 2025-05-27 RX ADMIN — PROTAMINE SULFATE 25 MG: 10 INJECTION, SOLUTION INTRAVENOUS at 13:44

## 2025-05-27 RX ADMIN — IODIXANOL 130 ML: 320 INJECTION, SOLUTION INTRAVASCULAR at 14:17

## 2025-05-27 RX ADMIN — FENTANYL CITRATE 25 MCG: 50 INJECTION, SOLUTION INTRAMUSCULAR; INTRAVENOUS at 12:49

## 2025-05-27 RX ADMIN — AMPICILLIN SODIUM AND SULBACTAM SODIUM 3 G: 2; 1 INJECTION, POWDER, FOR SOLUTION INTRAMUSCULAR; INTRAVENOUS at 22:46

## 2025-05-27 RX ADMIN — ACETAMINOPHEN 975 MG: 325 TABLET, FILM COATED ORAL at 16:28

## 2025-05-27 RX ADMIN — AMPICILLIN SODIUM AND SULBACTAM SODIUM 3 G: 2; 1 INJECTION, POWDER, FOR SOLUTION INTRAMUSCULAR; INTRAVENOUS at 16:28

## 2025-05-27 RX ADMIN — OXYCODONE HYDROCHLORIDE 5 MG: 5 TABLET ORAL at 16:28

## 2025-05-27 RX ADMIN — HYDRALAZINE HYDROCHLORIDE 100 MG: 50 TABLET ORAL at 16:28

## 2025-05-27 RX ADMIN — PREDNISONE 5 MG: 5 TABLET ORAL at 16:32

## 2025-05-27 RX ADMIN — AMPICILLIN SODIUM AND SULBACTAM SODIUM 3 G: 2; 1 INJECTION, POWDER, FOR SOLUTION INTRAMUSCULAR; INTRAVENOUS at 09:37

## 2025-05-27 RX ADMIN — OXYCODONE HYDROCHLORIDE 5 MG: 5 TABLET ORAL at 08:18

## 2025-05-27 RX ADMIN — HYDRALAZINE HYDROCHLORIDE 100 MG: 50 TABLET ORAL at 09:35

## 2025-05-27 RX ADMIN — FENTANYL CITRATE 50 MCG: 50 INJECTION, SOLUTION INTRAMUSCULAR; INTRAVENOUS at 12:26

## 2025-05-27 RX ADMIN — LISINOPRIL 10 MG: 10 TABLET ORAL at 09:36

## 2025-05-27 RX ADMIN — MIDAZOLAM 1 MG: 1 INJECTION INTRAMUSCULAR; INTRAVENOUS at 12:39

## 2025-05-27 RX ADMIN — LIDOCAINE HYDROCHLORIDE 10 ML: 10 INJECTION, SOLUTION INFILTRATION; PERINEURAL at 13:30

## 2025-05-27 RX ADMIN — LISINOPRIL 10 MG: 10 TABLET ORAL at 20:53

## 2025-05-27 RX ADMIN — AMPICILLIN SODIUM AND SULBACTAM SODIUM 3 G: 2; 1 INJECTION, POWDER, FOR SOLUTION INTRAMUSCULAR; INTRAVENOUS at 03:57

## 2025-05-27 RX ADMIN — MIDAZOLAM 1 MG: 1 INJECTION INTRAMUSCULAR; INTRAVENOUS at 12:26

## 2025-05-27 RX ADMIN — AMLODIPINE BESYLATE 10 MG: 10 TABLET ORAL at 09:36

## 2025-05-27 ASSESSMENT — ACTIVITIES OF DAILY LIVING (ADL)
ADLS_ACUITY_SCORE: 61

## 2025-05-27 NOTE — IR NOTE
Interventional Radiology Intra-procedural Nursing Note    Patient Name: Sushil Diana  Medical Record Number: [id number]  Today's Date: May 27, 2025    Procedure: right lower extremity angiogram with angioplasty, with moderate sedation  Start time: 1237  End time: 1339  Report provided to: Vini GALAVIZ  Patient depart time and location: 1400 to 2221    Note: Patient entered Interventional Radiology Suite number 2 via cart. Patient awake, alert and oriented. Assisted onto procedural table in supine position. Prepped and draped.  Dr. Bertrand in room. Time out and procedure started. Vital signs stable. Telemetry reading sinus rhythm.    Procedure well tolerated by patient without complications. Procedure end with debrief by Dr. Bertrand.  Manual pressure applied until hemostasis achieved. Gauze/tegaderm dressing applied to left femoral interventional procedure access site.    Bedrest for 2 hours until 1600.    Administered medication totals:  Lidocaine 1% 10 mL Intradermal  Heparin 8000 Units IVP  Protamine 25 mg IVP  Versed 3 mg IVP  Fentanyl 125 mcg IVP    Last dose of sedation administered at 1257.

## 2025-05-27 NOTE — PROGRESS NOTES
I had a detailed discussion with the patient and his significant other. I explained that he has multilevel arterial disease in his right lower extremity.   He had SFA occlusion which was recanalized and angioplastied. Anterior and posterior tibial arteries are occluded. HIs only supply into the foot is by way of he peroneal artery.  Too early to say if this revascularization would be enough to salvage the foot. Recommend that podiatry team proceed with the necessary surgical intervention. The situation will declare itself in time.   I did offer hyperbaric oxygen therapy as an adjunct/back up to promote wound healing.

## 2025-05-27 NOTE — PLAN OF CARE
Goal Outcome Evaluation:       Date & Time: 05/27/2025  Surgery/POD#: Admitted 05/21 for acute osteomyelitis of RLE    Behavior & Aggression: Calm and cooperative   Fall Risk: Yes  Orientation:AOX4  ABNL VS/O2:VSS, RA   ABNL Labs: Na (Hyponatremia, acute on chronic), Hgb 7.9  Pain Management: Scheduled tylenol, PRN oxy, x1 dose IV dilaudid given for RLE pain   Bowel/Bladder: LBM 05/26, passing gas, voiding w/o difficulty   Drains: NA   Wounds/incisions: (R) elbow skin tear, (R) 5th toe ischemia   Diet:NPO   Activity: SBA w/ walker   Tests/Procedures: Angio 05/27  Anticipated  DC Date: TBD   Significant Information:

## 2025-05-27 NOTE — PROGRESS NOTES
[facility]    Medicine Progress Note - Hospitalist Service    Date of Admission:  5/21/2025    Assessment & Plan     Sushil Diana is a 65 year old male  Past medical history significant for PAD, HTN, HLP, Chronic combined (diastolic and systolic) heart failure, DM2, GERD, MDD with anxiety, Alcohol use D/O, Metastatic prostate cancer with mets to the bone admitted on 5/21/25 due to acute osteomyelitis of right 5th proximal phalanx and metatarsal head.       Patient had been admitted at [facility] due to right foot pain secondary to chronic diabetic ulceration with concern for osteomyelitis and sepsis.  Workup completed at [facility] included a right lower extremity/foot MRI that confirmed acute osteomyelitis.  Podiatry had been following the patient.  Patient was transferred to [facility] to undergo vascular surgery assessment, continued podiatry assessment and likely proceeding for surgical intervention. Patient was receiving IV Vancomycin and IV Zosyn.  Underwent IR right lower extremity angiogram 5/27. Pending OR time for podiatry 5/27.      Right 5th proximal phalanx and metatarsal head osteomyelitis  Chronic right foot diabetic wound with cellulitis  PAD  5/20 MRI right foot acute osteomyelitis 5th prox phalanx and 5th metatarsal head and potentil fifth metatarsal diaphysis (outside hospital)   Patient transferred from [facility] with diagnosis of acute osteomyelitis of the right fifth proximal phalanx and metatarsal head.  Initial presentation with right foot pain and noted diabetic ulceration with concern for osteomyelitis and sepsis.  Right lower extremity foot MRI confirmed acute osteomyelitis.  Started on Vanco and Zosyn (MRSA swab negative)   5/22 podiatry consult: Right foot fifth digit metatarsal head with osteomyelitis and peripheral artery disease.  MRI noting osteomyelitis.  Recommendations fifth ray resection after vascular supply established. (Noted SFA  occlusion)   Continue antibiotics with vascular assessment as below  ID consult on unasyn (culture in OR) >> stopped vanco and zosyn     Peripheral vascular disease  PAD  Mid right SFA occlusion   Plans for lower extremity angiogram 5/27 on Tuesday with Dr. Bertrand  5/20 lower extremity duplex showing occlusion in the mid right SFA with monophasic waveforms.  Suspected stenosis in the left distal SFA given transition of waveforms may be multifocal areas but suspect femoral-popliteal distribution greatest amount of disease  5/20 ABIs right monophasic noncompressible 0.64 and left noncompressible tibial and monophasic DP 1.33 and PT 1.57  5/22 vascular consult, underwent RLE angiogram on 5/27     Hyponatremia, acute on chronic  Hydrochlorothiazide and +/- SIADH + hx ETOH   Sodium ranges between 120-135.     Hold PTA hydrochlorothiazide 25 mg/d.    IV fluids at 100 ml/hr with NS discontinued and now on 1500 mL FR    Urine sodium 107 with urine osmolality 343 on 5/22 5/23 stopped hydrochlorothiazide (would not resume on discharge)      Hypokalemia  Hypophosphatemia  Hypomagnesium   -Replace per protocol      CINDY; resolved   Baseline creatinine around 1, noted creatinine was up to 1.76.   Improved to baseline with IVF now on FR 2/2 to hyponatremia      Anemia, acute on chronic normocytic  *Baseline HGB prior to admission 9-11.3.    - Hemoglobin has trended down but fairly stable at 8 presents now.  No sign of bleeding.  < 1g drop to 7.9 from 8.7. Asymptomatic. Will repeat CBC in am prior to angiogram   Checked iron stores, B12 and folate  5/24 some degree anemia for awhile 2024.   Followed by oncology can followup      HTN  Resumed on PTA amlodipine 10 mg/d, lopressor 50 mg BID and hydralazine 100 mg TID.  Hold parameters in place.    Continue to hold PTA hydrochlorothiazide 25 mg/d, not resuming given hyponatremia.    SBP's in 140s, since creatinine has improved, resume lisinopril at lower dose and monitor.     PRN  PO/IV hydralazine 10 mg every 4 hours for SBP GREATER THAN 180.    5/23 Zestril started at 10 mg p.o. daily (reported to be on 40 mg prior to admission) >> increase to 10 mg po BID 5/24     HLP  Resumed on PTA atorvastatin 20 mg/d.       Chronic combined (diastolic and systolic) heart failure  History of cardiomyopathy 8/2019   ECHO 5/24 with normal EF and 1+ MR   Managing PTA HCTZ and lisinopril as above  Resumed on PTA lopressor 50 mg BID.    Monitor daily weights, intake and output as able.    Combination cardiac diet.    8/2019 noted in epic to have mild to moderate concentric LVH with grade 1 early diastolic dysfunction.  Moderately reduced LV function 30 to 35% with global hypokinesia.  1+ MR  5/24 ECHO now with EF 65-70% RV normal. 1+ mitral regurg  Patient and wife updated per hospitalist partner           Type 2 DM   PTA regimen includes: Metformin 1000 mg daily with lunch and glipizide 5 mg every morning.    Hold PTA metformin or glipizide.  Resume at discharge.    Medium intensity sliding scale insulin ordered.  Hypoglycemic protocol in place.    add NovoLog per carb count + sliding scale >> stable      GERD  - Resumed on PTA Pepcid 40 mg BID.       Major depressive disorder with anxiety  - Resumed on PTA Celexa 20 mg/d.     Alcohol use D/O  *3-4 beers per day reported as typical consumption.    Had been at [facility] since 5/19.    - CIWA monitoring ordered.          Metastatic prostate cancer with mets to the bone  - Resumed on PTA Zytiga 1000 mg/d.    - Resumed on PTA prednisone 5 mg/d.   - Per prior hospitalist partner oncology was curb sided and ok to continue with current regimen             Diet: Fluid restriction 1800 ML FLUID  Moderate Consistent Carb (60 g CHO per Meal) Diet  NPO for Medical/Clinical Reasons Except for: Meds    DVT Prophylaxis: On plavix, Lovenox  Alfred Catheter: Not present  Lines: None     Cardiac Monitoring: None  Code Status: Full Code      Clinically Significant  "Risk Factors        # Hypokalemia: Lowest K = 3.2 mmol/L in last 2 days, will replace as needed  # Hyponatremia: Lowest Na = 133 mmol/L in last 2 days, will monitor as appropriate           # Hypertension: Noted on problem list    # Chronic heart failure with preserved ejection fraction: heart failure noted on problem list and last echo with EF >50%           # Overweight: Estimated body mass index is 26.31 kg/m  as calculated from the following:    Height as of 5/19/25: 1.727 m (5' 8\").    Weight as of this encounter: 78.5 kg (173 lb 1 oz).             Social Drivers of Health    Tobacco Use: High Risk (12/26/2024)    Patient History     Smoking Tobacco Use: Some Days     Smokeless Tobacco Use: Never     Passive Exposure: Current   Physical Activity: Insufficiently Active (5/1/2024)    Exercise Vital Sign     Days of Exercise per Week: 2 days     Minutes of Exercise per Session: 10 min   Social Connections: Unknown (5/1/2024)    Social Connection and Isolation Panel [NHANES]     Frequency of Social Gatherings with Friends and Family: Once a week          Disposition Plan     Medically Ready for Discharge: Anticipated in 2-4 Days         Javed Vera, DO  Hospitalist Service  [facility]  Securely message with [employer] (more info)  Text page via [employer] Paging/Directory   ______________________________________________________________________    Interval History     - No acute events overnight  - Saw patient after angiogram  - He is sleepy but opens eyes to voice  - He has no acute concerns  - Denies significant pain  - Discussed with wife at bedside  - No other acute concerns     Physical Exam   Vital Signs: Temp: 98.1  F (36.7  C) Temp src: Oral BP: (!) 163/76 Pulse: 77   Resp: 14 SpO2: 98 % O2 Device: Nasal cannula Oxygen Delivery: 2 LPM  Weight: 173 lbs .98 oz    Constitutional: Somnolent but opens eyes to voice   Eyes: Lids and lashes normal, pupils equal, round and reactive to light, " extra ocular muscles intact, sclera clear, conjunctiva normal  ENT: Normocephalic, without obvious abnormality, atraumatic   Respiratory: No increased work of breathing, good air exchange, clear to auscultation bilaterally, no crackles or wheezing  Cardiovascular: Normal apical impulse, regular rate and rhythm, normal S1 and S2, no S3 or S4, and no murmur noted  GI: No scars, normal bowel sounds, soft, non-distended, non-tender, no masses palpated, no hepatosplenomegally  Skin: No redness, warmth, or swelling   Musculoskeletal: No deformities   Neurologic: Sleeping, wakes up to voice and follows commands     Medical Decision Making       44 MINUTES SPENT BY ME on the date of service doing chart review, history, exam, documentation & further activities per the note.      Data   ------------------------- PAST 24 HR DATA REVIEWED -----------------------------------------------    I have personally reviewed the following data over the past 24 hrs:    9.5  \   8.6 (L)   / 215     135 102 9.4 /  105 (H)   3.6 22 0.79 \       Imaging results reviewed over the past 24 hrs:   Recent Results (from the past 24 hours)   IR Lower Extremity Angiogram Right    Narrative    Preprocedure diagnosis: Right lower extremity critical limb ischemia.    Postprocedure diagnosis: Same.    Procedure:  1.  Ultrasound-guided left common femoral artery access placement.  2.  Aortoiliac arteriogram.  3.  Selective catheterization of right common iliac, external iliac,  common femoral, superficial femoral, popliteal, tibioperoneal trunk  and peroneal arteries.  4.  Right lower extremity arteriogram with runoff.  5.  Balloon angioplasty of right distal superficial femoral and  proximal popliteal artery using 5 mm x 20 cm angioplasty balloon with  completion arteriogram.  6.  Balloon angioplasty of right distal superficial femoral and  proximal popliteal artery using 6 mm x 12 cm angioplasty balloon with  completion arteriogram.  7.  Balloon  angioplasty of mid superficial femoral artery using 6 mm x  12 cm angioplasty balloon with completion arteriogram.  8.  Left common femoral arteriogram with access site closure using 6  Luxembourger Angio-Seal device.    Surgeon: Brenton Bertrand M.D.     Sedation: Patient received 3 mg of intravenous Versed.  Patient  received 125 mcg of intravenous fentanyl.  Medication was administered  under my direct supervision by registered nurse in the department of  interventional radiology.  Patient was continuously monitored.  Sedation time: 62 minutes.  Fluoroscopy time: 20.2 minutes  Fluoroscopy dose: 91.41 mGY  Heparin: 8000 units.  Protamine: 25 mg.    Contrast: 130 mls visipaque    Indication for procedure: This is a 65-year-old gentleman with right  lower extremity critical limb ischemia.  Arteriogram is advised for  limb salvage.    Procedure details: Patient was identified and then taken to the  procedure room and placed in supine position.  Both groins were  prepped in a sterile surgical field was created.  Preprocedure timeout  was conducted.  The left common femoral artery was located anterior to  the left femoral head by way of fluoroscopy.  It was further localized  with ultrasonography.  Images were stored.    Local anesthetic was administered in the left groin and the left  common femoral artery was accessed with sonographic guidance using a  micropuncture needle.  This was done in retrograde fashion.  Microwire  was placed.  Then we converted to a 0.035 inch wire platform.  Short 5  Luxembourger sheath was placed.  Omni Flush catheter was advanced into the  distal aorta.  Aortoiliac arteriogram was performed.  Distal aorta,  both common iliac arteries and external iliac arteries were patent.   Both hypogastric arteries had mild to moderate disease at origins.   Wire and catheter were advanced into the distal right external iliac  artery.  Arteriogram showed that the right common femoral artery, deep  femoral artery and  proximal superficial femoral arteries were patent.   Mid to distal superficial femoral artery had high-grade stenosis with  short segment occlusion at the Joel's canal.  It reconstituted as a  second segment popliteal artery.  The anterior tibial and posterior  tibial arteries were occluded shortly after their origin.  Peroneal  artery was the only runoff.  Mid peroneal artery had a short segment  occlusion but there was a sizable collaterals that reconstituted the  mid to distal peroneal artery.  Peroneal artery provided perforating  branch to reconstitute the distal anterior tibial artery and  communicating branch to reconstitute the distal posterior tibial  arteries.    8000 units of heparin were given.  We switched over to a 6 [place]  crossover sheath which was positioned in the proximal right  superficial femoral artery.  The area of the occlusion and tandem  stenoses in the superficial femoral artery/proximal popliteal artery  were traversed with an angled Matthew cross catheter and 0.035 inch  angled Glidewire.  Catheter was advanced into the third segment of the  popliteal artery.  Arteriogram confirmed intraluminal position.  Then  we switched over to a 0.035 inch Maria wire.  The second segment  popliteal, first segment popliteal and distal superficial femoral  arteries were first treated with a 5 mm x 20 cm angioplasty balloon.   This balloon was inflated to 8 diane for 1 minute.  Balloon was deflated  and removed.  Arteriogram showed significant improvement without  evidence of perforation or embolization.  There was a short segment  dissection in the P1 segment.  Matthew cross catheter was positioned in  the distal popliteal artery and a V18 0.018 inch wire was advanced  into the peroneal artery.  I could not traverse the area of the  occlusion of the peroneal artery with a V18 wire and 0.018 inch  [place] catheter.  However I did not want to persist in that area  because the collateral was sizable and  reconstituted the mid to distal  peroneal artery with good flow.    Then I treated the first segment of the popliteal artery and the  distal superficial femoral artery with a 6 mm x 12 cm drug-coated  balloon.  This balloon was inflated to 8 diane for 3 minutes.  There was  another more proximal short segment stenosis in the mid superficial  femoral artery which was treated with the same 6 mm x 12 cm  angioplasty balloon which was used here as a plain balloon.   Completion arteriogram showed inline flow from the superficial femoral  to popliteal artery with flow into the peroneal artery which  reconstituted the anterior and posterior tibial arteries distally.    Procedure was concluded.  Left common femoral arteriogram was  performed and access site was assessed for closure device.  25 mg of  protamine was given.  Then the access site was closed with a 6 Thai  Angio-Seal device.  Following deployment of the Angio-Seal device  ultrasonography was performed which showed that the footplate was  well-positioned.  There was excellent flow in the proximal left common  femoral, proximal superficial femoral and deep femoral arteries.    Patient was taken to his room in stable condition.    ANIL HIGGINS MD         SYSTEM ID:  [id number]

## 2025-05-27 NOTE — PROGRESS NOTES
[facility]    Infectious Disease Progress Note    Date of Service (when I saw the patient): 05/27/2025     Assessment & Plan   Sushil Diana is a 65 year old male who was admitted on 5/21/2025.     Impression:   65 year old male with DM (A1C 5.9), CHF, HTN, metastatic prostate cancer to bone, PAD and GERD with a several month history of ulceration on right foot, presenting with acute cellulitis and acute osteomyelitis right 5th proximal phalanx and metatarsal head.      -PAD with noted right SFA occlusion on vascular studies.   -Acute osteomyelitis right fifth proximal phalanx and 5th metatarsal head on MRI.   -Blood cultures no growth to date.   -On Unasyn.        Recommendations:   Continue Unasyn.   Await angio by Vasc Surg today and surgery by Podiatry potentially on Wednesday.   Obtain cultures in OR.   Once he undergoes right 5th ray amputation, will likely be able to switch him over to oral antibiotics.     Patient and plan discussed with Dr. Bridges.         Andra Hall PA-C    Interval History   Tolerating antibiotics ok   No new rashes or issues with antibiotics   Labs reviewed   No changes to past medical, social or family history   Doing ok but having significant pain in right foot.  Awaiting angiogram today.       Physical Exam   Temp: 98  F (36.7  C) Temp src: Oral BP: (!) 157/71 Pulse: 77   Resp: 15 SpO2: 92 % O2 Device: None (Room air)    Vitals:    05/24/25 0135 05/24/25 0503 05/27/25 0215   Weight: 78.9 kg (173 lb 15.1 oz) 75.8 kg (167 lb 1.7 oz) 78.5 kg (173 lb 1 oz)     Vital Signs with Ranges  Temp:  [97.1  F (36.2  C)-98  F (36.7  C)] 98  F (36.7  C)  Pulse:  [65-78] 77  Resp:  [6-20] 15  BP: (135-159)/(64-79) 157/71  SpO2:  [91 %-99 %] 92 %    Constitutional: Awake, alert, cooperative, no apparent distress  Lungs: Clear to auscultation bilaterally, no crackles or wheezing  Cardiovascular: Regular rate and rhythm, normal S1 and S2, and no murmur noted  Abdomen: Normal  bowel sounds, soft, non-distended, non-tender  Skin: No rashes, no cyanosis, no edema. Wound on foot dressed.   Other:    Medications   Current Facility-Administered Medications   Medication Dose Route Frequency Provider Last Rate Last Admin    sodium chloride 0.9 % infusion   Intravenous Continuous Merlin Madsen MD         Current Facility-Administered Medications   Medication Dose Route Frequency Provider Last Rate Last Admin    abiraterone (ZYTIGA) tablet 1,000 mg  1,000 mg Oral Daily Rachelle Richards MD   1,000 mg at 05/27/25 0936    acetaminophen (TYLENOL) tablet 975 mg  975 mg Oral Q8H Jignesh Mcclure PA-C   975 mg at 05/27/25 0716    amLODIPine (NORVASC) tablet 10 mg  10 mg Oral Daily Jignesh Mcclure PA-C   10 mg at 05/27/25 0936    ampicillin-sulbactam (UNASYN) 3 g vial to attach to  mL bag  3 g Intravenous Q6H Andra Hall PA-C   3 g at 05/27/25 0937    atorvastatin (LIPITOR) tablet 20 mg  20 mg Oral Daily Naz Adams MD   20 mg at 05/27/25 0936    citalopram (celeXA) tablet 20 mg  20 mg Oral Daily Naz Adams MD   20 mg at 05/27/25 0935    enoxaparin ANTICOAGULANT (LOVENOX) injection 40 mg  40 mg Subcutaneous Q24H Kacy Peters MD   40 mg at 05/26/25 2145    famotidine (PEPCID) tablet 40 mg  40 mg Oral BID Maddi Mota RPH   40 mg at 05/27/25 0936    hydrALAZINE (APRESOLINE) tablet 100 mg  100 mg Oral TID Jignesh Mcclure PA-C   100 mg at 05/27/25 0935    insulin aspart (NovoLOG) injection (RAPID ACTING)   Subcutaneous TID w/meals Ernesto Scruggs MD   5 Units at 05/26/25 1734    insulin aspart (NovoLOG) injection (RAPID ACTING)  1-7 Units Subcutaneous TID AC Jignesh cMclure PA-C   2 Units at 05/25/25 1747    insulin aspart (NovoLOG) injection (RAPID ACTING)  1-5 Units Subcutaneous At Bedtime Jignesh Mcclure PA-C        iodixanol (VISIPAQUE 320) injection 100 mL  100 mL Arterial Once Brenton Bertrand MD         lisinopril (ZESTRIL) tablet 10 mg  10 mg Oral BID aKcy Peters MD   10 mg at 05/27/25 0936    metoprolol tartrate (LOPRESSOR) tablet 50 mg  50 mg Oral BID Jignesh Mcclure PA-C   50 mg at 05/27/25 0936    predniSONE (DELTASONE) tablet 5 mg  5 mg Oral Daily Naz Adams MD   5 mg at 05/26/25 0848    sodium chloride (PF) 0.9% PF flush 3 mL  3 mL Intracatheter Q8H [facility] Naz Adams MD   3 mL at 05/27/25 0547       Data   All microbiology laboratory data reviewed.  Recent Labs   Lab Test 05/27/25  0653 05/26/25  0716 05/25/25  0731   WBC 9.5 8.0 8.8   HGB 8.6* 7.9* 8.7*   HCT 25.0* 23.4* 25.9*   MCV 86 87 88    199 227     Recent Labs   Lab Test 05/27/25  0653 05/26/25  0716 05/25/25  0731   CR 0.79 0.80 0.77     Recent Labs   Lab Test 05/19/25  1817   SED 49*     05/21/2025 2346 05/22/2025 0325 MRSA MSSA PCR, Nasal Swab [[id number]]    Swab from Nares, Bilateral    Final result Component Value   MRSA Target DNA Negative   SA Target DNA Negative          05/19/2025 1834 05/22/2025 2231 Blood Culture Peripheral blood (BC) Arm, Right [[id number]]   Peripheral blood (BC) from Arm, Right    Preliminary result Component Value   Culture No growth after 3 days P             05/19/2025 1817 05/22/2025 2231 Blood Culture Peripheral blood (BC) Arm, Right [[id number]]   Peripheral blood (BC) from Arm, Right    Preliminary result Component Value   Culture No growth after 3 days P

## 2025-05-27 NOTE — PRE-PROCEDURE
GENERAL PRE-PROCEDURE:   Procedure:  Right lower extremity angiogram, conscious sedation  Date/Time:  5/27/2025 9:41 AM    Written consent obtained?: Yes    Risks and benefits: Risks, benefits and alternatives were discussed    Consent given by:  Patient  Patient states understanding of procedure being performed: Yes    Patient's understanding of procedure matches consent: Yes    Procedure consent matches procedure scheduled: Yes    Expected level of sedation:  Moderate  Appropriately NPO:  Yes  ASA Class:  3  Mallampati  :  Grade 3- soft palate visible, posterior pharyngeal wall not visible  Lungs:  Lungs clear with good breath sounds bilaterally  Heart:  Normal heart sounds and rate  History & Physical reviewed:  History and physical reviewed and no updates needed  Statement of review:  I have reviewed the lab findings, diagnostic data, medications, and the plan for sedation    Paper consent signed this morning by patient's wife after discussion about risks, benefits, and alternatives as well as review of the details of the procedure.       Bridget Madsen MD  PGY-6  Vascular Surgery  Pager: [phone]    Please page me directly with any questions/concerns during regular weekday hours before 5PM. If there is no response, if it is a weekend, or if it is during evening hours, then please use the [email or web address] system to page the first-call resident on call for vascular surgery.

## 2025-05-27 NOTE — PROGRESS NOTES
[facility]: Cancer Care                                                                                          Patient appears to be in-patient and is scheduled for angiogram tomorrow at [facility]. Writer will follow closely and reach out to patient after discharge. Patient also has an appt with Maylin Michaels NP 7/1/25.      Signature:  Cami Ibrahim RN

## 2025-05-28 ENCOUNTER — TELEPHONE (OUTPATIENT)
Dept: OTHER | Facility: CLINIC | Age: 65
End: 2025-05-28

## 2025-05-28 ENCOUNTER — ANESTHESIA (OUTPATIENT)
Dept: SURGERY | Facility: CLINIC | Age: 65
End: 2025-05-28
Payer: MEDICARE

## 2025-05-28 ENCOUNTER — ANESTHESIA EVENT (OUTPATIENT)
Dept: SURGERY | Facility: CLINIC | Age: 65
End: 2025-05-28
Payer: MEDICARE

## 2025-05-28 DIAGNOSIS — I73.9 PAD (PERIPHERAL ARTERY DISEASE): Primary | ICD-10-CM

## 2025-05-28 LAB
ANION GAP SERPL CALCULATED.3IONS-SCNC: 10 MMOL/L (ref 7–15)
BACTERIA SPEC CULT: NORMAL
BUN SERPL-MCNC: 9 MG/DL (ref 8–23)
CALCIUM SERPL-MCNC: 8.5 MG/DL (ref 8.8–10.4)
CHLORIDE SERPL-SCNC: 103 MMOL/L (ref 98–107)
CREAT SERPL-MCNC: 0.79 MG/DL (ref 0.67–1.17)
EGFRCR SERPLBLD CKD-EPI 2021: >90 ML/MIN/1.73M2
ERYTHROCYTE [DISTWIDTH] IN BLOOD BY AUTOMATED COUNT: 13 % (ref 10–15)
GLUCOSE BLDC GLUCOMTR-MCNC: 102 MG/DL (ref 70–99)
GLUCOSE BLDC GLUCOMTR-MCNC: 103 MG/DL (ref 70–99)
GLUCOSE BLDC GLUCOMTR-MCNC: 116 MG/DL (ref 70–99)
GLUCOSE BLDC GLUCOMTR-MCNC: 117 MG/DL (ref 70–99)
GLUCOSE BLDC GLUCOMTR-MCNC: 146 MG/DL (ref 70–99)
GLUCOSE BLDC GLUCOMTR-MCNC: 223 MG/DL (ref 70–99)
GLUCOSE SERPL-MCNC: 104 MG/DL (ref 70–99)
GRAM STAIN RESULT: NORMAL
GRAM STAIN RESULT: NORMAL
HCO3 SERPL-SCNC: 23 MMOL/L (ref 22–29)
HCT VFR BLD AUTO: 24 % (ref 40–53)
HGB BLD-MCNC: 7.9 G/DL (ref 13.3–17.7)
MAGNESIUM SERPL-MCNC: 1.8 MG/DL (ref 1.7–2.3)
MCH RBC QN AUTO: 29.2 PG (ref 26.5–33)
MCHC RBC AUTO-ENTMCNC: 32.9 G/DL (ref 31.5–36.5)
MCV RBC AUTO: 89 FL (ref 78–100)
PHOSPHATE SERPL-MCNC: 3.1 MG/DL (ref 2.5–4.5)
PLATELET # BLD AUTO: 180 10E3/UL (ref 150–450)
POTASSIUM SERPL-SCNC: 3.6 MMOL/L (ref 3.4–5.3)
RBC # BLD AUTO: 2.71 10E6/UL (ref 4.4–5.9)
SODIUM SERPL-SCNC: 136 MMOL/L (ref 135–145)
WBC # BLD AUTO: 9.3 10E3/UL (ref 4–11)

## 2025-05-28 PROCEDURE — 36415 COLL VENOUS BLD VENIPUNCTURE: CPT | Performed by: INTERNAL MEDICINE

## 2025-05-28 PROCEDURE — 88305 TISSUE EXAM BY PATHOLOGIST: CPT | Mod: TC | Performed by: PODIATRIST

## 2025-05-28 PROCEDURE — 250N000011 HC RX IP 250 OP 636: Performed by: INTERNAL MEDICINE

## 2025-05-28 PROCEDURE — 87070 CULTURE OTHR SPECIMN AEROBIC: CPT | Performed by: PODIATRIST

## 2025-05-28 PROCEDURE — 250N000013 HC RX MED GY IP 250 OP 250 PS 637

## 2025-05-28 PROCEDURE — 99207 PR NO BILLABLE SERVICE THIS VISIT: CPT

## 2025-05-28 PROCEDURE — 87075 CULTR BACTERIA EXCEPT BLOOD: CPT | Performed by: PODIATRIST

## 2025-05-28 PROCEDURE — 710N000009 HC RECOVERY PHASE 1, LEVEL 1, PER MIN: Performed by: PODIATRIST

## 2025-05-28 PROCEDURE — 87205 SMEAR GRAM STAIN: CPT | Performed by: PODIATRIST

## 2025-05-28 PROCEDURE — 250N000011 HC RX IP 250 OP 636

## 2025-05-28 PROCEDURE — 0Y6X0Z0 DETACHMENT AT RIGHT 5TH TOE, COMPLETE, OPEN APPROACH: ICD-10-PCS | Performed by: PODIATRIST

## 2025-05-28 PROCEDURE — 250N000012 HC RX MED GY IP 250 OP 636 PS 637: Performed by: STUDENT IN AN ORGANIZED HEALTH CARE EDUCATION/TRAINING PROGRAM

## 2025-05-28 PROCEDURE — 85014 HEMATOCRIT: CPT | Performed by: INTERNAL MEDICINE

## 2025-05-28 PROCEDURE — 250N000009 HC RX 250: Performed by: PODIATRIST

## 2025-05-28 PROCEDURE — 999N000141 HC STATISTIC PRE-PROCEDURE NURSING ASSESSMENT: Performed by: PODIATRIST

## 2025-05-28 PROCEDURE — 250N000011 HC RX IP 250 OP 636: Performed by: PHYSICIAN ASSISTANT

## 2025-05-28 PROCEDURE — 360N000075 HC SURGERY LEVEL 2, PER MIN: Performed by: PODIATRIST

## 2025-05-28 PROCEDURE — 250N000013 HC RX MED GY IP 250 OP 250 PS 637: Performed by: SURGERY

## 2025-05-28 PROCEDURE — 120N000001 HC R&B MED SURG/OB

## 2025-05-28 PROCEDURE — 258N000003 HC RX IP 258 OP 636: Performed by: ANESTHESIOLOGY

## 2025-05-28 PROCEDURE — 80048 BASIC METABOLIC PNL TOTAL CA: CPT | Performed by: INTERNAL MEDICINE

## 2025-05-28 PROCEDURE — 99232 SBSQ HOSP IP/OBS MODERATE 35: CPT | Performed by: STUDENT IN AN ORGANIZED HEALTH CARE EDUCATION/TRAINING PROGRAM

## 2025-05-28 PROCEDURE — 370N000017 HC ANESTHESIA TECHNICAL FEE, PER MIN: Performed by: PODIATRIST

## 2025-05-28 PROCEDURE — 258N000003 HC RX IP 258 OP 636

## 2025-05-28 PROCEDURE — 28820 AMPUTATION OF TOE: CPT | Mod: T9 | Performed by: PODIATRIST

## 2025-05-28 PROCEDURE — 83735 ASSAY OF MAGNESIUM: CPT | Performed by: STUDENT IN AN ORGANIZED HEALTH CARE EDUCATION/TRAINING PROGRAM

## 2025-05-28 PROCEDURE — 84100 ASSAY OF PHOSPHORUS: CPT | Performed by: STUDENT IN AN ORGANIZED HEALTH CARE EDUCATION/TRAINING PROGRAM

## 2025-05-28 PROCEDURE — 250N000013 HC RX MED GY IP 250 OP 250 PS 637: Performed by: PHYSICIAN ASSISTANT

## 2025-05-28 PROCEDURE — 250N000009 HC RX 250

## 2025-05-28 PROCEDURE — 250N000011 HC RX IP 250 OP 636: Performed by: PODIATRIST

## 2025-05-28 PROCEDURE — 272N000001 HC OR GENERAL SUPPLY STERILE: Performed by: PODIATRIST

## 2025-05-28 PROCEDURE — 250N000013 HC RX MED GY IP 250 OP 250 PS 637: Performed by: STUDENT IN AN ORGANIZED HEALTH CARE EDUCATION/TRAINING PROGRAM

## 2025-05-28 PROCEDURE — 250N000013 HC RX MED GY IP 250 OP 250 PS 637: Performed by: PODIATRIST

## 2025-05-28 PROCEDURE — 250N000013 HC RX MED GY IP 250 OP 250 PS 637: Performed by: INTERNAL MEDICINE

## 2025-05-28 PROCEDURE — 250N000011 HC RX IP 250 OP 636: Performed by: STUDENT IN AN ORGANIZED HEALTH CARE EDUCATION/TRAINING PROGRAM

## 2025-05-28 RX ORDER — LIDOCAINE HYDROCHLORIDE 20 MG/ML
INJECTION, SOLUTION INFILTRATION; PERINEURAL PRN
Status: DISCONTINUED | OUTPATIENT
Start: 2025-05-28 | End: 2025-05-28

## 2025-05-28 RX ORDER — FENTANYL CITRATE 0.05 MG/ML
50 INJECTION, SOLUTION INTRAMUSCULAR; INTRAVENOUS EVERY 5 MIN PRN
Status: DISCONTINUED | OUTPATIENT
Start: 2025-05-28 | End: 2025-05-28 | Stop reason: HOSPADM

## 2025-05-28 RX ORDER — LABETALOL HYDROCHLORIDE 5 MG/ML
10 INJECTION, SOLUTION INTRAVENOUS EVERY 6 HOURS PRN
Status: DISCONTINUED | OUTPATIENT
Start: 2025-05-28 | End: 2025-05-30 | Stop reason: HOSPADM

## 2025-05-28 RX ORDER — SODIUM CHLORIDE, SODIUM LACTATE, POTASSIUM CHLORIDE, CALCIUM CHLORIDE 600; 310; 30; 20 MG/100ML; MG/100ML; MG/100ML; MG/100ML
INJECTION, SOLUTION INTRAVENOUS CONTINUOUS
Status: DISCONTINUED | OUTPATIENT
Start: 2025-05-28 | End: 2025-05-28 | Stop reason: HOSPADM

## 2025-05-28 RX ORDER — AMOXICILLIN 250 MG
1 CAPSULE ORAL 2 TIMES DAILY
Status: DISCONTINUED | OUTPATIENT
Start: 2025-05-28 | End: 2025-05-30 | Stop reason: HOSPADM

## 2025-05-28 RX ORDER — LABETALOL HYDROCHLORIDE 5 MG/ML
10 INJECTION, SOLUTION INTRAVENOUS
Status: DISCONTINUED | OUTPATIENT
Start: 2025-05-28 | End: 2025-05-28 | Stop reason: HOSPADM

## 2025-05-28 RX ORDER — ACETAMINOPHEN 325 MG/1
975 TABLET ORAL EVERY 8 HOURS
Status: DISCONTINUED | OUTPATIENT
Start: 2025-05-28 | End: 2025-05-28

## 2025-05-28 RX ORDER — PROPOFOL 10 MG/ML
INJECTION, EMULSION INTRAVENOUS PRN
Status: DISCONTINUED | OUTPATIENT
Start: 2025-05-28 | End: 2025-05-28

## 2025-05-28 RX ORDER — MAGNESIUM HYDROXIDE 1200 MG/15ML
LIQUID ORAL PRN
Status: DISCONTINUED | OUTPATIENT
Start: 2025-05-28 | End: 2025-05-28 | Stop reason: HOSPADM

## 2025-05-28 RX ORDER — HYDROMORPHONE HCL IN WATER/PF 6 MG/30 ML
0.2 PATIENT CONTROLLED ANALGESIA SYRINGE INTRAVENOUS EVERY 5 MIN PRN
Status: DISCONTINUED | OUTPATIENT
Start: 2025-05-28 | End: 2025-05-28 | Stop reason: HOSPADM

## 2025-05-28 RX ORDER — FENTANYL CITRATE 0.05 MG/ML
25 INJECTION, SOLUTION INTRAMUSCULAR; INTRAVENOUS EVERY 5 MIN PRN
Status: DISCONTINUED | OUTPATIENT
Start: 2025-05-28 | End: 2025-05-28 | Stop reason: HOSPADM

## 2025-05-28 RX ORDER — ONDANSETRON 4 MG/1
4 TABLET, ORALLY DISINTEGRATING ORAL EVERY 30 MIN PRN
Status: DISCONTINUED | OUTPATIENT
Start: 2025-05-28 | End: 2025-05-28 | Stop reason: HOSPADM

## 2025-05-28 RX ORDER — BISACODYL 10 MG
10 SUPPOSITORY, RECTAL RECTAL DAILY PRN
Status: DISCONTINUED | OUTPATIENT
Start: 2025-05-31 | End: 2025-05-30 | Stop reason: HOSPADM

## 2025-05-28 RX ORDER — BUPIVACAINE HYDROCHLORIDE 5 MG/ML
INJECTION, SOLUTION EPIDURAL; INTRACAUDAL; PERINEURAL PRN
Status: DISCONTINUED | OUTPATIENT
Start: 2025-05-28 | End: 2025-05-28 | Stop reason: HOSPADM

## 2025-05-28 RX ORDER — NALOXONE HYDROCHLORIDE 0.4 MG/ML
0.1 INJECTION, SOLUTION INTRAMUSCULAR; INTRAVENOUS; SUBCUTANEOUS
Status: DISCONTINUED | OUTPATIENT
Start: 2025-05-28 | End: 2025-05-28 | Stop reason: HOSPADM

## 2025-05-28 RX ORDER — HYDROMORPHONE HCL IN WATER/PF 6 MG/30 ML
0.4 PATIENT CONTROLLED ANALGESIA SYRINGE INTRAVENOUS EVERY 5 MIN PRN
Status: DISCONTINUED | OUTPATIENT
Start: 2025-05-28 | End: 2025-05-28 | Stop reason: HOSPADM

## 2025-05-28 RX ORDER — CEFAZOLIN SODIUM 1 G/3ML
INJECTION, POWDER, FOR SOLUTION INTRAMUSCULAR; INTRAVENOUS PRN
Status: DISCONTINUED | OUTPATIENT
Start: 2025-05-28 | End: 2025-05-28

## 2025-05-28 RX ORDER — PROPOFOL 10 MG/ML
INJECTION, EMULSION INTRAVENOUS CONTINUOUS PRN
Status: DISCONTINUED | OUTPATIENT
Start: 2025-05-28 | End: 2025-05-28

## 2025-05-28 RX ORDER — HYDRALAZINE HYDROCHLORIDE 20 MG/ML
10 INJECTION INTRAMUSCULAR; INTRAVENOUS EVERY 6 HOURS PRN
Status: DISCONTINUED | OUTPATIENT
Start: 2025-05-28 | End: 2025-05-28

## 2025-05-28 RX ORDER — DEXAMETHASONE SODIUM PHOSPHATE 4 MG/ML
INJECTION, SOLUTION INTRA-ARTICULAR; INTRALESIONAL; INTRAMUSCULAR; INTRAVENOUS; SOFT TISSUE PRN
Status: DISCONTINUED | OUTPATIENT
Start: 2025-05-28 | End: 2025-05-28

## 2025-05-28 RX ORDER — DEXAMETHASONE SODIUM PHOSPHATE 4 MG/ML
4 INJECTION, SOLUTION INTRA-ARTICULAR; INTRALESIONAL; INTRAMUSCULAR; INTRAVENOUS; SOFT TISSUE
Status: DISCONTINUED | OUTPATIENT
Start: 2025-05-28 | End: 2025-05-28 | Stop reason: HOSPADM

## 2025-05-28 RX ORDER — ONDANSETRON 2 MG/ML
4 INJECTION INTRAMUSCULAR; INTRAVENOUS EVERY 30 MIN PRN
Status: DISCONTINUED | OUTPATIENT
Start: 2025-05-28 | End: 2025-05-28 | Stop reason: HOSPADM

## 2025-05-28 RX ORDER — LIDOCAINE 40 MG/G
CREAM TOPICAL
Status: DISCONTINUED | OUTPATIENT
Start: 2025-05-28 | End: 2025-05-28

## 2025-05-28 RX ORDER — POLYETHYLENE GLYCOL 3350 17 G/17G
17 POWDER, FOR SOLUTION ORAL DAILY
Status: DISCONTINUED | OUTPATIENT
Start: 2025-05-29 | End: 2025-05-30 | Stop reason: HOSPADM

## 2025-05-28 RX ADMIN — HYDRALAZINE HYDROCHLORIDE 100 MG: 50 TABLET ORAL at 08:32

## 2025-05-28 RX ADMIN — AMPICILLIN SODIUM AND SULBACTAM SODIUM 3 G: 2; 1 INJECTION, POWDER, FOR SOLUTION INTRAMUSCULAR; INTRAVENOUS at 16:45

## 2025-05-28 RX ADMIN — LISINOPRIL 10 MG: 10 TABLET ORAL at 08:32

## 2025-05-28 RX ADMIN — METOPROLOL TARTRATE 50 MG: 50 TABLET, FILM COATED ORAL at 20:44

## 2025-05-28 RX ADMIN — ONDANSETRON 4 MG: 2 INJECTION INTRAMUSCULAR; INTRAVENOUS at 13:43

## 2025-05-28 RX ADMIN — ACETAMINOPHEN 975 MG: 325 TABLET, FILM COATED ORAL at 16:43

## 2025-05-28 RX ADMIN — ABIRATERONE ACETATE 1000 MG: 250 TABLET ORAL at 08:25

## 2025-05-28 RX ADMIN — SODIUM CHLORIDE, SODIUM LACTATE, POTASSIUM CHLORIDE, AND CALCIUM CHLORIDE: .6; .31; .03; .02 INJECTION, SOLUTION INTRAVENOUS at 13:31

## 2025-05-28 RX ADMIN — DEXMEDETOMIDINE HYDROCHLORIDE 8 MCG: 100 INJECTION, SOLUTION INTRAVENOUS at 13:45

## 2025-05-28 RX ADMIN — AMLODIPINE BESYLATE 10 MG: 10 TABLET ORAL at 08:24

## 2025-05-28 RX ADMIN — INSULIN ASPART 1 UNITS: 100 INJECTION, SOLUTION INTRAVENOUS; SUBCUTANEOUS at 17:27

## 2025-05-28 RX ADMIN — AMPICILLIN SODIUM AND SULBACTAM SODIUM 3 G: 2; 1 INJECTION, POWDER, FOR SOLUTION INTRAMUSCULAR; INTRAVENOUS at 22:09

## 2025-05-28 RX ADMIN — CEFAZOLIN 2 G: 1 INJECTION, POWDER, FOR SOLUTION INTRAMUSCULAR; INTRAVENOUS at 13:31

## 2025-05-28 RX ADMIN — OXYCODONE HYDROCHLORIDE 5 MG: 5 TABLET ORAL at 06:04

## 2025-05-28 RX ADMIN — ACETAMINOPHEN 975 MG: 325 TABLET, FILM COATED ORAL at 08:23

## 2025-05-28 RX ADMIN — FAMOTIDINE 40 MG: 20 TABLET, FILM COATED ORAL at 20:44

## 2025-05-28 RX ADMIN — OXYCODONE HYDROCHLORIDE 5 MG: 5 TABLET ORAL at 01:40

## 2025-05-28 RX ADMIN — OXYCODONE HYDROCHLORIDE 5 MG: 5 TABLET ORAL at 10:01

## 2025-05-28 RX ADMIN — HYDRALAZINE HYDROCHLORIDE 100 MG: 50 TABLET ORAL at 16:42

## 2025-05-28 RX ADMIN — OXYCODONE HYDROCHLORIDE 5 MG: 5 TABLET ORAL at 20:49

## 2025-05-28 RX ADMIN — AMPICILLIN SODIUM AND SULBACTAM SODIUM 3 G: 2; 1 INJECTION, POWDER, FOR SOLUTION INTRAMUSCULAR; INTRAVENOUS at 04:07

## 2025-05-28 RX ADMIN — OXYCODONE HYDROCHLORIDE 5 MG: 5 TABLET ORAL at 16:42

## 2025-05-28 RX ADMIN — CITALOPRAM HYDROBROMIDE 20 MG: 20 TABLET ORAL at 08:24

## 2025-05-28 RX ADMIN — LIDOCAINE HYDROCHLORIDE 40 MG: 20 INJECTION, SOLUTION INFILTRATION; PERINEURAL at 13:38

## 2025-05-28 RX ADMIN — PREDNISONE 5 MG: 5 TABLET ORAL at 17:26

## 2025-05-28 RX ADMIN — AMPICILLIN SODIUM AND SULBACTAM SODIUM 3 G: 2; 1 INJECTION, POWDER, FOR SOLUTION INTRAMUSCULAR; INTRAVENOUS at 10:01

## 2025-05-28 RX ADMIN — SENNOSIDES AND DOCUSATE SODIUM 1 TABLET: 50; 8.6 TABLET ORAL at 20:44

## 2025-05-28 RX ADMIN — METOPROLOL TARTRATE 50 MG: 50 TABLET, FILM COATED ORAL at 08:32

## 2025-05-28 RX ADMIN — HYDRALAZINE HYDROCHLORIDE 100 MG: 50 TABLET ORAL at 22:08

## 2025-05-28 RX ADMIN — DEXAMETHASONE SODIUM PHOSPHATE 4 MG: 4 INJECTION, SOLUTION INTRA-ARTICULAR; INTRALESIONAL; INTRAMUSCULAR; INTRAVENOUS; SOFT TISSUE at 13:43

## 2025-05-28 RX ADMIN — ACETAMINOPHEN 975 MG: 325 TABLET, FILM COATED ORAL at 23:00

## 2025-05-28 RX ADMIN — CLOPIDOGREL BISULFATE 75 MG: 75 TABLET, FILM COATED ORAL at 08:24

## 2025-05-28 RX ADMIN — FAMOTIDINE 40 MG: 20 TABLET, FILM COATED ORAL at 08:24

## 2025-05-28 RX ADMIN — ATORVASTATIN CALCIUM 20 MG: 20 TABLET, FILM COATED ORAL at 08:24

## 2025-05-28 RX ADMIN — LISINOPRIL 10 MG: 10 TABLET ORAL at 20:44

## 2025-05-28 RX ADMIN — ENOXAPARIN SODIUM 40 MG: 40 INJECTION SUBCUTANEOUS at 20:44

## 2025-05-28 RX ADMIN — PROPOFOL 30 MG: 10 INJECTION, EMULSION INTRAVENOUS at 13:41

## 2025-05-28 RX ADMIN — PROPOFOL 130 MCG/KG/MIN: 10 INJECTION, EMULSION INTRAVENOUS at 13:39

## 2025-05-28 RX ADMIN — PROPOFOL 70 MG: 10 INJECTION, EMULSION INTRAVENOUS at 13:38

## 2025-05-28 RX ADMIN — ACETAMINOPHEN 975 MG: 325 TABLET, FILM COATED ORAL at 01:39

## 2025-05-28 ASSESSMENT — ACTIVITIES OF DAILY LIVING (ADL)
ADLS_ACUITY_SCORE: 62
ADLS_ACUITY_SCORE: 61
ADLS_ACUITY_SCORE: 62
ADLS_ACUITY_SCORE: 61
ADLS_ACUITY_SCORE: 62

## 2025-05-28 NOTE — PROVIDER NOTIFICATION
MD Notification    Notified Person: MD    Notified Person Name: NOHEMI LIVINGSTON      Notification Date/Time: 05/27/2025, 2043    Notification Interaction: Vocera web     Purpose of Notification: Pt is scheduled to have a procedure 05/28, should I give 2100 Lovenox?     Orders Received: Hold medication     Comments:

## 2025-05-28 NOTE — BRIEF OP NOTE
[facility]    Brief Operative Note    Pre-operative diagnosis: Other acute osteomyelitis of right foot (H) [M86.171]  Post-operative diagnosis Same as pre-operative diagnosis    Procedure: Right foot fifth ray resection, Right - Toe    Surgeon: Surgeons and Role:     * Beatriz Lomax DPM - Primary  Anesthesia: MAC   Estimated Blood Loss: 2 ml     Drains: None  Specimens:   ID Type Source Tests Collected by Time Destination   1 : right 5th ray Tissue Toe, Right SURGICAL PATHOLOGY EXAM Beatriz Lomax DPM 5/28/2025  1:52 PM    A : right 5th ray swab Swab Toe, Right ANAEROBIC BACTERIAL CULTURE ROUTINE, GRAM STAIN, AEROBIC BACTERIAL CULTURE ROUTINE Beatriz Lomax DPM 5/28/2025  1:58 PM      Findings:   Resection of fifth ray at mid shaft of metatarsal. Residual tissue appeared viable. Less than expected bleeding for procedure.  Complications: None.  Implants: * No implants in log *

## 2025-05-28 NOTE — PROGRESS NOTES
[facility]    Medicine Progress Note - Hospitalist Service    Date of Admission:  5/21/2025    Assessment & Plan     Sushil Diana is a 65 year old male  Past medical history significant for PAD, HTN, HLP, Chronic combined (diastolic and systolic) heart failure, DM2, GERD, MDD with anxiety, Alcohol use D/O, Metastatic prostate cancer with mets to the bone admitted on 5/21/25 due to acute osteomyelitis of right 5th proximal phalanx and metatarsal head.       Patient had been admitted at [facility] due to right foot pain secondary to chronic diabetic ulceration with concern for osteomyelitis and sepsis.  Workup completed at [facility] included a right lower extremity/foot MRI that confirmed acute osteomyelitis.  Podiatry had been following the patient. Patient was transferred to [facility] to undergo vascular surgery assessment, continued podiatry assessment and likely proceeding for surgical intervention. Patient was receiving IV Vancomycin and IV Zosyn. Underwent IR right lower extremity angiogram 5/27. Going to OR with podiatry on 5/28.      Right 5th proximal phalanx and metatarsal head osteomyelitis  Chronic right foot diabetic wound with cellulitis  PAD  5/20 MRI right foot acute osteomyelitis 5th prox phalanx and 5th metatarsal head and potentil fifth metatarsal diaphysis (outside hospital)   Patient transferred from [facility] with diagnosis of acute osteomyelitis of the right fifth proximal phalanx and metatarsal head.  Initial presentation with right foot pain and noted diabetic ulceration with concern for osteomyelitis and sepsis.  Right lower extremity foot MRI confirmed acute osteomyelitis.  Started on Vanco and Zosyn (MRSA swab negative)   5/22 podiatry consult: Right foot fifth digit metatarsal head with osteomyelitis and peripheral artery disease.  MRI noting osteomyelitis.  S/p angiogram on 5/27, plan for OR with podiatry on 5/28, NPO  Continue antibiotics with  vascular assessment as below  ID consult on unasyn (culture in OR) >> stopped vanco and zosyn     Peripheral vascular disease  PAD  Mid right SFA occlusion   Plans for lower extremity angiogram 5/27 on Tuesday with Dr. Bertrand  5/20 lower extremity duplex showing occlusion in the mid right SFA with monophasic waveforms.  Suspected stenosis in the left distal SFA given transition of waveforms may be multifocal areas but suspect femoral-popliteal distribution greatest amount of disease  5/20 ABIs right monophasic noncompressible 0.64 and left noncompressible tibial and monophasic DP 1.33 and PT 1.57  5/22 vascular consult, underwent RLE angiogram on 5/27     Hyponatremia, acute on chronic, improving  Hydrochlorothiazide and +/- SIADH + hx ETOH   Sodium ranges between 120-135.     Hold PTA hydrochlorothiazide 25 mg/d.    Was on 1500 ml fluid restriction, increased fluid restriction to 1800 ml on 5/28  Urine sodium 107 with urine osmolality 343 on 5/22 5/23 stopped hydrochlorothiazide (would not resume on discharge)      Hypokalemia  Hypophosphatemia  Hypomagnesium   -Replace per protocol      CINDY; resolved   Baseline creatinine around 1, noted creatinine was up to 1.76.   Improved to baseline with IVF now on FR 2/2 to hyponatremia      Anemia, acute on chronic normocytic  *Baseline HGB prior to admission 9-11.3.    - Hemoglobin has trended down but fairly stable at 8 presents now.  No sign of bleeding.  < 1g drop to 7.9 from 8.7. Asymptomatic. Will repeat CBC in am prior to angiogram   Checked iron stores, B12 and folate  5/24 some degree anemia for awhile 2024.   Followed by oncology      HTN  Resumed on PTA amlodipine 10 mg/d, lopressor 50 mg BID and hydralazine 100 mg TID.  Hold parameters in place.    Continue to hold PTA hydrochlorothiazide 25 mg/d, not resuming given hyponatremia.    SBP's in 140s, since creatinine has improved, resume lisinopril at lower dose and monitor.     PRN IV labetalol 10 mg every 6  hours for SBP GREATER THAN 180.    5/23 Zestril started at 10 mg p.o. daily (reported to be on 40 mg prior to admission) >> increase to 10 mg po BID 5/24      HLP  Resumed on PTA atorvastatin 20 mg/d.       Chronic combined (diastolic and systolic) heart failure  History of cardiomyopathy 8/2019   ECHO 5/24 with normal EF and 1+ MR   Managing PTA HCTZ and lisinopril as above  Resumed on PTA lopressor 50 mg BID.    Monitor daily weights, intake and output as able.    Combination cardiac diet.    8/2019 noted in epic to have mild to moderate concentric LVH with grade 1 early diastolic dysfunction.  Moderately reduced LV function 30 to 35% with global hypokinesia.  1+ MR  5/24 ECHO now with EF 65-70% RV normal. 1+ mitral regurg  Patient and wife updated per hospitalist partner           Type 2 DM   PTA regimen includes: Metformin 1000 mg daily with lunch and glipizide 5 mg every morning.    Hold PTA metformin or glipizide.  Resume at discharge.    Medium intensity sliding scale insulin ordered.  Hypoglycemic protocol in place.    add NovoLog per carb count + sliding scale >> stable      GERD  - Resumed on PTA Pepcid 40 mg BID.       Major depressive disorder with anxiety  - Resumed on PTA Celexa 20 mg/d.     Alcohol use D/O  *3-4 beers per day reported as typical consumption.    Had been at [facility] 5/19.    - CIWA monitoring ordered.          Metastatic prostate cancer with mets to the bone  - Resumed on PTA Zytiga 1000 mg/d.    - Resumed on PTA prednisone 5 mg/d.   - Per prior hospitalist partner oncology was curb sided and ok to continue with current regimen             Diet: Fluid restriction 1800 ML FLUID  NPO for Medical/Clinical Reasons Except for: Meds    DVT Prophylaxis: On plavix, Lovenox  Alfred Catheter: Not present  Lines: None     Cardiac Monitoring: None  Code Status: Full Code      Clinically Significant Risk Factors                   # Hypertension: Noted on problem list    # Chronic  heart failure with preserved ejection fraction: heart failure noted on problem list and last echo with EF >50%                      Social Drivers of Health    Tobacco Use: High Risk (12/26/2024)    Patient History     Smoking Tobacco Use: Some Days     Smokeless Tobacco Use: Never     Passive Exposure: Current   Physical Activity: Insufficiently Active (5/1/2024)    Exercise Vital Sign     Days of Exercise per Week: 2 days     Minutes of Exercise per Session: 10 min   Social Connections: Unknown (5/1/2024)    Social Connection and Isolation Panel [NHANES]     Frequency of Social Gatherings with Friends and Family: Once a week          Disposition Plan     Medically Ready for Discharge: Anticipated in 2-4 Days         Javed Vera, DO  Hospitalist Service  [facility]  Securely message with [employer] (more info)  Text page via [employer] Paging/Directory   ______________________________________________________________________    Interval History     - No acute events overnight   - Patient reports to be doing fair  - Pain currently under control  - Plan for OR today at 1330   - No acute events overnight     Physical Exam   Vital Signs: Temp: 97.9  F (36.6  C) Temp src: Oral BP: (!) 169/82 Pulse: 83   Resp: 16 SpO2: 99 % O2 Device: None (Room air) Oxygen Delivery: 1 LPM  Weight: 156 lbs 8.43 oz    Constitutional: Somnolent but opens eyes to voice   Eyes: Lids and lashes normal, pupils equal, round and reactive to light, extra ocular muscles intact, sclera clear, conjunctiva normal  ENT: Normocephalic, without obvious abnormality, atraumatic   Respiratory: No increased work of breathing, good air exchange, clear to auscultation bilaterally, no crackles or wheezing  Cardiovascular: Normal apical impulse, regular rate and rhythm, normal S1 and S2, no S3 or S4, and no murmur noted  GI: No scars, normal bowel sounds, soft, non-distended, non-tender, no masses palpated, no hepatosplenomegally  Skin: No  redness, warmth, or swelling   Musculoskeletal: No deformities   Neurologic: Sleeping, wakes up to voice and follows commands     Medical Decision Making       42 MINUTES SPENT BY ME on the date of service doing chart review, history, exam, documentation & further activities per the note.      Data   ------------------------- PAST 24 HR DATA REVIEWED -----------------------------------------------    I have personally reviewed the following data over the past 24 hrs:    9.3  \   7.9 (L)   / 180     136 103 9.0 /  104 (H)   3.6 23 0.79 \       Imaging results reviewed over the past 24 hrs:   Recent Results (from the past 24 hours)   IR Lower Extremity Angiogram Right    Narrative    Preprocedure diagnosis: Right lower extremity critical limb ischemia.    Postprocedure diagnosis: Same.    Procedure:  1.  Ultrasound-guided left common femoral artery access placement.  2.  Aortoiliac arteriogram.  3.  Selective catheterization of right common iliac, external iliac,  common femoral, superficial femoral, popliteal, tibioperoneal trunk  and peroneal arteries.  4.  Right lower extremity arteriogram with runoff.  5.  Balloon angioplasty of right distal superficial femoral and  proximal popliteal artery using 5 mm x 20 cm angioplasty balloon with  completion arteriogram.  6.  Balloon angioplasty of right distal superficial femoral and  proximal popliteal artery using 6 mm x 12 cm angioplasty balloon with  completion arteriogram.  7.  Balloon angioplasty of mid superficial femoral artery using 6 mm x  12 cm angioplasty balloon with completion arteriogram.  8.  Left common femoral arteriogram with access site closure using 6  Welsh Angio-Seal device.    Surgeon: Brenton Bertrand M.D.     Sedation: Patient received 3 mg of intravenous Versed.  Patient  received 125 mcg of intravenous fentanyl.  Medication was administered  under my direct supervision by registered nurse in the department of  interventional radiology.  Patient was  continuously monitored.  Sedation time: 62 minutes.  Fluoroscopy time: 20.2 minutes  Fluoroscopy dose: 91.41 mGY  Heparin: 8000 units.  Protamine: 25 mg.    Contrast: 130 mls visipaque    Indication for procedure: This is a 65-year-old gentleman with right  lower extremity critical limb ischemia.  Arteriogram is advised for  limb salvage.    Procedure details: Patient was identified and then taken to the  procedure room and placed in supine position.  Both groins were  prepped in a sterile surgical field was created.  Preprocedure timeout  was conducted.  The left common femoral artery was located anterior to  the left femoral head by way of fluoroscopy.  It was further localized  with ultrasonography.  Images were stored.    Local anesthetic was administered in the left groin and the left  common femoral artery was accessed with sonographic guidance using a  micropuncture needle.  This was done in retrograde fashion.  Microwire  was placed.  Then we converted to a 0.035 inch wire platform.  Short 5  Eritrean sheath was placed.  Omni Flush catheter was advanced into the  distal aorta.  Aortoiliac arteriogram was performed.  Distal aorta,  both common iliac arteries and external iliac arteries were patent.   Both hypogastric arteries had mild to moderate disease at origins.   Wire and catheter were advanced into the distal right external iliac  artery.  Arteriogram showed that the right common femoral artery, deep  femoral artery and proximal superficial femoral arteries were patent.   Mid to distal superficial femoral artery had high-grade stenosis with  short segment occlusion at the Joel's canal.  It reconstituted as a  second segment popliteal artery.  The anterior tibial and posterior  tibial arteries were occluded shortly after their origin.  Peroneal  artery was the only runoff.  Mid peroneal artery had a short segment  occlusion but there was a sizable collaterals that reconstituted the  mid to distal  peroneal artery.  Peroneal artery provided perforating  branch to reconstitute the distal anterior tibial artery and  communicating branch to reconstitute the distal posterior tibial  arteries.    8000 units of heparin were given.  We switched over to a 6 Bahamian  crossover sheath which was positioned in the proximal right  superficial femoral artery.  The area of the occlusion and tandem  stenoses in the superficial femoral artery/proximal popliteal artery  were traversed with an angled Matthew cross catheter and 0.035 inch  angled Glidewire.  Catheter was advanced into the third segment of the  popliteal artery.  Arteriogram confirmed intraluminal position.  Then  we switched over to a 0.035 inch Maria wire.  The second segment  popliteal, first segment popliteal and distal superficial femoral  arteries were first treated with a 5 mm x 20 cm angioplasty balloon.   This balloon was inflated to 8 diane for 1 minute.  Balloon was deflated  and removed.  Arteriogram showed significant improvement without  evidence of perforation or embolization.  There was a short segment  dissection in the P1 segment.  Matthew cross catheter was positioned in  the distal popliteal artery and a V18 0.018 inch wire was advanced  into the peroneal artery.  I could not traverse the area of the  occlusion of the peroneal artery with a V18 wire and 0.018 inch  [place] catheter.  However I did not want to persist in that area  because the collateral was sizable and reconstituted the mid to distal  peroneal artery with good flow.    Then I treated the first segment of the popliteal artery and the  distal superficial femoral artery with a 6 mm x 12 cm drug-coated  balloon.  This balloon was inflated to 8 diane for 3 minutes.  There was  another more proximal short segment stenosis in the mid superficial  femoral artery which was treated with the same 6 mm x 12 cm  angioplasty balloon which was used here as a plain balloon.   Completion arteriogram  showed inline flow from the superficial femoral  to popliteal artery with flow into the peroneal artery which  reconstituted the anterior and posterior tibial arteries distally.    Procedure was concluded.  Left common femoral arteriogram was  performed and access site was assessed for closure device.  25 mg of  protamine was given.  Then the access site was closed with a 6 Kinyarwanda  Angio-Seal device.  Following deployment of the Angio-Seal device  ultrasonography was performed which showed that the footplate was  well-positioned.  There was excellent flow in the proximal left common  femoral, proximal superficial femoral and deep femoral arteries.    Patient was taken to his room in stable condition.    ANIL HIGGINS MD         SYSTEM ID:  [id number]

## 2025-05-28 NOTE — ANESTHESIA POSTPROCEDURE EVALUATION
Patient: Sushil Diana    Procedure: Procedure(s):  Right foot fifth ray resection       Anesthesia Type:  MAC    Note:  Disposition: Inpatient   Postop Pain Control: Uneventful            Sign Out: Well controlled pain   PONV: No   Neuro/Psych: Uneventful            Sign Out: Acceptable/Baseline neuro status   Airway/Respiratory: Uneventful            Sign Out: Acceptable/Baseline resp. status   CV/Hemodynamics: Uneventful            Sign Out: Acceptable CV status; No obvious hypovolemia; No obvious fluid overload   Other NRE: NONE   DID A NON-ROUTINE EVENT OCCUR? No           Last vitals:  Vitals Value Taken Time   /59 05/28/25 15:00   Temp 31.1  C (87.98  F) 05/28/25 15:09   Pulse 70 05/28/25 15:06   Resp 13 05/28/25 15:06   SpO2 90 % 05/28/25 15:06   Vitals shown include unfiled device data.    Electronically Signed By: Juan Florence DO  May 28, 2025  6:36 PM

## 2025-05-28 NOTE — ANESTHESIA PREPROCEDURE EVALUATION
Anesthesia Pre-Procedure Evaluation    Patient: Sushil Diana   MRN: [id number] : 1960          Procedure : Procedure(s):  Right foot fifth ray resection         Past Medical History:   Diagnosis Date    Cancer (H)     Congestive heart failure (H)     Diabetes (H)     GERD (gastroesophageal reflux disease)     Gout     Hypertension       Past Surgical History:   Procedure Laterality Date    IR LOWER EXTREMITY ANGIOGRAM RIGHT  2025    NONE OF THE ABOVE CORE MEASURE DIAGNOSES        Allergies   Allergen Reactions    No Known Drug Allergy     Seasonal Allergies       Social History     Tobacco Use    Smoking status: Some Days     Current packs/day: 1.00     Average packs/day: 1 pack/day for 28.0 years (28.0 ttl pk-yrs)     Types: Cigarettes     Passive exposure: Current    Smokeless tobacco: Never    Tobacco comments:     quit 2018   Substance Use Topics    Alcohol use: Yes     Comment: occasional      Wt Readings from Last 1 Encounters:   25 71 kg (156 lb 8.4 oz)        Prior to Admission medications    Medication Sig Start Date End Date Taking? Authorizing Provider   amLODIPine (NORVASC) 10 MG tablet TAKE ONE TABLET BY MOUTH ONCE DAILY 25  Yes Mark Matson DO   atorvastatin (LIPITOR) 20 MG tablet TAKE ONE TABLET BY MOUTH ONCE DAILY 24  Yes Chico Zuniga MD   citalopram (CELEXA) 20 MG tablet Take 1 tablet (20 mg) by mouth daily. 3/18/25  Yes Mark Matson DO   famotidine (PEPCID) 40 MG tablet Take 40 mg by mouth 2 times daily as needed for heartburn.   Yes Reported, Patient   glipiZIDE (GLUCOTROL) 5 MG tablet TAKE ONE TABLET BY MOUTH EVERY MORNING 25  Yes Mark Matson DO   hydrALAZINE (APRESOLINE) 100 MG tablet Take 100 mg by mouth 3 times daily.   Yes Reported, Patient   hydrochlorothiazide (HYDRODIURIL) 25 MG tablet TAKE ONE TABLET BY MOUTH ONCE DAILY 25  Yes Mark Matson DO   lisinopril (ZESTRIL) 40 MG tablet  TAKE ONE TABLET BY MOUTH ONCE DAILY 1/27/25  Yes Mark Matson DO   metFORMIN (GLUCOPHAGE XR) 500 MG 24 hr tablet Take 1,000 mg by mouth daily (with lunch).   Yes Reported, Patient   metoprolol tartrate (LOPRESSOR) 50 MG tablet Take 50 mg by mouth 2 times daily.   Yes Reported, Patient   predniSONE (DELTASONE) 5 MG tablet Take 1 tablet (5 mg) by mouth daily. START WITH ZYTIGA 9/26/24  Yes Eduardo Holliday MD   zoledronic acid (ZOMETA) 4 MG/5ML injection Inject 5 mLs into the vein once. Every 3 months   Yes Reported, Patient   abiraterone (ZYTIGA) 250 MG tablet Take 4 tablets (1,000 mg) by mouth daily for 30 doses. Take on empty stomach. 5/27/25 6/26/25  Eduardo Holliday MD   blood glucose (CONTOUR NEXT TEST) test strip USE TO TEST BLOOD SUGARS ONE TO THREE TIMES DAILY  OR AS DIRECTED 4/14/25   Mark Matson DO   blood glucose monitoring (NO BRAND SPECIFIED) meter device kit Use to test blood sugar 1-3 times daily or as directed. -Contour Next meter 12/15/20   Mark Matson DO   Microlet Lancets MISC USE TO TEST BLOOD SUGAR 1-3 TIMES DAILY OR AS DIRECTED. 6/24/24   Mark Matson DO     ECHO 5/21/25: Left Ventricle  The left ventricle is normal in size. There is mild concentric left  ventricular hypertrophy. Left ventricular systolic function is borderline  hyperdynamic. The visual ejection fraction is 65-70%. Left ventricular  diastolic function is normal. No regional wall motion abnormalities noted.     Right Ventricle  The right ventricle is normal in size and function.     Atria  The left atrium is moderately dilated. Right atrial size is normal. There is  no color Doppler evidence of an atrial shunt.     Mitral Valve  There is mild (1+) mitral regurgitation.     Tricuspid Valve  There is trace tricuspid regurgitation. The right ventricular systolic  pressure is approximated at 25.0 mmHg plus the right atrial pressure.     Aortic Valve  The aortic  valve is normal in structure and function.     Pulmonic Valve  The pulmonic valve is not well seen, but is grossly normal.     Vessels  The aortic root is normal size. Normal size ascending aorta. IVC is less than  1cm in diameter with near complete collapse with inspiration, suggesting  relative hypovolemia.     Pericardium  There is no pericardial effusion.     Anesthesia Evaluation            ROS/MED HX  ENT/Pulmonary:       Neurologic:       Cardiovascular:     (+) Dyslipidemia hypertension- Peripheral Vascular Disease-   -  - -      CHF                                METS/Exercise Tolerance:     Hematologic:       Musculoskeletal:       GI/Hepatic:     (+) GERD,                   Renal/Genitourinary:     (+) renal disease, type: CRI,            Endo:     (+)  type II DM,                    Psychiatric/Substance Use:     (+) psychiatric history anxiety and depression alcohol abuse      Infectious Disease: Comment: osteomyelitis      Malignancy:   (+) Malignancy, History of Prostate.    Other:              Physical Exam  Airway  Mallampati: II    Cardiovascular    Dental   (+) Minor Abnormalities - some fillings, tiny chips      Pulmonary       Neurological   Other Findings       OUTSIDE LABS:  CBC:   Lab Results   Component Value Date    WBC 9.3 05/28/2025    WBC 9.5 05/27/2025    HGB 7.9 (L) 05/28/2025    HGB 8.6 (L) 05/27/2025    HCT 24.0 (L) 05/28/2025    HCT 25.0 (L) 05/27/2025     05/28/2025     05/27/2025     BMP:   Lab Results   Component Value Date     05/28/2025     05/27/2025    POTASSIUM 3.6 05/28/2025    POTASSIUM 3.6 05/27/2025    CHLORIDE 103 05/28/2025    CHLORIDE 102 05/27/2025    CO2 23 05/28/2025    CO2 22 05/27/2025    BUN 9.0 05/28/2025    BUN 9.4 05/27/2025    CR 0.79 05/28/2025    CR 0.79 05/27/2025     (H) 05/28/2025     (H) 05/28/2025     COAGS:   Lab Results   Component Value Date    PTT 30 08/12/2018    INR 1.12 08/12/2018     POC:   Lab Results    Component Value Date     (H) 08/13/2018     HEPATIC:   Lab Results   Component Value Date    ALBUMIN 3.5 05/21/2025    PROTTOTAL 6.4 05/19/2025    ALT 24 05/19/2025    AST 82 (H) 05/19/2025    ALKPHOS 74 05/19/2025    BILITOTAL 1.0 05/19/2025     OTHER:   Lab Results   Component Value Date    LACT 0.6 (L) 05/21/2025    A1C 5.9 (H) 03/20/2025    ROBERT 8.5 (L) 05/28/2025    PHOS 3.1 05/28/2025    MAG 1.8 05/28/2025    TSH 2.98 05/19/2025    T4 1.41 08/12/2018    CRP 14.1 (H) 08/12/2018    SED 49 (H) 05/19/2025       Anesthesia Plan    ASA Status:  3      NPO Status: NPO Appropriate   Anesthesia Type: MAC.   Techniques and Equipment:       - Monitoring Plan: standard ASA monitoring     Consents    Anesthesia Plan(s) and associated risks, benefits, and realistic alternatives discussed. Questions answered and patient/representative(s) expressed understanding.     - Discussed: anesthesiologist     - Discussed with:  Patient          Blood Consent:      - Discussed with: not discussed.     Postoperative Care    Pain management: plan for postoperative opioid use, multimodal analgesia.     Comments:                   Joann Zelaya MD    I have reviewed the pertinent notes and labs in the chart from the past 30 days and (re)examined the patient.  Any updates or changes from those notes are reflected in this note.    Clinically Significant Risk Factors                   # Hypertension: Noted on problem list  # Chronic heart failure with preserved ejection fraction: heart failure noted on problem list and last echo with EF >50%

## 2025-05-28 NOTE — PLAN OF CARE
Goal Outcome Evaluation: Progressing    5/21/25 admit, Right 5th proximal phalanx and metatarsal head osteomyelitis, POD 0 R Foot 5th Ray resection  5/28/25, 7 a to 7 p     Orientation: Slow to respond, Intermittent confusion     Vitals/Tele: VSS on RA, multimodal management for pain     IV Access/drains: R PIV SL     Diet: Mod carb, 1800 FR, Mod carb diet tolerating     Mobility: A1 GB and W, Heel WB     GI/: Continent     Wound/Skin: R 5th toe dressing dressing CDI     Consults: Podiatry, Vascular     Discharge Plan: TBD

## 2025-05-28 NOTE — PROGRESS NOTES
"CLINICAL NUTRITION SERVICES - ASSESSMENT NOTE    Registered Dietitian Interventions:  - Verbal education and literature of increasing protein intake   - Consider nutrition supplements (Ensure, Premier Protein, Muscle Milk, etc) when appetite is low  - AND's Choose Your Foods list for diabetes  - Small frequent meals       REASON FOR ASSESSMENT  RIK Diana is a 65 year old male  Past medical history significant for PAD, HTN, HLP, Chronic combined (diastolic and systolic) heart failure, DM2, GERD, MDD with anxiety, Alcohol use D/O, Metastatic prostate cancer with mets to the bone admitted on 5/21/25 due to acute osteomyelitis of right 5th proximal phalanx and metatarsal head.     INFORMATION OBTAINED  Assessed patient in room.    NUTRITION HISTORY  Met with Pt and Pt's significant other, Angelina, at bedside this morning. Pt does not endorse weight loss and his appetite has improved. Angelina stated that PTA he did not eat much for 4 days and now he is \"clearing his plate\" - note flowsheets document 25% intake for most meals. He typically eats 2-3 meals a day and snacks. Recommended small frequent meals and protein at every meal to promote wound healing. Suggested protein drinks when appetite is low. Angelina asked of more information r/t CHO counting - provided verbal and handouts on CHO counting and tips for increasing protein intake.       CURRENT NUTRITION ORDERS  Diet: NPO  Snacks/Supplements: None      CURRENT INTAKE/TOLERANCE  Prior to NPO pt ordering 3 meals with good variety  25% of most documented meals, appetite fair, \"snack\" noted   5/27  533 kcal and 36 g pro  5/26 1621 kcal and 96 g pro  5/25  1371 kcal and 69 g pro      LABS  Nutrition-relevant labs: Hemoglobin 7.9, Electrolytes replaced due to hypo -kalimia, -phospatemia, -magnesium    MEDICATIONS  Nutrition-relevant medications: norvasc 10 mg daily, pepcid, insulin TID with meals, insulin rapid acting at bed, lopressor, " "unsyn    ANTHROPOMETRICS  Height: 172.7 cm (5' 8\")  Admission Weight: 77.8 kg (171 lb 8.3 oz) (05/23/25 0600)   Most Recent Weight: 71 kg (156 lb 8.4 oz) (05/28/25 0159)  IBW: 68.4 kg (150 lb 12.7  kg)  BMI: Body mass index is 23.8 kg/m .   Weight History:   Wt Readings from Last 10 Encounters:   05/28/25 71 kg (156 lb 8.4 oz)   05/19/25 74.4 kg (164 lb 0.4 oz)   03/28/25 72.2 kg (159 lb 1.6 oz)   03/20/25 72.2 kg (159 lb 1 oz)   12/30/24 75.2 kg (165 lb 11.2 oz)   12/26/24 72.6 kg (160 lb)   09/25/24 74.5 kg (164 lb 4.8 oz)   06/26/24 74.5 kg (164 lb 3.2 oz)   05/07/24 75.2 kg (165 lb 12.8 oz)   04/24/24 75.5 kg (166 lb 6.1 oz)       Dosing Weight: 71 kg, based on actual wt    ASSESSED NUTRITION NEEDS  Estimated Energy Needs: 0026-5156 kcals/day (25 - 30 kcals/kg)  Justification: Maintenance  Estimated Protein Needs:  grams protein/day (1.2 - 1.5 grams of pro/kg)  Justification: Increased needs, Maintenance, and Wound healing  Estimated Fluid Needs: 1800 or total body fluids per MD  Justification: On a fluid restriction    SYSTEM AND PHYSICAL FINDINGS       GI symptoms: Reviewed LB 5/27  Skin/wounds: Chronic right foot diabetic wound with cellulitis     MALNUTRITION  % Intake: No decreased intake noted - Pt is back to baseline  % Weight Loss: None noted  Subcutaneous Fat Loss: None observed  Muscle Loss: None observed  Fluid Accumulation/Edema: None noted  Malnutrition Diagnosis: Patient does not meet two of the established criteria necessary for diagnosing malnutrition but is at risk for malnutrition  Malnutrition Present on Admission: No    NUTRITION DIAGNOSIS  Increased nutrient needs protein related to delayed wound healing right foot as evidenced by deep skin ulcer overlying the fifth MTP joint with surrounding cellulitis.     INTERVENTIONS  See nutrition interventions above    GOALS  Total avg nutritional intake to meet a minimum of 1775 kcal/kg and 85 g PRO/kg daily (per dosing wt 71 kg).   "   MONITORING/EVALUATION  Progress toward goals will be monitored and evaluated per policy.

## 2025-05-28 NOTE — ANESTHESIA CARE TRANSFER NOTE
Patient: Sushil Diana    Procedure: Procedure(s):  Right foot fifth ray resection       Diagnosis: Other acute osteomyelitis of right foot (H) [M86.171]  Diagnosis Additional Information: No value filed.    Anesthesia Type:   MAC     Note:    Oropharynx: oropharynx clear of all foreign objects and spontaneously breathing  Level of Consciousness: awake  Oxygen Supplementation: face mask  Level of Supplemental Oxygen (L/min / FiO2): 6  Independent Airway: airway patency satisfactory and stable  Dentition: dentition unchanged  Vital Signs Stable: post-procedure vital signs reviewed and stable  Report to RN Given: handoff report given  Patient transferred to: PACU    Handoff Report: Identifed the Patient, Identified the Reponsible Provider, Reviewed the pertinent medical history, Discussed the surgical course, Reviewed Intra-OP anesthesia mangement and issues during anesthesia, Set expectations for post-procedure period and Allowed opportunity for questions and acknowledgement of understanding      Vitals:  Vitals Value Taken Time   BP     Temp     Pulse 60 05/28/25 14:22   Resp 8 05/28/25 14:22   SpO2 100 % 05/28/25 14:22   Vitals shown include unfiled device data.    Electronically Signed By: NANI Price CRNA  May 28, 2025  2:23 PM

## 2025-05-28 NOTE — OP NOTE
PREOPERATIVE DIAGNOSES:     1.  Right foot fifth digit gangrene   2.  Right foot fifth MPJ osteomyelitis        POSTOPERATIVE DIAGNOSES:   1.  Right foot fifth digit gangrene   2.  Right foot fifth MPJ osteomyelitis         PROCEDURE:     1.  Right foot fifth ray resection         ANESTHESIA:  MAC with local.       HEMOSTASIS:  Electrocautery.       ESTIMATED BLOOD LOSS:  2 mL.       SPECIMENS:  Soft tissue culture, fifth ray for pathology       MATERIALS:  None     Indications: Sushil Diana  has an ulcer and active infection to the right foot. Xray and MRI confirm osteomyelitis to the fifth metatarsal phalangeal joint with significant clinical gangrene. He underwent and angiogram and has ongoing PAD. Recommendation was made for a fifth ray resection, despite this, due to the infection. Procedure was discussed with patient at length, including all risks and benefits. Patient agrees to planned procedure.     Procedure: Under mild sedation pt was brought into the OR & placed on the operating table in a supine position. A total of 18 cc of .5% marcaine were injected into the fifth met in a peoples block formation  The foot was scrubbed, prepped and draped in an aseptic manner.  An incision was made along the fifth ray, excising all necrotic and infection tissue. The freer elevator was then used to reflect all soft tissue. The sagittal saw was then used to cut through the metatarsal, starting dorsally and moving plantarly. This was continued in through and through cuts and the bones and digits were sharply excised and sent to pathology. All bleeders were ligated and cauterized as necessary.     All resected bone was sent to pathology.  All nonviable soft tissue was resected  insuring no necrotic tissue proximal to the amputation remained. Next copious amounts of saline were used to flush the amputation site. Site was then closed with 2.0 and 3.0 prolene suture.  Upon completion of suturing, a non-adhesive sterile  dressing was then placed over the surgical site followed by 4 x 4s, kerlix, & an ACE wrap. Cultures were taken consisting of deep soft tissue.    The pt tolerated the procedure & anesthesia well.  He was transferred to the recovery room with vital signs stable and vascular status intact to the right foot.  Following a period of post-op monitoring the pt will return to his hospital bed with the following written and oral post-op instructions:    Keep dressing clean, dry and intact.  Do not remove dressing.  WB to heel only.  Elevate foot at rest.  Continue IV antibiotics  Contact Dr. Lomax if any post-op questions or arrise.     Intraop findings: Resection of fifth ray, at mid shaft of the metatarsal. Closure of sites. Overall less than expected bleeding for procedure.     Post op plan: Will see patient on POD#1. No further surgery planned. Given his tenuous blood flow, would only consider further surgery with significant clinical signs of infection/gangrene. Oral antibiotics per cultures for discharge.

## 2025-05-28 NOTE — PROGRESS NOTES
Vascular Surgery Progress Note    Sushil Diana is a 65 year old male who had R SFA recanalization and angioplastied with Dr. Bertrand on 5/27. Noted to have AT and PT arteries occluded with only supply to foot by way of peroneal artery. Unsure if this revascularization will be enough to salvage foot. Will set up in clinic to see Dr. Bertrand or myself in approximately 6 weeks with right lower extremity duplex.     Thinking about hyperbaric oxygen therapy as adjunct.     Ok for podiatry to proceed with surgical intervention.     Harper Hammond, CNP

## 2025-05-28 NOTE — TELEPHONE ENCOUNTER
6 week follow up to 05/27/25 Right SFA recanalization and angioplasty with Dr. Bertrand      Routing to scheduling to coordinate the following:  US right lower extremity arterial duplex  RETURN VASCULAR PATIENT consult with Dr. Bertrand or NANI Crowe CNP  Please schedule this in approximately 6 weeks      Appt note:  6 week follow up to 05/27/25 Right SFA recanalization and angioplasty with Dr. Bertrand

## 2025-05-28 NOTE — PLAN OF CARE
Goal Outcome Evaluation:       Date & Time: 05/28/2025  Surgery/POD#: POD #1 angiogram   Behavior & Aggression: Calm and cooperative   Fall Risk: Yes  Orientation: AOX4  ABNL VS/O2: VSS, RA   ABNL Labs: NA   Pain Management: Scheduled tylenol, PRN oxy   Bowel/Bladder: LBM 05/27, passing gas, voiding w/o difficulty   Drains: NA   Wounds/incisions: (R) elbow skin tear, (L) groin site, (R) 5th toe wound   Diet:NPO   Activity: SBA w/ walker   Tests/Procedures: 5th ray resection 05/28  Anticipated  DC Date: TBD   Significant Information:

## 2025-05-28 NOTE — PLAN OF CARE
Goal Outcome Evaluation: Progressing    5/21/25 admit, Right 5th proximal phalanx and metatarsal head osteomyelitis   5/27/25, 7 a to 7 p     Orientation: Slow to respond, Intermittent confusion     Vitals/Tele: VSS on RA,multimodal management for pain     IV Access/drains: PIV SL     Diet: Mod carb, 1800 FR, NPO at MN     Mobility: A! GB and W     GI/: Continent, no BM     Wound/Skin: R 5th toe  dressing CDI, wound care done      Consults: Podiatry, Vascular     Discharge Plan: Angiogram done today, Plan for R foot 5th toe resection tomorrow

## 2025-05-28 NOTE — PROGRESS NOTES
[place] [facility]      Subjective:   Sushil Diana is a 65 year old male seen in preop for his right foot. Had angiogram yesterday. Has pain to his right foot, currently has site in flexion at the knee.         EXAM:Vitals: BP (!) 153/68   Pulse 74   Temp 98  F (36.7  C) (Temporal)   Resp 20   Wt 71 kg (156 lb 8.4 oz)   SpO2 98%   BMI 23.80 kg/m        LABS:     Lab Results   Component Value Date    WBC 9.3 05/22/2025    WBC 8.1 12/15/2020     Lab Results   Component Value Date    RBC 3.00 05/22/2025    RBC 3.82 12/15/2020     Lab Results   Component Value Date    HGB 8.9 05/22/2025    HGB 11.8 12/15/2020     Lab Results   Component Value Date    HCT 25.7 05/22/2025    HCT 34.3 12/15/2020     Lab Results   Component Value Date     05/22/2025     12/15/2020      Lab Results   Component Value Date    INR 1.12 08/12/2018        General appearance: Patient is alert and fully cooperative with history & exam.  No sign of distress is noted during the visit.      Respiratory: Breathing is regular & unlabored while sitting.      HEENT: Hearing is intact to spoken word.  Speech is clear.  No gross evidence of visual impairment that would impact ambulation.      Dermatologic: Right foot: fifth digit dusky in color, firm, lateral fifth metatarsal eschar with no drainage,necrotic base, slight fluctuance, distal lateral right forefoot with cellulitis and hyperemia. Mild tenderness to site.      Vascular: Dorsalis pedis and posterior tibial pulses are difficult to palpate to right foot. CFT right 5th toe negative     Neurologic: Lower extremity sensation is diminished, bilateral foot, to light touch.  No evidence of neurological-based weakness or contracture in the lower extremities.       Musculoskeletal: Patient is ambulatory without an assistive device or brace.  No gross foot or ankle deformity noted.   Righ 5th toe no range of motion,  firm, hard.    Psychiatric: Affect is pleasant &  "appropriate.        All cultures:  No results for input(s): \"CULTURE\" in the last 168 hours.       IMAGING:     Right foot XR:   IMPRESSION: Diffuse osseous demineralization. Soft tissue wound over the lateral aspect of the fifth MTP joint. No acute fracture or evidence of osteomyelitis. Multifocal degenerative arthrosis of the midfoot and forefoot, advanced at the first MTP joint. Soft tissue edema about the foot and ankle.     WAVEFORMS: The dorsalis pedis and posterior tibial arteries are monophasic bilaterally, with the right lower extremity waveforms are dampened relative to the left.     Vascular Studies:                                                                  IMPRESSION:  1.  RIGHT LOWER EXTREMITY: Noncompressible tibial arteries suggestive of calcific atherosclerosis. Monophasic tibial waveforms.  2.  LEFT LOWER EXTREMITY: Noncompressible tibial arteries suggestive of calcific stenosis. Monophasic tibial waveforms.    IMPRESSION:  1.  Occlusion in the mid right SFA with monophasic waveforms beyond this level.  2.  Suspect stenosis in the left distal SFA given transition of waveforms from normal triphasic and proximal SFA to monophasic in the runoff arteries. There may be multifocal areas, though suspect femoral popliteal distribution greatest amount of disease.    Right foot MRI:   IMPRESSION:  1.  Deep skin ulcer overlying the fifth MTP joint with surrounding cellulitis. No abscess.  2.  Acute osteomyelitis involving the fifth proximal phalanx and fifth metatarsal head. There is reactive osteitis versus early developing osteomyelitis throughout the fifth metatarsal diaphysis.  3.  Diffuse muscle atrophy and edema suggest diabetic neuropathy.    ASSESSMENT:  Right foot fifth digit, metatarsal head osteomyelitis  Peripheral Arterial Disease     Hba1c: 5.9     MEDICAL DECISION MAKING:   -Discussed all findings with patient. Chart and imaging reviewed.     -Reviewed vascular surgery documentation --> " sounds that blood flow is optimized, but remains tenuous with an AT and PT occlusions. Discussed with patient that we should proceed with the procedure to remove infection and the obvious gangrene, but that risks would be delayed or non healing, need for further/repeat surgery or at worse a proximal amputation.     -He understands and agrees to this. Will proceed. Currently NPO.     -Further plans post op.     -WB to heel only of RLE

## 2025-05-29 ENCOUNTER — DOCUMENTATION ONLY (OUTPATIENT)
Dept: ORTHOPEDICS | Facility: CLINIC | Age: 65
End: 2025-05-29
Payer: MEDICARE

## 2025-05-29 VITALS
DIASTOLIC BLOOD PRESSURE: 64 MMHG | BODY MASS INDEX: 24.81 KG/M2 | SYSTOLIC BLOOD PRESSURE: 140 MMHG | HEART RATE: 70 BPM | WEIGHT: 163.14 LBS | OXYGEN SATURATION: 98 % | RESPIRATION RATE: 17 BRPM | TEMPERATURE: 98.4 F

## 2025-05-29 LAB
ANION GAP SERPL CALCULATED.3IONS-SCNC: 12 MMOL/L (ref 7–15)
BACTERIA TISS BX CULT: NORMAL
BACTERIA TISS BX CULT: NORMAL
BUN SERPL-MCNC: 14.5 MG/DL (ref 8–23)
CALCIUM SERPL-MCNC: 8.6 MG/DL (ref 8.8–10.4)
CHLORIDE SERPL-SCNC: 99 MMOL/L (ref 98–107)
CREAT SERPL-MCNC: 0.96 MG/DL (ref 0.67–1.17)
EGFRCR SERPLBLD CKD-EPI 2021: 87 ML/MIN/1.73M2
ERYTHROCYTE [DISTWIDTH] IN BLOOD BY AUTOMATED COUNT: 13 % (ref 10–15)
GLUCOSE BLDC GLUCOMTR-MCNC: 100 MG/DL (ref 70–99)
GLUCOSE BLDC GLUCOMTR-MCNC: 132 MG/DL (ref 70–99)
GLUCOSE BLDC GLUCOMTR-MCNC: 141 MG/DL (ref 70–99)
GLUCOSE BLDC GLUCOMTR-MCNC: 165 MG/DL (ref 70–99)
GLUCOSE BLDC GLUCOMTR-MCNC: 183 MG/DL (ref 70–99)
GLUCOSE SERPL-MCNC: 144 MG/DL (ref 70–99)
HCO3 SERPL-SCNC: 22 MMOL/L (ref 22–29)
HCT VFR BLD AUTO: 23.3 % (ref 40–53)
HGB BLD-MCNC: 7.8 G/DL (ref 13.3–17.7)
MAGNESIUM SERPL-MCNC: 1.9 MG/DL (ref 1.7–2.3)
MCH RBC QN AUTO: 29.3 PG (ref 26.5–33)
MCHC RBC AUTO-ENTMCNC: 33.5 G/DL (ref 31.5–36.5)
MCV RBC AUTO: 88 FL (ref 78–100)
PHOSPHATE SERPL-MCNC: 2.9 MG/DL (ref 2.5–4.5)
PLATELET # BLD AUTO: 223 10E3/UL (ref 150–450)
POTASSIUM SERPL-SCNC: 3.4 MMOL/L (ref 3.4–5.3)
RBC # BLD AUTO: 2.66 10E6/UL (ref 4.4–5.9)
SODIUM SERPL-SCNC: 133 MMOL/L (ref 135–145)
WBC # BLD AUTO: 13.8 10E3/UL (ref 4–11)

## 2025-05-29 PROCEDURE — 250N000013 HC RX MED GY IP 250 OP 250 PS 637: Performed by: SURGERY

## 2025-05-29 PROCEDURE — 250N000011 HC RX IP 250 OP 636: Performed by: PHYSICIAN ASSISTANT

## 2025-05-29 PROCEDURE — 250N000013 HC RX MED GY IP 250 OP 250 PS 637: Performed by: STUDENT IN AN ORGANIZED HEALTH CARE EDUCATION/TRAINING PROGRAM

## 2025-05-29 PROCEDURE — 85018 HEMOGLOBIN: CPT | Performed by: STUDENT IN AN ORGANIZED HEALTH CARE EDUCATION/TRAINING PROGRAM

## 2025-05-29 PROCEDURE — 99231 SBSQ HOSP IP/OBS SF/LOW 25: CPT | Performed by: PODIATRIST

## 2025-05-29 PROCEDURE — 250N000013 HC RX MED GY IP 250 OP 250 PS 637: Performed by: PHYSICIAN ASSISTANT

## 2025-05-29 PROCEDURE — 99231 SBSQ HOSP IP/OBS SF/LOW 25: CPT | Performed by: STUDENT IN AN ORGANIZED HEALTH CARE EDUCATION/TRAINING PROGRAM

## 2025-05-29 PROCEDURE — 84100 ASSAY OF PHOSPHORUS: CPT | Performed by: STUDENT IN AN ORGANIZED HEALTH CARE EDUCATION/TRAINING PROGRAM

## 2025-05-29 PROCEDURE — 83735 ASSAY OF MAGNESIUM: CPT | Performed by: STUDENT IN AN ORGANIZED HEALTH CARE EDUCATION/TRAINING PROGRAM

## 2025-05-29 PROCEDURE — 250N000013 HC RX MED GY IP 250 OP 250 PS 637: Performed by: INTERNAL MEDICINE

## 2025-05-29 PROCEDURE — 250N000013 HC RX MED GY IP 250 OP 250 PS 637: Performed by: PODIATRIST

## 2025-05-29 PROCEDURE — 36415 COLL VENOUS BLD VENIPUNCTURE: CPT | Performed by: INTERNAL MEDICINE

## 2025-05-29 PROCEDURE — 250N000013 HC RX MED GY IP 250 OP 250 PS 637

## 2025-05-29 PROCEDURE — 80048 BASIC METABOLIC PNL TOTAL CA: CPT | Performed by: INTERNAL MEDICINE

## 2025-05-29 PROCEDURE — 120N000001 HC R&B MED SURG/OB

## 2025-05-29 PROCEDURE — 250N000012 HC RX MED GY IP 250 OP 636 PS 637: Performed by: STUDENT IN AN ORGANIZED HEALTH CARE EDUCATION/TRAINING PROGRAM

## 2025-05-29 PROCEDURE — 250N000011 HC RX IP 250 OP 636: Performed by: INTERNAL MEDICINE

## 2025-05-29 PROCEDURE — L4361 PNEUMA/VAC WALK BOOT PRE OTS: HCPCS

## 2025-05-29 RX ORDER — POTASSIUM CHLORIDE 1500 MG/1
40 TABLET, EXTENDED RELEASE ORAL ONCE
Status: COMPLETED | OUTPATIENT
Start: 2025-05-29 | End: 2025-05-29

## 2025-05-29 RX ADMIN — ACETAMINOPHEN 975 MG: 325 TABLET, FILM COATED ORAL at 07:00

## 2025-05-29 RX ADMIN — HYDRALAZINE HYDROCHLORIDE 100 MG: 50 TABLET ORAL at 22:27

## 2025-05-29 RX ADMIN — OXYCODONE HYDROCHLORIDE 5 MG: 5 TABLET ORAL at 16:47

## 2025-05-29 RX ADMIN — LISINOPRIL 10 MG: 10 TABLET ORAL at 08:54

## 2025-05-29 RX ADMIN — OXYCODONE HYDROCHLORIDE 5 MG: 5 TABLET ORAL at 04:14

## 2025-05-29 RX ADMIN — POLYETHYLENE GLYCOL 3350 17 G: 17 POWDER, FOR SOLUTION ORAL at 09:01

## 2025-05-29 RX ADMIN — METOPROLOL TARTRATE 50 MG: 50 TABLET, FILM COATED ORAL at 20:28

## 2025-05-29 RX ADMIN — ABIRATERONE ACETATE 1000 MG: 250 TABLET ORAL at 09:01

## 2025-05-29 RX ADMIN — AMLODIPINE BESYLATE 10 MG: 10 TABLET ORAL at 08:55

## 2025-05-29 RX ADMIN — POTASSIUM CHLORIDE 40 MEQ: 1500 TABLET, EXTENDED RELEASE ORAL at 20:28

## 2025-05-29 RX ADMIN — OXYCODONE HYDROCHLORIDE 5 MG: 5 TABLET ORAL at 21:36

## 2025-05-29 RX ADMIN — ACETAMINOPHEN 975 MG: 325 TABLET, FILM COATED ORAL at 16:47

## 2025-05-29 RX ADMIN — OXYCODONE HYDROCHLORIDE 5 MG: 5 TABLET ORAL at 11:52

## 2025-05-29 RX ADMIN — AMPICILLIN SODIUM AND SULBACTAM SODIUM 3 G: 2; 1 INJECTION, POWDER, FOR SOLUTION INTRAMUSCULAR; INTRAVENOUS at 09:07

## 2025-05-29 RX ADMIN — ATORVASTATIN CALCIUM 20 MG: 20 TABLET, FILM COATED ORAL at 08:54

## 2025-05-29 RX ADMIN — CLOPIDOGREL BISULFATE 75 MG: 75 TABLET, FILM COATED ORAL at 08:54

## 2025-05-29 RX ADMIN — LISINOPRIL 10 MG: 10 TABLET ORAL at 20:28

## 2025-05-29 RX ADMIN — ACETAMINOPHEN 325 MG: 325 TABLET, FILM COATED ORAL at 11:52

## 2025-05-29 RX ADMIN — AMPICILLIN SODIUM AND SULBACTAM SODIUM 3 G: 2; 1 INJECTION, POWDER, FOR SOLUTION INTRAMUSCULAR; INTRAVENOUS at 16:47

## 2025-05-29 RX ADMIN — PREDNISONE 5 MG: 5 TABLET ORAL at 08:54

## 2025-05-29 RX ADMIN — INSULIN ASPART 1 UNITS: 100 INJECTION, SOLUTION INTRAVENOUS; SUBCUTANEOUS at 18:16

## 2025-05-29 RX ADMIN — POLYETHYLENE GLYCOL 3350 17 G: 17 POWDER, FOR SOLUTION ORAL at 08:58

## 2025-05-29 RX ADMIN — FAMOTIDINE 40 MG: 20 TABLET, FILM COATED ORAL at 08:54

## 2025-05-29 RX ADMIN — AMPICILLIN SODIUM AND SULBACTAM SODIUM 3 G: 2; 1 INJECTION, POWDER, FOR SOLUTION INTRAMUSCULAR; INTRAVENOUS at 03:52

## 2025-05-29 RX ADMIN — CITALOPRAM HYDROBROMIDE 20 MG: 20 TABLET ORAL at 08:55

## 2025-05-29 RX ADMIN — AMPICILLIN SODIUM AND SULBACTAM SODIUM 3 G: 2; 1 INJECTION, POWDER, FOR SOLUTION INTRAMUSCULAR; INTRAVENOUS at 22:27

## 2025-05-29 RX ADMIN — METOPROLOL TARTRATE 50 MG: 50 TABLET, FILM COATED ORAL at 08:55

## 2025-05-29 RX ADMIN — HYDRALAZINE HYDROCHLORIDE 100 MG: 50 TABLET ORAL at 08:53

## 2025-05-29 RX ADMIN — FAMOTIDINE 40 MG: 20 TABLET, FILM COATED ORAL at 20:28

## 2025-05-29 RX ADMIN — INSULIN ASPART 1 UNITS: 100 INJECTION, SOLUTION INTRAVENOUS; SUBCUTANEOUS at 08:57

## 2025-05-29 RX ADMIN — HYDRALAZINE HYDROCHLORIDE 100 MG: 50 TABLET ORAL at 16:47

## 2025-05-29 RX ADMIN — ACETAMINOPHEN 975 MG: 325 TABLET, FILM COATED ORAL at 22:27

## 2025-05-29 RX ADMIN — SENNOSIDES AND DOCUSATE SODIUM 1 TABLET: 50; 8.6 TABLET ORAL at 08:54

## 2025-05-29 RX ADMIN — ENOXAPARIN SODIUM 40 MG: 40 INJECTION SUBCUTANEOUS at 20:29

## 2025-05-29 ASSESSMENT — ACTIVITIES OF DAILY LIVING (ADL)
ADLS_ACUITY_SCORE: 62

## 2025-05-29 NOTE — PROGRESS NOTES
[place] [facility]      Subjective:   Sushil Diana is a 65 year old male seen in follow up for his right foot. He underwent a fifth ray resection yesterday. States feels fairly well and that pain is overall manageable and improved. Does state feet/legs feel better in the dependent position.         EXAM:Vitals: BP (!) 145/63   Pulse 72   Temp 98.3  F (36.8  C) (Oral)   Resp 16   Wt 74 kg (163 lb 2.3 oz)   SpO2 98%   BMI 24.81 kg/m        LABS:     Lab Results   Component Value Date    WBC 9.3 05/22/2025    WBC 8.1 12/15/2020     Lab Results   Component Value Date    RBC 3.00 05/22/2025    RBC 3.82 12/15/2020     Lab Results   Component Value Date    HGB 8.9 05/22/2025    HGB 11.8 12/15/2020     Lab Results   Component Value Date    HCT 25.7 05/22/2025    HCT 34.3 12/15/2020     Lab Results   Component Value Date     05/22/2025     12/15/2020      Lab Results   Component Value Date    INR 1.12 08/12/2018        General appearance: Patient is alert and fully cooperative with history & exam.  No sign of distress is noted during the visit.      Respiratory: Breathing is regular & unlabored while sitting.      HEENT: Hearing is intact to spoken word.  Speech is clear.  No gross evidence of visual impairment that would impact ambulation.      Dermatologic: Right foot: incision site intact. Some areas of devascularization, but overall appropriate. No significant drainage. Previous cellulitis is resolved. No swelling.      Vascular: Dorsalis pedis and posterior tibial pulses are difficult to palpate to right foot. CFT right 5th toe negative     Neurologic: Lower extremity sensation is diminished, bilateral foot, to light touch.  No evidence of neurological-based weakness or contracture in the lower extremities.       Musculoskeletal: Patient is ambulatory without an assistive device or brace.  No gross foot or ankle deformity noted.   Righ 5th toe no range of motion,  firm,  hard.    Psychiatric: Affect is pleasant & appropriate.        All cultures:  Recent Labs   Lab 05/28/25  1358   CULTURE See corresponding culture for results                IMAGING:     Right foot XR:   IMPRESSION: Diffuse osseous demineralization. Soft tissue wound over the lateral aspect of the fifth MTP joint. No acute fracture or evidence of osteomyelitis. Multifocal degenerative arthrosis of the midfoot and forefoot, advanced at the first MTP joint. Soft tissue edema about the foot and ankle.     WAVEFORMS: The dorsalis pedis and posterior tibial arteries are monophasic bilaterally, with the right lower extremity waveforms are dampened relative to the left.     Vascular Studies:                                                                  IMPRESSION:  1.  RIGHT LOWER EXTREMITY: Noncompressible tibial arteries suggestive of calcific atherosclerosis. Monophasic tibial waveforms.  2.  LEFT LOWER EXTREMITY: Noncompressible tibial arteries suggestive of calcific stenosis. Monophasic tibial waveforms.    IMPRESSION:  1.  Occlusion in the mid right SFA with monophasic waveforms beyond this level.  2.  Suspect stenosis in the left distal SFA given transition of waveforms from normal triphasic and proximal SFA to monophasic in the runoff arteries. There may be multifocal areas, though suspect femoral popliteal distribution greatest amount of disease.    Right foot MRI:   IMPRESSION:  1.  Deep skin ulcer overlying the fifth MTP joint with surrounding cellulitis. No abscess.  2.  Acute osteomyelitis involving the fifth proximal phalanx and fifth metatarsal head. There is reactive osteitis versus early developing osteomyelitis throughout the fifth metatarsal diaphysis.  3.  Diffuse muscle atrophy and edema suggest diabetic neuropathy.    Culture:   Toe, Right; Tissue   0 Result Notes  Culture No growth, less than 1 day          ASSESSMENT:  Right foot fifth digit, metatarsal head osteomyelitis now s/p fifth ray  resection on 5/28   Peripheral Arterial Disease s/p angiogram --> residual AT and PT occlusion. Flow via peroneal artery only    Hba1c: 5.9     MEDICAL DECISION MAKING:   -Discussed all findings with patient. Chart and imaging reviewed.     -Right foot dressing changed: incision site overall fairly viable, considering known arterial disease. Previous infection symptoms resolved.     -Likely appropriate for discharge tomorrow. Will recommend oral antibiotics per cultures. They're no growth so far.     -Will order CAM boot. WB to heel of RLE. Discussed tentative post op course. Patient asks about showering. Okay to shower, but should have bandage covered so that site stays totally dry.     -Will see patient tomorrow.

## 2025-05-29 NOTE — PLAN OF CARE
Date & Time: 05/28-5/29 [id number]    Surgery/POD#: POD#1 Right foot fifth ray resection    Behavior & Aggression: Green  Fall Risk: Yes  Orientation: A&Ox4  ABNL VS/O2: VSS on RA  ABNL Labs: See chart. On K, Mg, and Phos protocols  Pain Management: PRN oxycodone given x2  Bowel/Bladder: Continent. Urinal at bedside  Drains: R PIV SL'd  Wounds/incisions: Scattered scabs and bruising. RLE wound  Diet: CHO, BG checks. Carb count. 1800mL FR  Number of times OUT OF BED this shift: Ax1 GB+W  Anticipated  DC Date: Pending

## 2025-05-29 NOTE — PROGRESS NOTES
S: Sushil is a 65 yom that was seen today at the [facility], Room 2221 for a right Short Cam Walking Boot.    O: The patient wears normally wears a size 9.5 shoe. The patient was fit with a Large ProCare Short Cam Waking Boot.    A: I demonstrated the donning and doffing protocol for the Pneumatic Walking Boot. Questions were invited and answered. I then provided the manufactures instructions to the patient.    P: The patient should follow the physician s recommendation for use. Duration wear will also be at the direction of the patient s physician.    Here are two important NOTES:    NOTE 1: When applying the boot, tighten the bootstraps starting at the ankle, then the foot, then the leg. Also, remember after tightening the boot s straps, to inflate the pneumatic boot. The number of pumps will vary depending upon the tightness of the straps.    NOTE 2: Please release the air (turn the release valve to the left) from the boot before removing the bootstraps and the boot. This will reset the air bladder for its next use and preserve the integrity of the air bladder.    G: The goal of the walking boot is to immobilize the patient s involved ankle and foot bones to allow the patient to bear weight more comfortably. The pneumatic feature aids in immobilization of the foot and ankle within the boot by filling voids between the brace and the patient s anatomy.    Please call me at the [facility] [place] [facility] Department ([phone]) if you have any questions or concerns.    Electronically signed by Javed Vazquez CPO

## 2025-05-29 NOTE — PROGRESS NOTES
ID CHART CHECK    Awaiting OR cultures for final antibiotic plan. ID will see patient again tomorrow.     Andra Hall PA-C

## 2025-05-29 NOTE — PROGRESS NOTES
[facility]    Medicine Progress Note - Hospitalist Service    Date of Admission:  5/21/2025    Assessment & Plan     Sushil Diana is a 65 year old male  Past medical history significant for PAD, HTN, HLP, Chronic combined (diastolic and systolic) heart failure, DM2, GERD, MDD with anxiety, Alcohol use D/O, Metastatic prostate cancer with mets to the bone admitted on 5/21/25 due to acute osteomyelitis of right 5th proximal phalanx and metatarsal head.       Patient had been admitted at [facility] due to right foot pain secondary to chronic diabetic ulceration with concern for osteomyelitis and sepsis.  Workup completed at [facility] included a right lower extremity/foot MRI that confirmed acute osteomyelitis.  Podiatry had been following the patient. Patient was transferred to [facility] to undergo vascular surgery assessment, continued podiatry assessment and likely proceeding for surgical intervention. Patient was receiving IV Vancomycin and IV Zosyn. Underwent IR right lower extremity angiogram 5/27. Underwent right foot fifth ray resection on 5/28. Anticipating discharge home.     Right 5th proximal phalanx and metatarsal head osteomyelitis  Chronic right foot diabetic wound with cellulitis  PAD  5/20 MRI right foot acute osteomyelitis 5th prox phalanx and 5th metatarsal head and potentil fifth metatarsal diaphysis (outside hospital)   Right foot fifth ray resection, 5/28  Patient transferred from [facility] with diagnosis of acute osteomyelitis of the right fifth proximal phalanx and metatarsal head.  Initial presentation with right foot pain and noted diabetic ulceration with concern for osteomyelitis and sepsis.  Right lower extremity foot MRI confirmed acute osteomyelitis.  Started on Vanco and Zosyn (MRSA swab negative)   5/22 podiatry consult: Right foot fifth digit metatarsal head with osteomyelitis and peripheral artery disease.  MRI noting osteomyelitis.  S/p  angiogram on 5/27, plan for OR with podiatry on 5/28, NPO  Continue antibiotics with vascular assessment as below  ID consult on unasyn (culture in OR) >> stopped vanco and zosyn     Peripheral vascular disease  PAD  Mid right SFA occlusion   Plans for lower extremity angiogram 5/27 on Tuesday with Dr. Bertrand  5/20 lower extremity duplex showing occlusion in the mid right SFA with monophasic waveforms.  Suspected stenosis in the left distal SFA given transition of waveforms may be multifocal areas but suspect femoral-popliteal distribution greatest amount of disease  5/20 ABIs right monophasic noncompressible 0.64 and left noncompressible tibial and monophasic DP 1.33 and PT 1.57  5/22 vascular consult, underwent RLE angiogram on 5/27 with vascular surgery     Hyponatremia, acute on chronic   Hydrochlorothiazide and +/- SIADH + hx ETOH   Sodium ranges between 120-135.     Hold PTA hydrochlorothiazide 25 mg/d.    Was on 1500 ml fluid restriction, increased fluid restriction to 1800 ml on 5/28  Urine sodium 107 with urine osmolality 343 on 5/22 5/23 stopped hydrochlorothiazide (would not resume on discharge)      Hypokalemia  Hypophosphatemia  Hypomagnesium   -Replace per protocol      CINDY; resolved   Baseline creatinine around 1, noted creatinine was up to 1.76.   Improved to baseline with IVF now on FR 2/2 to hyponatremia      Anemia, acute on chronic normocytic  *Baseline HGB prior to admission 9-11.3.    - Hemoglobin has trended down but fairly stable at 8 presents now.  No sign of bleeding.  < 1g drop to 7.9 from 8.7. Asymptomatic. Will repeat CBC in am prior to angiogram   Checked iron stores, B12 and folate  5/24 some degree anemia for awhile 2024.   Followed by oncology      HTN  Resumed on PTA amlodipine 10 mg/d, lopressor 50 mg BID and hydralazine 100 mg TID.  Hold parameters in place.    Continue to hold PTA hydrochlorothiazide 25 mg/d, not resuming given hyponatremia.    SBP's in 140s, since creatinine  has improved, resume lisinopril at lower dose and monitor.     PRN IV labetalol 10 mg every 6 hours for SBP GREATER THAN 180.    5/23 Zestril started at 10 mg p.o. daily (reported to be on 40 mg prior to admission) >> increase to 10 mg po BID 5/24      HLP  Resumed on PTA atorvastatin 20 mg/d.       Chronic combined (diastolic and systolic) heart failure  History of cardiomyopathy 8/2019   ECHO 5/24 with normal EF and 1+ MR   Managing PTA HCTZ and lisinopril as above  Resumed on PTA lopressor 50 mg BID.    Monitor daily weights, intake and output as able.    Combination cardiac diet.    8/2019 noted in epic to have mild to moderate concentric LVH with grade 1 early diastolic dysfunction.  Moderately reduced LV function 30 to 35% with global hypokinesia.  1+ MR  5/24 ECHO now with EF 65-70% RV normal. 1+ mitral regurg  Patient and wife updated per hospitalist partner        Type 2 DM   PTA regimen includes: Metformin 1000 mg daily with lunch and glipizide 5 mg every morning.    Hold PTA metformin or glipizide.  Resume at discharge.    Medium intensity sliding scale insulin ordered.  Hypoglycemic protocol in place.    add NovoLog per carb count + sliding scale >> stable      GERD  - Resumed on PTA Pepcid 40 mg BID.       Major depressive disorder with anxiety  - Resumed on PTA Celexa 20 mg/d.     Alcohol use D/O  *3-4 beers per day reported as typical consumption.    Had been at [facility] since 5/19.    - CIWA monitoring ordered.          Metastatic prostate cancer with mets to the bone  - Resumed on PTA Zytiga 1000 mg/d.    - Resumed on PTA prednisone 5 mg/d.   - Per prior hospitalist partner oncology was curb sided and ok to continue with current regimen             Diet: Fluid restriction 1800 ML FLUID  Moderate Consistent Carb (60 g CHO per Meal) Diet  Fluid restriction 1800 ML FLUID    DVT Prophylaxis: On plavix, Lovenox  Alfred Catheter: Not present  Lines: None     Cardiac Monitoring: None  Code Status:  Full Code      Clinically Significant Risk Factors                   # Hypertension: Noted on problem list    # Chronic heart failure with preserved ejection fraction: heart failure noted on problem list and last echo with EF >50%                      Social Drivers of Health    Tobacco Use: High Risk (12/26/2024)    Patient History     Smoking Tobacco Use: Some Days     Smokeless Tobacco Use: Never     Passive Exposure: Current   Physical Activity: Insufficiently Active (5/1/2024)    Exercise Vital Sign     Days of Exercise per Week: 2 days     Minutes of Exercise per Session: 10 min   Social Connections: Unknown (5/1/2024)    Social Connection and Isolation Panel [NHANES]     Frequency of Social Gatherings with Friends and Family: Once a week          Disposition Plan     Medically Ready for Discharge: Anticipated Tomorrow         Javed Vera,   Hospitalist Service  [facility]  Securely message with [employer] (more info)  Text page via [employer] Paging/Directory   ______________________________________________________________________    Interval History     - No acute events overnight  - Today the patient reports to be doing fair  - Pain is under control  - He is hopeful for discharge tomorrow     Physical Exam   Vital Signs: Temp: 98.1  F (36.7  C) Temp src: Oral BP: (!) 144/65 Pulse: 75   Resp: 16 SpO2: 98 % O2 Device: None (Room air) Oxygen Delivery: 1 LPM  Weight: 163 lbs 2.25 oz    Resting comfortably in bed. No respiratory distress. Patient deferred examination today due to wife helping him get changed     Medical Decision Making       33 MINUTES SPENT BY ME on the date of service doing chart review, history, exam, documentation & further activities per the note.      Data   ------------------------- PAST 24 HR DATA REVIEWED -----------------------------------------------    I have personally reviewed the following data over the past 24 hrs:    N/A  \   N/A   / N/A     N/A N/A N/A /   165 (H)   N/A N/A N/A \       Imaging results reviewed over the past 24 hrs:   No results found for this or any previous visit (from the past 24 hours).

## 2025-05-29 NOTE — PLAN OF CARE
Goal Outcome Evaluation:Right 5th proximal phalanx and metatarsal head osteomyelitis  Chronic right foot diabetic wound with cellulitis  PAD  5/20 MRI right foot acute osteomyelitis 5th prox phalanx and 5th metatarsal head and potentil fifth metatarsal diaphysis (outside hospital)   Right foot fifth ray resection, 5/28       Orientation: A/O x4 calm/please, denies short of breath, chest tightness, abdominal discomfort, nausea, vomiting, fever, chills and confusion.    Vitals: vss on R A     IV Access/drains: PIV Sl     Diet: Mod carb diet, Glucose checks, 144,132,183 1800 fluid restriction     Mobility: SBA spouse at bedside     GI/: Continent urinal used     Wound/Skin:  Right foot fifth digit gangrene Scattered scabs and bruising, RLE      Consults: Potassium, Magnesium and Phos protocols     Discharge Plan: pending       See Flow sheets for assessment

## 2025-05-30 ENCOUNTER — APPOINTMENT (OUTPATIENT)
Dept: PHYSICAL THERAPY | Facility: CLINIC | Age: 65
DRG: 253 | End: 2025-05-30
Attending: STUDENT IN AN ORGANIZED HEALTH CARE EDUCATION/TRAINING PROGRAM
Payer: MEDICARE

## 2025-05-30 VITALS
TEMPERATURE: 97.9 F | WEIGHT: 167.77 LBS | RESPIRATION RATE: 19 BRPM | DIASTOLIC BLOOD PRESSURE: 52 MMHG | SYSTOLIC BLOOD PRESSURE: 138 MMHG | BODY MASS INDEX: 25.51 KG/M2 | OXYGEN SATURATION: 97 % | HEART RATE: 65 BPM

## 2025-05-30 LAB
ANION GAP SERPL CALCULATED.3IONS-SCNC: 10 MMOL/L (ref 7–15)
BUN SERPL-MCNC: 17.2 MG/DL (ref 8–23)
CALCIUM SERPL-MCNC: 8.5 MG/DL (ref 8.8–10.4)
CHLORIDE SERPL-SCNC: 101 MMOL/L (ref 98–107)
CREAT SERPL-MCNC: 0.98 MG/DL (ref 0.67–1.17)
EGFRCR SERPLBLD CKD-EPI 2021: 85 ML/MIN/1.73M2
GLUCOSE BLDC GLUCOMTR-MCNC: 116 MG/DL (ref 70–99)
GLUCOSE BLDC GLUCOMTR-MCNC: 187 MG/DL (ref 70–99)
GLUCOSE SERPL-MCNC: 115 MG/DL (ref 70–99)
GLUCOSE SERPL-MCNC: 115 MG/DL (ref 70–99)
HCO3 SERPL-SCNC: 23 MMOL/L (ref 22–29)
HGB BLD-MCNC: 7.1 G/DL (ref 13.3–17.7)
HGB BLD-MCNC: 7.6 G/DL (ref 13.3–17.7)
MAGNESIUM SERPL-MCNC: 1.7 MG/DL (ref 1.7–2.3)
MCV RBC AUTO: 89 FL (ref 78–100)
MCV RBC AUTO: 89 FL (ref 78–100)
PHOSPHATE SERPL-MCNC: 2.7 MG/DL (ref 2.5–4.5)
POTASSIUM SERPL-SCNC: 3.6 MMOL/L (ref 3.4–5.3)
POTASSIUM SERPL-SCNC: 3.7 MMOL/L (ref 3.4–5.3)
SODIUM SERPL-SCNC: 134 MMOL/L (ref 135–145)

## 2025-05-30 PROCEDURE — 99231 SBSQ HOSP IP/OBS SF/LOW 25: CPT | Performed by: PODIATRIST

## 2025-05-30 PROCEDURE — 250N000011 HC RX IP 250 OP 636: Performed by: PHYSICIAN ASSISTANT

## 2025-05-30 PROCEDURE — 97116 GAIT TRAINING THERAPY: CPT | Mod: GP | Performed by: PHYSICAL THERAPIST

## 2025-05-30 PROCEDURE — 250N000013 HC RX MED GY IP 250 OP 250 PS 637: Performed by: SURGERY

## 2025-05-30 PROCEDURE — 99239 HOSP IP/OBS DSCHRG MGMT >30: CPT | Performed by: STUDENT IN AN ORGANIZED HEALTH CARE EDUCATION/TRAINING PROGRAM

## 2025-05-30 PROCEDURE — 250N000013 HC RX MED GY IP 250 OP 250 PS 637

## 2025-05-30 PROCEDURE — 85018 HEMOGLOBIN: CPT | Performed by: STUDENT IN AN ORGANIZED HEALTH CARE EDUCATION/TRAINING PROGRAM

## 2025-05-30 PROCEDURE — 84132 ASSAY OF SERUM POTASSIUM: CPT | Performed by: STUDENT IN AN ORGANIZED HEALTH CARE EDUCATION/TRAINING PROGRAM

## 2025-05-30 PROCEDURE — 36415 COLL VENOUS BLD VENIPUNCTURE: CPT | Performed by: STUDENT IN AN ORGANIZED HEALTH CARE EDUCATION/TRAINING PROGRAM

## 2025-05-30 PROCEDURE — 97530 THERAPEUTIC ACTIVITIES: CPT | Mod: GP | Performed by: PHYSICAL THERAPIST

## 2025-05-30 PROCEDURE — 250N000012 HC RX MED GY IP 250 OP 636 PS 637: Performed by: STUDENT IN AN ORGANIZED HEALTH CARE EDUCATION/TRAINING PROGRAM

## 2025-05-30 PROCEDURE — 83735 ASSAY OF MAGNESIUM: CPT | Performed by: STUDENT IN AN ORGANIZED HEALTH CARE EDUCATION/TRAINING PROGRAM

## 2025-05-30 PROCEDURE — 82435 ASSAY OF BLOOD CHLORIDE: CPT | Performed by: STUDENT IN AN ORGANIZED HEALTH CARE EDUCATION/TRAINING PROGRAM

## 2025-05-30 PROCEDURE — 250N000013 HC RX MED GY IP 250 OP 250 PS 637: Performed by: PODIATRIST

## 2025-05-30 PROCEDURE — 250N000013 HC RX MED GY IP 250 OP 250 PS 637: Performed by: PHYSICIAN ASSISTANT

## 2025-05-30 PROCEDURE — 250N000013 HC RX MED GY IP 250 OP 250 PS 637: Performed by: INTERNAL MEDICINE

## 2025-05-30 PROCEDURE — 84100 ASSAY OF PHOSPHORUS: CPT | Performed by: STUDENT IN AN ORGANIZED HEALTH CARE EDUCATION/TRAINING PROGRAM

## 2025-05-30 PROCEDURE — 250N000013 HC RX MED GY IP 250 OP 250 PS 637: Performed by: STUDENT IN AN ORGANIZED HEALTH CARE EDUCATION/TRAINING PROGRAM

## 2025-05-30 PROCEDURE — 99232 SBSQ HOSP IP/OBS MODERATE 35: CPT | Performed by: PHYSICIAN ASSISTANT

## 2025-05-30 PROCEDURE — 97161 PT EVAL LOW COMPLEX 20 MIN: CPT | Mod: GP | Performed by: PHYSICAL THERAPIST

## 2025-05-30 RX ORDER — POLYETHYLENE GLYCOL 3350 17 G/17G
17 POWDER, FOR SOLUTION ORAL 2 TIMES DAILY PRN
Qty: 510 G | Refills: 0 | Status: SHIPPED | OUTPATIENT
Start: 2025-05-30

## 2025-05-30 RX ORDER — OXYCODONE HYDROCHLORIDE 5 MG/1
5 TABLET ORAL EVERY 4 HOURS PRN
Qty: 10 TABLET | Refills: 0 | Status: SHIPPED | OUTPATIENT
Start: 2025-05-30 | End: 2025-06-05

## 2025-05-30 RX ORDER — ACETAMINOPHEN 325 MG/1
975 TABLET ORAL EVERY 8 HOURS
Qty: 60 TABLET | Refills: 0 | Status: SHIPPED | OUTPATIENT
Start: 2025-05-30 | End: 2025-06-06

## 2025-05-30 RX ORDER — CLOPIDOGREL BISULFATE 75 MG/1
75 TABLET ORAL DAILY
Qty: 30 TABLET | Refills: 0 | Status: SHIPPED | OUTPATIENT
Start: 2025-05-31

## 2025-05-30 RX ADMIN — OXYCODONE HYDROCHLORIDE 5 MG: 5 TABLET ORAL at 12:07

## 2025-05-30 RX ADMIN — OXYCODONE HYDROCHLORIDE 5 MG: 5 TABLET ORAL at 02:47

## 2025-05-30 RX ADMIN — OXYCODONE HYDROCHLORIDE 5 MG: 5 TABLET ORAL at 16:49

## 2025-05-30 RX ADMIN — METOPROLOL TARTRATE 50 MG: 50 TABLET, FILM COATED ORAL at 09:09

## 2025-05-30 RX ADMIN — FAMOTIDINE 40 MG: 20 TABLET, FILM COATED ORAL at 09:09

## 2025-05-30 RX ADMIN — AMPICILLIN SODIUM AND SULBACTAM SODIUM 3 G: 2; 1 INJECTION, POWDER, FOR SOLUTION INTRAMUSCULAR; INTRAVENOUS at 04:12

## 2025-05-30 RX ADMIN — HYDRALAZINE HYDROCHLORIDE 100 MG: 50 TABLET ORAL at 09:09

## 2025-05-30 RX ADMIN — ABIRATERONE ACETATE 1000 MG: 250 TABLET ORAL at 09:08

## 2025-05-30 RX ADMIN — AMPICILLIN SODIUM AND SULBACTAM SODIUM 3 G: 2; 1 INJECTION, POWDER, FOR SOLUTION INTRAMUSCULAR; INTRAVENOUS at 09:07

## 2025-05-30 RX ADMIN — AMLODIPINE BESYLATE 10 MG: 10 TABLET ORAL at 09:09

## 2025-05-30 RX ADMIN — ACETAMINOPHEN 975 MG: 325 TABLET, FILM COATED ORAL at 06:47

## 2025-05-30 RX ADMIN — LISINOPRIL 10 MG: 10 TABLET ORAL at 09:09

## 2025-05-30 RX ADMIN — ATORVASTATIN CALCIUM 20 MG: 20 TABLET, FILM COATED ORAL at 09:09

## 2025-05-30 RX ADMIN — PREDNISONE 5 MG: 5 TABLET ORAL at 09:09

## 2025-05-30 RX ADMIN — CITALOPRAM HYDROBROMIDE 20 MG: 20 TABLET ORAL at 09:09

## 2025-05-30 RX ADMIN — OXYCODONE HYDROCHLORIDE 5 MG: 5 TABLET ORAL at 06:47

## 2025-05-30 RX ADMIN — ACETAMINOPHEN 975 MG: 325 TABLET, FILM COATED ORAL at 16:49

## 2025-05-30 RX ADMIN — SENNOSIDES AND DOCUSATE SODIUM 1 TABLET: 50; 8.6 TABLET ORAL at 09:10

## 2025-05-30 RX ADMIN — ACETAMINOPHEN 325 MG: 325 TABLET, FILM COATED ORAL at 04:46

## 2025-05-30 RX ADMIN — CLOPIDOGREL BISULFATE 75 MG: 75 TABLET, FILM COATED ORAL at 09:09

## 2025-05-30 RX ADMIN — INSULIN ASPART 1 UNITS: 100 INJECTION, SOLUTION INTRAVENOUS; SUBCUTANEOUS at 14:15

## 2025-05-30 ASSESSMENT — ACTIVITIES OF DAILY LIVING (ADL)
ADLS_ACUITY_SCORE: 62
ADLS_ACUITY_SCORE: 62
ADLS_ACUITY_SCORE: 63
ADLS_ACUITY_SCORE: 62
ADLS_ACUITY_SCORE: 63
ADLS_ACUITY_SCORE: 62

## 2025-05-30 NOTE — PROGRESS NOTES
"   05/30/25 1247   Appointment Info   Signing Clinician's Name / Credentials (PT) Brandee Amaya, PT   Rehab Comments (PT) WBAT to heel only R; has CAM boot issued by orthotics   Living Environment   People in Home significant other   Current Living Arrangements house  (rambler)   Home Accessibility stairs to enter home   Number of Stairs, Main Entrance 1  (\"half step\")   Stair Railings, Main Entrance none   Transportation Anticipated family or friend will provide   Living Environment Comments One level living   Self-Care   Usual Activity Tolerance good   Current Activity Tolerance moderate   Equipment Currently Used at Home none   Fall history within last six months no   Activity/Exercise/Self-Care Comment I with all ADL's and gait without AD at baseline. Drives but SO has been driving more d/t R L/E issues and eye issues per pt.   General Information   Onset of Illness/Injury or Date of Surgery 05/21/25  (R 5th ray resection 5/29)   Referring Physician Javed Vera MD   Patient/Family Therapy Goals Statement (PT) Plans to dc to home with SO.   Pertinent History of Current Problem (include personal factors and/or comorbidities that impact the POC) 65 year old male with DM (A1C 5.9), CHF, HTN, metastatic prostate cancer to bone, PAD and GERD with a several month history of ulceration on right foot, presenting with acute cellulitis and acute osteomyelitis right 5th proximal phalanx and metatarsal head.      -PAD with noted right SFA occlusion on vascular studies. Now s/p angioplasty 5/27/25  -Acute osteomyelitis right fifth proximal phalanx and 5th metatarsal head on MRI. S/p right foot 5th ray resection 5/28/25.   Existing Precautions/Restrictions fall;weight bearing   Weight-Bearing Status - RLE other (see comments)  (wt. bearing to heel only; has CAM boot)   Cognition   Affect/Mental Status (Cognition) WFL   Orientation Status (Cognition) oriented x 3   Follows Commands (Cognition) WFL   Cognitive Status " Comments appears fatigued; slower movement and response at times. Low hemoglobin.   Pain Assessment   Patient Currently in Pain   (minimally R foot)   Integumentary/Edema   Integumentary/Edema Comments R foot bandaged   Posture    Posture Forward head position   Range of Motion (ROM)   Range of Motion ROM is WFL   ROM Comment deferred R foot   Strength (Manual Muscle Testing)   Strength (Manual Muscle Testing) Deficits observed during functional mobility   Bed Mobility   Comment, (Bed Mobility) Not observed. Up in chair and left on toilet w/ SO and  nsg. aware   Transfers   Comment, (Transfers) STS with slight Min to CGA   Gait/Stairs (Locomotion)   Comment, (Gait/Stairs) Amb. 10' for eval with WW and CGA; CAM boot on R   Balance   Balance Comments No gross LOB noted w/ WW but guarding d/t R foot pain and heaviness of CAM boot   Sensory Examination   Sensory Perception patient reports no sensory changes   Coordination   Coordination no deficits were identified   Muscle Tone   Muscle Tone no deficits were identified   Clinical Impression   Criteria for Skilled Therapeutic Intervention Yes, treatment indicated   PT Diagnosis (PT) Impaired functional mobility   Influenced by the following impairments pain, heaviness of CAM boot, wt. bearing restrictions, overall fatigue and weakness   Functional limitations due to impairments decreased I and endurance in functional mobility   Clinical Presentation (PT Evaluation Complexity) stable   Clinical Presentation Rationale clinical judgement   Clinical Decision Making (Complexity) low complexity   Planned Therapy Interventions (PT) balance training;bed mobility training;gait training;motor coordination training;patient/family education;postural re-education;orthotic fitting/training;stair training;strengthening;transfer training;progressive activity/exercise   Risk & Benefits of therapy have been explained evaluation/treatment results reviewed;care plan/treatment goals  reviewed;risks/benefits reviewed;current/potential barriers reviewed;participants voiced agreement with care plan;participants included;patient;spouse/significant other   PT Total Evaluation Time   PT Eval, Low Complexity Minutes (03630) 12   Physical Therapy Goals   PT Frequency Daily   PT Predicted Duration/Target Date for Goal Attainment 06/02/25   PT: Bed Mobility Independent;Supine to/from sit   PT: Transfers Modified independent;Sit to/from stand;Bed to/from chair;Assistive device;Within precautions   PT: Gait Modified independent;Rolling walker;Within precautions;150 feet   PT: Stairs Supervision/stand-by assist;Within precautions;Assistive device;1 stair   Therapeutic Activity   Therapeutic Activities: dynamic activities to improve functional performance Minutes (45111) 18   Symptoms Noted During/After Treatment Fatigue   Treatment Detail/Skilled Intervention Patient in chair; feet on step stool. Agreeable to particiapte. Slow movements at times. Reports he is tired; didn't sleep well. Hgb. last 7.1. SO states he is usually more energetic than this. A provided by SO and PT to shelley shoe on L; pt. did A as well. PT assisted iwth CAM boot on R. Education given STS into WW with three attempts; slight Min and then CGA provided. STS at transport chair with cueing and CGA. Ended in bathroom; low toilet holding rail with CGA; again slow transition movements. SO in bathroom with patient. Nurse OK'd this and they are to call when pt. is done. All in agreement. Discussed shower chair vs. bath bench. They have access to bath bench. May want chair. Deferred to best places to purchase. Have WW at home already. Educated on car transfer and recommendations for elevation. Discussed recommendations for home PT and both in agreement.   Gait Training   Gait Training Minutes (96834) 15   Symptoms Noted During/After Treatment (Gait Training) fatigue   Treatment Detail/Skilled Intervention Patient ambulated 160' with WW and CGA;  decreased shyam and step length; wc follow. No LOB. Instructed in and performed backwards up one step to mimic their home setting. Their step is half the height of the one performed; SO and pt. have good understanding how to step up into house backwards with SO holding WW; Also performed stepping down to mimic leaving  house. Wheeled back secondary to fatigue and needing to have a BM. Ambulated another 15' with CGA and WW. Instructed in heel wt. bearing throughout and that CAM boot will help maintain that. Instructed not to walk without WW or CAM boot.   Distance in Feet 160', 15'   PT Discharge Planning   PT Plan transfers, assess bed mobility, gait with WW, stair again   PT Discharge Recommendation (DC Rec) home with home care physical therapy   PT Rationale for DC Rec Patient currenlty functioning below baseline.  CGA to slight Min to stand. CGA for gait with WW. Generalized fatigue and weakness apparent; does have low hgv. Recommend home PT follow-up to assess in home setting and work on strength and mobility. Pt. and SO in agreement. Currently too taxing to leave houe for PT.   PT Brief overview of current status A of 1 with WW. Heel wt. bearing; wear CAM boot. Goals of therapy will be to address safe mobility and make recs for d/c to next level of care. Pt and RN will continue to follow all falls risk precautions as documented by RN staff while hospitalized   PT Total Distance Amb During Session (feet) 175   PT Equipment Needed at Discharge   (has WW and CAM boot)   Physical Therapy Time and Intention   Timed Code Treatment Minutes 33   Total Session Time (sum of timed and untimed services) 45

## 2025-05-30 NOTE — PROGRESS NOTES
[place] [facility]      Subjective:   Sushil Diana is a 65 year old male seen in follow up for his right foot. He underwent a fifth ray resection on 5/28. States feels well. Getting around well in the CAM boot. Pain improved to right foot.         EXAM:Vitals: BP (!) 154/71 (BP Location: Right arm)   Pulse 72   Temp 98.8  F (37.1  C) (Oral)   Resp 19   Wt 76.1 kg (167 lb 12.3 oz)   SpO2 96%   BMI 25.51 kg/m        LABS:     Lab Results   Component Value Date    WBC 9.3 05/22/2025    WBC 8.1 12/15/2020     Lab Results   Component Value Date    RBC 3.00 05/22/2025    RBC 3.82 12/15/2020     Lab Results   Component Value Date    HGB 8.9 05/22/2025    HGB 11.8 12/15/2020     Lab Results   Component Value Date    HCT 25.7 05/22/2025    HCT 34.3 12/15/2020     Lab Results   Component Value Date     05/22/2025     12/15/2020      Lab Results   Component Value Date    INR 1.12 08/12/2018        General appearance: Patient is alert and fully cooperative with history & exam.  No sign of distress is noted during the visit.      Respiratory: Breathing is regular & unlabored while sitting.      HEENT: Hearing is intact to spoken word.  Speech is clear.  No gross evidence of visual impairment that would impact ambulation.      Dermatologic: Right foot: incision site intact. Some areas of devascularization, but overall appropriate. No significant drainage. Previous cellulitis is resolved. No swelling.      Vascular: Dorsalis pedis and posterior tibial pulses are difficult to palpate to right foot. CFT right 5th toe negative     Neurologic: Lower extremity sensation is diminished, bilateral foot, to light touch.  No evidence of neurological-based weakness or contracture in the lower extremities.       Musculoskeletal: Patient is ambulatory without an assistive device or brace.  No gross foot or ankle deformity noted.   Righ 5th toe no range of motion,  firm, hard.    Psychiatric: Affect is  pleasant & appropriate.        All cultures:  Recent Labs   Lab 05/28/25  5838   CULTURE Culture in progress  Isolated in broth only Gram positive cocci in pairs and chains*  No anaerobic organisms isolated after 1 day  See corresponding culture for results         Toe, Right; Tissue   0 Result Notes  Culture Culture in progress   Isolated in broth only Gram positive cocci in pairs and chains Abnormal    On day 2 of incubation        Resulting Agency: [facility]          IMAGING:     Right foot XR:   IMPRESSION: Diffuse osseous demineralization. Soft tissue wound over the lateral aspect of the fifth MTP joint. No acute fracture or evidence of osteomyelitis. Multifocal degenerative arthrosis of the midfoot and forefoot, advanced at the first MTP joint. Soft tissue edema about the foot and ankle.     WAVEFORMS: The dorsalis pedis and posterior tibial arteries are monophasic bilaterally, with the right lower extremity waveforms are dampened relative to the left.     Vascular Studies:                                                                  IMPRESSION:  1.  RIGHT LOWER EXTREMITY: Noncompressible tibial arteries suggestive of calcific atherosclerosis. Monophasic tibial waveforms.  2.  LEFT LOWER EXTREMITY: Noncompressible tibial arteries suggestive of calcific stenosis. Monophasic tibial waveforms.    IMPRESSION:  1.  Occlusion in the mid right SFA with monophasic waveforms beyond this level.  2.  Suspect stenosis in the left distal SFA given transition of waveforms from normal triphasic and proximal SFA to monophasic in the runoff arteries. There may be multifocal areas, though suspect femoral popliteal distribution greatest amount of disease.    Right foot MRI:   IMPRESSION:  1.  Deep skin ulcer overlying the fifth MTP joint with surrounding cellulitis. No abscess.  2.  Acute osteomyelitis involving the fifth proximal phalanx and fifth metatarsal head. There is reactive osteitis versus early developing  osteomyelitis throughout the fifth metatarsal diaphysis.  3.  Diffuse muscle atrophy and edema suggest diabetic neuropathy.    Culture:   Toe, Right; Tissue   0 Result Notes  Culture Culture in progress   Isolated in broth only Gram positive cocci in pairs and chains Abnormal             ASSESSMENT:  Right foot fifth digit, metatarsal head osteomyelitis now s/p fifth ray resection on 5/28   Peripheral Arterial Disease s/p angiogram --> residual AT and PT occlusion. Flow via peroneal artery only    Hba1c: 5.9     MEDICAL DECISION MAKING:   -Discussed all findings with patient. Chart and imaging reviewed.     -Right foot evaluated: overall appropriate given patient's significant PAD. No signs of infection.     -Okay for patient to discharge today per podiatry. Cultures still pending, but GPCs so far. No apparent MRSA history. Likely appropriate for augmentin for discharge.     -Discussed dressing change plans with patient's wife. Would benefit from home health if able. Will place order for social work to look into this.     -Further follow up with myself outpatient. Will also provide podiatry clinic information for [place] which is closer to home for him.     -Will sign off. GetApp text with questions.       Beatriz Lomax DPM

## 2025-05-30 NOTE — PROGRESS NOTES
Care Management Follow Up    Length of Stay (days): 9    Expected Discharge Date: 05/31/2025     Concerns to be Addressed: adjustment to diagnosis/illness, discharge planning     Patient plan of care discussed at interdisciplinary rounds: Yes    Anticipated Discharge Disposition: Home Care     Anticipated Discharge Services:    Anticipated Discharge DME:      Patient/family educated on Medicare website which has current facility and service quality ratings:    Education Provided on the Discharge Plan:  yes  Patient/Family in Agreement with the Plan: yes    Referrals Placed by CM/SW:    Private pay costs discussed: Not applicable    Discussed  Partnership in Safe Discharge Planning  document with patient/family: No     Handoff Completed: No, handoff not indicated or clinically appropriate    Additional Information:  Updated by provider that patient can discharge today with [facility] services. Met with patient and spouse and both in agreement for C services. No agency preference. Referral sent to [facility] via standard process    Next Steps: secure home care.       Felisa Rand RN   [facility]   Phone [phone], [employer] or [phone]

## 2025-05-30 NOTE — PLAN OF CARE
Date & Time: 05/29-5/30 [id number]    Surgery/POD#: POD#2 Right foot fifth ray resection    Behavior & Aggression: Green  Fall Risk: Yes  Orientation: A&Ox4  ABNL VS/O2: VSS on RA  ABNL Labs: See chart. On K, Mg, and Phos protocols  Pain Management: PRN oxycodone given x2  Bowel/Bladder: Continent. Urinal at bedside, +gas, +BM  Drains: R PIV SL'd w/ int abx  Wounds/incisions: Scattered scabs and bruising. RLE wound. L groin site  Diet: CHO, BG checks. 1800mL FR  Number of times OUT OF BED this shift: Ax1 GB+W. Ambulated in the tucker x1 w/ CAM boot on. Up to BM x3  Anticipated DC Date: Possible 5/30/2025    Other Important Info: Awaiting OR cultures for final abx plan

## 2025-05-30 NOTE — PROGRESS NOTES
[facility]    Infectious Disease Progress Note    Date of Service (when I saw the patient): 05/30/2025     Assessment & Plan   Sushil Diana is a 65 year old male who was admitted on 5/21/2025.     Impression:   65 year old male with DM (A1C 5.9), CHF, HTN, metastatic prostate cancer to bone, PAD and GERD with a several month history of ulceration on right foot, presenting with acute cellulitis and acute osteomyelitis right 5th proximal phalanx and metatarsal head.      -PAD with noted right SFA occlusion on vascular studies. Now s/p angioplasty 5/27/25  -Acute osteomyelitis right fifth proximal phalanx and 5th metatarsal head on MRI. S/p right foot 5th ray resection 5/28/25. Op cultures with GPC pairs and chains.  -Blood cultures no growth to date.   -On Unasyn.        Recommendations:   Continue Unasyn.   Await final culture results/susceptibilities for final abx recs.  Anticipate short course of oral antibiotics upon discharge.     Patient and plan discussed with Dr. Bridges.         Andra Hall PA-C    Interval History   Tolerating antibiotics ok   No new rashes or issues with antibiotics   Labs reviewed   No changes to past medical, social or family history   Doing much better. Some ongoing pain in foot. Getting around with walker and boot.       Physical Exam   Temp: 97.9  F (36.6  C) Temp src: Oral BP: 138/52 Pulse: 65   Resp: 19 SpO2: 97 % O2 Device: None (Room air)    Vitals:    05/28/25 0159 05/29/25 0416 05/30/25 0430   Weight: 71 kg (156 lb 8.4 oz) 74 kg (163 lb 2.3 oz) 76.1 kg (167 lb 12.3 oz)     Vital Signs with Ranges  Temp:  [97.9  F (36.6  C)-98.8  F (37.1  C)] 97.9  F (36.6  C)  Pulse:  [65-72] 65  Resp:  [16-19] 19  BP: (135-154)/(52-71) 138/52  SpO2:  [96 %-98 %] 97 %    Constitutional: Awake, alert, cooperative, no apparent distress  Lungs: Clear to auscultation bilaterally, no crackles or wheezing  Cardiovascular: Regular rate and rhythm, normal S1 and S2, and no  murmur noted  Abdomen: Normal bowel sounds, soft, non-distended, non-tender  Skin: No rashes, no cyanosis, no edema. Wound on foot dressed and in boot.  Other:    Medications   Current Facility-Administered Medications   Medication Dose Route Frequency Provider Last Rate Last Admin     Current Facility-Administered Medications   Medication Dose Route Frequency Provider Last Rate Last Admin    abiraterone (ZYTIGA) tablet 1,000 mg  1,000 mg Oral Daily Rachelle Richards MD   1,000 mg at 05/30/25 0908    acetaminophen (TYLENOL) tablet 975 mg  975 mg Oral Q8H Jignesh Mcclure PA-C   975 mg at 05/30/25 0647    amLODIPine (NORVASC) tablet 10 mg  10 mg Oral Daily Jignesh Mcclure PA-C   10 mg at 05/30/25 0909    ampicillin-sulbactam (UNASYN) 3 g vial to attach to  mL bag  3 g Intravenous Q6H Andra Hall PA-C   3 g at 05/30/25 0907    atorvastatin (LIPITOR) tablet 20 mg  20 mg Oral Daily Naz Adams MD   20 mg at 05/30/25 0909    citalopram (celeXA) tablet 20 mg  20 mg Oral Daily Naz Adams MD   20 mg at 05/30/25 0909    clopidogrel (PLAVIX) tablet 75 mg  75 mg Oral Daily Brenton Bertrand MD   75 mg at 05/30/25 0909    enoxaparin ANTICOAGULANT (LOVENOX) injection 40 mg  40 mg Subcutaneous Q24H Kacy Peters MD   40 mg at 05/29/25 2029    famotidine (PEPCID) tablet 40 mg  40 mg Oral BID Maddi Mota RPH   40 mg at 05/30/25 0909    hydrALAZINE (APRESOLINE) tablet 100 mg  100 mg Oral TID Jignesh Mcclure PA-C   100 mg at 05/30/25 0909    insulin aspart (NovoLOG) injection (RAPID ACTING)   Subcutaneous TID w/meals Ernesto Scruggs MD   5 Units at 05/30/25 1028    insulin aspart (NovoLOG) injection (RAPID ACTING)  1-7 Units Subcutaneous TID AC Jignesh Mcclure PA-C   1 Units at 05/29/25 1816    insulin aspart (NovoLOG) injection (RAPID ACTING)  1-5 Units Subcutaneous At Bedtime Jignesh Mcclure PA-C   1 Units at 05/28/25 6404     lisinopril (ZESTRIL) tablet 10 mg  10 mg Oral BID Kacy Peters MD   10 mg at 05/30/25 0909    metoprolol tartrate (LOPRESSOR) tablet 50 mg  50 mg Oral BID Jignesh Mcclure PA-C   50 mg at 05/30/25 0909    polyethylene glycol (MIRALAX) Packet 17 g  17 g Oral Daily Beatriz Lomax DPM   17 g at 05/29/25 0901    predniSONE (DELTASONE) tablet 5 mg  5 mg Oral Daily Naz Adams MD   5 mg at 05/30/25 0909    senna-docusate (SENOKOT-S/PERICOLACE) 8.6-50 MG per tablet 1 tablet  1 tablet Oral BID Beatriz Lomax DPM   1 tablet at 05/30/25 0910    sodium chloride (PF) 0.9% PF flush 3 mL  3 mL Intracatheter Q8H MIRELLA Beatriz Lomax DPM   3 mL at 05/29/25 2230    sodium chloride (PF) 0.9% PF flush 3 mL  3 mL Intracatheter Q8H [facility] Naz Adams MD   3 mL at 05/29/25 1653       Data   All microbiology laboratory data reviewed.  Recent Labs   Lab Test 05/30/25  1038 05/30/25  0720 05/29/25  0801 05/28/25  0656 05/27/25  0653   WBC  --   --  13.8* 9.3 9.5   HGB 7.6* 7.1* 7.8* 7.9* 8.6*   HCT  --   --  23.3* 24.0* 25.0*   MCV 89 89 88 89 86   PLT  --   --  223 180 215     Recent Labs   Lab Test 05/30/25  0720 05/29/25  0801 05/28/25  0656   CR 0.98 0.96 0.79     Recent Labs   Lab Test 05/19/25  1817   SED 49*     05/28/2025 1358 05/29/2025 1805 Anaerobic Bacterial Culture Routine [[id number]]   Tissue from Toe, Right    Preliminary result Component Value   Culture No anaerobic organisms isolated after 1 day P             05/28/2025 1358 05/28/2025 1807 Gram Stain [[id number]]   Tissue from Toe, Right    Final result Component Value   GS Culture See corresponding culture for results   Gram Stain Result No organisms seen   Gram Stain Result 3+ WBC seen   Predominantly PMNs          05/28/2025 1358 05/30/2025 0952 Tissue Aerobic Bacterial Culture Routine [[id number]]   (Abnormal)   Tissue from Toe, Right    Preliminary result Component Value   Culture Culture in progress P    Isolated in  broth only Gram positive cocci in pairs and chains Abnormal  P    On day 2 of incubation             05/21/2025 2346 05/22/2025 0325 MRSA MSSA PCR, Nasal Swab [[id number]]    Swab from Nares, Bilateral    Final result Component Value   MRSA Target DNA Negative   SA Target DNA Negative          05/19/2025 1834 05/24/2025 2231 Blood Culture Peripheral blood (BC) Arm, Right [[id number]]   Peripheral blood (BC) from Arm, Right    Final result Component Value   Culture No Growth             05/19/2025 1817 05/24/2025 2231 Blood Culture Peripheral blood (BC) Arm, Right [[id number]]   Peripheral blood (BC) from Arm, Right    Final result Component Value   Culture No Growth

## 2025-05-30 NOTE — DISCHARGE SUMMARY
"[facility]  Hospitalist Discharge Summary      Date of Admission:  5/21/2025  Date of Discharge:  5/30/2025  Discharging Provider: Javed Vera DO  Discharge Service: Hospitalist Service    Discharge Diagnoses     Right 5th proximal phalanx and metatarsal head osteomyelitis  Chronic right foot diabetic wound with cellulitis  PAD  5/20 MRI right foot acute osteomyelitis 5th prox phalanx and 5th metatarsal head and potentil fifth metatarsal diaphysis (outside hospital)   Right foot fifth ray resection, 5/28  Peripheral vascular disease  PAD  Mid right SFA occlusion   Plans for lower extremity angiogram 5/27 on Tuesday with Dr. Bertrand  Hyponatremia, acute on chronic   Hydrochlorothiazide and +/- SIADH + hx ETOH   Hypokalemia  Hypophosphatemia  Hypomagnesium   CINDY; resolved   Anemia, acute on chronic normocytic  HTN  HLP  Chronic combined (diastolic and systolic) heart failure  History of cardiomyopathy 8/2019   ECHO 5/24 with normal EF and 1+ MR   Type 2 DM   GERD  Major depressive disorder with anxiety  Alcohol use D/O  Metastatic prostate cancer with mets to the bone    Clinically Significant Risk Factors     # Overweight: Estimated body mass index is 25.51 kg/m  as calculated from the following:    Height as of 5/19/25: 1.727 m (5' 8\").    Weight as of this encounter: 76.1 kg (167 lb 12.3 oz).       Follow-ups Needed After Discharge   Follow-up Appointments       Follow Up      Follow up with [facility]ax D[facility] located at [address] [place] [address] [address], [place] [address]. Phone number is [phone]. Call to schedule appt in 1-2 weeks.        Hospital Follow-up with Existing Primary Care Provider (PCP)          Schedule Primary Care visit within: 7 Days   Recommended labs and Imaging (to be ordered by Primary Care Provider): hemoglobin check, wound check             Unresulted Labs Ordered in the Past 30 Days of this Admission       Date and Time Order Name " Status Description    5/28/2025  1:59 PM Tissue Aerobic Bacterial Culture Routine Preliminary     5/28/2025  1:59 PM Anaerobic Bacterial Culture Routine Preliminary     5/28/2025  1:57 PM Surgical Pathology Exam In process         These results will be followed up by IM group. Will need to ensure surgical culture results, sensitive to Augmentin, consider reaching out to ID on call team if not sensitive to Augmentin    Discharge Disposition   Discharged to home  Condition at discharge: Fair    Hospital Course     Sushil Diana is a 65 year old male  Past medical history significant for PAD, HTN, HLP, Chronic combined (diastolic and systolic) heart failure, DM2, GERD, MDD with anxiety, Alcohol use D/O, Metastatic prostate cancer with mets to the bone admitted on 5/21/25 due to acute osteomyelitis of right 5th proximal phalanx and metatarsal head.       Patient had been admitted at [facility] due to right foot pain secondary to chronic diabetic ulceration with concern for osteomyelitis and sepsis.  Workup completed at [facility] included a right lower extremity/foot MRI that confirmed acute osteomyelitis.  Podiatry had been following the patient. Patient was transferred to [facility] to undergo vascular surgery assessment, continued podiatry assessment and likely proceeding for surgical intervention. Patient was receiving IV Vancomycin and IV Zosyn. Underwent IR right lower extremity angiogram 5/27. Underwent right foot fifth ray resection on 5/28. Discharging home with home PT and nursing on 5/30 with plan for outpatient podiatry follow up, vascular surgery follow up, and intermittent lab monitoring.      Right 5th proximal phalanx and metatarsal head osteomyelitis  Chronic right foot diabetic wound with cellulitis  PAD  5/20 MRI right foot acute osteomyelitis 5th prox phalanx and 5th metatarsal head and potentil fifth metatarsal diaphysis (outside hospital)   Right foot fifth ray resection, 5/28  Gram positive  cocci from 5/28 surgical culture, sensitivities pending  Patient transferred from [facility] with diagnosis of acute osteomyelitis of the right fifth proximal phalanx and metatarsal head.  Initial presentation with right foot pain and noted diabetic ulceration with concern for osteomyelitis and sepsis.  Right lower extremity foot MRI confirmed acute osteomyelitis.  Started on Vanco and Zosyn (MRSA swab negative)   5/22 podiatry consult: Right foot fifth digit metatarsal head with osteomyelitis and peripheral artery disease.  MRI noting osteomyelitis, underwent angiogram 5/27 with vascular, underwent toe amputation 5/30  Blood cultures from 5/19 with no growth to date  ID consult, start Unasyn after OR, the patient and his wife were requesting discharge on 5/30. I discussed with ID. We do not have complete culture data back yet, however Augmentin would be a reasonable options for 7 more days. Will need to follow up on further culture sensitivities to ensure Augmentin is a reasonable choice      Peripheral vascular disease  PAD  Mid right SFA occlusion   Plans for lower extremity angiogram 5/27 on Tuesday with Dr. Bertrand  5/20 lower extremity duplex showing occlusion in the mid right SFA with monophasic waveforms.  Suspected stenosis in the left distal SFA given transition of waveforms may be multifocal areas but suspect femoral-popliteal distribution greatest amount of disease  5/20 ABIs right monophasic noncompressible 0.64 and left noncompressible tibial and monophasic DP 1.33 and PT 1.57  5/22 vascular consult, underwent RLE angiogram on 5/27 with vascular surgery     Hyponatremia, acute on chronic   Hydrochlorothiazide and +/- SIADH + hx ETOH   Sodium ranges between 120-135.     Hold PTA hydrochlorothiazide 25 mg/d, until sodium level normalizes   Was on 1500 ml fluid restriction, increased fluid restriction to 1800 ml on 5/28, repeat BMP after discharge, can stop fluid restriction once sodium has  normalized   Urine sodium 107 with urine osmolality 343 on 5/22 5/23 stopped hydrochlorothiazide (would not resume on discharge)      Hypokalemia  Hypophosphatemia  Hypomagnesium   -Replaced per protocol      CINDY; resolved   Baseline creatinine around 1, noted creatinine was up to 1.76.   Improved to baseline with IVF now on FR 2/2 to hyponatremia      Anemia, acute on chronic normocytic  *Baseline HGB prior to admission 9-11.3.  Hemoglobin on admission is 9. Down to 7.6 on the day of discharge. Patient started on plavix per vascular surgery. Gave strict return precautions if any signs of bleeding at home. I instructed the patient to have hemoglobin checked on 6/2/25. This could be done by home nurse or at PCP follow up  Checked iron stores, B12 and folate  5/24 some degree anemia for awhile 2024.   Followed by oncology      HTN  Resumed on PTA amlodipine 10 mg/d, lopressor 50 mg BID and hydralazine 100 mg TID.  Hold parameters in place.    Continue to hold PTA hydrochlorothiazide 25 mg/d, not resuming given hyponatremia.    SBP's in 140s at time of discharge, was on lisinopril 10 mg bid during admission, resuming lisinopril 40 mg daily at time of discharge   PRN IV labetalol 10 mg every 6 hours for SBP GREATER THAN 180.    5/23 Zestril started at 10 mg p.o. daily (reported to be on 40 mg prior to admission) >> increase to 10 mg po BID 5/24      HLP  Resumed on PTA atorvastatin 20 mg/d.       Chronic combined (diastolic and systolic) heart failure  History of cardiomyopathy 8/2019   ECHO 5/24 with normal EF and 1+ MR   Managing PTA HCTZ and lisinopril as above  Resumed on PTA lopressor 50 mg BID.    Monitor daily weights, intake and output as able.    Combination cardiac diet.    8/2019 noted in epic to have mild to moderate concentric LVH with grade 1 early diastolic dysfunction.  Moderately reduced LV function 30 to 35% with global hypokinesia.  1+ MR  5/24 ECHO now with EF 65-70% RV normal. 1+ mitral  regurg  Patient and wife updated per hospitalist partner        Type 2 DM   PTA regimen includes: Metformin 1000 mg daily with lunch and glipizide 5 mg every morning.    Hold PTA metformin or glipizide.  Resume at discharge.    Medium intensity sliding scale insulin ordered.  Hypoglycemic protocol in place.    add NovoLog per carb count + sliding scale >> stable      GERD  - Resumed on PTA Pepcid 40 mg BID.       Major depressive disorder with anxiety  - Resumed on PTA Celexa 20 mg/d.     Alcohol use D/O  *3-4 beers per day reported as typical consumption.    Had been at [facility] since 5/19.    - CIWA monitoring ordered.          Metastatic prostate cancer with mets to the bone  - Resumed on PTA Zytiga 1000 mg/d.    - Resumed on PTA prednisone 5 mg/d.   - Per prior hospitalist partner oncology was curb sided and ok to continue with current regimen           Diet: Fluid restriction 1800 ML FLUID  Moderate Consistent Carb (60 g CHO per Meal) Diet  Fluid restriction 1800 ML FLUID    DVT Prophylaxis: On plavix, Lovenox  Alfred Catheter: Not present  Lines: None     Cardiac Monitoring: None  Code Status: Full Code    Consultations This Hospital Stay   PHARMACY TO DOSE VANCO  PODIATRY IP CONSULT  WOUND OSTOMY CONTINENCE NURSE  IP CONSULT  CARE MANAGEMENT / SOCIAL WORK IP CONSULT  VASCULAR SURGERY IP CONSULT  PHARMACY TO DOSE VANCO  INFECTIOUS DISEASES IP CONSULT  PHYSICAL THERAPY ADULT IP CONSULT  CARE MANAGEMENT / SOCIAL WORK IP CONSULT    Code Status   Full Code    Time Spent on this Encounter   IJaved DO, personally saw the patient today and spent greater than 30 minutes discharging this patient.       Javed Vera DO  [address] [address]  Phone: [phone]  Fax: [phone]  ______________________________________________________________________    Physical Exam   Vital Signs: Temp: 97.9  F (36.6  C) Temp src: Oral BP:  138/52 Pulse: 65   Resp: 19 SpO2: 97 % O2 Device: None (Room air)    Weight: 167 lbs 12.32 oz    Constitutional: Somnolent but opens eyes to voice   Eyes: Lids and lashes normal, pupils equal, round and reactive to light, extra ocular muscles intact, sclera clear, conjunctiva normal  ENT: Normocephalic, without obvious abnormality, atraumatic   Respiratory: No increased work of breathing, good air exchange, clear to auscultation bilaterally, no crackles or wheezing  Cardiovascular: Normal apical impulse, regular rate and rhythm, normal S1 and S2, no S3 or S4, and no murmur noted  GI: No scars, normal bowel sounds, soft, non-distended, non-tender, no masses palpated, no hepatosplenomegally  Skin: No redness, warmth, or swelling   Musculoskeletal: No deformities   Neurologic: Sleeping, wakes up to voice and follows commands        Primary Care Physician   Mark Matson    Discharge Orders      Primary Care - Care Coordination Referral      Med Therapy Management Referral      Home Care Referral      Activity    WB to heel of RLE in CAM boot. Elevate while at rest. Place ice behind knee for 15 minutes at a time.  Leave dressing to foot  at all times. Do not get wet in the shower.     Change dressing every other day. Cleanse incision site with wound cleanser. Pad dry. Paint incision with betadine. Cover with gauze, gauze wrap and an ACE bandage. Contact the office if you have any concerns.     Follow Up    Follow up with Beatriz Lomax DPM at the [facility], located at [address] [place] [address] [address] [address]. Phone number is [phone]. Call to schedule appt in 1-2 weeks.     Reason for your hospital stay    - Osteomyelitis of your toe     Activity    Your activity upon discharge: activity as tolerated     Discharge Instructions    - Discharging with home PT and home nursing for wound care  - Recommend hemoglobin check on Monday 6/2/25. Home care nurse could check this. If hemoglobin < 7.0  would present to ED and hold your Plavix  - Please also check a basic metabolic on 6/2/25. If Sodium level is normal, can stop the 1800 ml fluid restriction   - Follow up with podiatry for post-op check of the amputated toe  - Low threshold to come to the emergency department if any signs of bleeding  - Follow up with vascular surgery as an outpatient     Diet    Follow this diet upon discharge: Current Diet:Orders Placed This Encounter      Fluid restriction 1800 ML FLUID      Fluid restriction 1800 ML FLUID      Moderate Consistent Carb (60 g CHO per Meal) Diet     Hospital Follow-up with Existing Primary Care Provider (PCP)            Significant Results and Procedures   Most Recent 3 CBC's:  Recent Labs   Lab Test 05/30/25  1038 05/30/25  0720 05/29/25  0801 05/28/25  0656 05/27/25  0653   WBC  --   --  13.8* 9.3 9.5   HGB 7.6* 7.1* 7.8* 7.9* 8.6*   MCV 89 89 88 89 86   PLT  --   --  223 180 215     Most Recent 3 BMP's:  Recent Labs   Lab Test 05/30/25  1215 05/30/25  0720 05/30/25  0157 05/30/25  0027 05/29/25  0809 05/29/25  0801 05/28/25  1147 05/28/25  0656   NA  --  134*  --   --   --  133*  --  136   POTASSIUM  --  3.6  --  3.7  --  3.4  --  3.6   CHLORIDE  --  101  --   --   --  99  --  103   CO2  --  23  --   --   --  22  --  23   BUN  --  17.2  --   --   --  14.5  --  9.0   CR  --  0.98  --   --   --  0.96  --  0.79   ANIONGAP  --  10  --   --   --  12  --  10   ROBERT  --  8.5*  --   --   --  8.6*  --  8.5*   * 115*  115* 116*  --    < > 144*   < > 104*    < > = values in this interval not displayed.     Most Recent 2 LFT's:  Recent Labs   Lab Test 05/19/25  1817 03/20/25  1257   AST 82* 26   ALT 24 13   ALKPHOS 74 59   BILITOTAL 1.0 0.8     Most Recent 3 INR's:  Recent Labs   Lab Test 08/12/18  1830   INR 1.12   ,   Results for orders placed or performed during the hospital encounter of 05/21/25   IR Lower Extremity Angiogram Right    Narrative    Preprocedure diagnosis: Right lower extremity  critical limb ischemia.    Postprocedure diagnosis: Same.    Procedure:  1.  Ultrasound-guided left common femoral artery access placement.  2.  Aortoiliac arteriogram.  3.  Selective catheterization of right common iliac, external iliac,  common femoral, superficial femoral, popliteal, tibioperoneal trunk  and peroneal arteries.  4.  Right lower extremity arteriogram with runoff.  5.  Balloon angioplasty of right distal superficial femoral and  proximal popliteal artery using 5 mm x 20 cm angioplasty balloon with  completion arteriogram.  6.  Balloon angioplasty of right distal superficial femoral and  proximal popliteal artery using 6 mm x 12 cm angioplasty balloon with  completion arteriogram.  7.  Balloon angioplasty of mid superficial femoral artery using 6 mm x  12 cm angioplasty balloon with completion arteriogram.  8.  Left common femoral arteriogram with access site closure using 6  [place] Angio-Seal device.    Surgeon: Brenton Bertrand M.D.     Sedation: Patient received 3 mg of intravenous Versed.  Patient  received 125 mcg of intravenous fentanyl.  Medication was administered  under my direct supervision by registered nurse in the department of  interventional radiology.  Patient was continuously monitored.  Sedation time: 62 minutes.  Fluoroscopy time: 20.2 minutes  Fluoroscopy dose: 91.41 mGY  Heparin: 8000 units.  Protamine: 25 mg.    Contrast: 130 mls visipaque    Indication for procedure: This is a 65-year-old gentleman with right  lower extremity critical limb ischemia.  Arteriogram is advised for  limb salvage.    Procedure details: Patient was identified and then taken to the  procedure room and placed in supine position.  Both groins were  prepped in a sterile surgical field was created.  Preprocedure timeout  was conducted.  The left common femoral artery was located anterior to  the left femoral head by way of fluoroscopy.  It was further localized  with ultrasonography.  Images were  stored.    Local anesthetic was administered in the left groin and the left  common femoral artery was accessed with sonographic guidance using a  micropuncture needle.  This was done in retrograde fashion.  Microwire  was placed.  Then we converted to a 0.035 inch wire platform.  Short 5  [place] sheath was placed.  Omni Flush catheter was advanced into the  distal aorta.  Aortoiliac arteriogram was performed.  Distal aorta,  both common iliac arteries and external iliac arteries were patent.   Both hypogastric arteries had mild to moderate disease at origins.   Wire and catheter were advanced into the distal right external iliac  artery.  Arteriogram showed that the right common femoral artery, deep  femoral artery and proximal superficial femoral arteries were patent.   Mid to distal superficial femoral artery had high-grade stenosis with  short segment occlusion at the Joel's canal.  It reconstituted as a  second segment popliteal artery.  The anterior tibial and posterior  tibial arteries were occluded shortly after their origin.  Peroneal  artery was the only runoff.  Mid peroneal artery had a short segment  occlusion but there was a sizable collaterals that reconstituted the  mid to distal peroneal artery.  Peroneal artery provided perforating  branch to reconstitute the distal anterior tibial artery and  communicating branch to reconstitute the distal posterior tibial  arteries.    8000 units of heparin were given.  We switched over to a 6 [place]  crossover sheath which was positioned in the proximal right  superficial femoral artery.  The area of the occlusion and tandem  stenoses in the superficial femoral artery/proximal popliteal artery  were traversed with an angled Matthew cross catheter and 0.035 inch  angled Glidewire.  Catheter was advanced into the third segment of the  popliteal artery.  Arteriogram confirmed intraluminal position.  Then  we switched over to a 0.035 inch Maria wire.  The second  segment  popliteal, first segment popliteal and distal superficial femoral  arteries were first treated with a 5 mm x 20 cm angioplasty balloon.   This balloon was inflated to 8 diane for 1 minute.  Balloon was deflated  and removed.  Arteriogram showed significant improvement without  evidence of perforation or embolization.  There was a short segment  dissection in the P1 segment.  Matthew cross catheter was positioned in  the distal popliteal artery and a V18 0.018 inch wire was advanced  into the peroneal artery.  I could not traverse the area of the  occlusion of the peroneal artery with a V18 wire and 0.018 inch  [place] catheter.  However I did not want to persist in that area  because the collateral was sizable and reconstituted the mid to distal  peroneal artery with good flow.    Then I treated the first segment of the popliteal artery and the  distal superficial femoral artery with a 6 mm x 12 cm drug-coated  balloon.  This balloon was inflated to 8 diane for 3 minutes.  There was  another more proximal short segment stenosis in the mid superficial  femoral artery which was treated with the same 6 mm x 12 cm  angioplasty balloon which was used here as a plain balloon.   Completion arteriogram showed inline flow from the superficial femoral  to popliteal artery with flow into the peroneal artery which  reconstituted the anterior and posterior tibial arteries distally.    Procedure was concluded.  Left common femoral arteriogram was  performed and access site was assessed for closure device.  25 mg of  protamine was given.  Then the access site was closed with a 6 Uzbek  Angio-Seal device.  Following deployment of the Angio-Seal device  ultrasonography was performed which showed that the footplate was  well-positioned.  There was excellent flow in the proximal left common  femoral, proximal superficial femoral and deep femoral arteries.    Patient was taken to his room in stable condition.    ANIL HIGGINS MD          SYSTEM ID:  [id number]   US Lower Extremity Venous Mapping Bilateral    Narrative    EXAM: US LOWER EXTREMITY VENOUS MAPPING BILATERAL  LOCATION: [facility]  DATE: 2025    INDICATION: critical limb ischemia of right lower extremity. Vein mapping for graft conduit.  COMPARISON: None.  TECHNIQUE: Ultrasound examination of the lower extremity veins was performed, including gray-scale and compression imaging.     LOWER  EXTREMITY FINDINGS:       VENOUS DIAMETERS  LEFT GREAT SAPHENOUS VEIN  Upper Thigh: 2 mm  Mid Thigh: 2 mm  Lower Thigh: 2 mm  Knee: 3 mm  Upper Calf: 3 mm  Mid Calf: 3 mm  Lower Calf: 3 mm  Ankle: 3 mm    RIGHT GREAT SAPHENOUS VEIN  Upper Thigh: 4 mm  Mid Thigh: 4 mm  Lower Thigh: 5 mm  Knee: 5 mm  Upper Calf: 3 mm  Mid Calf: 3 mm  Lower Calf: 3 mm  Ankle: 3 mm      Impression    IMPRESSION:  1. Bilateral GSV measurements noted above.   Echocardiogram Complete     Value    LVEF  65-70%    Narrative    [id number]  [id number]  [id number]  [id number]     [facility]  Echocardiography Laboratory  [address] [place] [address]     Name: CORY TAVERAS  MRN: [id number]  : 1960  Study Date: 2025 02:33 PM  Age: 65 yrs  Gender: Male  Patient Location: [facility]  Reason For Study: Cardiomyopathy  Ordering Physician: HIRO ESPITIA  Referring Physician: ABUNDIO JEAN  Performed By: SANDEE Galvez     BSA: 1.9 m2  Height: 68 in  Weight: 167 lb  HR: 72  BP: 129/57 mmHg  ______________________________________________________________________________  Procedure  Echocardiogram with two-dimensional, color and spectral Doppler. Adequate  quality two-dimensional was performed and interpreted.  ______________________________________________________________________________  Interpretation Summary     Left ventricular systolic function is borderline hyperdynamic. The visual  ejection fraction is 65-70%.  Right ventricular global  function is normal.  There is mild (1+) mitral regurgitation.  IVC is less than 1cm in diameter with near complete collapse with inspiration,  suggesting relative hypovolemia.     ______________________________________________________________________________  Left Ventricle  The left ventricle is normal in size. There is mild concentric left  ventricular hypertrophy. Left ventricular systolic function is borderline  hyperdynamic. The visual ejection fraction is 65-70%. Left ventricular  diastolic function is normal. No regional wall motion abnormalities noted.     Right Ventricle  The right ventricle is normal in size and function.     Atria  The left atrium is moderately dilated. Right atrial size is normal. There is  no color Doppler evidence of an atrial shunt.     Mitral Valve  There is mild (1+) mitral regurgitation.     Tricuspid Valve  There is trace tricuspid regurgitation. The right ventricular systolic  pressure is approximated at 25.0 mmHg plus the right atrial pressure.     Aortic Valve  The aortic valve is normal in structure and function.     Pulmonic Valve  The pulmonic valve is not well seen, but is grossly normal.     Vessels  The aortic root is normal size. Normal size ascending aorta. IVC is less than  1cm in diameter with near complete collapse with inspiration, suggesting  relative hypovolemia.     Pericardium  There is no pericardial effusion.     ______________________________________________________________________________  MMode/2D Measurements & Calculations  IVSd: 1.1 cm  LVIDd: 4.5 cm  LVIDs: 2.9 cm  LVPWd: 1.5 cm  FS: 35.6 %  LV mass(C)d: 222.6 grams  LV mass(C)dI: 117.6 grams/m2     Ao root diam: 3.6 cm  asc Aorta Diam: 3.6 cm  LVOT diam: 2.1 cm  LVOT area: 3.5 cm2  Ao root diam index Ht(cm/m): 2.1  Ao root diam index BSA (cm/m2): 1.9  Asc Ao diam index BSA (cm/m2): 1.9  Asc Ao diam index Ht(cm/m): 2.1  LA Volume (BP): 88.5 ml  LA Volume Index (BP): 46.8 ml/m2  RWT: 0.67     TAPSE: 3.3  cm     Doppler Measurements & Calculations  MV E max wu: 119.0 cm/sec  MV A max wu: 78.4 cm/sec  MV E/A: 1.5  MV dec slope: 510.0 cm/sec2  MV dec time: 0.23 sec  PA acc time: 0.14 sec  TR max wu: 250.0 cm/sec  TR max P.0 mmHg  E/E' av.8  Lateral E/e': 10.2  Medial E/e': 9.5  RV S Wu: 15.1 cm/sec     ______________________________________________________________________________  Report approved by: Leonard Cruz MD on 2025 04:27 PM             Discharge Medications   Discharge Medication List as of 2025  4:44 PM        START taking these medications    Details   acetaminophen (TYLENOL) 325 MG tablet Take 3 tablets (975 mg) by mouth every 8 hours for 7 days., Disp-60 tablet, R-0, E-Prescribe      amoxicillin-clavulanate (AUGMENTIN) 875-125 MG tablet Take 1 tablet by mouth 2 times daily., Disp-14 tablet, R-0, E-Prescribe      clopidogrel (PLAVIX) 75 MG tablet Take 1 tablet (75 mg) by mouth daily., Disp-30 tablet, R-0, E-Prescribe      oxyCODONE (ROXICODONE) 5 MG tablet Take 1 tablet (5 mg) by mouth every 4 hours as needed for severe pain., Disp-10 tablet, R-0, E-Prescribe      polyethylene glycol (MIRALAX) 17 GM/Dose powder Take 17 g by mouth 2 times daily as needed for constipation., Disp-510 g, R-0, E-Prescribe           CONTINUE these medications which have NOT CHANGED    Details   amLODIPine (NORVASC) 10 MG tablet TAKE ONE TABLET BY MOUTH ONCE DAILY, Disp-90 tablet, R-0, E-PrescribePlease advise pt they are due for an appointment with their provider for further refills.      atorvastatin (LIPITOR) 20 MG tablet TAKE ONE TABLET BY MOUTH ONCE DAILY, Disp-90 tablet, R-3, E-Prescribe      citalopram (CELEXA) 20 MG tablet Take 1 tablet (20 mg) by mouth daily., Disp-90 tablet, R-0, E-PrescribePlease advise pt they are due for an appointment with their provider for further refills.      famotidine (PEPCID) 40 MG tablet Take 40 mg by mouth 2 times daily as needed for heartburn., Historical      glipiZIDE  (GLUCOTROL) 5 MG tablet TAKE ONE TABLET BY MOUTH EVERY MORNING, Disp-90 tablet, R-0, E-Prescribe      hydrALAZINE (APRESOLINE) 100 MG tablet Take 100 mg by mouth 3 times daily., Historical      hydrochlorothiazide (HYDRODIURIL) 25 MG tablet TAKE ONE TABLET BY MOUTH ONCE DAILY, Disp-90 tablet, R-1, E-PrescribePaused since today. Resumes on Wed 6/4/2025.      lisinopril (ZESTRIL) 40 MG tablet TAKE ONE TABLET BY MOUTH ONCE DAILY, Disp-90 tablet, R-1, E-Prescribe      metFORMIN (GLUCOPHAGE XR) 500 MG 24 hr tablet Take 1,000 mg by mouth daily (with lunch)., Historical      metoprolol tartrate (LOPRESSOR) 50 MG tablet Take 50 mg by mouth 2 times daily., Historical      predniSONE (DELTASONE) 5 MG tablet Take 1 tablet (5 mg) by mouth daily. START WITH ZYTIGA, Disp-180 tablet, R-3, E-Prescribe      zoledronic acid (ZOMETA) 4 MG/5ML injection Inject 5 mLs into the vein once. Every 3 months, Historical      abiraterone (ZYTIGA) 250 MG tablet Take 4 tablets (1,000 mg) by mouth daily for 30 doses. Take on empty stomach., Disp-120 tablet, R-0, E-Prescribe      blood glucose (CONTOUR NEXT TEST) test strip USE TO TEST BLOOD SUGARS ONE TO THREE TIMES DAILY  OR AS DIRECTED, Disp-100 strip, R-1, E-Prescribe      blood glucose monitoring (NO BRAND SPECIFIED) meter device kit Use to test blood sugar 1-3 times daily or as directed. -Contour Next meterDisp-1 kit, [id number]      Microlet Lancets MISC USE TO TEST BLOOD SUGAR 1-3 TIMES DAILY OR AS DIRECTED., Disp-100 each, R-0, E-Prescribe           Allergies   Allergies   Allergen Reactions    No Known Drug Allergy     Seasonal Allergies

## 2025-05-30 NOTE — PLAN OF CARE
Surgery/POD#: POD#2 Right foot fifth ray resection  Orientation: A&Ox4  ABNL VS/O2: VSS on RA  ABNL Labs: See chart. On K, Mg, and Phos protocols  Pain Management: oxy, scheduled tylenol   Bowel/Bladder: Continent. Urinal, voiding well  Drains: R PIV  Wounds/incisions:RLE foot wound. L groin site  Diet: CHO, BG checks. 1800mL FR  Number of times OUT OF BED this shift: Ax1 GB+W. Ambulated in the tucker x1 w/ CAM boot on  Anticipated DC Date: today - pending abx plan, home care set up  Other Important Info:

## 2025-05-30 NOTE — PLAN OF CARE
Goal Outcome Evaluation:  Surgery/POD#: POD#2 Right foot fifth ray resection  Orientation: A&Ox4  ABNL VS/O2: VSS on Rm Air  ABNL Labs: See chart.  Pain Management: oxy, scheduled tylenol   Bowel/Bladder: Continent. Urinal, voiding well  Drains: R PIV  Wounds/incisions: RLE foot wound. Drsg CDI.   Diet: CHO, BG checks. 1800mL FR  Number of times OUT OF BED this shift: Ax1 GB+W. Ambulated in the tucker x1 w/ CAM boot on  Anticipated DC Date: today - pending abx plan, home care set up  Other Important Info: ok to discharge home, home health care agency [facility] to follow up w/ patient, discharge education,  instructions reviewed w/ patient & wife at bedside. Patient discharging home with wife transporting at this time.

## 2025-05-30 NOTE — CONSULTS
Consult acknowledged for Home Rn services. Referrals are pending.     Felisa Rand RN   [facility]   Phone [phone], Vocera or [phone]

## 2025-05-30 NOTE — PROGRESS NOTES
Care Management Follow Up    Length of Stay (days): 9    Expected Discharge Date: 05/30/2025     Concerns to be Addressed: adjustment to diagnosis/illness, discharge planning     Patient plan of care discussed at interdisciplinary rounds: Yes    Anticipated Discharge Disposition: Home Care  Anticipated Discharge Services:    Anticipated Discharge DME:      Patient/family educated on Medicare website which has current facility and service quality ratings:    Education Provided on the Discharge Plan:    Patient/Family in Agreement with the Plan: yes    Referrals Placed by CM/SW:    Private pay costs discussed: Not applicable    Discussed  Partnership in Safe Discharge Planning  document with patient/family: No     Handoff Completed: No, handoff not indicated or clinically appropriate    Additional Information:  SW checked [facility] status.   [facility] home care accepted. SW updated pt and spouse of [facility] agency and that they will be reaching out to schedule initial meeting.     Next Steps: no further needs at this time.     Angelita Lyon, BSW

## 2025-05-30 NOTE — PROGRESS NOTES
Brief Provider Note    Discussed low hemoglobin (7.1) with patient and his wife. I consented for blood transfusion. We will repeat a hemoglobin to confirm if low and if blood transfusion needed. Planning to continue plavix for now. Awaiting ID and podiatry recommendations. Also getting PT evaluation this morning. Will follow up with patient this afternoon.     Javed Vera, DO

## 2025-05-30 NOTE — PLAN OF CARE
Date & Time: 05/29-5/30 [id number]    Surgery/POD#: POD#2 Right foot fifth ray resection    Behavior & Aggression: Green  Fall Risk: Yes  Orientation: A&Ox4  ABNL VS/O2: VSS on RA  ABNL Labs: See chart. On K, Mg, and Phos protocols  Pain Management: PRN oxycodone given x3    Bowel/Bladder: Continent. Urinal at bedside, +gas, +BM  Drains: R PIV SL'd w/ int abx  Wounds/incisions: Scattered scabs and bruising. RLE wound. L groin site  Diet: CHO, BG checks. 1800mL FR  Number of times OUT OF BED this shift: Ax1 GB+W. Ambulated in the tucker x1 w/ CAM boot on. Up to BM x3  Anticipated DC Date: Possible 5/30/2025    Other Important Info: Awaiting OR cultures for final abx plan

## 2025-05-31 ENCOUNTER — RESULTS FOLLOW-UP (OUTPATIENT)
Dept: FAMILY MEDICINE | Facility: CLINIC | Age: 65
End: 2025-05-31

## 2025-05-31 NOTE — PLAN OF CARE
Physical Therapy Discharge Summary    Reason for therapy discharge:    Discharged to home with home therapy.    Progress towards therapy goal(s). See goals on Care Plan in [facility] electronic health record for goal details.  Goals not met.  Barriers to achieving goals:   discharge from facility.    Therapy recommendation(s):    Continued therapy is recommended.  Rationale/Recommendations:  To progress independence and safety with functional mobility.

## 2025-06-01 ENCOUNTER — TELEPHONE (OUTPATIENT)
Dept: INTERNAL MEDICINE | Facility: CLINIC | Age: 65
End: 2025-06-01
Payer: MEDICARE

## 2025-06-01 DIAGNOSIS — E11.9 DIABETES MELLITUS WITHOUT COMPLICATION (H): Primary | ICD-10-CM

## 2025-06-01 DIAGNOSIS — I10 HYPERTENSION, UNSPECIFIED TYPE: ICD-10-CM

## 2025-06-01 LAB — BACTERIA TISS BX CULT: ABNORMAL

## 2025-06-01 NOTE — RESULT ENCOUNTER NOTE
Op Cx results w + Strep constellatus in broth noted. PCN/cephalosporin sensitite   Discharged on Augmentin x7d. Should cover  Forwarded to Dr Beatriz Lomax DPM (s/p R 5th ray amputation 5/28) for review re abx/tx.

## 2025-06-01 NOTE — TELEPHONE ENCOUNTER
FYI - Status Update    Who is Calling: family member -spouse    Update:  ghanshyam is calling to make an lab only appt for pt. Pt was recently discharge and need follow up lab appt. Decline hospital follow up at this time, pt is following up on dr. Vera orders to repeat lab.     Does caller want a call/response back: Yes - orders for hemoglobin and other labs / sodium.    Could we send this information to you in [employer] or would you prefer to receive a phone call?:   Patient would prefer a phone call   Okay to leave a detailed message?: Yes at Home number on file [phone] (home)

## 2025-06-02 ENCOUNTER — PATIENT OUTREACH (OUTPATIENT)
Dept: CARE COORDINATION | Facility: CLINIC | Age: 65
End: 2025-06-02
Payer: MEDICARE

## 2025-06-02 ENCOUNTER — TELEPHONE (OUTPATIENT)
Dept: PHARMACY | Facility: CLINIC | Age: 65
End: 2025-06-02
Payer: MEDICARE

## 2025-06-02 DIAGNOSIS — Z09 HOSPITAL DISCHARGE FOLLOW-UP: ICD-10-CM

## 2025-06-02 DIAGNOSIS — I10 HYPERTENSION, UNSPECIFIED TYPE: Primary | ICD-10-CM

## 2025-06-02 RX ORDER — HYDRALAZINE HYDROCHLORIDE 100 MG/1
100 TABLET, FILM COATED ORAL 3 TIMES DAILY
Qty: 90 TABLET | Refills: 2 | Status: SHIPPED | OUTPATIENT
Start: 2025-06-02

## 2025-06-02 ASSESSMENT — ACTIVITIES OF DAILY LIVING (ADL): DEPENDENT_IADLS:: INDEPENDENT

## 2025-06-02 NOTE — TELEPHONE ENCOUNTER
Wife Angelina (Consent to communicate on file) is also wondering if patient can get a refill on his oxycodone? She said the patient was only sent home with #10 tablets. [facility].    See message below about labs as well.     Will route to Primary Care Provider.     Luma Samuel RN Care Coordination   [facility] Palafox  Email: [email or web address]  Phone: [phone]

## 2025-06-02 NOTE — TELEPHONE ENCOUNTER
MTM referral from: Transitions of Care (recent hospital discharge, TCU discharge, or ED visit)    MTM referral outreach attempt #1 on June 2, 2025 at 10:49 AM      Outcome: Spoke with patient S.O-declined    Use PH, use VBC, DENZEL discharged 5/30 for the carrier/Plan on the flowsheet          Jennifer Guy [facility]  -[facility]  [phone]

## 2025-06-02 NOTE — TELEPHONE ENCOUNTER
Scheduled patient for a hospital follow up. Also informed Wife they need to contact the provider that saw him for this to be able to get pain medication.

## 2025-06-02 NOTE — PROGRESS NOTES
Clinic Care Coordination Contact  Transitions of Care Outreach  Chief Complaint   Patient presents with    Clinic Care Coordination - Post Hospital     RN CC- post hospital follow up       Most Recent Admission Date: 5/21/2025   Most Recent Admission Diagnosis: Osteomyelitis (H) - M86.9     Most Recent Discharge Date: 5/30/2025   Most Recent Discharge Diagnosis: Other specified counseling - Z71.89  Other acute osteomyelitis of right foot (H) - M86.171  Peripheral vascular disease - I73.9     Transitions of Care Assessment    Discharge Assessment  How are you doing now that you are home?: per patient's wife (consent to communicate on file), patient is doing well since discharge. has all medication. needs a refill on pain medication. follow up 6/11/2025  How are your symptoms? (Red Flag symptoms escalate to triage hotline per guidelines): Improved  Do you know how to contact your clinic care team if you have future questions or changes to your health status? : Yes  Does the patient have their discharge instructions? : Yes  Does the patient have questions regarding their discharge instructions? : No  Were you started on any new medications or were there changes to any of your previous medications? : Yes  Does the patient have all of their medications?: Yes  Do you have questions regarding any of your medications? : No  Do you have all of your needed medical supplies or equipment (DME)?  (i.e. oxygen tank, CPAP, cane, etc.): Yes         Post-op (Clinicians Only)  Did the patient have surgery or a procedure: Yes  Fever: No  Chills: No  Eating & Drinking: eating and drinking without complaints/concerns    Follow up Plan     Discharge Follow-Up  Discharge follow up appointment scheduled in alignment with recommended follow up timeframe or Transitions of Risk Category? (Low = within 30 days; Moderate= within 14 days; High= within 7 days): Yes  Discharge Follow Up Appointment Date: 06/11/25  Discharge Follow Up Appointment  Scheduled with?: Specialty Care Provider    Future Appointments   Date Time Provider Department Center   6/11/2025 10:20 AM Beatriz Lomax DPM [facility]   7/1/2025 12:30 PM PH LAB [facility]   7/1/2025  1:00 PM Maylin Michaels APRN CNP [facility]   7/1/2025  2:00 PM PH INFUSION NURSE [facility]   10/2/2025  2:30 PM PH LAB [facility]   10/2/2025  3:00 PM Eduardo Holliday MD [facility]   10/2/2025  3:30 PM PH INFUSION NURSE [facility]       Outpatient Plan as outlined on AVS reviewed with patient.    For any urgent concerns, please contact our 24 hour nurse triage line: [phone] ([phone])       Patient declines care coordination at this time.    Luma Samuel RN

## 2025-06-02 NOTE — PROGRESS NOTES
Connected Care Resource Center: [facility]    Background: Transitional Care Management program identified per system criteria and reviewed by [facility] team for possible outreach.    Assessment: Upon chart review, CCR Team member will not proceed with patient outreach related to this episode of Transitional Care Management program due to reason below:    Patient has active communication with a nurse, provider or care team for reason of post-hospital follow up plan.  Outreach call by CCRC team not indicated to minimize duplicative efforts.     Family spoke to RNCC today for post hospital TCM.  See other patient outreach encounter dated 6/2/25.    Plan: Transitional Care Management episode addressed appropriately per reason noted above.      Nadia Vásquez, ANASTACIA  Connected Care Resource [place]    *Connected Care Resource Team does NOT follow patient ongoing. Referrals are identified based on internal discharge reports and the outreach is to ensure patient has an understanding of their discharge instructions.

## 2025-06-02 NOTE — TELEPHONE ENCOUNTER
This patient was last seen by me June 26, 2024.  That was for a cataract preop exam.  He will probably need to contact one of his more recent providers for his requested oxycodone.    Otherwise, he will need to set up a follow-up appointment before I can prescribe such medication.    Dano

## 2025-06-03 ENCOUNTER — LAB (OUTPATIENT)
Dept: LAB | Facility: CLINIC | Age: 65
End: 2025-06-03
Payer: MEDICARE

## 2025-06-03 ENCOUNTER — TELEPHONE (OUTPATIENT)
Dept: INTERNAL MEDICINE | Facility: CLINIC | Age: 65
End: 2025-06-03

## 2025-06-03 ENCOUNTER — TELEPHONE (OUTPATIENT)
Dept: PHARMACY | Facility: OTHER | Age: 65
End: 2025-06-03

## 2025-06-03 DIAGNOSIS — E11.628 DIABETIC INFECTION OF RIGHT FOOT (H): ICD-10-CM

## 2025-06-03 DIAGNOSIS — E11.9 DIABETES MELLITUS WITHOUT COMPLICATION (H): ICD-10-CM

## 2025-06-03 DIAGNOSIS — L08.9 DIABETIC INFECTION OF RIGHT FOOT (H): ICD-10-CM

## 2025-06-03 LAB
ALBUMIN SERPL BCG-MCNC: 3.9 G/DL (ref 3.5–5.2)
ALP SERPL-CCNC: 59 U/L (ref 40–150)
ALT SERPL W P-5'-P-CCNC: 20 U/L (ref 0–70)
ANION GAP SERPL CALCULATED.3IONS-SCNC: 11 MMOL/L (ref 7–15)
AST SERPL W P-5'-P-CCNC: 27 U/L (ref 0–45)
BILIRUB SERPL-MCNC: 0.5 MG/DL
BUN SERPL-MCNC: 15.1 MG/DL (ref 8–23)
CALCIUM SERPL-MCNC: 9.7 MG/DL (ref 8.8–10.4)
CHLORIDE SERPL-SCNC: 99 MMOL/L (ref 98–107)
CREAT SERPL-MCNC: 0.85 MG/DL (ref 0.67–1.17)
EGFRCR SERPLBLD CKD-EPI 2021: >90 ML/MIN/1.73M2
ERYTHROCYTE [DISTWIDTH] IN BLOOD BY AUTOMATED COUNT: 14 % (ref 10–15)
EST. AVERAGE GLUCOSE BLD GHB EST-MCNC: 108 MG/DL
GLUCOSE SERPL-MCNC: 38 MG/DL (ref 70–99)
HBA1C MFR BLD: 5.4 %
HCO3 SERPL-SCNC: 24 MMOL/L (ref 22–29)
HCT VFR BLD AUTO: 23 % (ref 40–53)
HGB BLD-MCNC: 7.7 G/DL (ref 13.3–17.7)
MCH RBC QN AUTO: 29.8 PG (ref 26.5–33)
MCHC RBC AUTO-ENTMCNC: 33.5 G/DL (ref 31.5–36.5)
MCV RBC AUTO: 89 FL (ref 78–100)
PATH REPORT.COMMENTS IMP SPEC: NORMAL
PATH REPORT.COMMENTS IMP SPEC: NORMAL
PATH REPORT.FINAL DX SPEC: NORMAL
PATH REPORT.GROSS SPEC: NORMAL
PATH REPORT.MICROSCOPIC SPEC OTHER STN: NORMAL
PATH REPORT.RELEVANT HX SPEC: NORMAL
PHOTO IMAGE: NORMAL
PLATELET # BLD AUTO: 224 10E3/UL (ref 150–450)
POTASSIUM SERPL-SCNC: 3.3 MMOL/L (ref 3.4–5.3)
PROT SERPL-MCNC: 6.4 G/DL (ref 6.4–8.3)
RBC # BLD AUTO: 2.58 10E6/UL (ref 4.4–5.9)
SODIUM SERPL-SCNC: 134 MMOL/L (ref 135–145)
WBC # BLD AUTO: 11.3 10E3/UL (ref 4–11)

## 2025-06-03 PROCEDURE — 88305 TISSUE EXAM BY PATHOLOGIST: CPT | Mod: 26 | Performed by: PATHOLOGY

## 2025-06-03 PROCEDURE — 83036 HEMOGLOBIN GLYCOSYLATED A1C: CPT

## 2025-06-03 PROCEDURE — 80053 COMPREHEN METABOLIC PANEL: CPT

## 2025-06-03 PROCEDURE — 88311 DECALCIFY TISSUE: CPT | Mod: 26 | Performed by: PATHOLOGY

## 2025-06-03 PROCEDURE — 36415 COLL VENOUS BLD VENIPUNCTURE: CPT

## 2025-06-03 PROCEDURE — 80061 LIPID PANEL: CPT

## 2025-06-03 PROCEDURE — 85027 COMPLETE CBC AUTOMATED: CPT

## 2025-06-03 NOTE — TELEPHONE ENCOUNTER
Left voicemail for patient to call back to schedule appointment(s), provided telephone number for patient to call back to schedule.    US right lower extremity arterial duplex  RETURN VASCULAR PATIENT consult with Dr. Bertrand or NANI Crowe CNP  Please schedule this in approximately 6 weeks        Appt note:  6 week follow up to 05/27/25 Right SFA recanalization and angioplasty with Dr. Bertrand

## 2025-06-03 NOTE — TELEPHONE ENCOUNTER
Date/time of call received from lab: 06/03/25 at 11:58 AM.    Lab test:  Glucose    Lab value:  38    Ordering provider name: Dr. Matson     Ordering provider department: Internal Medicine/Family Medicine     Primary Care Provider: Mark Matson     Result managed by: Clinic Hours: Primary Care clinic RN staff. Was test ordered in Primary Care?: Yes, ordered in Primary Care Primary Care: route telephone encounter high priority to ordering provider and ordering provider's clinic Nurse BRYANNA HermosilloN, RN

## 2025-06-03 NOTE — TELEPHONE ENCOUNTER
Blood sugar was 176 when he arrived home. No symptoms.    Patient and significant other are asking how often to check BG, and if he should hold Glipizide indefinitely? He did take Glipizide already this morning.    Layla Escalona RN on 6/3/2025 at 1:51 PM

## 2025-06-03 NOTE — TELEPHONE ENCOUNTER
RN spoke with MARGIE Red Inquiring if she would be able to contact patient in Emergency situation, regarding labs needing to check-in.     Teofilo Did provide writer with following number.: [phone]    RN Triage    Patient Contact    Attempt # 1, 2, and 3.     Was call answered?  No.  Left message on voicemail with information to call me back. and Emergency Contact is also going to reach out to patient and Kaleigh.     Zack Grant RN on 6/3/2025 at 12:13 PM

## 2025-06-03 NOTE — TELEPHONE ENCOUNTER
Please make sure they recheck his blood sugar and that it has come up to normal range between 70 and 100.  Please make sure he is eating.  I would have him hold his glipizide as his A1c was good and we want to avoid low sugars.

## 2025-06-03 NOTE — TELEPHONE ENCOUNTER
Consent to communicate is on file to speak with wife    Wife notified of providers advice.  Lucero Edgar RN on 6/3/2025 at 2:36 PM

## 2025-06-03 NOTE — TELEPHONE ENCOUNTER
Angelina, patient 's significant other called in with patient. C2C on file.     Writer read below message from provider to Angelina. She stated understanding, they will hold the glipizide for now. She wonders if he should continue the metformin or hold this as well?    She states while patient was in the hospital, they held his metformin and glipizide, and were giving him insulin instead. She states when he was discharged they were instructed to resume the glipizide and metformin, so those were just restarted on Saturday.     She states he did eat an hour or so before the labs, had some sausage and pancakes, had his morning pills. States he seems to be doing just fine right now. States she actually saw the low glucose result on MyChart and gave him 2 pieces of black licorice that she had in the car right away. She states they will be home in about 30 minutes and they will re-check his glucose and return call to us with update.       Eden Bagley, BRYANNAN, RN

## 2025-06-03 NOTE — TELEPHONE ENCOUNTER
[facility] Recruitment: [facility]     Referral outreach attempt #1 on Blaire 3, 2025      Outcome: left voicemail- Call back number [phone]    Haydee Haji [facility]   [phone]

## 2025-06-04 ENCOUNTER — TELEPHONE (OUTPATIENT)
Dept: INTERNAL MEDICINE | Facility: CLINIC | Age: 65
End: 2025-06-04
Payer: MEDICARE

## 2025-06-04 LAB — BACTERIA TISS BX CULT: NORMAL

## 2025-06-04 NOTE — TELEPHONE ENCOUNTER
MTM referral from: Transitions of Care (recent hospital discharge, TCU discharge, or ED visit)    MT referral outreach attempt #2 on June 4, 2025 at 1:05 PM      Outcome: Spoke with patient said everything is good    Use vbc for the carrier/Plan on the flowsheet          EUGENE White   [phone]

## 2025-06-05 ENCOUNTER — OFFICE VISIT (OUTPATIENT)
Dept: INTERNAL MEDICINE | Facility: CLINIC | Age: 65
End: 2025-06-05
Payer: MEDICARE

## 2025-06-05 VITALS
SYSTOLIC BLOOD PRESSURE: 129 MMHG | RESPIRATION RATE: 16 BRPM | TEMPERATURE: 96.9 F | HEART RATE: 71 BPM | OXYGEN SATURATION: 97 % | DIASTOLIC BLOOD PRESSURE: 69 MMHG

## 2025-06-05 DIAGNOSIS — L08.9 DIABETIC INFECTION OF RIGHT FOOT (H): Primary | ICD-10-CM

## 2025-06-05 DIAGNOSIS — K21.00 GASTROESOPHAGEAL REFLUX DISEASE WITH ESOPHAGITIS WITHOUT HEMORRHAGE: ICD-10-CM

## 2025-06-05 DIAGNOSIS — M86.9 OSTEOMYELITIS OF FIFTH TOE OF RIGHT FOOT (H): ICD-10-CM

## 2025-06-05 DIAGNOSIS — E83.42 HYPOMAGNESEMIA: ICD-10-CM

## 2025-06-05 DIAGNOSIS — C61 PROSTATE CANCER METASTATIC TO BONE (H): ICD-10-CM

## 2025-06-05 DIAGNOSIS — E11.628 DIABETIC INFECTION OF RIGHT FOOT (H): Primary | ICD-10-CM

## 2025-06-05 DIAGNOSIS — C61 METASTASIS FROM MALIGNANT NEOPLASM OF PROSTATE (H): ICD-10-CM

## 2025-06-05 DIAGNOSIS — E83.39 HYPOPHOSPHATEMIA: ICD-10-CM

## 2025-06-05 DIAGNOSIS — E11.65 TYPE 2 DIABETES MELLITUS WITH HYPERGLYCEMIA, WITHOUT LONG-TERM CURRENT USE OF INSULIN (H): ICD-10-CM

## 2025-06-05 DIAGNOSIS — I50.43 ACUTE ON CHRONIC COMBINED SYSTOLIC AND DIASTOLIC CONGESTIVE HEART FAILURE (H): ICD-10-CM

## 2025-06-05 DIAGNOSIS — C79.9 METASTASIS FROM MALIGNANT NEOPLASM OF PROSTATE (H): ICD-10-CM

## 2025-06-05 DIAGNOSIS — E87.1 HYPONATREMIA: ICD-10-CM

## 2025-06-05 DIAGNOSIS — C79.51 PROSTATE CANCER METASTATIC TO BONE (H): ICD-10-CM

## 2025-06-05 DIAGNOSIS — I73.9 PERIPHERAL ARTERY DISEASE: ICD-10-CM

## 2025-06-05 DIAGNOSIS — N18.31 CHRONIC KIDNEY DISEASE, STAGE 3A (H): ICD-10-CM

## 2025-06-05 DIAGNOSIS — E87.6 HYPOKALEMIA: ICD-10-CM

## 2025-06-05 PROBLEM — E11.621 DIABETIC ULCER OF RIGHT MIDFOOT ASSOCIATED WITH TYPE 2 DIABETES MELLITUS (H): Status: RESOLVED | Noted: 2025-05-19 | Resolved: 2025-06-05

## 2025-06-05 PROBLEM — L97.419 DIABETIC ULCER OF RIGHT MIDFOOT ASSOCIATED WITH TYPE 2 DIABETES MELLITUS (H): Status: RESOLVED | Noted: 2025-05-19 | Resolved: 2025-06-05

## 2025-06-05 LAB
CHOLEST SERPL-MCNC: 100 MG/DL
HDLC SERPL-MCNC: 31 MG/DL
LDLC SERPL CALC-MCNC: 32 MG/DL
NONHDLC SERPL-MCNC: 69 MG/DL
TRIGL SERPL-MCNC: 183 MG/DL

## 2025-06-05 RX ORDER — OXYCODONE HYDROCHLORIDE 5 MG/1
5 TABLET ORAL EVERY 4 HOURS PRN
Qty: 10 TABLET | Refills: 0 | Status: SHIPPED | OUTPATIENT
Start: 2025-06-05

## 2025-06-05 RX ORDER — PANTOPRAZOLE SODIUM 40 MG/1
40 TABLET, DELAYED RELEASE ORAL DAILY
Qty: 30 TABLET | Refills: 0 | Status: SHIPPED | OUTPATIENT
Start: 2025-06-05

## 2025-06-05 RX ORDER — POTASSIUM CHLORIDE 1500 MG/1
20 TABLET, EXTENDED RELEASE ORAL 2 TIMES DAILY
Qty: 30 TABLET | Refills: 0 | Status: SHIPPED | OUTPATIENT
Start: 2025-06-05

## 2025-06-05 RX ORDER — METFORMIN HYDROCHLORIDE 500 MG/1
500 TABLET, EXTENDED RELEASE ORAL
COMMUNITY
Start: 2025-06-05

## 2025-06-05 ASSESSMENT — PAIN SCALES - GENERAL: PAINLEVEL_OUTOF10: MODERATE PAIN (5)

## 2025-06-05 NOTE — PROGRESS NOTES
Assessment & Plan     Osteomyelitis of fifth toe of right foot (H)  Now status post amputation of the toe    Diabetic infection of right foot (H)  Improved      Type 2 diabetes mellitus with hyperglycemia, without long-term current use of insulin (H)  Controlled.- Lipid panel reflex to direct LDL Non-fasting; Future  Will decrease metformin to 500 mg daily and discontinue the sulfonylurea as patient is not experiencing recurrent hypoglycemia.    Peripheral artery disease  Stable.    Chronic kidney disease, stage 3a (H)  Improved.  Renal function reviewed.    - Albumin Random Urine Quantitative with Creat Ratio; Future  - potassium chloride simona ER (KLOR-CON M20) 20 MEQ CR tablet; Take 1 tablet (20 mEq) by mouth 2 times daily.    Gastroesophageal reflux disease with esophagitis without hemorrhage  Discontinue H2 blocker in favor of Protonix as he is continue to have symptoms.  - pantoprazole (PROTONIX) 40 MG EC tablet; Take 1 tablet (40 mg) by mouth daily.    Acute on chronic combined systolic and diastolic congestive heart failure (H)  Stable    Hyponatremia  Resolved    Hypokalemia  Will start on potassium supplement    Hypomagnesemia  Resolved and recheck in a week    Hypophosphatemia  Resolved.  Recheck in a week    Prostate cancer metastatic to bone (H)  Follow-up with oncologist    Metastasis from malignant neoplasm of prostate (H)  As above          MED REC REQUIRED  Post Medication Reconciliation Status: discharge medications reconciled, continue medications without change        Zara Ling is a 65 year old, presenting for the following health issues:  ER F/U        6/5/2025     8:29 AM   Additional Questions   Roomed by Yelitza   Accompanied by Angelina NEFF        Discharge Diagnoses  Right 5th proximal phalanx and metatarsal head osteomyelitis  Chronic right foot diabetic wound with cellulitis  PAD  5/20 MRI right foot acute osteomyelitis 5th prox phalanx and 5th metatarsal head and potentil fifth  metatarsal diaphysis (outside hospital)   Right foot fifth ray resection, 5/28  Peripheral vascular disease  PAD  Mid right SFA occlusion   Plans for lower extremity angiogram 5/27 on Tuesday with Dr. Bertrand  Hyponatremia, acute on chronic   Hydrochlorothiazide and +/- SIADH + hx ETOH   Hypokalemia  Hypophosphatemia  Hypomagnesium   CINDY; resolved   Anemia, acute on chronic normocytic  HTN  HLP  Chronic combined (diastolic and systolic) heart failure  History of cardiomyopathy 8/2019   ECHO 5/24 with normal EF and 1+ MR   Type 2 DM   GERD  Major depressive disorder with anxiety  Alcohol use D/O  Metastatic prostate cancer with mets to the bone           6/2/2025   Post Discharge Outreach   How are you doing now that you are home? per patient's wife (consent to communicate on file), patient is doing well since discharge. has all medication. needs a refill on pain medication. follow up 6/11/2025   How are your symptoms? (Red Flag symptoms escalate to triage hotline per guidelines) Improved   Does the patient have their discharge instructions?  Yes   Does the patient have questions regarding their discharge instructions?  No   Were you started on any new medications or were there changes to any of your previous medications?  Yes   Does the patient have all of their medications? Yes   Do you have questions regarding any of your medications?  No   Do you have all of your needed medical supplies or equipment (DME)?  (i.e. oxygen tank, CPAP, cane, etc.) Yes   Discharge Follow Up Appointment Date 6/11/2025   Discharge Follow Up Appointment Scheduled with? Specialty Care Provider       Hospital Follow-up Visit:    Hospital/Nursing Home/IP Rehab Facility: [facility]  Most Recent Admission Date: 5/21/2025   Most Recent Admission Diagnosis: Osteomyelitis (H) - M86.9     Most Recent Discharge Date: 5/30/2025   Most Recent Discharge Diagnosis: Other specified counseling - Z71.89  Other acute osteomyelitis  of right foot (H) - M86.171  Peripheral vascular disease - I73.9   Do you have any other stressors you would like to discuss with your provider? No    Problems taking medications regularly:  None  Medication changes since discharge: Holding Glipizide   Problems adhering to non-medication therapy:  None    Summary of hospitalization:  [facility] discharge summary reviewed  Diagnostic Tests/Treatments reviewed.  Follow up needed: Patient will follow-up with his podiatrist, oncologist, and vascular surgeon as scheduled.  Other Healthcare Providers Involved in Patient s Care:         Many as listed  Update since discharge: improved.         Plan of care communicated with patient and family                 Review of Systems  CONSTITUTIONAL:NEGATIVE for fever, chills, change in weight and POSITIVE  for fatigue  INTEGUMENTARY/SKIN: Patient has healing of the surgical site.  Is scheduled for wound dressing change tomorrow.  Not visualized at this time  EYES: NEGATIVE for vision changes or irritation  ENT/MOUTH: NEGATIVE for ear, mouth and throat problems  RESP: NEGATIVE for significant cough or SOB  BREAST: NEGATIVE for masses, tenderness or discharge  CV: NEGATIVE for chest pain, palpitations or peripheral edema  GI: NEGATIVE for nausea, abdominal pain, heartburn, or change in bowel habits  : NEGATIVE for frequency, dysuria, or hematuria  MUSCULOSKELETAL: Has continued pain in his postoperative right foot.  NEURO: NEGATIVE for weakness, dizziness or paresthesias  ENDOCRINE: Had episode of severe hypoglycemia recently.  Has now discontinued the sulfonylurea.  HEME: NEGATIVE for bleeding problems  PSYCHIATRIC: NEGATIVE for changes in mood or affect      Objective    /69 (BP Location: Right arm, Patient Position: Sitting, Cuff Size: Adult Regular)   Pulse 71   Temp 96.9  F (36.1  C) (Temporal)   Resp 16   SpO2 97%   There is no height or weight on file to calculate BMI.  Physical Exam   GENERAL:  alert and no distress  EYES: Eyes grossly normal to inspection, PERRL and conjunctivae and sclerae normal  HENT: ear canals and TM's normal, nose and mouth without ulcers or lesions  NECK: no adenopathy, no asymmetry, masses, or scars  RESP: lungs clear to auscultation - no rales, rhonchi or wheezes  CV: regular rate and rhythm, normal S1 S2, no S3 or S4, no murmur, click or rub, no peripheral edema  ABDOMEN: soft, nontender, no hepatosplenomegaly, no masses and bowel sounds normal  MS: Right foot is heavily dressed and booted.  Upcoming examination per his podiatrist.  SKIN: no suspicious lesions or rashes  NEURO: Normal strength and tone, mentation intact and speech normal  PSYCH: mentation appears normal, affect normal/bright    Lab work and radiographs performed in the hospitalization discussed with the patient and his wife in detail.        Signed Electronically by: Mark Matson DO

## 2025-06-06 ENCOUNTER — RESULTS FOLLOW-UP (OUTPATIENT)
Dept: FAMILY MEDICINE | Facility: CLINIC | Age: 65
End: 2025-06-06

## 2025-06-09 ENCOUNTER — TELEPHONE (OUTPATIENT)
Dept: PODIATRY | Facility: CLINIC | Age: 65
End: 2025-06-09
Payer: MEDICARE

## 2025-06-09 NOTE — TELEPHONE ENCOUNTER
Other: Significant other calling wondering if they can do the post op appt with Dr. Fofana instead? The patient doesn't feel he is up to such a long trip to come in to see Dr. Lomax.        Could we send this information to you in [employer] or would you prefer to receive a phone call?:   Patient would prefer a phone call   Okay to leave a detailed message?: Yes at Cell number on file:    Telephone Information:   Mobile [phone]

## 2025-06-10 NOTE — TELEPHONE ENCOUNTER
Other: Angelina significant other calling again regarding message sent yesterday regarding seeing Dr. Barrett,would like a call back asap     Could we send this information to you in [employer] or would you prefer to receive a phone call?:   Patient would prefer a phone call   Okay to leave a detailed message?: Yes at Other phone number:  [phone]   na

## 2025-06-11 ENCOUNTER — OFFICE VISIT (OUTPATIENT)
Dept: PODIATRY | Facility: CLINIC | Age: 65
End: 2025-06-11
Payer: MEDICARE

## 2025-06-11 DIAGNOSIS — I73.9 PERIPHERAL ARTERIAL DISEASE: ICD-10-CM

## 2025-06-11 DIAGNOSIS — Z89.421 S/P AMPUTATION OF LESSER TOE, RIGHT: Primary | ICD-10-CM

## 2025-06-11 NOTE — TELEPHONE ENCOUNTER
Called patient 6/11/2025 to let them know they could follow up with Dr. Barrett, however, patient was already in rout e to [place] for appt.    Estella Turcios, LAT  Podiatry- supporting Dr. Lomax

## 2025-06-11 NOTE — LETTER
6/11/2025      Sushil Diana  [address] [address] [place] [id number]      Dear Colleague,    Thank you for referring your patient, Sushil Diana, to the [facility]. Please see a copy of my visit note below.    [facility]  CLINIC NOTE    CHIEF COMPLAINT:  right foot    PATIENT HISTORY:  Sushil Diana is a 65 year old male  who presents for follow up for his right foot. He was last seen in the hospital for right fifth digit gangrene, undergoing a fifth ray resection on 5/28/25. He's noted to have severe PAD and had an angiogram on 5/27. This though was challenging and his blood flow is still quite poor.  -He's now been home, is doing fairly well. His wife has been changing his dressing. No pain or significant drainage from the right foot. Denies N/F/V/C/D. Has been WB to heel only of RLE.         Review of Systems:  A 10 point review of systems was performed and is positive for that noted above in the patient history.  All other areas are negative.     PAST MEDICAL HISTORY:   Past Medical History:   Diagnosis Date     Cancer (H)      Congestive heart failure (H)      Diabetes (H)      GERD (gastroesophageal reflux disease)      Gout      Hypertension         PAST SURGICAL HISTORY:   Past Surgical History:   Procedure Laterality Date     AMPUTATE TOE(S) Right 5/28/2025    Procedure: Right foot fifth ray resection;  Surgeon: Beatriz Lomax DPM;  Location:  OR     IR LOWER EXTREMITY ANGIOGRAM RIGHT  5/27/2025     NONE OF THE ABOVE CORE MEASURE DIAGNOSES          MEDICATIONS:  Reviewed in Epic.     ALLERGIES:    Allergies   Allergen Reactions     No Known Drug Allergy      Seasonal Allergies         SOCIAL HISTORY:   Social History     Socioeconomic History     Marital status: Single     Spouse name: Not on file     Number of children: Not on file     Years of education: Not on file     Highest education level: Not on file   Occupational History      Not on file   Tobacco Use     Smoking status: Former     Current packs/day: 0.00     Average packs/day: 1 pack/day for 28.0 years (28.0 ttl pk-yrs)     Types: Cigarettes     Start date: 1997     Quit date: 2025     Years since quittin.1     Passive exposure: Current     Smokeless tobacco: Never     Tobacco comments:     quit 2018   Vaping Use     Vaping status: Never Used   Substance and Sexual Activity     Alcohol use: Yes     Comment: occasional     Drug use: No     Sexual activity: Yes     Partners: Female   Other Topics Concern     Parent/sibling w/ CABG, MI or angioplasty before 65F 55M? No   Social History Narrative     Not on file     Social Drivers of Health     Financial Resource Strain: Low Risk  (2025)    Financial Resource Strain      Within the past 12 months, have you or your family members you live with been unable to get utilities (heat, electricity) when it was really needed?: No   Food Insecurity: Low Risk  (2025)    Food Insecurity      Within the past 12 months, did you worry that your food would run out before you got money to buy more?: No      Within the past 12 months, did the food you bought just not last and you didn t have money to get more?: No   Transportation Needs: Low Risk  (2025)    Transportation Needs      Within the past 12 months, has lack of transportation kept you from medical appointments, getting your medicines, non-medical meetings or appointments, work, or from getting things that you need?: No   Physical Activity: Insufficiently Active (2024)    Exercise Vital Sign      Days of Exercise per Week: 2 days      Minutes of Exercise per Session: 10 min   Stress: No Stress Concern Present (2024)    [employer] - Occupational Stress Questionnaire      Feeling of Stress : Only a little   Social Connections: Unknown (2024)    Social Connection and Isolation Panel [NHANES]      Frequency of Communication with  Friends and Family: Not on file      Frequency of Social Gatherings with Friends and Family: Once a week      Attends [employer] Services: Not on file      Active Member of Clubs or Organizations: Not on file      Attends Club or Organization Meetings: Not on file      Marital Status: Not on file   Interpersonal Safety: Low Risk  (5/19/2025)    Interpersonal Safety      Do you feel physically and emotionally safe where you currently live?: Yes      Within the past 12 months, have you been hit, slapped, kicked or otherwise physically hurt by someone?: No      Within the past 12 months, have you been humiliated or emotionally abused in other ways by your partner or ex-partner?: No   Housing Stability: Low Risk  (5/19/2025)    Housing Stability      Do you have housing? : Yes      Are you worried about losing your housing?: No        FAMILY HISTORY:   Family History   Problem Relation Age of Onset     Diabetes Other      Hypertension No family hx of      Cerebrovascular Disease No family hx of      Other Cancer No family hx of         EXAM:Vitals: There were no vitals taken for this visit.  BMI= There is no height or weight on file to calculate BMI.      General appearance: Patient is alert and fully cooperative with history & exam.  No sign of distress is noted during the visit.      Respiratory: Breathing is regular & unlabored while sitting.      HEENT: Hearing is intact to spoken word.  Speech is clear.  No gross evidence of visual impairment that would impact ambulation.      Dermatologic: Right foot lateral foot incision: intact. Areas of eschar distally, but is dry and appears closed. Has some granular tissue to periphery. No swelling or cellulitis.      Vascular: Dorsalis pedis and posterior tibial pulses are difficult to palpate     Neurologic: Lower extremity sensation is diminished, bilateral foot, to light touch.  No evidence of neurological-based weakness or contracture in the lower extremities.        Musculoskeletal: Patient is ambulatory without an assistive device or brace.  S/p fifth ray resection on right foot    Psychiatric: Affect is pleasant & appropriate.          Culture:   Toe, Right; Tissue   0 Result Notes  Culture Culture in progress   Isolated in broth only Gram positive cocci in pairs and chains Abnormal                ASSESSMENT:  Right foot fifth digit, metatarsal head osteomyelitis now s/p fifth ray resection on 5/28   Peripheral Arterial Disease s/p angiogram --> residual AT and PT occlusion. Flow via peroneal artery only     Hba1c: 5.9     MEDICAL DECISION MAKING:   -Discussed all findings with patient. Chart and imaging reviewed.     -Right foot evaluated: approx 2 weeks out from surgery. Is somewhat appropriate and appears to be healing. Distal aspect does have eschar, but would leave intact and allow hopefully healing secondarily. Given patient's significant PAD, he's at high risk for nonhealing and further surgery wouldn't be ideal. Currently site is dry and without signs of infection.     -Discussed wound care plan - can leave dressing intact until follow up. Plans to follow up with Dr. Barrett in [place] for this, given that they live much closer to this clinic. Agree with this plan.     -Discussed need for minimal WB. Can WB on heel in CAM boot. Should elevate afterwards.     -Discussed smoking cessation.     -All questions answered. Further follow up in [place].     Beatriz Lomax DPM   [facility]le [facility]                Again, thank you for allowing me to participate in the care of your patient.        Sincerely,        Beatriz Lomax DPM    Electronically signed

## 2025-06-11 NOTE — PROGRESS NOTES
[facility]  CLINIC NOTE    CHIEF COMPLAINT:  right foot    PATIENT HISTORY:  Sushil Diana is a 65 year old male  who presents for follow up for his right foot. He was last seen in the hospital for right fifth digit gangrene, undergoing a fifth ray resection on 25. He's noted to have severe PAD and had an angiogram on . This though was challenging and his blood flow is still quite poor.  -He's now been home, is doing fairly well. His wife has been changing his dressing. No pain or significant drainage from the right foot. Denies N/F/V/C/D. Has been WB to heel only of RLE.         Review of Systems:  A 10 point review of systems was performed and is positive for that noted above in the patient history.  All other areas are negative.     PAST MEDICAL HISTORY:   Past Medical History:   Diagnosis Date    Cancer (H)     Congestive heart failure (H)     Diabetes (H)     GERD (gastroesophageal reflux disease)     Gout     Hypertension         PAST SURGICAL HISTORY:   Past Surgical History:   Procedure Laterality Date    AMPUTATE TOE(S) Right 2025    Procedure: Right foot fifth ray resection;  Surgeon: Beatriz Lmoax DPM;  Location: [facility] OR    IR LOWER EXTREMITY ANGIOGRAM RIGHT  2025    NONE OF THE ABOVE CORE MEASURE DIAGNOSES          MEDICATIONS:  Reviewed in Epic.     ALLERGIES:    Allergies   Allergen Reactions    No Known Drug Allergy     Seasonal Allergies         SOCIAL HISTORY:   Social History     Socioeconomic History    Marital status: Single     Spouse name: Not on file    Number of children: Not on file    Years of education: Not on file    Highest education level: Not on file   Occupational History    Not on file   Tobacco Use    Smoking status: Former     Current packs/day: 0.00     Average packs/day: 1 pack/day for 28.0 years (28.0 ttl pk-yrs)     Types: Cigarettes     Start date: 1997     Quit date: 2025     Years since quittin.1     Passive  exposure: Current    Smokeless tobacco: Never    Tobacco comments:     quit June 2018   Vaping Use    Vaping status: Never Used   Substance and Sexual Activity    Alcohol use: Yes     Comment: occasional    Drug use: No    Sexual activity: Yes     Partners: Female   Other Topics Concern    Parent/sibling w/ CABG, MI or angioplasty before 65F 55M? No   Social History Narrative    Not on file     Social Drivers of Health     Financial Resource Strain: Low Risk  (5/19/2025)    Financial Resource Strain     Within the past 12 months, have you or your family members you live with been unable to get utilities (heat, electricity) when it was really needed?: No   Food Insecurity: Low Risk  (5/19/2025)    Food Insecurity     Within the past 12 months, did you worry that your food would run out before you got money to buy more?: No     Within the past 12 months, did the food you bought just not last and you didn t have money to get more?: No   Transportation Needs: Low Risk  (5/19/2025)    Transportation Needs     Within the past 12 months, has lack of transportation kept you from medical appointments, getting your medicines, non-medical meetings or appointments, work, or from getting things that you need?: No   Physical Activity: Insufficiently Active (5/1/2024)    Exercise Vital Sign     Days of Exercise per Week: 2 days     Minutes of Exercise per Session: 10 min   Stress: No Stress Concern Present (5/1/2024)    [employer] - Occupational Stress Questionnaire     Feeling of Stress : Only a little   Social Connections: Unknown (5/1/2024)    Social Connection and Isolation Panel [NHANES]     Frequency of Communication with Friends and Family: Not on file     Frequency of Social Gatherings with Friends and Family: Once a week     Attends Baptism Services: Not on file     Active Member of Clubs or Organizations: Not on file     Attends Club or Organization Meetings: Not on file     Marital  Status: Not on file   Interpersonal Safety: Low Risk  (5/19/2025)    Interpersonal Safety     Do you feel physically and emotionally safe where you currently live?: Yes     Within the past 12 months, have you been hit, slapped, kicked or otherwise physically hurt by someone?: No     Within the past 12 months, have you been humiliated or emotionally abused in other ways by your partner or ex-partner?: No   Housing Stability: Low Risk  (5/19/2025)    Housing Stability     Do you have housing? : Yes     Are you worried about losing your housing?: No        FAMILY HISTORY:   Family History   Problem Relation Age of Onset    Diabetes Other     Hypertension No family hx of     Cerebrovascular Disease No family hx of     Other Cancer No family hx of         EXAM:Vitals: There were no vitals taken for this visit.  BMI= There is no height or weight on file to calculate BMI.      General appearance: Patient is alert and fully cooperative with history & exam.  No sign of distress is noted during the visit.      Respiratory: Breathing is regular & unlabored while sitting.      HEENT: Hearing is intact to spoken word.  Speech is clear.  No gross evidence of visual impairment that would impact ambulation.      Dermatologic: Right foot lateral foot incision: intact. Areas of eschar distally, but is dry and appears closed. Has some granular tissue to periphery. No swelling or cellulitis.      Vascular: Dorsalis pedis and posterior tibial pulses are difficult to palpate     Neurologic: Lower extremity sensation is diminished, bilateral foot, to light touch.  No evidence of neurological-based weakness or contracture in the lower extremities.       Musculoskeletal: Patient is ambulatory without an assistive device or brace.  S/p fifth ray resection on right foot    Psychiatric: Affect is pleasant & appropriate.          Culture:   Toe, Right; Tissue   0 Result Notes  Culture Culture in progress   Isolated in broth only Gram positive  cocci in pairs and chains Abnormal                ASSESSMENT:  Right foot fifth digit, metatarsal head osteomyelitis now s/p fifth ray resection on 5/28   Peripheral Arterial Disease s/p angiogram --> residual AT and PT occlusion. Flow via peroneal artery only     Hba1c: 5.9     MEDICAL DECISION MAKING:   -Discussed all findings with patient. Chart and imaging reviewed.     -Right foot evaluated: approx 2 weeks out from surgery. Is somewhat appropriate and appears to be healing. Distal aspect does have eschar, but would leave intact and allow hopefully healing secondarily. Given patient's significant PAD, he's at high risk for nonhealing and further surgery wouldn't be ideal. Currently site is dry and without signs of infection.     -Discussed wound care plan - can leave dressing intact until follow up. Plans to follow up with Dr. Barrett in [place] for this, given that they live much closer to this clinic. Agree with this plan.     -Discussed need for minimal WB. Can WB on heel in CAM boot. Should elevate afterwards.     -Discussed smoking cessation.     -All questions answered. Further follow up in [place].     Beatriz Lomax DPM   [facility] of Podiat[facility]/Foot & Ankle Surgery

## 2025-06-14 ENCOUNTER — HEALTH MAINTENANCE LETTER (OUTPATIENT)
Age: 65
End: 2025-06-14

## 2025-06-16 ENCOUNTER — TELEPHONE (OUTPATIENT)
Dept: INTERNAL MEDICINE | Facility: CLINIC | Age: 65
End: 2025-06-16
Payer: MEDICARE

## 2025-06-16 DIAGNOSIS — F43.22 ADJUSTMENT DISORDER WITH ANXIOUS MOOD: ICD-10-CM

## 2025-06-16 RX ORDER — CITALOPRAM HYDROBROMIDE 20 MG/1
20 TABLET ORAL DAILY
Qty: 90 TABLET | Refills: 1 | Status: SHIPPED | OUTPATIENT
Start: 2025-06-16

## 2025-06-16 NOTE — TELEPHONE ENCOUNTER
Patient Quality Outreach    Patient is due for the following:   Diabetes -  Foot Exam  Physical Annual Wellness Visit    Action(s) Taken:   Patient has upcoming appointment, these items will be addressed at that time.    Type of outreach:    Chart review performed, no outreach needed.    Questions for provider review:    None         Ning Hines MA  Chart routed to None.

## 2025-06-17 DIAGNOSIS — E11.9 DIABETES MELLITUS WITHOUT COMPLICATION (H): ICD-10-CM

## 2025-06-17 RX ORDER — ATORVASTATIN CALCIUM 20 MG/1
20 TABLET, FILM COATED ORAL DAILY
Qty: 90 TABLET | Refills: 2 | Status: SHIPPED | OUTPATIENT
Start: 2025-06-17

## 2025-06-19 ENCOUNTER — OFFICE VISIT (OUTPATIENT)
Dept: INTERNAL MEDICINE | Facility: CLINIC | Age: 65
End: 2025-06-19
Payer: MEDICARE

## 2025-06-19 VITALS
SYSTOLIC BLOOD PRESSURE: 110 MMHG | HEART RATE: 66 BPM | OXYGEN SATURATION: 98 % | RESPIRATION RATE: 16 BRPM | TEMPERATURE: 98 F | DIASTOLIC BLOOD PRESSURE: 50 MMHG

## 2025-06-19 DIAGNOSIS — E11.628 DIABETIC INFECTION OF RIGHT FOOT (H): ICD-10-CM

## 2025-06-19 DIAGNOSIS — I73.9 PERIPHERAL VASCULAR DISEASE: ICD-10-CM

## 2025-06-19 DIAGNOSIS — N18.31 CHRONIC KIDNEY DISEASE, STAGE 3A (H): ICD-10-CM

## 2025-06-19 DIAGNOSIS — L08.9 DIABETIC INFECTION OF RIGHT FOOT (H): ICD-10-CM

## 2025-06-19 DIAGNOSIS — M86.9 OSTEOMYELITIS OF FIFTH TOE OF RIGHT FOOT (H): Primary | ICD-10-CM

## 2025-06-19 DIAGNOSIS — E87.6 HYPOKALEMIA: ICD-10-CM

## 2025-06-19 LAB
ANION GAP SERPL CALCULATED.3IONS-SCNC: 9 MMOL/L (ref 7–15)
BUN SERPL-MCNC: 25.5 MG/DL (ref 8–23)
CALCIUM SERPL-MCNC: 9.2 MG/DL (ref 8.8–10.4)
CHLORIDE SERPL-SCNC: 99 MMOL/L (ref 98–107)
CREAT SERPL-MCNC: 1.08 MG/DL (ref 0.67–1.17)
EGFRCR SERPLBLD CKD-EPI 2021: 75 ML/MIN/1.73M2
GLUCOSE SERPL-MCNC: 159 MG/DL (ref 70–99)
HCO3 SERPL-SCNC: 26 MMOL/L (ref 22–29)
POTASSIUM SERPL-SCNC: 4.4 MMOL/L (ref 3.4–5.3)
SODIUM SERPL-SCNC: 134 MMOL/L (ref 135–145)

## 2025-06-19 RX ORDER — CLOPIDOGREL BISULFATE 75 MG/1
75 TABLET ORAL DAILY
Qty: 30 TABLET | Refills: 0 | Status: SHIPPED | OUTPATIENT
Start: 2025-06-19

## 2025-06-19 RX ORDER — POTASSIUM CHLORIDE 1500 MG/1
20 TABLET, EXTENDED RELEASE ORAL 2 TIMES DAILY
Qty: 30 TABLET | Refills: 0 | Status: SHIPPED | OUTPATIENT
Start: 2025-06-19

## 2025-06-19 ASSESSMENT — PAIN SCALES - GENERAL: PAINLEVEL_OUTOF10: SEVERE PAIN (7)

## 2025-06-19 NOTE — PROGRESS NOTES
Zara Ling is a 65 year old, presenting for the following health issues:  Follow Up      6/19/2025    10:25 AM   Additional Questions   Accompanied by Angelina-wife     History of Present Illness       Reason for visit:  Follow up, recheck potassium   He is taking medications regularly.                 EMR reviewed including:             Complaint, History of Chief Complaint, Corresponding Review of Systems, and Complaint Specific Physical Examination.    #1   Patient presents for follow-up of his hypokalemia.  Is on potassium supplementation.  Denies any muscle cramps.  Denies any cardiac arrhythmia.          Exam:   LUNGS: clear bilaterally, airflow is brisk, no intercostal retraction or stridor is noted. No coughing is noted during visit.   HEART:  regular without rubs, clicks, gallops, or murmurs. PMI is nondisplaced. Upstrokes are brisk. S1,S2 are heard.      #2   Follow-up on type 2 diabetes.  Patient continues metformin.  Denies polyuria or polydipsia.  Blood sugars been well-controlled.  Has had no hypoglycemic spells recently.  Discussed rescue for hypoglycemia in detail.  Last A1c within normal limits.          Exam:   NEURO: Pt is alert and appropriate. No neurologic lateralization is noted. Cranial nerves 2-12 are intact. Peripheral sensory function is decreased in the distal foot.      #3   Follow-up on a recent surgery and diabetic foot infection/osteomyelitis.  Photographs from his podiatrist reviewed.  Dressings clean.  Moderate to minimal pain.  Has follow-up with local podiatrist in the upcoming week.  No signs of secondary or ascending infection.        Patient has been interviewed, applicable history and applied review of systems have been performed.    Vital Signs:   /50   Pulse 66   Temp 98  F (36.7  C) (Temporal)   Resp 16   SpO2 98%       Decision Making    Problem and Complexity     1. Osteomyelitis of fifth toe of right foot (H) (Primary)  Status post surgical  excision.    2. Diabetic infection of right foot (H)  Blood sugars stable.  Healing slow    3. Peripheral vascular disease  Continue current medications including antiplatelet therapy  - clopidogrel (PLAVIX) 75 MG tablet; Take 1 tablet (75 mg) by mouth daily.  Dispense: 30 tablet; Refill: 0    4. Chronic kidney disease, stage 3a (H)  Check potassium levels.  Continue supplementation as predicated by results  - potassium chloride simona ER (KLOR-CON M20) 20 MEQ CR tablet; Take 1 tablet (20 mEq) by mouth 2 times daily.  Dispense: 30 tablet; Refill: 0    5. Hypokalemia  As above                                  FOLLOW UP   I have asked the patient to make an appointment for followup with me virtually for results and discussion tomorrow    Regarding routine vaccinations:  I have reviewed the patient's vaccination schedule and discussed the benefits of prophylactic vaccination in detail.  I recommend the patient contact their pharmacist for vaccinations.  Discussed that most insurance companies now favor reimbursement to the pharmacies and it will financially behoove the patient to have vaccinations performed at their pharmacy.        I have carefully explained the diagnosis and treatment options to the patient.  The patient has displayed an understanding of the above, and all subsequent questions were answered.      DO SEAN Rodgers    Portions of this note were produced using [employer]  Although every attempt at real-time proof reading has been made, occasional grammar/syntax errors may have been missed.

## 2025-06-23 ENCOUNTER — RESULTS FOLLOW-UP (OUTPATIENT)
Dept: FAMILY MEDICINE | Facility: CLINIC | Age: 65
End: 2025-06-23

## 2025-06-26 ENCOUNTER — OFFICE VISIT (OUTPATIENT)
Dept: PODIATRY | Facility: CLINIC | Age: 65
End: 2025-06-26
Payer: MEDICARE

## 2025-06-26 VITALS — HEIGHT: 68 IN | TEMPERATURE: 98 F | BODY MASS INDEX: 25.43 KG/M2 | WEIGHT: 167.77 LBS

## 2025-06-26 DIAGNOSIS — E11.65 TYPE 2 DIABETES MELLITUS WITH HYPERGLYCEMIA, WITHOUT LONG-TERM CURRENT USE OF INSULIN (H): ICD-10-CM

## 2025-06-26 DIAGNOSIS — Z89.421 S/P AMPUTATION OF LESSER TOE, RIGHT: Primary | ICD-10-CM

## 2025-06-26 DIAGNOSIS — I73.9 PERIPHERAL ARTERIAL DISEASE: ICD-10-CM

## 2025-06-26 DIAGNOSIS — I10 HYPERTENSION, UNSPECIFIED TYPE: ICD-10-CM

## 2025-06-26 LAB
CREAT UR-MCNC: 22.5 MG/DL
MICROALBUMIN UR-MCNC: 19.8 MG/L
MICROALBUMIN/CREAT UR: 88 MG/G CR (ref 0–17)

## 2025-06-26 ASSESSMENT — PAIN SCALES - GENERAL: PAINLEVEL_OUTOF10: MODERATE PAIN (4)
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
## 2025-06-26 NOTE — PATIENT INSTRUCTIONS
"Nail Debridement    A high quality instrument makes trimming toenails MUCH easier.  Search [employer]aid for any 5\" nail nipper manufactured by reliable brands such as Miltex, Integra or Jarit as these quality instruments will help manage difficult nails more effectively and comfortably. We use Miltex -SS.  A physician is not necessary to trim nails even if you are taking blood thinners or are diabetic.  Your family or care givers may help manage your toenails.      Trim or sand the nails once weekly.  Do not wait until they are long and painful or trimming will become too difficult and painful and will increase your risk of complications or infection.  A course file or 120 grit sandpaper on a sanding block can be helpful.  For very thick nails many people prefer battery operated franklin such as an Amope', Personal Pedi and Emjoi for regular use or heavy painful callouses or thick toenails.    Trim or skive any portion of nail that is thick, loose, crumbling, or not well attached. Do not tear the nail away, but rather cut them with a nail nipper or sand or sand them down.  You may follow up with your Podiatric Physician if you have pain, bleeding, infection, questions or other concerns.      You may also contact the following Registered Nurses for further help with nail debridement and minor hygiene concnerns.  They may come to your home or meet them at their clinic to trim your toenails and soak your feet, as well as monitor for any complications that would require evaluation by a Physician.      [facility]  Emelina Cristobal RN  Phone & text [phone]    [facility]  Nemo Nickerson RN  Office [phone]    For up to date list and to find foot care nurses in other communities visit [employer] website:  afcna.org.     Calluses, Corns, IPKs, Porokeratosis    When there is excessive friction or pressure on the skin, the body responds by making the skin thicker. "  While this may protect the deeper structures, the thickened skin can take up more space and thus increase pressure over a bony prominence or become an open sore or skin ulcer as this skin becomes less flexible.    Flat, diffuse thickening are simple calluses and they are usually caused by friction.  Often these are the result of rubbing on a shoe or going barefoot.    Calluses with a central core between the toes are called corns.  These often result from prominent joints on adjacent toes rubbing together.  Theses are often a symptom of bone malalignment and will usually recur unless the underlying bones are addressed.    Many of these lesions can be kept comfortable with routine maintenance. This consists of filing them with a Ped Egg, callus file, or 120 grit sandpaper on a block, every day during your bath or shower.  Most people prefer battery operated franklin such as an Amope', Personal Pedi and Emjoi for regular use or heavy painful callouses.  Heavy creams or ointments can be applied 1-2 times every day to keep them soft. Toe spacers can be used for corns, gel pads can be used for other lesions on the bottom of the foot. If there is a deformity noted, such as a prominent bone, often this can be addressed to minimize recurrence. However, sometimes the pressure and lesion simply migrates to another spot after surgery, so it is not a guaranteed cure.     If you have heavy calluses, you may apply heavier cream that you scoop up such as Cetaphil cream, or Eucerin cream.  Be sure to obtain cream and not lotion.  Lotion is water based, dispenses through a pump, and not durable enough for feet). If your skin is cracked you may even select ointment such as Aquaphor or Vaseline. For more aggressive short term help apply heavy creams or ointment under occlusive dressings such as Saran Wrap or Jelly Feet while sleeping.   Jelly Feet can be obtained at [email or web address].     To be successful with managing hyperkeratotic  skin, you must manage hygiene daily.  Performing this hygiene once a month or sometimes even once a week may not be enough to stay ahead of it.  At your bath or shower time is the easiest time to work on this.  There is no medical or surgical treatment that will absolutely eliminate many of these and symptoms.    Ped[employer]ix is a reliable source for all sorts of foot pads, cushions, or interdigital spacers and foot appliances. Go to www.pedDizkonix.Mobile [employer] or request a catalog at 4-681-Bookya.
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
## 2025-06-30 DIAGNOSIS — N18.31 CHRONIC KIDNEY DISEASE, STAGE 3A (H): ICD-10-CM

## 2025-06-30 RX ORDER — POTASSIUM CHLORIDE 1500 MG/1
20 TABLET, EXTENDED RELEASE ORAL 2 TIMES DAILY
Qty: 30 TABLET | Refills: 0 | Status: SHIPPED | OUTPATIENT
Start: 2025-06-30

## 2025-07-01 ENCOUNTER — LAB (OUTPATIENT)
Dept: LAB | Facility: CLINIC | Age: 65
End: 2025-07-01
Payer: MEDICARE

## 2025-07-01 ENCOUNTER — ONCOLOGY VISIT (OUTPATIENT)
Dept: ONCOLOGY | Facility: CLINIC | Age: 65
End: 2025-07-01
Attending: INTERNAL MEDICINE
Payer: MEDICARE

## 2025-07-01 VITALS
HEART RATE: 67 BPM | DIASTOLIC BLOOD PRESSURE: 46 MMHG | WEIGHT: 157.06 LBS | RESPIRATION RATE: 15 BRPM | OXYGEN SATURATION: 98 % | HEIGHT: 68 IN | SYSTOLIC BLOOD PRESSURE: 108 MMHG | TEMPERATURE: 98.1 F | BODY MASS INDEX: 23.8 KG/M2

## 2025-07-01 DIAGNOSIS — N95.1 MENOPAUSAL SYNDROME (HOT FLASHES): ICD-10-CM

## 2025-07-01 DIAGNOSIS — C61 PROSTATE CANCER (H): ICD-10-CM

## 2025-07-01 DIAGNOSIS — D63.0 ANEMIA IN NEOPLASTIC DISEASE: ICD-10-CM

## 2025-07-01 DIAGNOSIS — C61 PROSTATE CANCER METASTATIC TO BONE (H): Primary | ICD-10-CM

## 2025-07-01 DIAGNOSIS — C79.51 PROSTATE CANCER METASTATIC TO BONE (H): Primary | ICD-10-CM

## 2025-07-01 LAB
ALBUMIN SERPL BCG-MCNC: 4.3 G/DL (ref 3.5–5.2)
ALP SERPL-CCNC: 54 U/L (ref 40–150)
ALT SERPL W P-5'-P-CCNC: 14 U/L (ref 0–70)
ANION GAP SERPL CALCULATED.3IONS-SCNC: 14 MMOL/L (ref 7–15)
AST SERPL W P-5'-P-CCNC: 23 U/L (ref 0–45)
BILIRUB SERPL-MCNC: 1 MG/DL
BUN SERPL-MCNC: 20.5 MG/DL (ref 8–23)
CALCIUM SERPL-MCNC: 9.1 MG/DL (ref 8.8–10.4)
CHLORIDE SERPL-SCNC: 96 MMOL/L (ref 98–107)
CREAT SERPL-MCNC: 1.15 MG/DL (ref 0.67–1.17)
EGFRCR SERPLBLD CKD-EPI 2021: 70 ML/MIN/1.73M2
GLUCOSE SERPL-MCNC: 116 MG/DL (ref 70–99)
HCO3 SERPL-SCNC: 20 MMOL/L (ref 22–29)
POTASSIUM SERPL-SCNC: 4.4 MMOL/L (ref 3.4–5.3)
PROT SERPL-MCNC: 6.8 G/DL (ref 6.4–8.3)
PSA SERPL DL<=0.01 NG/ML-MCNC: 0.39 NG/ML (ref 0–4.5)
SODIUM SERPL-SCNC: 130 MMOL/L (ref 135–145)

## 2025-07-01 PROCEDURE — 84153 ASSAY OF PSA TOTAL: CPT

## 2025-07-01 PROCEDURE — 36415 COLL VENOUS BLD VENIPUNCTURE: CPT

## 2025-07-01 PROCEDURE — G2211 COMPLEX E/M VISIT ADD ON: HCPCS

## 2025-07-01 PROCEDURE — 80053 COMPREHEN METABOLIC PANEL: CPT

## 2025-07-01 PROCEDURE — 99214 OFFICE O/P EST MOD 30 MIN: CPT

## 2025-07-01 RX ORDER — ABIRATERONE ACETATE 250 MG/1
250 TABLET ORAL DAILY
COMMUNITY
Start: 2025-06-23 | End: 2025-07-02

## 2025-07-01 ASSESSMENT — PAIN SCALES - GENERAL: PAINLEVEL_OUTOF10: NO PAIN (0)

## 2025-07-01 NOTE — PROGRESS NOTES
Oncology Follow Up Visit: July 1, 2025    Oncologist: Dr. Holliday   PCP: Mark Matson    Reason for Visit: Follow-up prostate cancer     Diagnosis: Metastatic prostate cancer  Sushil Diana is a 65 year old male with metastatic prostate cancer.  He had nocturia for few years with no elevation of PSA, followed up with his PCP and PSA was elevated at 272.  He was referred to urology in August 2023, biopsy performed and noted to have prostatic adenocarcinoma, Wally 4+9.  Follow-up CT abdomen/pelvis with evidence of bony metastasis, therefore follow-up bone scan on 9/20/2023 with diffuse osseous metastasis.  He was given a dose of Eligard on 9/27/23 and referred to Dr. Holliday, who recommended abiraterone/prednisone with monthly Zometa.  NGS testing with no targetable findings. BED2V2E loss. BELIA. TMB 0      Treatment:  9/2023 to present: Lupron ADT 45 mg  10/2023 to present: Abiraterone + prednisone  1/2024 to present: zometa every 3 months    Interval History:  Patient and spouse are seen in clinic for review of prostate cancer. Many health events since last visit. To summarize, developed osteomyelitis of his R) foot requiring surgery with hospitalization. Continues to heal but progress has been slow due to poor arterial blood flow. From prostate standpoint, tolerating lupron and zytiga well without side effects. Previous hot flashes have resolved. Eating well and gaining weight.  No issues with mood or sleep. Not very active due to physical challenges with foot boot. No additional questions or concerns.     Patient denies any of the following except if noted above: fevers, chills, difficulty with energy, vision or hearing changes, chest pain, dyspnea, abdominal pain, nausea, vomiting, diarrhea, constipation, urinary concerns, headaches, numbness, tingling, issues with sleep or mood. He also denies lumps, bumps, rashes or skin lesions, bleeding or bruising issues.    Physical Exam:  /46 (BP  "Location: Right arm, Patient Position: Sitting, Cuff Size: Adult Regular)   Pulse 67   Temp 98.1  F (36.7  C) (Temporal)   Resp 15   Ht 1.727 m (5' 8\")   Wt 71.2 kg (157 lb 1 oz)   SpO2 98%   BMI 23.88 kg/m       Constitutional: No acute distress, pleasant, appropriately groomed.   ENT: PERRLA, sclera without erythema. Patent nasal passages. Appropriate dentition.  Neck: Trachea midline, no adenopathy.   Resp: No respiratory distress, adequate depth and rate of respirations.   Cardiac: S1/S2,RRR, no murmurs. No cyanosis, JVD, or peripheral edema.  Abdomen: BS active, abdomen soft and non-tender. No masses or organomegaly.   MS:  In wheelchair, R) foot boot on.  Skin: No rashes, lesions, or wounds.  Neuro: A/O x 4, sensation intact.   Psych: Appropriate mentation and affect.     Laboratory Results:   Results for orders placed or performed in visit on 07/01/25   PSA tumor marker     Status: Normal   Result Value Ref Range    PSA Tumor Marker 0.39 0.00 - 4.50 ng/mL    Narrative    This result is obtained using the Roche Elecsys total PSA method on the janny e601 immunoassay analyzer, which is an ultrasensitive method. Results obtained with different assay methods or kits cannot be used interchangeably.  An undetectable (<0.01 ng/mL) ultrasensitive prostate-specific antigen (USPSA) concentration after radical prostatectomy is reassuring and may aid in postoperative risk stratification of patients.     A detectable USPSA concentration (> or =0.01 ng/mL) after radical prostatectomy (RP) does not necessarily translate into disease progression or recurrence. Interpretation of a detectable USPSA needs to be made in conjunction with other clinicopathologic risk factors. The cutpoint for interpretation of USPSA assays remains controversial and has ranged from 0.01 to 0.05 ng/mL. For example, in a study that included 754 men after RP, a cutpoint of 0.01 ng/mL was an independent predictor of biochemical recurrence (BCR). " BCR-free survival at 5 years was 92.4% for patients with an USPSA post-RP of less than 0.01 ng/mL and 56.8% for patients with an USPSA post-RP of 0.01 ng/mL or higher.(1) In the same study a cutoff of 0.03 ng/mL also predicted BCR independent of clinicopathological factors and BCR-free survival at 5 yrs was 90.8% for patients with an USPSA post-RP of less than 0.03 ng/mL and 26.9% for patients with a PSA post-RP of greater or equal to 0.03 ng/mL. (1)    1. Mellissa LJ, Antolin Z, Nobles DW, et al. Do ultrasensitive prostate specific antigen measurements have a role in predicting long-term biochemical recurrence-free survival in men after radical prostatectomy? J Urol. 2016;195(2):330-336. doi:10.1016/j.juro.2015.08.080   I reviewed the above labs today.    Assessment and Plan:   Stage IV prostate cancer with mets to the bone  - On treatment with Lupron ADT every 6 months, and Zytiga / prednisone daily.  - Tolerating well without significant side effects.  - PSA slightly elevated but stable.  - Will continue current plan without changes. Will hold zometa today due to recent R) foot fifth ray resection and prolonged wound healing. Reassess at next visit for healing progress and consider restarting.   - Follow-up with Dr. Holliday scheduled in 3 months with Lupron and zometa to follow.    Bone health  - On zometa every 3 months (C3D90 zometa held), no dental issues  - Continue calcium and vitamin D BID  - No new bone pains    Anemia  - Suspect s/t prostate cancer and treatment  - No s/s of bleeding  - Continue to monitor     R) 5th proximal phalanx and metatarsal head osteomyelitis  - S/p R) foot fifth ray resection on 5-  - Healing process has been slow due to significant PVD/PAD  - Continues to follow PCP and podiatry closely      Maylin CARDOZA, JOSE        The longitudinal plan of care for the diagnosis(es)/condition(s) as documented were addressed during this visit. Due to the added complexity in care, I  will continue to support Sushil in the subsequent management and with ongoing continuity of care.

## 2025-07-01 NOTE — Clinical Note
7/1/2025      Sushil Diana  [address] [address] [place] [id number]      Dear Colleague,    Thank you for referring your patient, Sushil Diana, to the [facility]. Please see a copy of my visit note below.    Oncology Follow Up Visit: July 1, 2025    Oncologist: Dr. Holliday   PCP: Mark Matson    Reason for Visit: Follow-up prostate cancer     Diagnosis: Metastatic prostate cancer  Sushil Diana is a 65 year old male with metastatic prostate cancer.  He had nocturia for few years with no elevation of PSA, followed up with his PCP and PSA was elevated at 272.  He was referred to urology in August 2023, biopsy performed and noted to have prostatic adenocarcinoma, [place] 4+9.  Follow-up CT abdomen/pelvis with evidence of bony metastasis, therefore follow-up bone scan on 9/20/2023 with diffuse osseous metastasis.  He was given a dose of Eligard on 9/27/23 and referred to Dr. Holliday, who recommended abiraterone/prednisone with monthly Zometa.  NGS testing with no targetable findings. [id number] loss. BELIA. TMB 0      Treatment:  9/2023 to present: Lupron ADT 45 mg  10/2023 to present: Abiraterone + prednisone  1/2024 to present: zometa every 3 months    Interval History:  Patient and spouse are seen virtually in clinic to follow-up lab work. Sushil is tolerating Zytiga well with manageable hot flashes. No new bone pains. Has been feeling well lately with no new recent illnesses. Energy is good, staying active. No changes in bowel functioning. Having cataract surgery next week. No additional questions or concerns today.    Patient denies any of the following except if noted above: fevers, chills, difficulty with energy, vision or hearing changes, chest pain, dyspnea, abdominal pain, nausea, vomiting, diarrhea, constipation, urinary concerns, headaches, numbness, tingling, issues with sleep or mood. He also denies lumps, bumps, rashes or skin lesions, bleeding or bruising  "issues.    Physical Exam:  /46 (BP Location: Right arm, Patient Position: Sitting, Cuff Size: Adult Regular)   Pulse 67   Temp 98.1  F (36.7  C) (Temporal)   Resp 15   Ht 1.727 m (5' 8\")   Wt 71.2 kg (157 lb 1 oz)   SpO2 98%   BMI 23.88 kg/m       Constitutional: No acute distress, pleasant, appropriately groomed.   ENT: PERRLA, sclera without erythema. Patent nasal passages. Appropriate dentition.  Neck: Trachea midline, no adenopathy.   Resp: No respiratory distress, adequate depth and rate of respirations.   Cardiac: S1/S2,RRR, no murmurs. No cyanosis, JVD, or peripheral edema.  Abdomen: BS active, abdomen soft and non-tender. No masses or organomegaly.   MS:  5/5 muscle strength, adequate ROM.   Skin: No rashes, lesions, or wounds.  Neuro: A/O x 4, sensation intact.   Lymph: No palpable anterior/posterior cervical, axillary, or supraclavicular nodes.   Psych: Appropriate mentation and affect.     Laboratory Results:   Results for orders placed or performed in visit on 07/01/25   PSA tumor marker     Status: Normal   Result Value Ref Range    PSA Tumor Marker 0.39 0.00 - 4.50 ng/mL    Narrative    This result is obtained using the Roche Elecsys total PSA method on the janny e601 immunoassay analyzer, which is an ultrasensitive method. Results obtained with different assay methods or kits cannot be used interchangeably.  An undetectable (<0.01 ng/mL) ultrasensitive prostate-specific antigen (USPSA) concentration after radical prostatectomy is reassuring and may aid in postoperative risk stratification of patients.     A detectable USPSA concentration (> or =0.01 ng/mL) after radical prostatectomy (RP) does not necessarily translate into disease progression or recurrence. Interpretation of a detectable USPSA needs to be made in conjunction with other clinicopathologic risk factors. The cutpoint for interpretation of USPSA assays remains controversial and has ranged from 0.01 to 0.05 ng/mL. For example, " in a study that included 754 men after RP, a cutpoint of 0.01 ng/mL was an independent predictor of biochemical recurrence (BCR). BCR-free survival at 5 years was 92.4% for patients with an USPSA post-RP of less than 0.01 ng/mL and 56.8% for patients with an USPSA post-RP of 0.01 ng/mL or higher.(1) In the same study a cutoff of 0.03 ng/mL also predicted BCR independent of clinicopathological factors and BCR-free survival at 5 yrs was 90.8% for patients with an USPSA post-RP of less than 0.03 ng/mL and 26.9% for patients with a PSA post-RP of greater or equal to 0.03 ng/mL. (1)    1. Mellissa VIEIRA, Antolin Z, Giuliano DW, et al. Do ultrasensitive prostate specific antigen measurements have a role in predicting long-term biochemical recurrence-free survival in men after radical prostatectomy? J Urol. 2016;195(2):330-336. doi:10.1016/j.juro.2015.08.080   I reviewed the above labs today.    Assessment and Plan:   Stage IV prostate cancer with mets to the bone  - On treatment with Lupron ADT every 6 months, and Zytiga / prednisone daily.  - Tolerating well with manageable hot flashes.  - PSA ***  - No plan for repeat imaging as he is doing well clinically and PSA down trending.  - Follow-up with Dr. Holliday scheduled in 6 months with Lupron and zometa to follow.    Bone health  - Continue zometa every 3 months, no dental issues  - Continue calcium and vitamin D BID  - No new bone pains    Anemia  - Suspect s/t prostate cancer and treatment  - No s/s of bleeding  - Continue to monitor       Maylin CARDOZA CNP        The longitudinal plan of care for the diagnosis(es)/condition(s) as documented were addressed during this visit. Due to the added complexity in care, I will continue to support Sushil in the subsequent management and with ongoing continuity of care.      Oncology Follow Up Visit: July 1, 2025    Oncologist: Dr. Holliday   PCP: Mark Matson    Reason for Visit: Follow-up prostate cancer  "    Diagnosis: Metastatic prostate cancer  Sushil Diana is a 65 year old male with metastatic prostate cancer.  He had nocturia for few years with no elevation of PSA, followed up with his PCP and PSA was elevated at 272.  He was referred to urology in August 2023, biopsy performed and noted to have prostatic adenocarcinoma, [place] 4+9.  Follow-up CT abdomen/pelvis with evidence of bony metastasis, therefore follow-up bone scan on 9/20/2023 with diffuse osseous metastasis.  He was given a dose of Eligard on 9/27/23 and referred to Dr. Holliday, who recommended abiraterone/prednisone with monthly Zometa.  NGS testing with no targetable findings. [id number] loss. BELIA. TMB 0      Treatment:  9/2023 to present: Lupron ADT 45 mg  10/2023 to present: Abiraterone + prednisone  1/2024 to present: zometa every 3 months    Interval History:  Patient and spouse are seen in clinic for review of prostate cancer. Many health events since last visit. To summarize, developed osteomyelitis of his R) foot requiring surgery and     Patient denies any of the following except if noted above: fevers, chills, difficulty with energy, vision or hearing changes, chest pain, dyspnea, abdominal pain, nausea, vomiting, diarrhea, constipation, urinary concerns, headaches, numbness, tingling, issues with sleep or mood. He also denies lumps, bumps, rashes or skin lesions, bleeding or bruising issues.    Physical Exam:  /46 (BP Location: Right arm, Patient Position: Sitting, Cuff Size: Adult Regular)   Pulse 67   Temp 98.1  F (36.7  C) (Temporal)   Resp 15   Ht 1.727 m (5' 8\")   Wt 71.2 kg (157 lb 1 oz)   SpO2 98%   BMI 23.88 kg/m       Constitutional: No acute distress, pleasant, appropriately groomed.   ENT: PERRLA, sclera without erythema. Patent nasal passages. Appropriate dentition.  Neck: Trachea midline, no adenopathy.   Resp: No respiratory distress, adequate depth and rate of respirations.   Cardiac: S1/S2,RRR, no murmurs. No " cyanosis, JVD, or peripheral edema.  Abdomen: BS active, abdomen soft and non-tender. No masses or organomegaly.   MS:  In wheelchair, R) boot on  Skin: No rashes, lesions, or wounds.  Neuro: A/O x 4, sensation intact.   Psych: Appropriate mentation and affect.     Laboratory Results:   Results for orders placed or performed in visit on 07/01/25   PSA tumor marker     Status: Normal   Result Value Ref Range    PSA Tumor Marker 0.39 0.00 - 4.50 ng/mL    Narrative    This result is obtained using the Roche Elecsys total PSA method on the janny e601 immunoassay analyzer, which is an ultrasensitive method. Results obtained with different assay methods or kits cannot be used interchangeably.  An undetectable (<0.01 ng/mL) ultrasensitive prostate-specific antigen (USPSA) concentration after radical prostatectomy is reassuring and may aid in postoperative risk stratification of patients.     A detectable USPSA concentration (> or =0.01 ng/mL) after radical prostatectomy (RP) does not necessarily translate into disease progression or recurrence. Interpretation of a detectable USPSA needs to be made in conjunction with other clinicopathologic risk factors. The cutpoint for interpretation of USPSA assays remains controversial and has ranged from 0.01 to 0.05 ng/mL. For example, in a study that included 754 men after RP, a cutpoint of 0.01 ng/mL was an independent predictor of biochemical recurrence (BCR). BCR-free survival at 5 years was 92.4% for patients with an USPSA post-RP of less than 0.01 ng/mL and 56.8% for patients with an USPSA post-RP of 0.01 ng/mL or higher.(1) In the same study a cutoff of 0.03 ng/mL also predicted BCR independent of clinicopathological factors and BCR-free survival at 5 yrs was 90.8% for patients with an USPSA post-RP of less than 0.03 ng/mL and 26.9% for patients with a PSA post-RP of greater or equal to 0.03 ng/mL. (1)    1. Mellissa VIEIRA, Antolin YUN, Giuliano LANG, et al. Do ultrasensitive prostate  specific antigen measurements have a role in predicting long-term biochemical recurrence-free survival in men after radical prostatectomy? J Urol. 2016;195(2):330-336. doi:10.1016/j.juro.2015.08.080   I reviewed the above labs today.    Assessment and Plan:   Stage IV prostate cancer with mets to the bone  - On treatment with Lupron ADT every 6 months, and Zytiga / prednisone daily.  - Tolerating well with manageable hot flashes.  - PSA ***  - No plan for repeat imaging as he is doing well clinically and PSA down trending.  - Follow-up with Dr. Holliday scheduled in 6 months with Lupron and zometa to follow.    Bone health  - Continue zometa every 3 months, no dental issues  - Continue calcium and vitamin D BID  - No new bone pains    Anemia  - Suspect s/t prostate cancer and treatment  - No s/s of bleeding  - Continue to monitor     R) 5th proximal phalanx and metatarsal head osteomyelitis  - S/p R) foot fifth ray resection on 5-  - Healing process has been slow due to significant PVD/PAD  - Continues to follow PCP and podiatry closely      Maylin CARDOZA, CNP        The longitudinal plan of care for the diagnosis(es)/condition(s) as documented were addressed during this visit. Due to the added complexity in care, I will continue to support Sushil in the subsequent management and with ongoing continuity of care.        Again, thank you for allowing me to participate in the care of your patient.        Sincerely,        NANI Marmolejo CNP    Electronically signed

## 2025-07-02 DIAGNOSIS — C79.51 PROSTATE CANCER METASTATIC TO BONE (H): Primary | ICD-10-CM

## 2025-07-02 DIAGNOSIS — C61 PROSTATE CANCER METASTATIC TO BONE (H): Primary | ICD-10-CM

## 2025-07-02 RX ORDER — ABIRATERONE ACETATE 250 MG/1
1000 TABLET ORAL DAILY
Qty: 120 TABLET | Refills: 0 | OUTPATIENT
Start: 2025-07-02 | End: 2025-08-01

## 2025-07-03 ENCOUNTER — RESULTS FOLLOW-UP (OUTPATIENT)
Dept: ONCOLOGY | Facility: CLINIC | Age: 65
End: 2025-07-03

## 2025-07-05 ENCOUNTER — HEALTH MAINTENANCE LETTER (OUTPATIENT)
Age: 65
End: 2025-07-05

## 2025-07-14 ENCOUNTER — HOSPITAL ENCOUNTER (OUTPATIENT)
Dept: ULTRASOUND IMAGING | Facility: CLINIC | Age: 65
Discharge: HOME | End: 2025-07-14
Payer: MEDICARE

## 2025-07-14 ENCOUNTER — OFFICE VISIT (OUTPATIENT)
Dept: OTHER | Facility: CLINIC | Age: 65
End: 2025-07-14
Attending: SURGERY
Payer: MEDICARE

## 2025-07-14 VITALS — HEART RATE: 60 BPM | SYSTOLIC BLOOD PRESSURE: 154 MMHG | DIASTOLIC BLOOD PRESSURE: 76 MMHG

## 2025-07-14 DIAGNOSIS — I70.221 CRITICAL LIMB ISCHEMIA OF RIGHT LOWER EXTREMITY (H): Primary | ICD-10-CM

## 2025-07-14 DIAGNOSIS — I73.9 PAD (PERIPHERAL ARTERY DISEASE): ICD-10-CM

## 2025-07-14 PROCEDURE — 93926 LOWER EXTREMITY STUDY: CPT | Mod: RT

## 2025-07-14 PROCEDURE — 3077F SYST BP >= 140 MM HG: CPT | Performed by: SURGERY

## 2025-07-14 PROCEDURE — 3078F DIAST BP <80 MM HG: CPT | Performed by: SURGERY

## 2025-07-14 PROCEDURE — G0463 HOSPITAL OUTPT CLINIC VISIT: HCPCS | Performed by: SURGERY

## 2025-07-14 PROCEDURE — 99213 OFFICE O/P EST LOW 20 MIN: CPT | Performed by: SURGERY

## 2025-07-14 RX ORDER — CLOPIDOGREL BISULFATE 75 MG/1
75 TABLET ORAL DAILY
Qty: 90 TABLET | Refills: 3 | Status: SHIPPED | OUTPATIENT
Start: 2025-07-14

## 2025-07-14 NOTE — PROGRESS NOTES
[facility]        Patient is here for a  follow up.    Pt is currently taking Statin and Plavix.    BP (!) 154/76 (BP Location: Right arm, Patient Position: Chair, Cuff Size: Adult Regular)   Pulse 60     The provider has been notified that the patient has no concerns.     Questions patient would like addressed today are: N/A.    Refills are needed: N/A    Has homecare services and agency name:  Maribel Manzo MA

## 2025-07-14 NOTE — PATIENT INSTRUCTIONS
Thank you for allowing [facility] to provide your medical care.      Please see below for the follow up instructions pertaining to your medical appointment today with [facility].      Follow up plan:     Reach out to Dr. Bretrand if wound does not heal.            Our office will send you a letter by mail, closer to your follow up time frame, with a reminder to call to schedule appointment(s).      Please call our office with any concerns or questions, [phone].

## 2025-07-14 NOTE — PROGRESS NOTES
It was a pleasure to see Mr. Sushil Diana in the vascular surgery clinic today.  He is a very pleasant 65-year-old gentleman with right lower extremity critical limb ischemia..  Right lower extremity arteriogram on 27 May 2025.  There was recanalization of the distal superficial femoral and popliteal arteries with balloon angioplasty.  His main runoff was the peroneal artery.    He then subsequently underwent amputation of the right fifth ray.  He tells me that he has seen Dr. Barrett from podiatry and it is slowly healing.    On my examination he has strong monophasic signals in the dorsalis pedis and posterior tibial arteries in the right foot.  Noninvasive arterial imaging shows patent distal superficial femoral and popliteal arteries with monophasic flow identified in the peroneal, distal anterior tibial and posterior tibial arteries.    Explained the findings to the patient and to his significant other.  We are going to stay optimistic that this will slowly heal.    If there are problems with the healing of the surgical site that the next step would be hyperbaric oxygen therapy.  They live in [place], [place].  I will try and look for a hyperbaric oxygen therapy center in that area.

## 2025-07-16 DIAGNOSIS — K21.9 GASTROESOPHAGEAL REFLUX DISEASE WITHOUT ESOPHAGITIS: ICD-10-CM

## 2025-07-16 RX ORDER — FAMOTIDINE 40 MG/1
40 TABLET, FILM COATED ORAL
Qty: 180 TABLET | Refills: 1 | Status: SHIPPED | OUTPATIENT
Start: 2025-07-16

## 2025-07-16 NOTE — TELEPHONE ENCOUNTER
"This mediation was discontinued from patient med list on 5/30/25 when discharged from hospital.     Office visit note 6/5/25: \"Gastroesophageal reflux disease with esophagitis without hemorrhage  Discontinue H2 blocker in favor of Protonix as he is continue to have symptoms.  - pantoprazole (PROTONIX) 40 MG EC tablet; Take 1 tablet (40 mg) by mouth daily.\"      MyChart sent to clarify refill request.     BRYANNA KevinN, RN    "

## 2025-07-22 DIAGNOSIS — N18.31 CHRONIC KIDNEY DISEASE, STAGE 3A (H): ICD-10-CM

## 2025-07-22 RX ORDER — POTASSIUM CHLORIDE 1500 MG/1
20 TABLET, EXTENDED RELEASE ORAL 2 TIMES DAILY
Qty: 180 TABLET | Refills: 2 | Status: SHIPPED | OUTPATIENT
Start: 2025-07-22

## 2025-08-08 DIAGNOSIS — I50.41 ACUTE COMBINED SYSTOLIC AND DIASTOLIC CONGESTIVE HEART FAILURE (H): ICD-10-CM

## 2025-08-08 DIAGNOSIS — I10 HYPERTENSION, UNSPECIFIED TYPE: ICD-10-CM

## 2025-08-08 RX ORDER — METOPROLOL TARTRATE 50 MG
50 TABLET ORAL
Qty: 180 TABLET | Refills: 1 | OUTPATIENT
Start: 2025-08-08

## 2025-08-11 DIAGNOSIS — C61 PROSTATE CANCER METASTATIC TO BONE (H): ICD-10-CM

## 2025-08-11 DIAGNOSIS — C79.51 PROSTATE CANCER METASTATIC TO BONE (H): ICD-10-CM

## 2025-08-11 RX ORDER — ABIRATERONE ACETATE 250 MG/1
TABLET ORAL
Qty: 120 TABLET | Refills: 0 | OUTPATIENT
Start: 2025-08-11

## 2025-08-11 RX ORDER — HYDROCHLOROTHIAZIDE 25 MG/1
25 TABLET ORAL DAILY
Qty: 90 TABLET | Refills: 1 | Status: SHIPPED | OUTPATIENT
Start: 2025-08-11

## 2025-08-11 RX ORDER — LISINOPRIL 40 MG/1
40 TABLET ORAL DAILY
Qty: 90 TABLET | Refills: 1 | Status: SHIPPED | OUTPATIENT
Start: 2025-08-11

## 2025-08-14 ENCOUNTER — OFFICE VISIT (OUTPATIENT)
Dept: PODIATRY | Facility: CLINIC | Age: 65
End: 2025-08-14
Payer: MEDICARE

## 2025-08-14 VITALS — BODY MASS INDEX: 23.95 KG/M2 | TEMPERATURE: 98.3 F | WEIGHT: 158 LBS | HEIGHT: 68 IN

## 2025-08-14 DIAGNOSIS — C79.51 PROSTATE CANCER METASTATIC TO BONE (H): Primary | ICD-10-CM

## 2025-08-14 DIAGNOSIS — I73.9 PERIPHERAL ARTERIAL DISEASE: ICD-10-CM

## 2025-08-14 DIAGNOSIS — Z89.421 S/P AMPUTATION OF LESSER TOE, RIGHT: Primary | ICD-10-CM

## 2025-08-14 DIAGNOSIS — E11.65 TYPE 2 DIABETES MELLITUS WITH HYPERGLYCEMIA, WITHOUT LONG-TERM CURRENT USE OF INSULIN (H): ICD-10-CM

## 2025-08-14 DIAGNOSIS — C61 PROSTATE CANCER METASTATIC TO BONE (H): Primary | ICD-10-CM

## 2025-08-14 RX ORDER — ABIRATERONE ACETATE 250 MG/1
1000 TABLET ORAL DAILY
Qty: 120 TABLET | Refills: 0 | OUTPATIENT
Start: 2025-08-14 | End: 2025-09-13

## 2025-08-14 ASSESSMENT — PAIN SCALES - GENERAL: PAINLEVEL_OUTOF10: NO PAIN (0)

## 2025-08-18 ENCOUNTER — TELEPHONE (OUTPATIENT)
Dept: INTERNAL MEDICINE | Facility: CLINIC | Age: 65
End: 2025-08-18
Payer: MEDICARE

## 2025-08-26 ENCOUNTER — TRANSFERRED RECORDS (OUTPATIENT)
Dept: HEALTH INFORMATION MANAGEMENT | Facility: CLINIC | Age: 65
End: 2025-08-26
Payer: MEDICARE

## 2025-08-26 LAB — RETINOPATHY: POSITIVE

## (undated) DEVICE — GLOVE PROTEXIS POWDER FREE 6.5 ORTHO LIME 2D73ET65

## (undated) DEVICE — TRAY PREP DRY SKIN SCRUB 067

## (undated) DEVICE — SU PROLENE 2-0 FS 18" 8685H

## (undated) DEVICE — LINEN TOWEL PACK X5 5464

## (undated) DEVICE — SUCTION MANIFOLD NEPTUNE 2 SYS 4 PORT 0702-020-000

## (undated) DEVICE — ESU GROUND PAD E7506

## (undated) DEVICE — SU PROLENE 3-0 PS-2 18" 8687H

## (undated) DEVICE — SOL WATER IRRIG 1000ML BOTTLE 2F7114

## (undated) DEVICE — PACK EXTREMITY SOP15EXFSD

## (undated) DEVICE — ESU GROUND PAD UNIVERSAL W/O CORD

## (undated) DEVICE — KIT TURNOVER FAIRVIEW SOUTHDALE FULL SP3889

## (undated) DEVICE — GLOVE PROTEXIS BLUE W/NEU-THERA 7.0  2D73EB70

## (undated) DEVICE — BLADE SAW OSCILLATING STRYK MED 9.0X25X0.38MM 2296-003-111

## (undated) RX ORDER — ONDANSETRON 2 MG/ML
INJECTION INTRAMUSCULAR; INTRAVENOUS
Status: DISPENSED
Start: 2025-05-28

## (undated) RX ORDER — PROPOFOL 10 MG/ML
INJECTION, EMULSION INTRAVENOUS
Status: DISPENSED
Start: 2025-05-28

## (undated) RX ORDER — HEPARIN SODIUM 200 [USP'U]/100ML
INJECTION, SOLUTION INTRAVENOUS
Status: DISPENSED
Start: 2025-05-27

## (undated) RX ORDER — HEPARIN SODIUM 1000 [USP'U]/ML
INJECTION, SOLUTION INTRAVENOUS; SUBCUTANEOUS
Status: DISPENSED
Start: 2025-05-27

## (undated) RX ORDER — DEXAMETHASONE SODIUM PHOSPHATE 4 MG/ML
INJECTION, SOLUTION INTRA-ARTICULAR; INTRALESIONAL; INTRAMUSCULAR; INTRAVENOUS; SOFT TISSUE
Status: DISPENSED
Start: 2025-05-28

## (undated) RX ORDER — LIDOCAINE HYDROCHLORIDE 10 MG/ML
INJECTION, SOLUTION INFILTRATION; PERINEURAL
Status: DISPENSED
Start: 2025-05-27

## (undated) RX ORDER — PROTAMINE SULFATE 10 MG/ML
INJECTION, SOLUTION INTRAVENOUS
Status: DISPENSED
Start: 2025-05-27

## (undated) RX ORDER — BUPIVACAINE HYDROCHLORIDE 5 MG/ML
INJECTION, SOLUTION EPIDURAL; INTRACAUDAL; PERINEURAL
Status: DISPENSED
Start: 2025-05-28